# Patient Record
Sex: MALE | Race: BLACK OR AFRICAN AMERICAN | NOT HISPANIC OR LATINO | Employment: OTHER | URBAN - METROPOLITAN AREA
[De-identification: names, ages, dates, MRNs, and addresses within clinical notes are randomized per-mention and may not be internally consistent; named-entity substitution may affect disease eponyms.]

---

## 2017-01-05 ENCOUNTER — OFFICE VISIT (OUTPATIENT)
Dept: OPHTHALMOLOGY | Facility: CLINIC | Age: 72
End: 2017-01-05
Payer: MEDICARE

## 2017-01-05 DIAGNOSIS — H35.033 HYPERTENSIVE RETINOPATHY OF BOTH EYES: ICD-10-CM

## 2017-01-05 DIAGNOSIS — H34.8310 BRANCH RETINAL VEIN OCCLUSION WITH MACULAR EDEMA OF RIGHT EYE: Primary | ICD-10-CM

## 2017-01-05 DIAGNOSIS — H35.371 EPIRETINAL MEMBRANE, RIGHT EYE: ICD-10-CM

## 2017-01-05 PROCEDURE — 92226 PR SPECIAL EYE EXAM, SUBSEQUENT: CPT | Mod: 50,S$PBB,, | Performed by: OPHTHALMOLOGY

## 2017-01-05 PROCEDURE — 92226 PR SPECIAL EYE EXAM, SUBSEQUENT: CPT | Mod: 50,PBBFAC,PO | Performed by: OPHTHALMOLOGY

## 2017-01-05 PROCEDURE — 92014 COMPRE OPH EXAM EST PT 1/>: CPT | Mod: S$PBB,,, | Performed by: OPHTHALMOLOGY

## 2017-01-05 PROCEDURE — 92134 CPTRZ OPH DX IMG PST SGM RTA: CPT | Mod: PBBFAC,PO | Performed by: OPHTHALMOLOGY

## 2017-01-05 PROCEDURE — 99213 OFFICE O/P EST LOW 20 MIN: CPT | Mod: PBBFAC,PO | Performed by: OPHTHALMOLOGY

## 2017-01-05 PROCEDURE — 99999 PR PBB SHADOW E&M-EST. PATIENT-LVL III: CPT | Mod: PBBFAC,,, | Performed by: OPHTHALMOLOGY

## 2017-01-05 NOTE — PROGRESS NOTES
OCT - CME resolved  OS - No ME    Prior FA- late macular leakage with vascular distortion OD  OS - NO leakage    A/P    1. ERM OD    2. Macular BRVO OD (possible)  3. CME  Avastin x 1 (12/29/15)  OCT CME did not improve with Avastin    S/p  25g PPV/MP/AFx OD 3/23/16    Very adherent hyaloid    Doing well, good IOP off gtts    Still ME - NI with Avastin  ME resolved with Ozurdex, but no Va improvement - manage OD conservatively going forward    BCVA 2013 was 20/25    4. NS OU    5. HTN Ret OU  BP control      12 months OCT

## 2017-01-05 NOTE — MR AVS SNAPSHOT
[place] [facility]   [facility]  [place] [address]  Phone: [phone]  Fax: [phone]                  Sidney Lanza   2017 9:30 AM   Office Visit    Description:  Male : 1945   Provider:  DIMITRI Infante MD   Department:  [place] [facility]           Reason for Visit     Concerns About Ocular Health           Diagnoses this Visit        Comments    Branch retinal vein occlusion with macular edema of right eye    -  Primary     Epiretinal membrane, right eye         Hypertensive retinopathy of both eyes                To Do List           Future Appointments        Provider Department Dept Phone    2017 11:30 AM Miri Rush MD [facility] [phone]    3/13/2017 1:40 PM Sunny Soto MD [place] [facility] [phone]      Goals (5 Years of Data)     None      Follow-Up and Disposition     Return in about 1 year (around 2018).      [facility] On Call     [facility] On Call Nurse Care Line -  Assistance  Registered nurses in the [facility] On Call Center provide clinical advisement, health education, appointment booking, and other advisory services.  Call for this free service at [phone].             Medications           Message regarding Medications     Verify the changes and/or additions to your medication regime listed below are the same as discussed with your clinician today.  If any of these changes or additions are incorrect, please notify your healthcare provider.             Verify that the below list of medications is an accurate representation of the medications you are currently taking.  If none reported, the list may be blank. If incorrect, please contact your healthcare provider. Carry this list with you in case of emergency.           Current Medications     amlodipine (NORVASC) 10 MG tablet TAKE 1 TABLET DAILY    diclofenac-misoprostol  mg-mcg (ARTHROTEC 75)  mg-mcg per tablet TAKE  1 TABLET TWICE A DAY FOR ARTHRITIS PAIN.    FIBER, DEXTRIN, ORAL Take by mouth.    fluticasone (FLONASE) 50 mcg/actuation nasal spray 2 sprays by Each Nare route once daily.    multivitamin capsule Take 1 capsule by mouth once daily.           Clinical Reference Information           Allergies as of 1/5/2017     No Known Allergies      Immunizations Administered on Date of Encounter - 1/5/2017     None      Orders Placed During Today's Visit      Normal Orders This Visit    Posterior Segment OCT Retina-Both eyes     Future Labs/Procedures Expected by Expires    Posterior Segment OCT Retina-Both eyes  As directed 1/5/2018

## 2017-03-13 ENCOUNTER — OFFICE VISIT (OUTPATIENT)
Dept: INTERNAL MEDICINE | Facility: CLINIC | Age: 72
End: 2017-03-13
Payer: MEDICARE

## 2017-03-13 ENCOUNTER — TELEPHONE (OUTPATIENT)
Dept: INTERNAL MEDICINE | Facility: CLINIC | Age: 72
End: 2017-03-13

## 2017-03-13 VITALS
SYSTOLIC BLOOD PRESSURE: 138 MMHG | HEIGHT: 70 IN | DIASTOLIC BLOOD PRESSURE: 78 MMHG | RESPIRATION RATE: 15 BRPM | HEART RATE: 60 BPM | BODY MASS INDEX: 24.2 KG/M2 | WEIGHT: 169.75 LBS | DIASTOLIC BLOOD PRESSURE: 70 MMHG | TEMPERATURE: 98 F | TEMPERATURE: 98 F | WEIGHT: 169 LBS | BODY MASS INDEX: 24.3 KG/M2 | HEART RATE: 68 BPM | HEIGHT: 70 IN | SYSTOLIC BLOOD PRESSURE: 138 MMHG

## 2017-03-13 DIAGNOSIS — E78.5 HYPERLIPIDEMIA, UNSPECIFIED HYPERLIPIDEMIA TYPE: ICD-10-CM

## 2017-03-13 DIAGNOSIS — M15.9 PRIMARY OSTEOARTHRITIS INVOLVING MULTIPLE JOINTS: ICD-10-CM

## 2017-03-13 DIAGNOSIS — R29.898 RIGHT ARM WEAKNESS: ICD-10-CM

## 2017-03-13 DIAGNOSIS — H35.371 EPIRETINAL MEMBRANE, RIGHT EYE: ICD-10-CM

## 2017-03-13 DIAGNOSIS — H25.13 NUCLEAR SCLEROSIS, BILATERAL: ICD-10-CM

## 2017-03-13 DIAGNOSIS — R35.1 NOCTURIA: ICD-10-CM

## 2017-03-13 DIAGNOSIS — I10 ESSENTIAL HYPERTENSION: Chronic | ICD-10-CM

## 2017-03-13 DIAGNOSIS — H34.8310 BRANCH RETINAL VEIN OCCLUSION WITH MACULAR EDEMA OF RIGHT EYE: ICD-10-CM

## 2017-03-13 DIAGNOSIS — I10 ESSENTIAL HYPERTENSION: Primary | Chronic | ICD-10-CM

## 2017-03-13 DIAGNOSIS — M19.90 ARTHRITIS: ICD-10-CM

## 2017-03-13 DIAGNOSIS — H35.033 HYPERTENSIVE RETINOPATHY OF BOTH EYES: ICD-10-CM

## 2017-03-13 DIAGNOSIS — D64.9 ANEMIA, UNSPECIFIED TYPE: ICD-10-CM

## 2017-03-13 DIAGNOSIS — N52.9 IMPOTENCE OF ORGANIC ORIGIN: ICD-10-CM

## 2017-03-13 DIAGNOSIS — J31.0 CHRONIC RHINITIS: ICD-10-CM

## 2017-03-13 DIAGNOSIS — H34.8392 BRANCH RETINAL VEIN OCCLUSION: Primary | ICD-10-CM

## 2017-03-13 DIAGNOSIS — Z85.46 HISTORY OF PROSTATE CANCER: ICD-10-CM

## 2017-03-13 DIAGNOSIS — Z00.00 ENCOUNTER FOR PREVENTIVE HEALTH EXAMINATION: ICD-10-CM

## 2017-03-13 DIAGNOSIS — Z13.6 SCREENING FOR AAA (ABDOMINAL AORTIC ANEURYSM): Primary | ICD-10-CM

## 2017-03-13 DIAGNOSIS — H35.371 ERM OD (EPIRETINAL MEMBRANE, RIGHT EYE): ICD-10-CM

## 2017-03-13 PROCEDURE — G0438 PPPS, INITIAL VISIT: HCPCS | Mod: ,,, | Performed by: NURSE PRACTITIONER

## 2017-03-13 PROCEDURE — 99214 OFFICE O/P EST MOD 30 MIN: CPT | Mod: PBBFAC,PO | Performed by: NURSE PRACTITIONER

## 2017-03-13 PROCEDURE — 99214 OFFICE O/P EST MOD 30 MIN: CPT | Mod: S$PBB,,, | Performed by: INTERNAL MEDICINE

## 2017-03-13 PROCEDURE — 99213 OFFICE O/P EST LOW 20 MIN: CPT | Mod: PBBFAC,27,PO | Performed by: INTERNAL MEDICINE

## 2017-03-13 PROCEDURE — 99999 PR PBB SHADOW E&M-EST. PATIENT-LVL III: CPT | Mod: PBBFAC,,, | Performed by: INTERNAL MEDICINE

## 2017-03-13 PROCEDURE — 99999 PR PBB SHADOW E&M-EST. PATIENT-LVL IV: CPT | Mod: PBBFAC,,, | Performed by: NURSE PRACTITIONER

## 2017-03-13 NOTE — Clinical Note
Primary Care Providers: Sunny oSto MD, MD (General)  Your patient was seen today for a HRA visit. Gap(s) in care (HEDIS gaps) have been identified during this visit that require additional testing and possible follow up.  Colonoscopy- has anemia on labs Hep c screen- has been ordered by you AAA screen- exsmoker- orders forwarded for you to approve   I have included a copy of my visit note; please review the note and feel free to contact me with any questions.   Thank you for allowing me to participate in the care of your patients. Deloris Talley NP

## 2017-03-13 NOTE — PROGRESS NOTES
Subjective:       Patient ID: iSdney Lanza is a 71 y.o. male.    Chief Complaint: Hypertension (4 mo follow up)    HPI   The patient presents for follow-up of hypertension and other medical conditions.  He is tolerating his blood pressure medication well without side effects.  He still notes right shoulder pain.  The pain as aching in character.  Mobility is normal.  He is performing home therapy exercises as prescribed by his physical therapist.  There is no sleep disruption due to right shoulder pain.    The patient uses Flonase daily as needed for sinus congestion.  Review of Systems   Constitutional: Negative for activity change, appetite change, fatigue and unexpected weight change.   HENT: Positive for congestion and postnasal drip. Negative for sinus pressure and sore throat.    Eyes: Negative for visual disturbance.   Respiratory: Negative for cough, chest tightness, shortness of breath and wheezing.    Cardiovascular: Negative for chest pain, palpitations and leg swelling.   Gastrointestinal: Negative for abdominal pain, blood in stool, nausea and vomiting.   Genitourinary: Negative for dysuria, hematuria and urgency.   Musculoskeletal: Positive for arthralgias. Negative for back pain, gait problem, joint swelling, myalgias, neck pain and neck stiffness.   Skin: Negative for color change and rash.   Neurological: Negative for dizziness, syncope, weakness, light-headedness, numbness and headaches.   Psychiatric/Behavioral: Negative for sleep disturbance.       Objective:      Physical Exam   Constitutional: He is oriented to person, place, and time. He appears well-developed and well-nourished. No distress.   HENT:   Head: Normocephalic and atraumatic.   Eyes: Conjunctivae and EOM are normal. No scleral icterus.   Neck: Normal range of motion. Neck supple. No JVD present. No thyromegaly present.   Cardiovascular: Normal rate, regular rhythm, normal heart sounds and intact distal pulses.  Exam reveals no  gallop and no friction rub.    No murmur heard.  Pulmonary/Chest: Effort normal and breath sounds normal. No respiratory distress. He has no wheezes. He has no rales.   Abdominal: Soft. Bowel sounds are normal. He exhibits no mass. There is no tenderness.   Musculoskeletal: Normal range of motion.   Shoulder range of motion is intact bilaterally.  No localized tenderness is present at this time.   Lymphadenopathy:     He has no cervical adenopathy.   Neurological: He is alert and oriented to person, place, and time.   Gait is normal.   Skin: Skin is warm and dry. No rash noted.   Nursing note and vitals reviewed.      Assessment:       1. Essential hypertension    2. Hyperlipidemia, unspecified hyperlipidemia type    3. Arthritis    4. Chronic rhinitis    5. Nocturia        Plan:       Sidney was seen today for hypertension.  Current therapy will be continued.  Blood tests will be obtained in 3 months along with a follow-up visit for general checkup and follow-up.    Diagnoses and all orders for this visit:    Essential hypertension    Hyperlipidemia, unspecified hyperlipidemia type    Arthritis    Chronic rhinitis

## 2017-03-13 NOTE — MR AVS SNAPSHOT
[place] [facility]   [facility]  [place] [address]  Phone: [phone]  Fax: [phone]                  Sidney Lanza   3/13/2017 2:00 PM   Office Visit    Description:  Male : 1945   Provider:  HRA, MET 1   Department:  [place] [facility]           Reason for Visit     Medicare AWV           Diagnoses this Visit        Comments    Branch retinal vein occlusion    -  Primary     Branch retinal vein occlusion with macular edema of right eye         Epiretinal membrane, right eye         ERM OD (epiretinal membrane, right eye)         Essential hypertension         Hyperlipidemia, unspecified hyperlipidemia type         Hypertensive retinopathy of both eyes         Impotence of organic origin         Nuclear sclerosis, bilateral         History of prostate cancer         Right arm weakness         Primary osteoarthritis involving multiple joints         Encounter for preventive health examination                To Do List           Goals (5 Years of Data)     Eat more fruits and vegetables     Maintain a low sodium diet       Follow-Up and Disposition     Return in about 3 months (around 2017).      [facility] On Call     [facility] On Call Nurse [facility] Line - / Assistance  Registered nurses in the [facility] On Call Center provide clinical advisement, health education, appointment booking, and other advisory services.  Call for this free service at [phone].             Medications           Message regarding Medications     Verify the changes and/or additions to your medication regime listed below are the same as discussed with your clinician today.  If any of these changes or additions are incorrect, please notify your healthcare provider.             Verify that the below list of medications is an accurate representation of the medications you are currently taking.  If none reported, the list may be blank. If incorrect, please contact your healthcare  "provider. Carry this list with you in case of emergency.           Current Medications     amlodipine (NORVASC) 10 MG tablet TAKE 1 TABLET DAILY    diclofenac-misoprostol  mg-mcg (ARTHROTEC 75)  mg-mcg per tablet TAKE 1 TABLET TWICE A DAY FOR ARTHRITIS PAIN.    FIBER, DEXTRIN, ORAL Take by mouth.    multivitamin capsule Take 1 capsule by mouth once daily.    fluticasone (FLONASE) 50 mcg/actuation nasal spray 2 sprays by Each Nare route once daily.           Clinical Reference Information           Your Vitals Were     BP Pulse Temp Resp Height Weight    138/70 68 97.8 °F (36.6 °C) (Oral) 15 5' 10" (1.778 m) 76.7 kg (169 lb)    BMI                24.25 kg/m2          Blood Pressure          Most Recent Value    BP  138/70      Allergies as of 3/13/2017     No Known Allergies      Immunizations Administered on Date of Encounter - 3/13/2017     None      Instructions      Counseling and Referral of Other Preventative  (Italic type indicates deductible and co-insurance are waived)    Patient Name: Sidney Lanza  Today's Date: 3/13/2017      SERVICE LIMITATIONS RECOMMENDATION    Vaccines    · Pneumococcal (once after 65)    · Influenza (annually)    · Hepatitis B (if medium/high risk)    · Prevnar 13      Hepatitis B medium/high risk factors:       - End-stage renal disease       - Hemophiliacs who received Factor VII or         IX concentrates       - Clients of institutions for the mentally             retarded       - Persons who live in the same house as          a HepB carrier       - Homosexual men       - Illicit injectable drug abusers     Pneumococcal: current     Influenza: current     Hepatitis B: declined     Prevnar 13: current    Prostate cancer screening (annually to age 75)     Prostate specific antigen (PSA) Shared decision making with Provider. Sometimes a co-pay may be required if the patient decides to have this test. The USPSTF no longer recommends prostate cancer screening routinely in " medicine:   current    Colorectal cancer screening (to age 75)    · Fecal occult blood test (annual)  · Flexible sigmoidoscopy (5y)  · Screening colonoscopy (10y)  · Barium enema       Diabetes self-management training (no USPSTF recommendations)  Requires referral by treating physician for patient with diabetes or renal disease. 10 hours of initial DSMT sessions of no less than 30 minutes each in a continuous 12-month period. 2 hours of follow-up DSMT in subsequent years.  N/A    Glaucoma screening (no USPSTF recommendation)  Diabetes mellitus, family history   , age 50 or over    American, age 65 or over  current    Medical nutrition therapy for diabetes or renal disease (no recommended schedule)  Requires referral by treating physician for patient with diabetes or renal disease or kidney transplant within the past 3 years.  Can be provided in same year as diabetes self-management training (DSMT), and CMS recommends medical nutrition therapy take place after DSMT. Up to 3 hours for initial year and 2 hours in subsequent years.  N/A    Cardiovascular screening blood tests (every 5 years)  · Fasting lipid panel  Order as a panel if possible  current    Diabetes screening tests (at least every 3 years, Medicare covers annually or at 6-month intervals for prediabetic patients)  · Fasting blood sugar (FBS) or glucose tolerance test (GTT)  Patient must be diagnosed with one of the following:       - Hypertension       - Dyslipidemia       - Obesity (BMI 30kg/m2)       - Previous elevated impaired FBS or GTT       ... or any two of the following:       - Overweight (BMI 25 but <30)       - Family history of diabetes       - Age 65 or older       - History of gestational diabetes or birth of baby weighing more than 9 pounds  current    Abdominal aortic aneurysm screening (once)  · Sonogram   Limited to patients who meet one of the following criteria:       - Men who are 65-75 years old and have  smoked more than 100 cigarette in their lifetime       - Anyone with a family history of abdominal aortic aneurysm       - Anyone recommended for screening by the USPSTF  message sent to PCP to order- Pt to contact Dr Soto to arrange    HIV screening (annually for increased risk patients)  · HIV-1 and HIV-2 by EIA, or POLA, rapid antibody test or oral mucosa transudate  Patients must be at increased risk for HIV infection per USPSTF guidelines or pregnant. Tests covered annually for patient at increased risk or as requested by the patient. Pregnant patients may receive up to 3 tests during pregnancy.  Risks discussed, screening is declined    Smoking cessation counseling (up to 8 sessions per year)  Patients must be asymptomatic of tobacco-related conditions to receive as a preventative service.  N/A    Subsequent annual wellness visit  At least 12 months since last AWV  Return in one year     The following information is provided to all patients.  This information is to help you find resources for any of the problems found today that may be affecting your health:                Living healthy guide: www.[facility].[place].[facility]      Understanding Diabetes: www.diabetes.org      Eating healthy: www.cdc.gov/healthyweight      [facility] home safety checklist: www.cdc.gov/steadi/patient.html      Agency on Aging: www.goea.[place].[facility]      Alcoholics anonymous (AA): www.aa.org      Physical Activity: www.nathaly.nih.gov/kv5yqut      Tobacco use: www.quitwithusla.org          Language Assistance Services     ATTENTION: Language assistance services are available, free of charge. Please call [phone].      ATENCIÓN: Si habla español, tiene a martino disposición servicios gratuitos de asistencia lingüística. Llame al [phone].     CHÚ Ý: N?u b?n nói Ti?ng Vi?t, có các d?ch v? h? tr? ngôn ng? mi?n phí dành cho b?n. G?i s? [phone].         [facility] [facility] complies with applicable Federal civil rights laws and  does not discriminate on the basis of race, color, national origin, age, disability, or sex.

## 2017-03-13 NOTE — PATIENT INSTRUCTIONS
Counseling and Referral of Other Preventative  (Italic type indicates deductible and co-insurance are waived)    Patient Name: Sidney Lanza  Today's Date: 3/13/2017      SERVICE LIMITATIONS RECOMMENDATION    Vaccines    · Pneumococcal (once after 65)    · Influenza (annually)    · Hepatitis B (if medium/high risk)    · Prevnar 13      Hepatitis B medium/high risk factors:       - End-stage renal disease       - Hemophiliacs who received Factor VII or         IX concentrates       - Clients of institutions for the mentally             retarded       - Persons who live in the same house as          a HepB carrier       - Homosexual men       - Illicit injectable drug abusers     Pneumococcal: current     Influenza: current     Hepatitis B: declined     Prevnar 13: current    Prostate cancer screening (annually to age 75)     Prostate specific antigen (PSA) Shared decision making with Provider. Sometimes a co-pay may be required if the patient decides to have this test. The USPSTF no longer recommends prostate cancer screening routinely in medicine:   current    Colorectal cancer screening (to age 75)    · Fecal occult blood test (annual)  · Flexible sigmoidoscopy (5y)  · Screening colonoscopy (10y)  · Barium enema   recommended -he will discuss with DR Soto    Diabetes self-management training (no USPSTF recommendations)  Requires referral by treating physician for patient with diabetes or renal disease. 10 hours of initial DSMT sessions of no less than 30 minutes each in a continuous 12-month period. 2 hours of follow-up DSMT in subsequent years.  N/A    Glaucoma screening (no USPSTF recommendation)  Diabetes mellitus, family history   , age 50 or over    American, age 65 or over  current    Medical nutrition therapy for diabetes or renal disease (no recommended schedule)  Requires referral by treating physician for patient with diabetes or renal disease or kidney transplant within the  past 3 years.  Can be provided in same year as diabetes self-management training (DSMT), and CMS recommends medical nutrition therapy take place after DSMT. Up to 3 hours for initial year and 2 hours in subsequent years.  N/A    Cardiovascular screening blood tests (every 5 years)  · Fasting lipid panel  Order as a panel if possible  current    Diabetes screening tests (at least every 3 years, Medicare covers annually or at 6-month intervals for prediabetic patients)  · Fasting blood sugar (FBS) or glucose tolerance test (GTT)  Patient must be diagnosed with one of the following:       - Hypertension       - Dyslipidemia       - Obesity (BMI 30kg/m2)       - Previous elevated impaired FBS or GTT       ... or any two of the following:       - Overweight (BMI 25 but <30)       - Family history of diabetes       - Age 65 or older       - History of gestational diabetes or birth of baby weighing more than 9 pounds  current    Abdominal aortic aneurysm screening (once)  · Sonogram   Limited to patients who meet one of the following criteria:       - Men who are 65-75 years old and have smoked more than 100 cigarette in their lifetime       - Anyone with a family history of abdominal aortic aneurysm       - Anyone recommended for screening by the USPSTF  message sent to PCP to order- Pt to contact Dr Soto to schedule    HIV screening (annually for increased risk patients)  · HIV-1 and HIV-2 by EIA, or POLA, rapid antibody test or oral mucosa transudate  Patients must be at increased risk for HIV infection per USPSTF guidelines or pregnant. Tests covered annually for patient at increased risk or as requested by the patient. Pregnant patients may receive up to 3 tests during pregnancy.  Risks discussed, screening is declined    Smoking cessation counseling (up to 8 sessions per year)  Patients must be asymptomatic of tobacco-related conditions to receive as a preventative service.  N/A    Subsequent annual wellness visit   At least 12 months since last AWV  Return in one year     The following information is provided to all patients.  This information is to help you find resources for any of the problems found today that may be affecting your health:                Living healthy guide: www.[facility].[place].[facility]      Understanding Diabetes: www.diabetes.org      Eating healthy: www.cdc.gov/healthyweight      [facility] home safety checklist: www.cdc.gov/steadi/patient.html      Agency on Aging: www.goea.[place].[facility]      Alcoholics anonymous (AA): www.aa.org      Physical Activity: www.nathaly.nih.gov/oz0ayat      Tobacco use: www.quitwithusla.org

## 2017-03-13 NOTE — PROGRESS NOTES
"Sidney Lanza presented for  Medicare AW today. The following components were reviewed and updated:    · Medical history  · Family History  · Social history  · Allergies and Current Medications  · Health Risk Assessment  · Health Maintenance  · Care Team    **See Completed Assessments for Annual Wellness visit with in the encounter summary    The following assessments were completed:  · Depression Screening  · Cognitive function Screening  · Timed Get Up Test  · Whisper Test    Vitals:    03/13/17 1433   BP: 138/70   Pulse: 68   Resp: 15   Temp: 97.8 °F (36.6 °C)   TempSrc: Oral   Weight: 76.7 kg (169 lb)   Height: 5' 10" (1.778 m)     Body mass index is 24.25 kg/(m^2).   ]        Diagnoses and health risks identified today and associated recommendations/orders:    1. Branch retinal vein occlusion  Stable- followed by opthalmology    2. Branch retinal vein occlusion with macular edema of right eye  Stable- followed by opthalmology      3. Epiretinal membrane, right eye  Stable- followed by opthalmology      4. ERM OD (epiretinal membrane, right eye)  Stable- followed by opthalmology      5. Essential hypertension  Stable- followed by PCP     6. Hyperlipidemia, unspecified hyperlipidemia type  Stable- followed by PCP     7. Hypertensive retinopathy of both eyes  Stable- followed by opthalmology      8. Impotence of organic origin  Stable- followed by PCP     9. Nuclear sclerosis, bilateral  Stable- followed by opthalmology      10. History of prostate cancer  Stable- followed by PCP     11. Right arm weakness  Stable- followed by PCP & orthopedic    12. Primary osteoarthritis involving multiple joints  Stable- followed by PCP & orthopedic    13. Encounter for preventive health examination  Assessments completed  Preventative health recommendations reviewed with patient    14 Anemia  Stable- followed by PCP         Provided Sidney with a 5-10 year written screening schedule and personal prevention plan. " Recommendations were developed using the USPSTF age appropriate recommendations. Education, counseling, and referrals were provided as needed.  After Visit Summary printed and given to patient which includes a list of additional screenings\tests needed. He will schedule hep C screen, AAA screen (exsmoker) and discuss need for screening colonoscopy with PCP. Pt education- RENEE to diet. He exercises regularly. Denies dental issues .   Return in about 3 months (around 6/13/2017).      Deloris Talley NP

## 2017-03-14 ENCOUNTER — TELEPHONE (OUTPATIENT)
Dept: INTERNAL MEDICINE | Facility: CLINIC | Age: 72
End: 2017-03-14

## 2017-03-30 ENCOUNTER — CLINICAL SUPPORT (OUTPATIENT)
Dept: CARDIOLOGY | Facility: CLINIC | Age: 72
End: 2017-03-30
Attending: INTERNAL MEDICINE
Payer: MEDICARE

## 2017-03-30 DIAGNOSIS — Z13.6 SCREENING FOR AAA (ABDOMINAL AORTIC ANEURYSM): ICD-10-CM

## 2017-03-30 LAB — VASCULAR ABDOMINAL AORTIC ANEURYSM (AAA): 2.25

## 2017-03-30 PROCEDURE — 93978 VASCULAR STUDY: CPT | Mod: PBBFAC | Performed by: INTERNAL MEDICINE

## 2017-05-30 ENCOUNTER — TELEPHONE (OUTPATIENT)
Dept: ORTHOPEDICS | Facility: CLINIC | Age: 72
End: 2017-05-30

## 2017-05-30 NOTE — TELEPHONE ENCOUNTER
"VM left, calling patient about upcoming appointment. "shoulder trauma" per patient.  Will need new xray, need to know when this injury occurred and move up appt if needed.   "

## 2017-06-14 DIAGNOSIS — M25.511 RIGHT SHOULDER PAIN, UNSPECIFIED CHRONICITY: Primary | ICD-10-CM

## 2017-06-16 ENCOUNTER — LAB VISIT (OUTPATIENT)
Dept: LAB | Facility: HOSPITAL | Age: 72
End: 2017-06-16
Attending: INTERNAL MEDICINE
Payer: MEDICARE

## 2017-06-16 DIAGNOSIS — R35.1 NOCTURIA: ICD-10-CM

## 2017-06-16 DIAGNOSIS — E78.5 HYPERLIPIDEMIA, UNSPECIFIED HYPERLIPIDEMIA TYPE: ICD-10-CM

## 2017-06-16 DIAGNOSIS — I10 ESSENTIAL HYPERTENSION: Chronic | ICD-10-CM

## 2017-06-16 LAB
ALBUMIN SERPL BCP-MCNC: 4.2 G/DL
ALP SERPL-CCNC: 61 U/L
ALT SERPL W/O P-5'-P-CCNC: 36 U/L
ANION GAP SERPL CALC-SCNC: 7 MMOL/L
AST SERPL-CCNC: 30 U/L
BASOPHILS # BLD AUTO: 0.02 K/UL
BASOPHILS NFR BLD: 0.2 %
BILIRUB SERPL-MCNC: 0.5 MG/DL
BUN SERPL-MCNC: 12 MG/DL
CALCIUM SERPL-MCNC: 9.9 MG/DL
CHLORIDE SERPL-SCNC: 107 MMOL/L
CHOLEST/HDLC SERPL: 4.4 {RATIO}
CO2 SERPL-SCNC: 27 MMOL/L
COMPLEXED PSA SERPL-MCNC: <0.01 NG/ML
CREAT SERPL-MCNC: 1.1 MG/DL
DIFFERENTIAL METHOD: ABNORMAL
EOSINOPHIL # BLD AUTO: 0.1 K/UL
EOSINOPHIL NFR BLD: 1.6 %
ERYTHROCYTE [DISTWIDTH] IN BLOOD BY AUTOMATED COUNT: 16.3 %
EST. GFR  (AFRICAN AMERICAN): >60 ML/MIN/1.73 M^2
EST. GFR  (NON AFRICAN AMERICAN): >60 ML/MIN/1.73 M^2
GLUCOSE SERPL-MCNC: 100 MG/DL
HCT VFR BLD AUTO: 38.9 %
HDL/CHOLESTEROL RATIO: 22.6 %
HDLC SERPL-MCNC: 212 MG/DL
HDLC SERPL-MCNC: 48 MG/DL
HGB BLD-MCNC: 12.7 G/DL
LDLC SERPL CALC-MCNC: 145.2 MG/DL
LYMPHOCYTES # BLD AUTO: 2.9 K/UL
LYMPHOCYTES NFR BLD: 35.5 %
MCH RBC QN AUTO: 23.6 PG
MCHC RBC AUTO-ENTMCNC: 32.6 %
MCV RBC AUTO: 72 FL
MONOCYTES # BLD AUTO: 0.6 K/UL
MONOCYTES NFR BLD: 7.6 %
NEUTROPHILS # BLD AUTO: 4.5 K/UL
NEUTROPHILS NFR BLD: 55 %
NONHDLC SERPL-MCNC: 164 MG/DL
PLATELET # BLD AUTO: 258 K/UL
PMV BLD AUTO: 9 FL
POTASSIUM SERPL-SCNC: 4.7 MMOL/L
PROT SERPL-MCNC: 7.7 G/DL
RBC # BLD AUTO: 5.37 M/UL
SODIUM SERPL-SCNC: 141 MMOL/L
TRIGL SERPL-MCNC: 94 MG/DL
TSH SERPL DL<=0.005 MIU/L-ACNC: 1.11 UIU/ML
WBC # BLD AUTO: 8.18 K/UL

## 2017-06-16 PROCEDURE — 84153 ASSAY OF PSA TOTAL: CPT

## 2017-06-16 PROCEDURE — 80053 COMPREHEN METABOLIC PANEL: CPT

## 2017-06-16 PROCEDURE — 36415 COLL VENOUS BLD VENIPUNCTURE: CPT

## 2017-06-16 PROCEDURE — 84443 ASSAY THYROID STIM HORMONE: CPT

## 2017-06-16 PROCEDURE — 85025 COMPLETE CBC W/AUTO DIFF WBC: CPT

## 2017-06-16 PROCEDURE — 80061 LIPID PANEL: CPT

## 2017-06-22 ENCOUNTER — OFFICE VISIT (OUTPATIENT)
Dept: INTERNAL MEDICINE | Facility: CLINIC | Age: 72
End: 2017-06-22
Payer: MEDICARE

## 2017-06-22 ENCOUNTER — LAB VISIT (OUTPATIENT)
Dept: LAB | Facility: HOSPITAL | Age: 72
End: 2017-06-22
Attending: INTERNAL MEDICINE
Payer: MEDICARE

## 2017-06-22 VITALS
BODY MASS INDEX: 24.61 KG/M2 | WEIGHT: 171.94 LBS | TEMPERATURE: 98 F | RESPIRATION RATE: 14 BRPM | HEART RATE: 65 BPM | DIASTOLIC BLOOD PRESSURE: 78 MMHG | SYSTOLIC BLOOD PRESSURE: 138 MMHG | HEIGHT: 70 IN

## 2017-06-22 DIAGNOSIS — M15.9 PRIMARY OSTEOARTHRITIS INVOLVING MULTIPLE JOINTS: ICD-10-CM

## 2017-06-22 DIAGNOSIS — I10 ESSENTIAL HYPERTENSION: Primary | Chronic | ICD-10-CM

## 2017-06-22 DIAGNOSIS — D64.9 ANEMIA, UNSPECIFIED TYPE: ICD-10-CM

## 2017-06-22 DIAGNOSIS — Z91.89 ENCOUNTER FOR HEPATITIS C VIRUS SCREENING TEST FOR HIGH RISK PATIENT: ICD-10-CM

## 2017-06-22 DIAGNOSIS — J31.0 CHRONIC RHINITIS: ICD-10-CM

## 2017-06-22 DIAGNOSIS — Z11.59 ENCOUNTER FOR HEPATITIS C VIRUS SCREENING TEST FOR HIGH RISK PATIENT: ICD-10-CM

## 2017-06-22 DIAGNOSIS — E78.5 HYPERLIPIDEMIA, UNSPECIFIED HYPERLIPIDEMIA TYPE: ICD-10-CM

## 2017-06-22 PROCEDURE — 36415 COLL VENOUS BLD VENIPUNCTURE: CPT | Mod: PO

## 2017-06-22 PROCEDURE — 1125F AMNT PAIN NOTED PAIN PRSNT: CPT | Mod: ,,, | Performed by: INTERNAL MEDICINE

## 2017-06-22 PROCEDURE — 99999 PR PBB SHADOW E&M-EST. PATIENT-LVL III: CPT | Mod: PBBFAC,,, | Performed by: INTERNAL MEDICINE

## 2017-06-22 PROCEDURE — 99214 OFFICE O/P EST MOD 30 MIN: CPT | Mod: S$PBB,,, | Performed by: INTERNAL MEDICINE

## 2017-06-22 PROCEDURE — 1159F MED LIST DOCD IN RCRD: CPT | Mod: ,,, | Performed by: INTERNAL MEDICINE

## 2017-06-22 PROCEDURE — 86803 HEPATITIS C AB TEST: CPT

## 2017-06-22 PROCEDURE — 99213 OFFICE O/P EST LOW 20 MIN: CPT | Mod: PBBFAC,PO | Performed by: INTERNAL MEDICINE

## 2017-06-22 RX ORDER — DICLOFENAC SODIUM AND MISOPROSTOL 75; 200 MG/1; UG/1
1 TABLET, DELAYED RELEASE ORAL DAILY
Qty: 90 TABLET | Refills: 3 | Status: SHIPPED | OUTPATIENT
Start: 2017-06-22 | End: 2017-09-20 | Stop reason: SDUPTHER

## 2017-06-22 RX ORDER — FLUTICASONE PROPIONATE 50 MCG
2 SPRAY, SUSPENSION (ML) NASAL DAILY
Qty: 3 BOTTLE | Refills: 3 | Status: SHIPPED | OUTPATIENT
Start: 2017-06-22 | End: 2018-07-25 | Stop reason: SDUPTHER

## 2017-06-22 NOTE — PROGRESS NOTES
Subjective:       Patient ID: Sidney Lanza is a 71 y.o. male.    Chief Complaint: Annual Exam; Follow-up; Hypertension; and Hyperlipidemia    HPI   The patient presents for follow-up of medical conditions.  This includes annual examination.  The patient has hypertension, hyperlipidemia, arthritis, and chronic rhinitis.  Bilateral shoulder pain.  He is performing upper extremity exercises as prescribed.  He is unable to lie on the right shoulder ivory due to discomfort.  Posterior neck muscles are also tight at times.  He is using Arthrotec as needed for joint pain relief.    He is resting well at night although he does note rhinitis symptoms at night which include postnasal drainage and congestion.  He is tolerating his blood pressure medication well.  On self monitoring his blood pressures have ranged from 124-127 systolic over 70+ diastolic.  He does experience nocturia.  He is not experiencing any morning headaches.    Social history: the patient is a former cigarette smoker.  He currently drinks 2 beers at night.    Review of Systems   Constitutional: Negative for activity change, appetite change, chills, fatigue, fever and unexpected weight change.   HENT: Negative for congestion, ear pain, nosebleeds and postnasal drip.    Eyes: Negative for pain, redness, itching and visual disturbance.   Respiratory: Negative for cough, chest tightness, shortness of breath and wheezing.    Cardiovascular: Negative for chest pain, palpitations and leg swelling.   Gastrointestinal: Negative for abdominal pain, blood in stool, constipation, nausea and vomiting.   Genitourinary: Negative for difficulty urinating, dysuria, frequency, hematuria and urgency.   Musculoskeletal: Positive for arthralgias, joint swelling and neck pain. Negative for back pain, gait problem, myalgias and neck stiffness.   Skin: Negative for color change and rash.   Neurological: Negative for dizziness, seizures, syncope, weakness, light-headedness,  numbness and headaches.   Hematological: Does not bruise/bleed easily.   Psychiatric/Behavioral: Negative for agitation, confusion, hallucinations and sleep disturbance. The patient is not nervous/anxious.        Objective:      Physical Exam   Constitutional: He is oriented to person, place, and time. He appears well-developed and well-nourished. No distress.   HENT:   Head: Normocephalic and atraumatic.   Right Ear: External ear normal.   Left Ear: External ear normal.   Mouth/Throat: Oropharynx is clear and moist. No oropharyngeal exudate.   Eyes: Conjunctivae and EOM are normal. Pupils are equal, round, and reactive to light. No scleral icterus.   Neck: Normal range of motion. Neck supple. No JVD present. No thyromegaly present.   Cardiovascular: Normal rate, regular rhythm, normal heart sounds and intact distal pulses.  Exam reveals no gallop and no friction rub.    No murmur heard.  Pulmonary/Chest: Effort normal and breath sounds normal. No respiratory distress. He has no wheezes. He has no rales.   Abdominal: Soft. Bowel sounds are normal. He exhibits no mass. There is no tenderness.   Musculoskeletal: Normal range of motion. He exhibits deformity. He exhibits no edema or tenderness.   Heberden's and Aline's nodes noted of both hands.   Lymphadenopathy:     He has no cervical adenopathy.   Neurological: He is alert and oriented to person, place, and time. No cranial nerve deficit. He exhibits normal muscle tone.   Skin: Skin is warm and dry. No rash noted.   Psychiatric: He has a normal mood and affect. His behavior is normal.       Results for orders placed or performed in visit on 06/16/17   Urinalysis   Result Value Ref Range    Specimen UA Urine, Unspecified     Color, UA Yellow Yellow, Straw, Lanette    Appearance, UA Clear Clear    pH, UA 7.0 5.0 - 8.0    Specific Gravity, UA 1.010 1.005 - 1.030    Protein, UA Negative Negative    Glucose, UA Negative Negative    Ketones, UA Negative Negative     Bilirubin (UA) Negative Negative    Occult Blood UA Negative Negative    Nitrite, UA Negative Negative    Urobilinogen, UA Negative <2.0 EU/dL    Leukocytes, UA Negative Negative     Other labs were reviewed with the patient.    Abdominal aortic ultrasound screening for aneurysm was negative.  Assessment:       1. Essential hypertension    2. Hyperlipidemia, unspecified hyperlipidemia type    3. Primary osteoarthritis involving multiple joints    4. Anemia, unspecified type    5. Chronic rhinitis    6. Encounter for hepatitis C virus screening test for high risk patient        Plan:     Sidney was seen today for annual exam, follow-up, hypertension and hyperlipidemia.  Hepatitis C antibody test will be obtained today for screening.  Arthrotec will be renewed.  The patient is to return to clinic in 6 months.    Diagnoses and all orders for this visit:    Essential hypertension    Hyperlipidemia, unspecified hyperlipidemia type    Primary osteoarthritis involving multiple joints    Anemia, unspecified type    Chronic rhinitis    Encounter for hepatitis C virus screening test for high risk patient  -     Hepatitis C antibody; Future    Other orders  -     diclofenac-misoprostol  mg-mcg (ARTHROTEC 75)  mg-mcg per tablet; Take 1 tablet by mouth once daily. For arthritis pain.  -     fluticasone (FLONASE) 50 mcg/actuation nasal spray; 2 sprays by Each Nare route once daily.

## 2017-06-23 LAB — HCV AB SERPL QL IA: NEGATIVE

## 2017-06-26 ENCOUNTER — TELEPHONE (OUTPATIENT)
Dept: ORTHOPEDICS | Facility: CLINIC | Age: 72
End: 2017-06-26

## 2017-06-27 ENCOUNTER — HOSPITAL ENCOUNTER (OUTPATIENT)
Dept: RADIOLOGY | Facility: OTHER | Age: 72
Discharge: HOME | End: 2017-06-27
Attending: ORTHOPAEDIC SURGERY
Payer: MEDICARE

## 2017-06-27 ENCOUNTER — OFFICE VISIT (OUTPATIENT)
Dept: ORTHOPEDICS | Facility: CLINIC | Age: 72
End: 2017-06-27
Payer: MEDICARE

## 2017-06-27 VITALS
BODY MASS INDEX: 24.61 KG/M2 | WEIGHT: 171.94 LBS | HEART RATE: 65 BPM | DIASTOLIC BLOOD PRESSURE: 81 MMHG | HEIGHT: 70 IN | RESPIRATION RATE: 18 BRPM | SYSTOLIC BLOOD PRESSURE: 138 MMHG

## 2017-06-27 DIAGNOSIS — M25.511 RIGHT SHOULDER PAIN, UNSPECIFIED CHRONICITY: ICD-10-CM

## 2017-06-27 PROCEDURE — 99213 OFFICE O/P EST LOW 20 MIN: CPT | Mod: PBBFAC,25 | Performed by: ORTHOPAEDIC SURGERY

## 2017-06-27 PROCEDURE — 73030 X-RAY EXAM OF SHOULDER: CPT | Mod: 26,RT,, | Performed by: RADIOLOGY

## 2017-06-27 PROCEDURE — 99213 OFFICE O/P EST LOW 20 MIN: CPT | Mod: S$PBB,25,, | Performed by: ORTHOPAEDIC SURGERY

## 2017-06-27 PROCEDURE — 1159F MED LIST DOCD IN RCRD: CPT | Mod: ,,, | Performed by: ORTHOPAEDIC SURGERY

## 2017-06-27 PROCEDURE — 99999 PR PBB SHADOW E&M-EST. PATIENT-LVL III: CPT | Mod: PBBFAC,,, | Performed by: ORTHOPAEDIC SURGERY

## 2017-06-27 PROCEDURE — 1125F AMNT PAIN NOTED PAIN PRSNT: CPT | Mod: ,,, | Performed by: ORTHOPAEDIC SURGERY

## 2017-06-27 PROCEDURE — 20610 DRAIN/INJ JOINT/BURSA W/O US: CPT | Mod: PBBFAC | Performed by: ORTHOPAEDIC SURGERY

## 2017-06-27 RX ADMIN — TRIAMCINOLONE ACETONIDE 80 MG: 40 INJECTION, SUSPENSION INTRA-ARTICULAR; INTRAMUSCULAR at 01:06

## 2017-06-27 NOTE — PROGRESS NOTES
I have personally taken the history and examined this patient. I agree with the resident's note as stated above. Pt has TTP over biceps, Pain with impingement signs. Plan for injection today, HEP. Pt has been doing HEP for 6 months.

## 2017-06-27 NOTE — PROCEDURES
Large Joint Aspiration/Injection  Date/Time: 6/27/2017 1:38 PM  Performed by: TRAMAINE CONTRERAS  Authorized by: TRAMAINE CONTRERAS     Indications:  Pain  Timeout: Prior to procedure the correct patient, procedure, and site was verified    Needle size:  22 G  Approach:  Posterior  Medications:  80 mg triamcinolone acetonide 40 mg/mL

## 2017-06-27 NOTE — PROGRESS NOTES
HPI:  70 yo male, RHD, here for re-eval of his right shoulder. He has chronic right shoulder pain, likely from DJD as well as RTC tendinopathy. He had an injection Dec 2016 which helped for almost 3 months. He does have night time pain. He is interested in another injection. He also complains of some right scapular and trapezial pain which prompted the shoulder pain. He denies radicular pain.     ROS:  Patient denies constitutional symptoms, cardiac symptoms, respiratory symptoms, GI symptoms.  The remainder of the musculoskeletal ROS is included in the HPI.    PE:    AA&O x 4.  NAD  HEENT:  NCAT, sclera nonicteric  Lungs:  Respirations are equal and unlabored.  CV:  2+ bilateral upper and lower extremity pulses.  Skin:  Intact throughout.    MS -  Right shoulder with full passive ROM. IR to L1, ER to 40 degrees  +Jobes test, + impingement, -guerrero  5/5 strength with abduction, IR/ER.   Sensation intact, palpable distal pulses   Negative spurlings    Rads:  Imaging revealed showing supraspinatus calcific tendinitis. Moderate DJD    A/P:  71 year old male with right shoulder RTC tendinosis     --he would like to proceed with another right shoulder injection today in clinic

## 2017-06-28 RX ORDER — TRIAMCINOLONE ACETONIDE 40 MG/ML
80 INJECTION, SUSPENSION INTRA-ARTICULAR; INTRAMUSCULAR
Status: DISCONTINUED | OUTPATIENT
Start: 2017-06-27 | End: 2017-06-28 | Stop reason: HOSPADM

## 2017-09-20 RX ORDER — DICLOFENAC SODIUM AND MISOPROSTOL 75; 200 MG/1; UG/1
1 TABLET, DELAYED RELEASE ORAL DAILY
Qty: 90 TABLET | Refills: 3 | Status: SHIPPED | OUTPATIENT
Start: 2017-09-20 | End: 2017-11-28 | Stop reason: SDUPTHER

## 2017-09-20 NOTE — TELEPHONE ENCOUNTER
----- Message from Sergei Reis sent at 9/20/2017  2:22 PM CDT -----  Contact: Pt at [phone]  Pt said script diclofenac-misoprostol  mg-mcg (ARTHROTEC 75)  mg-mcg per tablet was called in to pharmacy for 60 day supply instead of 90 day supply. Pt would like this to be corrected.     [facility] - [address]   [phone] (Phone)  [phone] (Fax)

## 2017-09-22 ENCOUNTER — TELEPHONE (OUTPATIENT)
Dept: INTERNAL MEDICINE | Facility: CLINIC | Age: 72
End: 2017-09-22

## 2017-09-22 NOTE — TELEPHONE ENCOUNTER
"----- Message from Mirtha Zee sent at 9/22/2017  1:22 PM CDT -----  Contact: Pt [phone]  RX request - refill or new RX.  Is this a refill or new RX:  Refill  RX name and strength: diclofenac-misoprostol  mg-mcg (ARTHROTEC 75)  mg-mcg per tablet  Directions:   Is this a 30 day or 90 day RX:  90  Pharmacy name and phone # (DON'T enter "on file" or "in chart"): [facility] - [address] [phone] (Phone)  [phone] (Fax)  Comments:        "

## 2017-09-22 NOTE — TELEPHONE ENCOUNTER
Spoke to pt. Pt advised that the [facility] had [facility] with 3 refills on 9/20/17.

## 2017-09-26 ENCOUNTER — OFFICE VISIT (OUTPATIENT)
Dept: ORTHOPEDICS | Facility: CLINIC | Age: 72
End: 2017-09-26
Payer: MEDICARE

## 2017-09-26 VITALS
HEIGHT: 70 IN | SYSTOLIC BLOOD PRESSURE: 109 MMHG | BODY MASS INDEX: 24.61 KG/M2 | WEIGHT: 171.94 LBS | DIASTOLIC BLOOD PRESSURE: 72 MMHG | HEART RATE: 72 BPM

## 2017-09-26 DIAGNOSIS — M75.111 INCOMPLETE TEAR OF RIGHT ROTATOR CUFF: Primary | ICD-10-CM

## 2017-09-26 PROCEDURE — 99213 OFFICE O/P EST LOW 20 MIN: CPT | Mod: S$PBB,,, | Performed by: ORTHOPAEDIC SURGERY

## 2017-09-26 PROCEDURE — 99999 PR PBB SHADOW E&M-EST. PATIENT-LVL III: CPT | Mod: PBBFAC,,, | Performed by: ORTHOPAEDIC SURGERY

## 2017-09-26 PROCEDURE — 99213 OFFICE O/P EST LOW 20 MIN: CPT | Mod: PBBFAC | Performed by: ORTHOPAEDIC SURGERY

## 2017-09-30 ENCOUNTER — HOSPITAL ENCOUNTER (OUTPATIENT)
Dept: RADIOLOGY | Facility: HOSPITAL | Age: 72
Discharge: HOME | End: 2017-09-30
Attending: ORTHOPAEDIC SURGERY
Payer: MEDICARE

## 2017-09-30 DIAGNOSIS — M75.111 INCOMPLETE TEAR OF RIGHT ROTATOR CUFF: ICD-10-CM

## 2017-09-30 PROCEDURE — 73221 MRI JOINT UPR EXTREM W/O DYE: CPT | Mod: 26,RT,, | Performed by: RADIOLOGY

## 2017-09-30 PROCEDURE — 73221 MRI JOINT UPR EXTREM W/O DYE: CPT | Mod: TC,RT

## 2017-10-03 ENCOUNTER — OFFICE VISIT (OUTPATIENT)
Dept: ORTHOPEDICS | Facility: CLINIC | Age: 72
End: 2017-10-03
Payer: MEDICARE

## 2017-10-03 VITALS
RESPIRATION RATE: 71 BRPM | BODY MASS INDEX: 24.48 KG/M2 | WEIGHT: 171 LBS | HEART RATE: 71 BPM | HEIGHT: 70 IN | SYSTOLIC BLOOD PRESSURE: 145 MMHG | DIASTOLIC BLOOD PRESSURE: 79 MMHG

## 2017-10-03 DIAGNOSIS — M75.111 INCOMPLETE TEAR OF RIGHT ROTATOR CUFF: Primary | ICD-10-CM

## 2017-10-03 PROCEDURE — 99999 PR PBB SHADOW E&M-EST. PATIENT-LVL III: CPT | Mod: PBBFAC,,, | Performed by: ORTHOPAEDIC SURGERY

## 2017-10-03 PROCEDURE — 99213 OFFICE O/P EST LOW 20 MIN: CPT | Mod: PBBFAC | Performed by: ORTHOPAEDIC SURGERY

## 2017-10-03 PROCEDURE — 99212 OFFICE O/P EST SF 10 MIN: CPT | Mod: S$PBB,,, | Performed by: ORTHOPAEDIC SURGERY

## 2017-10-10 ENCOUNTER — INITIAL CONSULT (OUTPATIENT)
Dept: INTERNAL MEDICINE | Facility: CLINIC | Age: 72
End: 2017-10-10
Payer: MEDICARE

## 2017-10-10 VITALS
HEART RATE: 71 BPM | TEMPERATURE: 98 F | BODY MASS INDEX: 23.19 KG/M2 | OXYGEN SATURATION: 97 % | HEIGHT: 70 IN | DIASTOLIC BLOOD PRESSURE: 78 MMHG | WEIGHT: 162 LBS | SYSTOLIC BLOOD PRESSURE: 129 MMHG

## 2017-10-10 DIAGNOSIS — I10 ESSENTIAL HYPERTENSION: Chronic | ICD-10-CM

## 2017-10-10 DIAGNOSIS — I83.93 VARICOSE VEINS OF BOTH LOWER EXTREMITIES: ICD-10-CM

## 2017-10-10 DIAGNOSIS — D64.9 ANEMIA, UNSPECIFIED TYPE: ICD-10-CM

## 2017-10-10 DIAGNOSIS — Z01.818 PREOP EXAMINATION: Primary | ICD-10-CM

## 2017-10-10 PROCEDURE — 99214 OFFICE O/P EST MOD 30 MIN: CPT | Mod: S$PBB,,, | Performed by: HOSPITALIST

## 2017-10-10 PROCEDURE — 99213 OFFICE O/P EST LOW 20 MIN: CPT | Mod: PBBFAC | Performed by: HOSPITALIST

## 2017-10-10 PROCEDURE — 99999 PR PBB SHADOW E&M-EST. PATIENT-LVL III: CPT | Mod: PBBFAC,,, | Performed by: HOSPITALIST

## 2017-10-10 RX ORDER — GLUCOSAMINE/CHONDRO SU A 500-400 MG
2 TABLET ORAL EVERY MORNING
COMMUNITY

## 2017-10-10 NOTE — PROGRESS NOTES
CHIEF COMPLAINT:  Three-month followup, right shoulder.    HISTORY OF PRESENT ILLNESS:  Mr. Lanza is a 72-year-old male who has   recurrent right shoulder pain.  His last injection was three months ago, it   worked pretty well, but he started having the pain again, it is bothering him.    He cannot sleep on it at night.  He states it is really that did not get a lot   better.  He has problems with activities of daily living.    PHYSICAL EXAMINATION:  EXTREMITIES:  Median, radial, ulnar, and anterior interosseous, posterior   interosseous, motor and sensation to light touch intact.  Brisk capillary   refill.  On examination of his shoulder, he is tender to palpation, AC, lateral   acromion.  He has got pain on range of motion, forward flexion, external   rotation and internal rotation.    PLAN:  At this time, he has failed conservative treatment.  Therefore, we will   order an MRI for further evaluation prior to proceeding with another injection.    The patient agrees.  He will return to the clinic after the MRI.      LES/VENICE  dd: 10/09/2017 16:01:02 (LEILANI)  td: 10/10/2017 06:32:13 (LEILANI)  Doc ID   #[id number]  Job ID #[id number]    CC:

## 2017-10-10 NOTE — PROGRESS NOTES
"[facility] - Pre Op Consult  Progress Note    Patient Name: Sidney Lanza  MRN: [id number]  Date of Evaluation- 10/10/2017  PCP- Sunny Soto MD        HPI:  History of present illness- I had the pleasure of meeting this pleasant 72 y.o. gentleman in the pre op clinic prior to his elective Orthopedic surgery. The patient is new to me .  Goes by "Al"    I have obtained the history by speaking to the patient and by reviewing the electronic health records.    Events leading up to surgery / History of presenting illness -    Surgery for right shoulder RTC tear   He has been troubled with moderate Rt shoulder   pain for 1 year  . Pain increases with at night time  and activity like using the computer key board and decreases with massage.  No known injuries    Relevant health conditions of significance for the perioperative period/ History of presenting illness -    Retired   Used to play ball in the younger years    Hypertension ,On medication  Home  machine readings -  120/70- 80's      History of prostate cancer - was operated   No urinary difficulty      Microcytic anemia  Electrophoresis - Hemoglobin bands: Hb A and Hb A2;Normal  Arthrotec use for 15 years for arthritis  Had colonoscopies 1-2 times  Suggested PCP follow up    Not known to have heart disease , Diabetes Mellitus, Lung disease , Deep vein thrombosis        Subjective/ Objective:          Chief complaint-Preoperative evaluation, Perioperative Medical management, complication reduction plan     Relevant health conditions of significance for the perioperative period/ History of presenting illness -    Active cardiac conditions- none    Revised cardiac risk index predictors- none    Functional capacity -Examples of physical activity, walks every morning , does push ups, stretching , uses stairs at [facility], can take 1 flight of stairs, cutting the grass with a mower, raking lawn, painting, planting shrubs, weeding garden, swimming and " digging----- He can undertake all the above activities without  chest pain,chest tightness, Shortness of breath ,dizziness,lightheadedness making his exercise tolerance more  than 4 Mets.       Review of Systems   Constitutional: Negative for chills and fever.        No unusual weight changes   HENT:          Elevated BP  Age over 50 years  Neck size over 40 CM  Male gender   Eyes:        No unusual vision changes   Respiratory:        No Cough ,Phlegm      Cardiovascular:        As noted   Gastrointestinal:        Bowels- Regular   No overt GI/ blood losses   Endocrine:        Recent steroid use- none   Genitourinary: Negative for dysuria.        No hesitancy   Musculoskeletal:        As above      Skin: Negative for rash.   Neurological: Negative for syncope.        No unilateral weakness   Hematological:        Current use of Anticoagulants- none   Psychiatric/Behavioral:        No Depression,Anxiety       Past Medical History:   Diagnosis Date    Arthritis     Chronic rhinitis     Fever blister     Hyperlipidemia     Hypertension     Impotence of organic origin     Joint pain     Nuclear sclerosis - Both Eyes 10/23/2013    Prostate cancer     Ulcer    no vascular stenting   Family History   Problem Relation Age of Onset    Cancer Mother      breast    Cancer Father      prostate    Heart disease Father      CHF    Diabetes Sister     Heart disease Sister     Diabetes Brother     Hypertension Brother     Diabetes Brother     Amblyopia Neg Hx     Blindness Neg Hx     Cataracts Neg Hx     Glaucoma Neg Hx     Macular degeneration Neg Hx     Retinal detachment Neg Hx     Strabismus Neg Hx     Stroke Neg Hx     Thyroid disease Neg Hx     Melanoma Neg Hx     Psoriasis Neg Hx     Lupus Neg Hx      Past Surgical History:   Procedure Laterality Date    KNEE ARTHROSCOPY W/ DEBRIDEMENT      PROSTATE SURGERY  2006    for prostate cancer    ULNAR NERVE TRANSPOSITION     No anesthesia,  bleeding , cardiac problems , PONV with previous surgeries/ procedures   Medications and Allergies reviewed in epic.  FH- No anesthesia, thrombosis ,  in family    Lives with wife , help available post op    Physical Exam   HENT:   Head: Normocephalic.       Physical Exam   HENT:   Head: Normocephalic.     Constitutional- Vitals - Body mass index is 23.24 kg/m².,   Vitals:    10/10/17 1305   BP: 129/78   Pulse: 71   Temp: 97.8 °F (36.6 °C)     General appearance-Conscious,Coherent  Eyes- No conjunctival icterus,pupils  round , reactive to light  and  Bilateral cataracts  ENT-Oral cavity- moist  , Hearing grossly normal   Neck- No thyromegaly ,Trachea -central, No jugular venous distension,   No Carotid Bruit   Cardiovascular -Heart Sounds- Normal  and  no murmur   , No gallop rhythm   Respiratory - Normal Respiratory Effort, Normal breath sounds and  no wheeze    Peripheral pitting pedal edema-- none ,  varicose veins right lower extremity  and  varicose veins left lower extremity, no calf pain   Gastrointestinal -Soft abdomen, No palpable masses, Non Tender,Liver,Spleen not palpable. No-- free fluid and shifting dullness  Musculoskeletal- No finger Clubbing. Strength grossly normal   Lymphatic-No Palpable cervical, axillary,Inguinal lymphadenopathy   Psychiatric - normal effect,Orientation  Rt Dorsalis pedis pulses-palpable    Lt Dorsalis pedis pulses- palpable   Rt Posterior tibial pulses -palpable   Left posterior tibial pulses -palpable   Miscellaneous -  no asterixis,  no dupuytren's contracture,  no renal bruit and  bowel sounds positive , abdominal scar    Investigations  Lab and Imaging have been reviewed in [facility].    Review of Medicine tests    EKG- I had independently reviewed the EKG from--4/26/2011  It was reported to be showing    Normal EKG     Review of clinical lab tests-Date---6/16/2017  Creatinine-1.1  Date--6/12/2017 Hemoglobin--12.7  Platelet count--258 March 2017     There is no evidence of an  "Abdominal Aortic Aneurysm      Review of old records- Was done and information gathered regards to events leading to surgery and health conditions of significance in the perioperative period.        Assessment/Plan:     Essential hypertension  Hypertension-  Blood pressure is acceptable . I suggest continuation of -Amlodipine- during the entire perioperative period.I suggest addressing pain control as uncontrolled pain can increased blood pressure     Anemia  suggested follow up    I suggest monitoring his Hemoglobin perioperatively in view of his history of pre existing anemia.  Suggest restrictive transfusion strategy , if clinic al situation permits      Varicose veins of both lower extremities  Increased risk of thrombosis   Compression stocking use discussed      Preoperative cardiac risk assessment-  The patient does not have any active cardiac conditions . Revised cardiac risk index predictors-0 ---.Functional capacity is more than 4 Mets. He will be undergoing a Orthopedic procedure that carries a intermediate risk     The estimated risk of the rate of adverse cardiac outcomes  0.4%    No further cardiac work up is indicated prior to proceeding with the surgery       American Society of Anesthesiologists Physical status classification ( ASA ) class- - 2    Postoperative pulmonary complication risk assessment    /78 Comment: lt  Pulse 71   Temp 97.8 °F (36.6 °C) (Oral)   Ht 5' 10" (1.778 m)   Wt 73.5 kg (162 lb)   SpO2 97%   BMI 23.24 kg/m²     ARISCAT ( Canet) risk index- risk class - low,    if duration of surgery is under 3 hours , intermediate,if duration of surgery is over 3 hours     Rey Respiratory failure index- percentage risk of respiratory failure- 0.5 %      Preventive perioperative care    Thromboembolic prophylaxis:  His risk factors for thrombosis include surgical procedure and age.I suggest  thromboembolic prophylaxis ( mechanical/pharmacological, weighing the risk benefits " of pharmacological agent use considering sukhdev procedural bleeding )  during the perioperative period.I suggested being active in the post operative period.      Postoperative pulmonary complication prophylaxis-Risk factors for post operative pulmonary complications include age over 65 years and proximity of the surgical site to the lungs- I suggest incentive spirometry use, early ambulation and end tidal carbon dioxide monitoring  , oral care , head end of bed elevation     Renal complication prophylaxis-Risk factors for renal complications include hypertension . I suggest keeping him well hydrated .I suggested drinking 2 litre's of water a day      Surgical site Infection Prophylaxis-I  suggest appropriate antibiotic for Prophylaxis against Surgical site infections     This visit was focused on Preoperative evaluation, Perioperative Medical management, complication reduction plans. I suggest that the patient follows up with primary care or relevant sub specialists for ongoing health care.    I appreciate the opportunity to be involved in this patients care. Please feel free to contact me if there were any questions about this consultation.    Patient is optimized    Celia Lewis MD  Perioperative Medicine  [facility]   Pager [phone]  --------  10/17 - 12 48     Called to follow up  doing fine   No changes to medication, health  Suggested Call,if needed

## 2017-10-10 NOTE — LETTER
October 10, 2017      Miri Rush MD  [address]  [address]  [facility] [place] [address]           [facility] - Pre Op Consult  [address] [facility] [address]  Phone: [phone]          Patient: Sidney Lanza   MR Number: [id number]   YOB: 1945   Date of Visit: 10/10/2017       Dear Dr. Miri Rush:    Thank you for referring Sidney Lanza to me for evaluation. Attached you will find relevant portions of my assessment and plan of care.    If you have questions, please do not hesitate to call me. I look forward to following Sidney Lanza along with you.    Sincerely,    Celia Lewis MD    Enclosure  CC:  Sunny Soto MD    If you would like to receive this communication electronically, please contact [email or web address] or [phone] to request more information on [employer] access.    For providers and/or their staff who would like to refer a patient to [facility], please contact us through our one-stop-shop provider referral line, [facility], at [phone].    If you feel you have received this communication in error or would no longer like to receive these types of communications, please e-mail [email or web address]

## 2017-10-10 NOTE — OUTPATIENT SUBJECTIVE & OBJECTIVE
Outpatient Subjective & Objective     Chief complaint-Preoperative evaluation, Perioperative Medical management, complication reduction plan     Relevant health conditions of significance for the perioperative period/ History of presenting illness -    Active cardiac conditions- none    Revised cardiac risk index predictors- none    Functional capacity -Examples of physical activity, walks every morning , does push ups, stretching , uses stairs at [facility], can take 1 flight of stairs, cutting the grass with a mower, raking lawn, painting, planting shrubs, weeding garden, swimming and digging----- He can undertake all the above activities without  chest pain,chest tightness, Shortness of breath ,dizziness,lightheadedness making his exercise tolerance more  than 4 Mets.       Review of Systems   Constitutional: Negative for chills and fever.        No unusual weight changes   HENT:          Elevated BP  Age over 50 years  Neck size over 40 CM  Male gender   Eyes:        No unusual vision changes   Respiratory:        No Cough ,Phlegm      Cardiovascular:        As noted   Gastrointestinal:        Bowels- Regular   No overt GI/ blood losses   Endocrine:        Recent steroid use- none   Genitourinary: Negative for dysuria.        No hesitancy   Musculoskeletal:        As above      Skin: Negative for rash.   Neurological: Negative for syncope.        No unilateral weakness   Hematological:        Current use of Anticoagulants- none   Psychiatric/Behavioral:        No Depression,Anxiety       Past Medical History:   Diagnosis Date    Arthritis     Chronic rhinitis     Fever blister     Hyperlipidemia     Hypertension     Impotence of organic origin     Joint pain     Nuclear sclerosis - Both Eyes 10/23/2013    Prostate cancer     Ulcer    no vascular stenting   Family History   Problem Relation Age of Onset    Cancer Mother      breast    Cancer Father      prostate    Heart disease Father      CHF     Diabetes Sister     Heart disease Sister     Diabetes Brother     Hypertension Brother     Diabetes Brother     Amblyopia Neg Hx     Blindness Neg Hx     Cataracts Neg Hx     Glaucoma Neg Hx     Macular degeneration Neg Hx     Retinal detachment Neg Hx     Strabismus Neg Hx     Stroke Neg Hx     Thyroid disease Neg Hx     Melanoma Neg Hx     Psoriasis Neg Hx     Lupus Neg Hx      Past Surgical History:   Procedure Laterality Date    KNEE ARTHROSCOPY W/ DEBRIDEMENT      PROSTATE SURGERY  2006    for prostate cancer    ULNAR NERVE TRANSPOSITION     No anesthesia, bleeding , cardiac problems , PONV with previous surgeries/ procedures   Medications and Allergies reviewed in epic.  FH- No anesthesia, thrombosis ,  in family    Lives with wife , help available post op    Physical Exam   HENT:   Head: Normocephalic.       Physical Exam   HENT:   Head: Normocephalic.     Constitutional- Vitals - Body mass index is 23.24 kg/m².,   Vitals:    10/10/17 1305   BP: 129/78   Pulse: 71   Temp: 97.8 °F (36.6 °C)     General appearance-Conscious,Coherent  Eyes- No conjunctival icterus,pupils  round , reactive to light  and  Bilateral cataracts  ENT-Oral cavity- moist  , Hearing grossly normal   Neck- No thyromegaly ,Trachea -central, No jugular venous distension,   No Carotid Bruit   Cardiovascular -Heart Sounds- Normal  and  no murmur   , No gallop rhythm   Respiratory - Normal Respiratory Effort, Normal breath sounds and  no wheeze    Peripheral pitting pedal edema-- none ,  varicose veins right lower extremity  and  varicose veins left lower extremity, no calf pain   Gastrointestinal -Soft abdomen, No palpable masses, Non Tender,Liver,Spleen not palpable. No-- free fluid and shifting dullness  Musculoskeletal- No finger Clubbing. Strength grossly normal   Lymphatic-No Palpable cervical, axillary,Inguinal lymphadenopathy   Psychiatric - normal effect,Orientation  Rt Dorsalis pedis pulses-palpable    Lt Dorsalis  pedis pulses- palpable   Rt Posterior tibial pulses -palpable   Left posterior tibial pulses -palpable   Miscellaneous -  no asterixis,  no dupuytren's contracture,  no renal bruit and  bowel sounds positive , abdominal scar    Investigations  Lab and Imaging have been reviewed in epic.    Review of Medicine tests    EKG- I had independently reviewed the EKG from--4/26/2011  It was reported to be showing    Normal EKG     Review of clinical lab tests-Date---6/16/2017  Creatinine-1.1  Date--6/12/2017 Hemoglobin--12.7  Platelet count--258    March 2017     There is no evidence of an Abdominal Aortic Aneurysm      Review of old records- Was done and information gathered regards to events leading to surgery and health conditions of significance in the perioperative period.    Outpatient Subjective & Objective

## 2017-10-10 NOTE — ASSESSMENT & PLAN NOTE
Hypertension-  Blood pressure is acceptable . I suggest continuation of -Amlodipine- during the entire perioperative period.I suggest addressing pain control as uncontrolled pain can increased blood pressure

## 2017-10-10 NOTE — PROGRESS NOTES
CHIEF COMPLAINT:  MRI results.    HISTORY OF PRESENT ILLNESS:  Mr. Lanza is a 72-year-old that had several   month history of right shoulder pain.  He has undergone an injection, which   helped.  He is here today to discuss the MRI.    The MRI shows near full thickness partial width of the supraspinatus of the   level footprint tear, infraspinatus bursal surface partial with partial   thickness tear, subscapularis and teres normal.  Degenerative in nature along   the lateral humerus.    PLAN:  For this patient, we discussed treatment options, specifically surgery   versus injection, continuing conservative treatment.  All questions were   answered at this time.  The patient understands the risks and benefits of both   options.      LES/HN  dd: 10/09/2017 18:05:42 (CDT)  td: 10/10/2017 06:37:40 (CDT)  Doc ID   #[id number]  Job ID #[id number]    CC:

## 2017-10-10 NOTE — PATIENT INSTRUCTIONS
Your surgery has been scheduled for:____Wed 10/18____     You should report to:  ___X__[facility], located on the [place] side of the first floor of the [facility] ([phone])  ______The Second Floor [facility] Center, located on the [facility] side of the Second floor of the [facility] ([phone])  ______3rd Floor [facility]U located on the [facility] side of the [facility] [phone]    Please Note  -Tell your doctors if you take asprin, products containing aspirin, herbal medications or blood thinners, such as Coumadin, Ticlid, or Plavix. (Consult your provider regarding holding or stopping before surgery).  -Arrange for someone to drive you home following surgery. You will not be allowed to leave the surgical facility alone or drive yourself home following sedation and anesthesia.      Outpatient Encounter Prescriptions as of 10/10/2017   Medication Sig Note Dispense Refill    amlodipine (NORVASC) 10 MG tablet TAKE 1 TABLET DAILY (Patient taking differently: TAKE 1 TABLET DAILY AM) 10/10/2017: Take AM of surgery 90 tablet 3    diclofenac-misoprostol  mg-mcg (ARTHROTEC 75)  mg-mcg per tablet Take 1 tablet by mouth once daily. For arthritis pain. (Patient taking differently: Take 1 tablet by mouth every morning. For arthritis pain.) 10/10/2017: Hold until after surgery 90 tablet 3    FIBER, DEXTRIN, ORAL Take by mouth every morning.  10/10/2017: Hold AM of surgery      fluticasone (FLONASE) 50 mcg/actuation nasal spray 2 sprays by Each Nare route once daily. (Patient taking differently: 2 sprays by Each Nare route daily as needed. ) 10/10/2017: Use as needed 3 Bottle 3    glucosamine-chondroitin 500-400 mg tablet Take 1 tablet by mouth every morning. 10/10/2017: Hold until after surgery      multivitamin capsule Take 1 capsule by mouth every morning.  10/10/2017: Hold until after surgery       No facility-administered  encounter medications on file as of 10/10/2017.          Please review the instructions regarding your above listed medications.    Before Surgery  -Stop taking all herbal medications 14 days prior to surgery  -Stop taking asprin, products containing asprin 7 days before surgery  -Stop taking blood thinners   days before surgery  -Refrain from drinking alcoholic beverages for 24 hours before and after surgery  -Stop or limit smoking   days before surgery    Night before Surgery  -DO NOT EAT OR DRINK ANYTHING AFTER MIDNIGHT, INCLUDING GUM, HARD CANDY, MINTS, OR CHEWING TOBACCO  -Take a shower or bath (shower is recommended). Bathe with Hibiciens soap or an antibacterial soap from the neck down. If not supplied by your surgeon, Hibiciens soap will need to be purchased over the counter in the pharmacy. Rinse soap off thoroughly.  -Shampoo your hair with your regular shampoo    The Day of Surgery  -Take another bath or shower with Hibiciens or any antibacterial soap, to reduce the chance of infection.  -Take heart and blood pressure medications with a small sip of water, as advised by the perioperative team.  -Do not take fluid pills  -You may brush your teeth and rinse your mouth, but do not swallow any additional water.  -Do not apply perfumes, powder, body lotions, or deodorant on the day of surgery.  -Nail polish should be removed  -Do not wear makeup or moisturizer  -Wear comfortable clothes, such as a button front shirt and loose fitting pants.  -Leave all jewelry, including body piercings, and valuables at home.  -Bring any devices you will need after surgery such as crutches or canes.  -If you have sleep apnea, please bring your CPAP machine    In the event of your physical conditions including the onset of a cold or respiratory illness, or if you have to delay or cancel your surgery, please notify your surgeon.     If you have any questions or concerns, please don't hesitate to call.    Sincerely,    [facility]  [phone]

## 2017-10-10 NOTE — HPI
"History of present illness- I had the pleasure of meeting this pleasant 72 y.o. gentleman in the pre op clinic prior to his elective Orthopedic surgery. The patient is new to me .  Goes by "Al"    I have obtained the history by speaking to the patient and by reviewing the electronic health records.    Events leading up to surgery / History of presenting illness -    Surgery for right shoulder RTC tear   He has been troubled with moderate Rt shoulder   pain for 1 year  . Pain increases with at night time  and activity like using the computer key board and decreases with massage.  No known injuries    Relevant health conditions of significance for the perioperative period/ History of presenting illness -    Retired   Used to play ball in the younger years    Hypertension ,On medication  Home  machine readings -  120/70- 80's      History of prostate cancer - was operated   No urinary difficulty      Microcytic anemia  Electrophoresis - Hemoglobin bands: Hb A and Hb A2;Normal  Arth[employer] use for 15 years for arthritis  Had colonoscopies 1-2 times  Suggested PCP follow up    Not known to have heart disease , Diabetes Mellitus, Lung disease , Deep vein thrombosis    "

## 2017-10-10 NOTE — ASSESSMENT & PLAN NOTE
suggested follow up    I suggest monitoring his Hemoglobin perioperatively in view of his history of pre existing anemia.  Suggest restrictive transfusion strategy , if clinic al situation permits

## 2017-10-13 DIAGNOSIS — M75.111 INCOMPLETE TEAR OF RIGHT ROTATOR CUFF: Primary | ICD-10-CM

## 2017-10-17 ENCOUNTER — TELEPHONE (OUTPATIENT)
Dept: ORTHOPEDICS | Facility: CLINIC | Age: 72
End: 2017-10-17

## 2017-10-17 NOTE — TELEPHONE ENCOUNTER
Sidney Lanza notified of arrival time 0745 for surgery on 10/18/17 with Dr. KALINA Rush. At [facility]. Post Op appointment made, slip in mail.

## 2017-10-18 ENCOUNTER — ANESTHESIA EVENT (OUTPATIENT)
Dept: SURGERY | Facility: HOSPITAL | Age: 72
End: 2017-10-18
Payer: MEDICARE

## 2017-10-18 ENCOUNTER — SURGERY (OUTPATIENT)
Age: 72
End: 2017-10-18

## 2017-10-18 ENCOUNTER — ANESTHESIA (OUTPATIENT)
Dept: SURGERY | Facility: HOSPITAL | Age: 72
End: 2017-10-18
Payer: MEDICARE

## 2017-10-18 ENCOUNTER — HOSPITAL ENCOUNTER (OUTPATIENT)
Facility: HOSPITAL | Age: 72
Discharge: HOME | End: 2017-10-18
Attending: ORTHOPAEDIC SURGERY | Admitting: ORTHOPAEDIC SURGERY
Payer: MEDICARE

## 2017-10-18 VITALS
WEIGHT: 163 LBS | TEMPERATURE: 98 F | HEIGHT: 70 IN | HEART RATE: 64 BPM | SYSTOLIC BLOOD PRESSURE: 142 MMHG | OXYGEN SATURATION: 100 % | RESPIRATION RATE: 22 BRPM | BODY MASS INDEX: 23.34 KG/M2 | DIASTOLIC BLOOD PRESSURE: 71 MMHG

## 2017-10-18 DIAGNOSIS — M75.101 RIGHT ROTATOR CUFF TEAR: Primary | ICD-10-CM

## 2017-10-18 PROCEDURE — C1713 ANCHOR/SCREW BN/BN,TIS/BN: HCPCS | Performed by: ORTHOPAEDIC SURGERY

## 2017-10-18 PROCEDURE — 25000003 PHARM REV CODE 250: Performed by: NURSE ANESTHETIST, CERTIFIED REGISTERED

## 2017-10-18 PROCEDURE — 63600175 PHARM REV CODE 636 W HCPCS: Performed by: ORTHOPAEDIC SURGERY

## 2017-10-18 PROCEDURE — 37000008 HC ANESTHESIA 1ST 15 MINUTES: Performed by: ORTHOPAEDIC SURGERY

## 2017-10-18 PROCEDURE — 63600175 PHARM REV CODE 636 W HCPCS

## 2017-10-18 PROCEDURE — 63600175 PHARM REV CODE 636 W HCPCS: Performed by: ANESTHESIOLOGY

## 2017-10-18 PROCEDURE — 29826 SHO ARTHRS SRG DECOMPRESSION: CPT | Mod: 59,RT,, | Performed by: ORTHOPAEDIC SURGERY

## 2017-10-18 PROCEDURE — S0077 INJECTION, CLINDAMYCIN PHOSP: HCPCS | Performed by: STUDENT IN AN ORGANIZED HEALTH CARE EDUCATION/TRAINING PROGRAM

## 2017-10-18 PROCEDURE — 36000710: Performed by: ORTHOPAEDIC SURGERY

## 2017-10-18 PROCEDURE — 23412 REPAIR ROTATOR CUFF CHRONIC: CPT | Mod: 51,RT,, | Performed by: ORTHOPAEDIC SURGERY

## 2017-10-18 PROCEDURE — 71000015 HC POSTOP RECOV 1ST HR: Performed by: ORTHOPAEDIC SURGERY

## 2017-10-18 PROCEDURE — D9220A PRA ANESTHESIA: Mod: ANES,,, | Performed by: ANESTHESIOLOGY

## 2017-10-18 PROCEDURE — 63600175 PHARM REV CODE 636 W HCPCS: Performed by: NURSE ANESTHETIST, CERTIFIED REGISTERED

## 2017-10-18 PROCEDURE — 76942 ECHO GUIDE FOR BIOPSY: CPT | Performed by: ANESTHESIOLOGY

## 2017-10-18 PROCEDURE — 25000003 PHARM REV CODE 250: Performed by: ORTHOPAEDIC SURGERY

## 2017-10-18 PROCEDURE — 25000003 PHARM REV CODE 250

## 2017-10-18 PROCEDURE — 36000711: Performed by: ORTHOPAEDIC SURGERY

## 2017-10-18 PROCEDURE — 29828 SHO ARTHRS SRG BICP TENODSIS: CPT | Mod: RT,,, | Performed by: ORTHOPAEDIC SURGERY

## 2017-10-18 PROCEDURE — 27200664 HC NERVE BLOCK COMPLETE KIT: Performed by: ANESTHESIOLOGY

## 2017-10-18 PROCEDURE — 25000003 PHARM REV CODE 250: Performed by: STUDENT IN AN ORGANIZED HEALTH CARE EDUCATION/TRAINING PROGRAM

## 2017-10-18 PROCEDURE — 71000033 HC RECOVERY, INTIAL HOUR: Performed by: ORTHOPAEDIC SURGERY

## 2017-10-18 PROCEDURE — 64416 NJX AA&/STRD BRCH PL NFS IMG: CPT | Mod: 59,RT,, | Performed by: ANESTHESIOLOGY

## 2017-10-18 PROCEDURE — 37000009 HC ANESTHESIA EA ADD 15 MINS: Performed by: ORTHOPAEDIC SURGERY

## 2017-10-18 PROCEDURE — 27201423 OPTIME MED/SURG SUP & DEVICES STERILE SUPPLY: Performed by: ORTHOPAEDIC SURGERY

## 2017-10-18 PROCEDURE — D9220A PRA ANESTHESIA: Mod: CRNA,,, | Performed by: NURSE ANESTHETIST, CERTIFIED REGISTERED

## 2017-10-18 DEVICE — ANCHOR SUT BC CRKSCR 4.5X14MM: Type: IMPLANTABLE DEVICE | Site: SHOULDER | Status: FUNCTIONAL

## 2017-10-18 DEVICE — ALLOPATCH HD THCK 4 X 8: Type: IMPLANTABLE DEVICE | Site: SHOULDER | Status: FUNCTIONAL

## 2017-10-18 RX ORDER — SODIUM CHLORIDE 0.9 % (FLUSH) 0.9 %
3 SYRINGE (ML) INJECTION
Status: DISCONTINUED | OUTPATIENT
Start: 2017-10-18 | End: 2017-10-18 | Stop reason: HOSPADM

## 2017-10-18 RX ORDER — CLINDAMYCIN PHOSPHATE 900 MG/50ML
INJECTION, SOLUTION INTRAVENOUS
Status: DISCONTINUED
Start: 2017-10-18 | End: 2017-10-18 | Stop reason: HOSPADM

## 2017-10-18 RX ORDER — MIDAZOLAM HYDROCHLORIDE 1 MG/ML
INJECTION INTRAMUSCULAR; INTRAVENOUS
Status: COMPLETED
Start: 2017-10-18 | End: 2017-10-18

## 2017-10-18 RX ORDER — EPINEPHRINE 1 MG/ML
INJECTION INTRAMUSCULAR; INTRAVENOUS; SUBCUTANEOUS
Status: DISCONTINUED
Start: 2017-10-18 | End: 2017-10-18 | Stop reason: HOSPADM

## 2017-10-18 RX ORDER — CLINDAMYCIN PHOSPHATE 900 MG/50ML
900 INJECTION, SOLUTION INTRAVENOUS
Status: COMPLETED | OUTPATIENT
Start: 2017-10-18 | End: 2017-10-18

## 2017-10-18 RX ORDER — ONDANSETRON 2 MG/ML
INJECTION INTRAMUSCULAR; INTRAVENOUS
Status: DISCONTINUED | OUTPATIENT
Start: 2017-10-18 | End: 2017-10-18

## 2017-10-18 RX ORDER — MEPERIDINE HYDROCHLORIDE 50 MG/ML
12.5 INJECTION INTRAMUSCULAR; INTRAVENOUS; SUBCUTANEOUS ONCE AS NEEDED
Status: DISCONTINUED | OUTPATIENT
Start: 2017-10-18 | End: 2017-10-18 | Stop reason: HOSPADM

## 2017-10-18 RX ORDER — PROMETHAZINE HYDROCHLORIDE 12.5 MG/1
12.5 TABLET ORAL EVERY 4 HOURS PRN
Qty: 60 TABLET | Refills: 0 | Status: SHIPPED | OUTPATIENT
Start: 2017-10-18 | End: 2018-01-08

## 2017-10-18 RX ORDER — DOCUSATE SODIUM 100 MG/1
100 CAPSULE, LIQUID FILLED ORAL 2 TIMES DAILY
Qty: 60 CAPSULE | Refills: 0 | Status: SHIPPED | OUTPATIENT
Start: 2017-10-18 | End: 2017-11-15

## 2017-10-18 RX ORDER — MIDAZOLAM HYDROCHLORIDE 1 MG/ML
INJECTION, SOLUTION INTRAMUSCULAR; INTRAVENOUS
Status: DISCONTINUED | OUTPATIENT
Start: 2017-10-18 | End: 2017-10-18

## 2017-10-18 RX ORDER — BUPIVACAINE HCL/EPINEPHRINE 0.5-1:200K
VIAL (ML) INJECTION
Status: DISCONTINUED
Start: 2017-10-18 | End: 2017-10-18 | Stop reason: WASHOUT

## 2017-10-18 RX ORDER — BACITRACIN ZINC 500 UNIT/G
OINTMENT (GRAM) TOPICAL
Status: DISCONTINUED | OUTPATIENT
Start: 2017-10-18 | End: 2017-10-18 | Stop reason: HOSPADM

## 2017-10-18 RX ORDER — ROPIVACAINE HYDROCHLORIDE 5 MG/ML
INJECTION, SOLUTION EPIDURAL; INFILTRATION; PERINEURAL
Status: DISCONTINUED
Start: 2017-10-18 | End: 2017-10-18 | Stop reason: HOSPADM

## 2017-10-18 RX ORDER — HYDROMORPHONE HYDROCHLORIDE 1 MG/ML
1 INJECTION, SOLUTION INTRAMUSCULAR; INTRAVENOUS; SUBCUTANEOUS EVERY 4 HOURS PRN
Status: DISCONTINUED | OUTPATIENT
Start: 2017-10-18 | End: 2017-10-18 | Stop reason: HOSPADM

## 2017-10-18 RX ORDER — OXYCODONE AND ACETAMINOPHEN 5; 325 MG/1; MG/1
1 TABLET ORAL EVERY 4 HOURS PRN
Qty: 61 TABLET | Refills: 0 | Status: SHIPPED | OUTPATIENT
Start: 2017-10-18 | End: 2018-01-08

## 2017-10-18 RX ORDER — FENTANYL CITRATE 50 UG/ML
INJECTION, SOLUTION INTRAMUSCULAR; INTRAVENOUS
Status: COMPLETED
Start: 2017-10-18 | End: 2017-10-18

## 2017-10-18 RX ORDER — NEOSTIGMINE METHYLSULFATE 1 MG/ML
INJECTION, SOLUTION INTRAVENOUS
Status: DISCONTINUED | OUTPATIENT
Start: 2017-10-18 | End: 2017-10-18

## 2017-10-18 RX ORDER — FENTANYL CITRATE 50 UG/ML
INJECTION, SOLUTION INTRAMUSCULAR; INTRAVENOUS
Status: DISCONTINUED | OUTPATIENT
Start: 2017-10-18 | End: 2017-10-18

## 2017-10-18 RX ORDER — BACITRACIN 500 [USP'U]/G
OINTMENT TOPICAL
Status: DISCONTINUED
Start: 2017-10-18 | End: 2017-10-18 | Stop reason: HOSPADM

## 2017-10-18 RX ORDER — HYDROCODONE BITARTRATE AND ACETAMINOPHEN 5; 325 MG/1; MG/1
1 TABLET ORAL EVERY 4 HOURS PRN
Status: DISCONTINUED | OUTPATIENT
Start: 2017-10-18 | End: 2017-10-18 | Stop reason: HOSPADM

## 2017-10-18 RX ORDER — METOCLOPRAMIDE HYDROCHLORIDE 5 MG/ML
5 INJECTION INTRAMUSCULAR; INTRAVENOUS EVERY 6 HOURS PRN
Status: DISCONTINUED | OUTPATIENT
Start: 2017-10-18 | End: 2017-10-18 | Stop reason: HOSPADM

## 2017-10-18 RX ORDER — GLYCOPYRROLATE 0.2 MG/ML
INJECTION INTRAMUSCULAR; INTRAVENOUS
Status: DISCONTINUED | OUTPATIENT
Start: 2017-10-18 | End: 2017-10-18

## 2017-10-18 RX ORDER — MUPIROCIN 20 MG/G
OINTMENT TOPICAL
Status: DISCONTINUED | OUTPATIENT
Start: 2017-10-18 | End: 2017-10-18 | Stop reason: HOSPADM

## 2017-10-18 RX ORDER — EPINEPHRINE 1 MG/ML
INJECTION, SOLUTION INTRACARDIAC; INTRAMUSCULAR; INTRAVENOUS; SUBCUTANEOUS
Status: DISCONTINUED | OUTPATIENT
Start: 2017-10-18 | End: 2017-10-18 | Stop reason: HOSPADM

## 2017-10-18 RX ORDER — MIDAZOLAM HYDROCHLORIDE 1 MG/ML
4 INJECTION INTRAMUSCULAR; INTRAVENOUS ONCE
Status: COMPLETED | OUTPATIENT
Start: 2017-10-18 | End: 2017-10-18

## 2017-10-18 RX ORDER — PROPOFOL 10 MG/ML
VIAL (ML) INTRAVENOUS
Status: DISCONTINUED | OUTPATIENT
Start: 2017-10-18 | End: 2017-10-18

## 2017-10-18 RX ORDER — MUPIROCIN 20 MG/G
OINTMENT TOPICAL
Status: COMPLETED
Start: 2017-10-18 | End: 2017-10-18

## 2017-10-18 RX ORDER — ROCURONIUM BROMIDE 10 MG/ML
INJECTION, SOLUTION INTRAVENOUS
Status: DISCONTINUED | OUTPATIENT
Start: 2017-10-18 | End: 2017-10-18

## 2017-10-18 RX ORDER — PHENYLEPHRINE HYDROCHLORIDE 10 MG/ML
INJECTION INTRAVENOUS
Status: DISCONTINUED | OUTPATIENT
Start: 2017-10-18 | End: 2017-10-18

## 2017-10-18 RX ORDER — SODIUM CHLORIDE 9 MG/ML
INJECTION, SOLUTION INTRAVENOUS CONTINUOUS
Status: DISCONTINUED | OUTPATIENT
Start: 2017-10-18 | End: 2017-10-18 | Stop reason: HOSPADM

## 2017-10-18 RX ORDER — LIDOCAINE HCL/PF 100 MG/5ML
SYRINGE (ML) INTRAVENOUS
Status: DISCONTINUED | OUTPATIENT
Start: 2017-10-18 | End: 2017-10-18

## 2017-10-18 RX ORDER — FENTANYL CITRATE 50 UG/ML
25 INJECTION, SOLUTION INTRAMUSCULAR; INTRAVENOUS EVERY 5 MIN PRN
Status: DISCONTINUED | OUTPATIENT
Start: 2017-10-18 | End: 2017-10-18 | Stop reason: HOSPADM

## 2017-10-18 RX ORDER — LIDOCAINE HYDROCHLORIDE 10 MG/ML
1 INJECTION, SOLUTION EPIDURAL; INFILTRATION; INTRACAUDAL; PERINEURAL ONCE
Status: COMPLETED | OUTPATIENT
Start: 2017-10-18 | End: 2017-10-18

## 2017-10-18 RX ORDER — MEPERIDINE HYDROCHLORIDE 50 MG/ML
12.5 INJECTION INTRAMUSCULAR; INTRAVENOUS; SUBCUTANEOUS ONCE AS NEEDED
Status: DISCONTINUED | OUTPATIENT
Start: 2017-10-18 | End: 2017-10-18 | Stop reason: SDUPTHER

## 2017-10-18 RX ORDER — ONDANSETRON 2 MG/ML
4 INJECTION INTRAMUSCULAR; INTRAVENOUS EVERY 12 HOURS PRN
Status: DISCONTINUED | OUTPATIENT
Start: 2017-10-18 | End: 2017-10-18 | Stop reason: HOSPADM

## 2017-10-18 RX ADMIN — CLINDAMYCIN PHOSPHATE 900 MG: 18 INJECTION, SOLUTION INTRAVENOUS at 12:10

## 2017-10-18 RX ADMIN — ROCURONIUM BROMIDE 10 MG: 10 INJECTION, SOLUTION INTRAVENOUS at 12:10

## 2017-10-18 RX ADMIN — PHENYLEPHRINE HYDROCHLORIDE 100 MCG: 10 INJECTION INTRAVENOUS at 01:10

## 2017-10-18 RX ADMIN — Medication 8 ML/HR: at 02:10

## 2017-10-18 RX ADMIN — ROCURONIUM BROMIDE 40 MG: 10 INJECTION, SOLUTION INTRAVENOUS at 12:10

## 2017-10-18 RX ADMIN — SODIUM CHLORIDE, SODIUM GLUCONATE, SODIUM ACETATE, POTASSIUM CHLORIDE, MAGNESIUM CHLORIDE, SODIUM PHOSPHATE, DIBASIC, AND POTASSIUM PHOSPHATE: .53; .5; .37; .037; .03; .012; .00082 INJECTION, SOLUTION INTRAVENOUS at 12:10

## 2017-10-18 RX ADMIN — PROPOFOL 200 MG: 10 INJECTION, EMULSION INTRAVENOUS at 12:10

## 2017-10-18 RX ADMIN — MIDAZOLAM HYDROCHLORIDE 2 MG: 1 INJECTION, SOLUTION INTRAMUSCULAR; INTRAVENOUS at 09:10

## 2017-10-18 RX ADMIN — MIDAZOLAM HYDROCHLORIDE 2 MG: 1 INJECTION, SOLUTION INTRAMUSCULAR; INTRAVENOUS at 11:10

## 2017-10-18 RX ADMIN — BACITRACIN ZINC 12 G: 500 OINTMENT TOPICAL at 12:10

## 2017-10-18 RX ADMIN — FENTANYL CITRATE 100 MCG: 50 INJECTION, SOLUTION INTRAMUSCULAR; INTRAVENOUS at 09:10

## 2017-10-18 RX ADMIN — MUPIROCIN: 20 OINTMENT TOPICAL at 08:10

## 2017-10-18 RX ADMIN — EPINEPHRINE 3 ML: 1 INJECTION, SOLUTION INTRAMUSCULAR; SUBCUTANEOUS at 01:10

## 2017-10-18 RX ADMIN — SODIUM CHLORIDE: 0.9 INJECTION, SOLUTION INTRAVENOUS at 08:10

## 2017-10-18 RX ADMIN — PHENYLEPHRINE HYDROCHLORIDE 100 MCG: 10 INJECTION INTRAVENOUS at 12:10

## 2017-10-18 RX ADMIN — ROCURONIUM BROMIDE 10 MG: 10 INJECTION, SOLUTION INTRAVENOUS at 01:10

## 2017-10-18 RX ADMIN — EPHEDRINE SULFATE 15 MG: 50 INJECTION, SOLUTION INTRAMUSCULAR; INTRAVENOUS; SUBCUTANEOUS at 01:10

## 2017-10-18 RX ADMIN — NEOSTIGMINE METHYLSULFATE 5 MG: 1 INJECTION INTRAVENOUS at 01:10

## 2017-10-18 RX ADMIN — MIDAZOLAM HYDROCHLORIDE 2 MG: 1 INJECTION INTRAMUSCULAR; INTRAVENOUS at 09:10

## 2017-10-18 RX ADMIN — GLYCOPYRROLATE 0.1 MG: 0.2 INJECTION, SOLUTION INTRAMUSCULAR; INTRAVENOUS at 12:10

## 2017-10-18 RX ADMIN — LIDOCAINE HYDROCHLORIDE 0.2 MG: 10 INJECTION, SOLUTION EPIDURAL; INFILTRATION; INTRACAUDAL; PERINEURAL at 08:10

## 2017-10-18 RX ADMIN — GLYCOPYRROLATE 0.6 MG: 0.2 INJECTION, SOLUTION INTRAMUSCULAR; INTRAVENOUS at 01:10

## 2017-10-18 RX ADMIN — LIDOCAINE HYDROCHLORIDE 100 MG: 20 INJECTION, SOLUTION INTRAVENOUS at 12:10

## 2017-10-18 RX ADMIN — EPHEDRINE SULFATE 10 MG: 50 INJECTION, SOLUTION INTRAMUSCULAR; INTRAVENOUS; SUBCUTANEOUS at 01:10

## 2017-10-18 RX ADMIN — ONDANSETRON 4 MG: 2 INJECTION INTRAMUSCULAR; INTRAVENOUS at 01:10

## 2017-10-18 RX ADMIN — FENTANYL CITRATE 50 MCG: 50 INJECTION, SOLUTION INTRAMUSCULAR; INTRAVENOUS at 12:10

## 2017-10-18 NOTE — H&P
H&P  Orthopaedics    SUBJECTIVE:     History of Present Illness:  Patient is a 72 y.o. male with right shoulder pain for several months.  He has had injections which did provide some relief.  MRI was done which showed near full thickness supraspinatus footprint tear, infraspinatus bursal surface partial tear.  He would like to proceed with surgery.    Scheduled Meds:  Continuous Infusions:  PRN Meds:    Review of patient's allergies indicates:  No Known Allergies    Past Medical History:   Diagnosis Date    Arthritis     Chronic rhinitis     Fever blister     Hyperlipidemia     Hypertension     Impotence of organic origin     Joint pain     Nuclear sclerosis - Both Eyes 10/23/2013    Prostate cancer     Ulcer     NSAID related      Past Surgical History:   Procedure Laterality Date    KNEE ARTHROSCOPY W/ DEBRIDEMENT      PROSTATE SURGERY  2006    for prostate cancer    ULNAR NERVE TRANSPOSITION       Family History   Problem Relation Age of Onset    Cancer Mother      breast    Cancer Father      prostate    Heart disease Father 74     CHF    Diabetes Sister     Heart disease Sister      stroke    Diabetes Brother     Hypertension Brother     Diabetes Brother     Amblyopia Neg Hx     Blindness Neg Hx     Cataracts Neg Hx     Glaucoma Neg Hx     Macular degeneration Neg Hx     Retinal detachment Neg Hx     Strabismus Neg Hx     Stroke Neg Hx     Thyroid disease Neg Hx     Melanoma Neg Hx     Psoriasis Neg Hx     Lupus Neg Hx      Social History   Substance Use Topics    Smoking status: Former Smoker     Packs/day: 1.00     Years: 30.00     Types: Cigarettes     Quit date: 5/14/1988    Smokeless tobacco: Never Used    Alcohol use 16.8 oz/week     28 Cans of beer per week      Comment: beer - 2 a night         Review of Systems:  Patient denies constitutional symptoms, cardiac symptoms, respiratory symptoms, GI symptoms.  The remainder of the musculoskeletal ROS is included in the  HPI.      OBJECTIVE:     Vital Signs (Most Recent)       Physical Exam:  Gen:  No acute distress  CV:  Peripherally well-perfused.  Pulses 2+ bilaterally.  Lungs:  Normal respiratory effort.  Abdomen:  Soft, non-tender, non-distended  Head/Neck:  Normocephalic.  Atraumatic. No TTP, AROM and PROM intact without pain  Neuro:  CN intact without deficit, SILT throughout B/L Upper & Lower Extremities      MSK: RUE: Median, radial, ulnar, and anterior interosseous, posterior   interosseous, motor and sensation to light touch intact.  Brisk capillary   refill.  On examination of his shoulder, he is tender to palpation, AC, lateral   acromion.  He has got pain on range of motion, forward flexion, external   rotation and internal rotation.    Laboratory:  No results found for this or any previous visit (from the past 72 hour(s)).    Diagnostic Results:  MRI with right supra/infra spinatus tears    ASSESSMENT/PLAN:     A/P: Sidney Lanza is a 72 y.o. with right RC tear          Plan:  - to OR for right RCR      Teodoro Elkins M.D. PGY3  Orthopedic Surgery Resident

## 2017-10-18 NOTE — ANESTHESIA PREPROCEDURE EVALUATION
10/18/2017  Sidney Lanza is a 72 y.o., male   Pre-operative evaluation for Procedure(s) (LRB):  REPAIR ROTATOR CUFF ARTHROSCOPIC right (Right)    Sidney Lanza is a 72 y.o. male       Active problems:  Patient Active Problem List   Diagnosis    Hyperlipidemia    Chronic rhinitis    Impotence of organic origin    Essential hypertension    Nuclear sclerosis - Both Eyes    Branch retinal vein occlusion    Epiretinal membrane, right eye    Hypertensive retinopathy of both eyes    ERM OD (epiretinal membrane, right eye)    Branch retinal vein occlusion with macular edema of right eye    Right arm weakness    History of prostate cancer    Osteoarthritis of multiple joints    Anemia    Varicose veins of both lower extremities    Right rotator cuff tear   ;    Prev airway:       Review of patient's allergies indicates:  No Known Allergies     No current facility-administered medications on file prior to encounter.      Current Outpatient Prescriptions on File Prior to Encounter   Medication Sig Dispense Refill    amlodipine (NORVASC) 10 MG tablet TAKE 1 TABLET DAILY (Patient taking differently: TAKE 1 TABLET DAILY AM) 90 tablet 3    fluticasone (FLONASE) 50 mcg/actuation nasal spray 2 sprays by Each Nare route once daily. (Patient taking differently: 2 sprays by Each Nare route daily as needed. ) 3 Bottle 3    glucosamine-chondroitin 500-400 mg tablet Take 1 tablet by mouth every morning.      multivitamin capsule Take 1 capsule by mouth every morning.       diclofenac-misoprostol  mg-mcg (ARTHROTEC 75)  mg-mcg per tablet Take 1 tablet by mouth once daily. For arthritis pain. (Patient taking differently: Take 1 tablet by mouth every morning. For arthritis pain.) 90 tablet 3    FIBER, DEXTRIN, ORAL Take by mouth every morning.          Past Surgical History:   Procedure  Laterality Date    EYE SURGERY      KNEE ARTHROSCOPY W/ DEBRIDEMENT      PROSTATE SURGERY  2006    for prostate cancer    ULNAR NERVE TRANSPOSITION         Social History     Social History    Marital status:      Spouse name: N/A    Number of children: N/A    Years of education: N/A     Occupational History    retired      Social History Main Topics    Smoking status: Former Smoker     Packs/day: 1.00     Years: 30.00     Types: Cigarettes     Quit date: 5/14/1988    Smokeless tobacco: Never Used    Alcohol use 16.8 oz/week     28 Cans of beer per week      Comment: beer - 2 a night     Drug use: No    Sexual activity: Not on file     Other Topics Concern    Not on file     Social History Narrative    No narrative on file         Vital Signs Range (Last 24H):  Wt Readings from Last 3 Encounters:   10/18/17 73.9 kg (163 lb)   10/10/17 73.5 kg (162 lb)   10/03/17 77.6 kg (171 lb)     Temp Readings from Last 3 Encounters:   10/18/17 36.5 °C (97.7 °F) (Oral)   10/10/17 36.6 °C (97.8 °F) (Oral)   06/22/17 36.8 °C (98.2 °F) (Oral)     BP Readings from Last 3 Encounters:   10/18/17 129/84   10/10/17 129/78   10/03/17 (!) 145/79     Pulse Readings from Last 3 Encounters:   10/18/17 62   10/10/17 71   10/03/17 71         CBC:   Lab Results   Component Value Date    WBC 8.18 06/16/2017    HGB 12.7 (L) 06/16/2017    HCT 38.9 (L) 06/16/2017    MCV 72 (L) 06/16/2017     06/16/2017       CMP: CMP  Sodium   Date Value Ref Range Status   06/16/2017 141 136 - 145 mmol/L Final     Potassium   Date Value Ref Range Status   06/16/2017 4.7 3.5 - 5.1 mmol/L Final     Chloride   Date Value Ref Range Status   06/16/2017 107 95 - 110 mmol/L Final     CO2   Date Value Ref Range Status   06/16/2017 27 23 - 29 mmol/L Final     Glucose   Date Value Ref Range Status   06/16/2017 100 70 - 110 mg/dL Final     BUN, Bld   Date Value Ref Range Status   06/16/2017 12 8 - 23 mg/dL Final     Creatinine   Date Value Ref  Range Status   2017 1.1 0.5 - 1.4 mg/dL Final     Calcium   Date Value Ref Range Status   2017 9.9 8.7 - 10.5 mg/dL Final     Total Protein   Date Value Ref Range Status   2017 7.7 6.0 - 8.4 g/dL Final     Albumin   Date Value Ref Range Status   2017 4.2 3.5 - 5.2 g/dL Final     Total Bilirubin   Date Value Ref Range Status   2017 0.5 0.1 - 1.0 mg/dL Final     Comment:     For infants and newborns, interpretation of results should be based  on gestational age, weight and in agreement with clinical  observations.  Premature Infant recommended reference ranges:  Up to 24 hours.............<8.0 mg/dL  Up to 48 hours............<12.0 mg/dL  3-5 days..................<15.0 mg/dL  6-29 days.................<15.0 mg/dL       Alkaline Phosphatase   Date Value Ref Range Status   2017 61 55 - 135 U/L Final     AST   Date Value Ref Range Status   2017 30 10 - 40 U/L Final     ALT   Date Value Ref Range Status   2017 36 10 - 44 U/L Final     Anion Gap   Date Value Ref Range Status   2017 7 (L) 8 - 16 mmol/L Final     eGFR if    Date Value Ref Range Status   2017 >60.0 >60 mL/min/1.73 m^2 Final     eGFR if non    Date Value Ref Range Status   2017 >60.0 >60 mL/min/1.73 m^2 Final     Comment:     Calculation used to obtain the estimated glomerular filtration  rate (eGFR) is the CKD-EPI equation. Since race is unknown   in our information system, the eGFR values for   -American and Non--American patients are given   for each creatinine result.         INR  No results found for: INR, PROTIME        Diagnostic Studies:      EKD Echo:        .    Anesthesia Evaluation         Review of Systems  Anesthesia Hx:  History of prior surgery of interest to airway management or planning: Denies Family Hx of Anesthesia complications.  Denies Personal Hx of Anesthesia complications.   Hematology/Oncology:         -- Cancer  in past history: Oncology Comments: prostate     Cardiovascular:   Hypertension hyperlipidemia    Pulmonary:  Pulmonary Normal    Musculoskeletal:   Arthritis     Endocrine:  Endocrine Normal        Physical Exam  General:  Well nourished    Airway/Jaw/Neck:  Airway Findings: Mouth Opening: Normal Tongue: Normal  General Airway Assessment: Adult  Mallampati: I  Jaw/Neck Findings:  Neck ROM: Normal ROM      Dental:  Dental Findings: In tact   Chest/Lungs:  Chest/Lungs Findings: Clear to auscultation     Heart/Vascular:  Heart Findings: Rate: Normal  Rhythm: Regular Rhythm  Sounds: Normal        Mental Status:  Mental Status Findings:  Cooperative         Anesthesia Plan  Type of Anesthesia, risks & benefits discussed:  Anesthesia Type:  general  Patient's Preference:   Intra-op Monitoring Plan:   Intra-op Monitoring Plan Comments:   Post Op Pain Control Plan: multimodal analgesia and peripheral nerve block  Post Op Pain Control Plan Comments:   Induction:   IV  Beta Blocker:  Patient is not currently on a Beta-Blocker (No further documentation required).       Informed Consent: Patient understands risks and agrees with Anesthesia plan.  Questions answered. Anesthesia consent signed with patient.  ASA Score: 2     Day of Surgery Review of History & Physical:    H&P update referred to the surgeon.  H&P completed by Anesthesiologist.       Ready For Surgery From Anesthesia Perspective.

## 2017-10-18 NOTE — ANESTHESIA PROCEDURE NOTES
Interscalene Brachial Plexus Catheter    Patient location during procedure: pre-op   Block not for primary anesthetic.  Reason for block: at surgeon's request and post-op pain management   Post-op Pain Location: R arm pain  Start time: 10/18/2017 9:15 AM  Timeout: 10/18/2017 9:15 AM   End time: 10/18/2017 9:54 AM  Staffing  Anesthesiologist: ELVA MIRANDA  Resident/CRNA: CHRISTIANO SAMUELS  Performed: resident/CRNA   Preanesthetic Checklist  Completed: patient identified, site marked, surgical consent, pre-op evaluation, timeout performed, IV checked, risks and benefits discussed and monitors and equipment checked  Peripheral Block  Patient position: sitting  Prep: ChloraPrep and site prepped and draped  Patient monitoring: heart rate, cardiac monitor, continuous pulse ox, continuous capnometry and frequent blood pressure checks  Block type: interscalene  Laterality: right  Injection technique: continuous  Needle  Needle type: Tuohy   Needle gauge: 18 G  Needle length: 2 in  Needle localization: anatomical landmarks and ultrasound guidance  Catheter type: non-stimulating  Catheter size: 20 G  Test dose: lidocaine 1.5% with Epi 1-to-200,000 and negative   -ultrasound image captured on disc.  Assessment  Injection assessment: negative aspiration, negative parasthesia and local visualized surrounding nerve  Paresthesia pain: none  Heart rate change: no  Slow fractionated injection: yes  Medications:  Bolus administered: 30 mL of 0.5 ropivacaine  Epinephrine added: 3.75 mcg/mL (1/300,000)  Additional Notes  VSS.  [facility] RN monitoring vitals throughout procedure.  Patient tolerated procedure well.

## 2017-10-18 NOTE — DISCHARGE INSTRUCTIONS
Discharge Instructions for Shoulder Arthroscopy  You had a shoulder arthroscopy. It is a surgical procedure that helps the healthcare provider diagnose and treat shoulder problems. These include instability, arthritis, and rotator cuff problems. Below are instructions to help you care for your shoulder when you are at home.  What to expect  After surgery, your joint may be swollen, painful, and stiff. The joint will heal with time. But, recovery times vary depending on what was done. For example, with a shaved rotator cuff, you may be told to move your arm soon after surgery to prevent stiffness. But if the rotator cuff is repaired or treatment is for instability or arthritis, your healthcare provider may want you to limit movement of your arm for a period of time. Follow your healthcare providers instructions regarding arm movement.  Activity     You may be told to do daily pendulum swings to improve your joints flexibility. Use your torso to move your arm in a [place], first in one direction, then the other.   · Dont drive until your healthcare provider says its OK. And never drive while taking opioid pain medicine.  · Ask your healthcare provider when it is safe to do Pendulum exercises:    ¨ Hold on to the back of a chair, or lean on a tabletop with your healthy arm.  ¨ Do not actively move your arm with your shoulder muscles during this process. Instead, allow your arm to sway gently.    ¨ Use your torso to move your affected arm in a [place]. First do 20 circles in one direction. Then do 20 circles in the other direction.  ¨ Repeat the pendulum exercise every 2 hour(s). When you feel ready, increase the number of circles to 50 in each direction, every 2 hour(s).  · Bend your wrist and wiggle your fingers often to help blood flow.  Incision care  · Check your incision daily for redness, tenderness, or drainage.  · Wait 3 day(s) after your surgery to begin showering. Then shower as needed. Carefully wash  your incision with soap and water. Gently pat it dry. Dont rub the incision, or apply creams or lotions to it.  · Dont soak in a bathtub, hot tub, or pool until your healthcare provider says its OK.  Other home care  · Take your temperature daily for 7 days after your surgery. Report a fever above 100.4º F (38º C) to your healthcare provider. Fever may be a sign of infection.  · Wear your sling or immobilizer as directed by your healthcare provider.  · Use pain medicine, as needed and as directed. Don't wait until the pain is severe to start using the medicine.   · Use an ice pack or a bag of frozen peas--or something similar--wrapped in a thin towel on your shoulder to reduce swelling for the first 48 hours after surgery. Hold the ice pack. Leave the ice pack on for 20 minutes; then take it off for 20 minutes. Repeat as needed.  Follow-up  Make a follow-up appointment as directed by your healthcare provider.  When to seek medical attention  Call 911 right away if you have any of the following:  · Chest pain  · Shortness of breath  Otherwise, call your healthcare provider immediately if you have any of the following:  · Increasing shoulder pain or pain not relieved by medicine  · Pain or swelling in the arm on the side of your surgery  · Numbness, tingling, or blue-gray color of your arm or fingers on the side of your surgery  · Drainage or oozing, redness, or warmth at the incision  · Fever above 100.4º F (38º C) or shaking chills  · Nausea or vomiting   Date Last Reviewed: 11/16/2015  © 1142-5279 [employer]. [address] [place] [address], [place] [address]. All rights reserved. This information is not intended as a substitute for professional medical care. Always follow your healthcare professional's instructions.

## 2017-10-18 NOTE — TRANSFER OF CARE
"Anesthesia Transfer of Care Note    Patient: Sidney Lanza    Procedure(s) Performed: Procedure(s) (LRB):  REPAIR ROTATOR CUFF ARTHROSCOPIC right (Right)    Patient location: PACU    Anesthesia Type: general    Transport from OR: Transported from OR on 6-10 L/min O2 by face mask with adequate spontaneous ventilation    Post pain: adequate analgesia    Post assessment: no apparent anesthetic complications    Level of consciousness: awake    Nausea/Vomiting: no nausea/vomiting    Complications: none    Transfer of care protocol was followed      Last vitals:   Visit Vitals  /65   Pulse 65   Temp 36.5 °C (97.7 °F) (Temporal)   Resp 20   Ht 5' 10" (1.778 m)   Wt 73.9 kg (163 lb)   SpO2 100%   BMI 23.39 kg/m²     "

## 2017-10-18 NOTE — PLAN OF CARE
Pt and family given dc instructions and verbalized understanding.   1500- Pt son called for . Pt aaox3 and in no distress.

## 2017-10-18 NOTE — BRIEF OP NOTE
[facility]  Brief Operative Note     SUMMARY     Surgery Date: 10/18/2017     Surgeon(s) and Role:     * Miri Rush MD - Primary     * Teodoro Elkins MD - Resident - Assisting        Pre-op Diagnosis:  Incomplete tear of right rotator cuff [M75.111]    Post-op Diagnosis:  Post-Op Diagnosis Codes:     * Incomplete tear of right rotator cuff [M75.111]    Procedure(s) (LRB):  REPAIR ROTATOR CUFF ARTHROSCOPIC right (Right)    Anesthesia: General    Description of the findings of the procedure: see op note    Findings/Key Components: see op note    Estimated Blood Loss: * No values recorded between 10/18/2017  1:00 PM and 10/18/2017  2:08 PM *         Specimens:   Specimen (12h ago through future)    None          Discharge Note    SUMMARY     Admit Date: 10/18/2017    Discharge Date and Time: 10/18/2017     Hospital Course (synopsis of major diagnoses, care, treatment, and services provided during the course of the hospital stay): Hospital Course:  On 10/18/2017, the patient arrived to [facility] for proper pre-operative management.  Upon completion of pre-operative preparation, the patient was taken back to the operative theatre.  A right shoulder arthroscopy was performed without complication and the patient was transported to the post anesthesia care unit in stable condition. The patient suffered minimal blood loss and electrolyte imbalances during the procedure, which were correct accordingly if neccessary. After appropriate recovery from the anaesthetic agents used during the surgery the patient was discharged home.        Final Diagnosis: Post-Op Diagnosis Codes:     * Incomplete tear of right rotator cuff [M75.111]    Disposition: [facility]    Follow Up/Patient Instructions:     Medications:  Reconciled Home Medications:   Current Discharge Medication List      START taking these medications    Details   docusate sodium (COLACE) 100 MG capsule Take 1 capsule (100  mg total) by mouth 2 (two) times daily.  Qty: 60 capsule, Refills: 0      oxycodone-acetaminophen (PERCOCET) 5-325 mg per tablet Take 1 tablet by mouth every 4 (four) hours as needed for Pain.  Qty: 61 tablet, Refills: 0      promethazine (PHENERGAN) 12.5 MG Tab Take 1 tablet (12.5 mg total) by mouth every 4 (four) hours as needed.  Qty: 60 tablet, Refills: 0         CONTINUE these medications which have NOT CHANGED    Details   amlodipine (NORVASC) 10 MG tablet TAKE 1 TABLET DAILY  Qty: 90 tablet, Refills: 3      fluticasone (FLONASE) 50 mcg/actuation nasal spray 2 sprays by Each Nare route once daily.  Qty: 3 Bottle, Refills: 3      glucosamine-chondroitin 500-400 mg tablet Take 1 tablet by mouth every morning.      multivitamin capsule Take 1 capsule by mouth every morning.       diclofenac-misoprostol  mg-mcg (ARTHROTEC 75)  mg-mcg per tablet Take 1 tablet by mouth once daily. For arthritis pain.  Qty: 90 tablet, Refills: 3      FIBER, DEXTRIN, ORAL Take by mouth every morning.              Discharge Procedure Orders  Diet general     Call MD for:  temperature >100.4     Call MD for:  persistent nausea and vomiting     Call MD for:  severe uncontrolled pain     Call MD for:  difficulty breathing, headache or visual disturbances     Call MD for:  redness, tenderness, or signs of infection (pain, swelling, redness, odor or green/yellow discharge around incision site)     Call MD for:  hives     Call MD for:  persistent dizziness or light-headedness     Call MD for:  extreme fatigue     No driving, operating heavy equipment or signing legal documents while taking pain medication     Non weight bearing     Lifting restrictions   Order Comments: No lifting or ROM in clinic     Leave dressing on - Keep it clean, dry, and intact until clinic visit     No dressing needed       Follow-up Information     GERALD Landry In 2 weeks.    Specialties:  Hand Surgery, Orthopedic Surgery  Contact  information:  [address]  [address]  [facility] [address]  [phone]

## 2017-10-18 NOTE — INTERVAL H&P NOTE
The patient has been examined and the H&P has been reviewed:    I concur with the findings and no changes have occurred since H&P was written.    Anesthesia/Surgery risks, benefits and alternative options discussed and understood by patient/family.          Active Hospital Problems    Diagnosis  POA    Right rotator cuff tear [M75.101]  Yes      Resolved Hospital Problems    Diagnosis Date Resolved POA   No resolved problems to display.

## 2017-10-19 NOTE — PROGRESS NOTES
Spoke with patient's wife, no acute issues with catheter, patient pain well-controlled.  Will call again tomorrow after catheter has been removed.

## 2017-10-19 NOTE — OP NOTE
DATE OF PROCEDURE:  10/18/2017    SERVICE:  Orthopedics.    ATTENDING SURGEON:  Miri Rush M.D.    RESIDENT SURGEON:  Teodoro Elkins M.D. (RES)    PREOPERATIVE DIAGNOSES:  1.  Right shoulder rotator cuff tear.  2.  Right shoulder biceps tendinitis.    POSTOPERATIVE DIAGNOSES:  1.  Right shoulder rotator cuff tear.  2.  Right shoulder biceps tendinitis.    PROCEDURES PERFORMED:  1.  Right open rotator cuff repair with Arthrex anchor and TissueMend.  2.  Arthroscopic biceps tenodesis.  3.  Arthroscopic subacromial decompression, right shoulder.  4.  Arthroscopic right shoulder synovectomy.    ANESTHESIA:  General and regional.    FLUIDS:  Lactated Ringer's.    BLOOD LOSS:  None.  No blood was given.    PACKS AND DRAINS:  None.    IMPLANTS:  Arthrex anchor TissueMend.    COMPLICATIONS:  None.    INDICATIONS FOR PROCEDURE:  Mr. Lanza is a 72-year-old male with significant   pain in his right shoulder as well as functional loss.  After he failed   conservative treatment, MRI was positive for rotator cuff tear.  We discussed at   great detail treatment options, specifically risk of recurrent rotator cuff   tear if we would repair it.  The patient elected for the repair versus the   arthroplasty.  Risks and benefits were explained to the patient in clinic.    Consents were performed in clinic.    PROCEDURE IN DETAIL:  After the correct site was marked with the patient's   participation in the Holding Area, the patient underwent regional anesthesia.    He was brought to the Operating Room, placed in supine position and underwent   MAC anesthesia and general anesthesia.  The patient was then placed in a   well-padded beachchair position.  SCDs and TEDs were placed appropriately and   again he was in a well-padded beachchair position.  His arm was draped free.    Range of motion was assessed and showed that he had good range of motion, but it   was limited in extremes of external rotation, internal rotation and  forward   flexion.  After his arm was prepped and draped in normal sterile fashion, a   timeout was conducted for the correct site, procedure and the patient to be   indicated.  IV antibiotics were given to the patient preoperatively.  The   anatomical landmarks were marked out.  A posterior portal was created.  The   arthroscope was introduced.  Immediately could be seen that there was   significant fraying within the glenohumeral joint.  An anterior portal was   created.  A shaver was introduced and synovectomy was conducted.  The biceps was   examined and showed that it was significantly frayed.  Biceps tenodesis was   then conducted.  Once that was completed, we examined the rotator cuff and it   did appear torn, specifically in the posterior aspect of the rotator cuff where   it inserted onto the humerus.  There was some full-thickness cartilage loss and   this was documented by photos.  A probe was introduced to measure the cartilage   loss on the glenoid.  The arthroscope was then removed and it was placed in the   subacromial space.  A lateral portal was created.  There was significant amount   of bursitis and this was demonstrated through photos and a shaver was   introduced.  A subacromial decompression was conducted.  A large osteophyte was   noticed on the acromion and using a emile, an acromioplasty was also conducted.    Once this was completed, the rotator cuff was evaluated.  It showed that it was   significantly degenerated.  At that time, an open procedure was performed in   which the rotator cuff was grasped with a Scorpion, both in the anterior and   posterior aspects.  TissueMend was then sutured into place and the rotator cuff   was tacked on the lateral aspect with the TissueMend at the humeral head using   Arthrex anchor.  Once that was completed, the areas were irrigated with copious   amounts of normal saline.  Prolene closed the skin.  A sterile dressing was   applied.  No tourniquet was  used.  The patient was placed in supine position,   extubated in the OR without complications and brought to the Recovery Room in   stable condition.    POSTOPERATIVE PLAN FOR THIS PATIENT:  He is to keep the dressing clean, dry and   intact.  We will see him back at 2 weeks' time.  At 2 weeks' time, sutures are   to be removed.  He can start showering, but he will remain in a sling for   roughly 6 weeks and therapy to be initiated at 2 weeks.      LES/IN  dd: 10/19/2017 17:20:23 (CDT)  td: 10/19/2017 17:50:04 (CDT)  Doc ID   #[id number]  Job ID #[id number]    CC:

## 2017-10-20 NOTE — PROGRESS NOTES
Patient called this evening to ensure that he removed his PNC.  Patient reports that his daughter removed it for him without issue with blue tip intact.  Patient explains that his pain is under control and that he is doing well.      Danya Guido CA2   [facility]

## 2017-10-20 NOTE — PROGRESS NOTES
Spoke with patient by phone, no acute issues.  Catheter still in place, will return call later tonight after patient has removed catheter.

## 2017-10-20 NOTE — ANESTHESIA POSTPROCEDURE EVALUATION
"Anesthesia Post Evaluation    Patient: Sidney Lanza    Procedure(s) Performed: Procedure(s) (LRB):  REPAIR ROTATOR CUFF ARTHROSCOPIC right (Right)    Final Anesthesia Type: general  Patient location during evaluation: PACU  Patient participation: Yes- Able to Participate  Level of consciousness: awake and alert and oriented  Post-procedure vital signs: reviewed and stable  Pain management: adequate  Airway patency: patent  PONV status at discharge: No PONV  Anesthetic complications: no      Cardiovascular status: blood pressure returned to baseline  Respiratory status: unassisted, spontaneous ventilation and room air  Hydration status: euvolemic  Follow-up not needed.        Visit Vitals  BP (!) 142/71   Pulse 64   Temp 36.5 °C (97.7 °F) (Temporal)   Resp (!) 22   Ht 5' 10" (1.778 m)   Wt 73.9 kg (163 lb)   SpO2 100%   BMI 23.39 kg/m²       Pain/Timothy Score: No Data Recorded      "

## 2017-10-30 ENCOUNTER — IMMUNIZATION (OUTPATIENT)
Dept: INTERNAL MEDICINE | Facility: CLINIC | Age: 72
End: 2017-10-30
Payer: MEDICARE

## 2017-10-30 PROCEDURE — G0008 ADMIN INFLUENZA VIRUS VAC: HCPCS | Mod: PBBFAC

## 2017-11-01 ENCOUNTER — OFFICE VISIT (OUTPATIENT)
Dept: ORTHOPEDICS | Facility: CLINIC | Age: 72
End: 2017-11-01
Payer: MEDICARE

## 2017-11-01 VITALS
DIASTOLIC BLOOD PRESSURE: 82 MMHG | WEIGHT: 163 LBS | SYSTOLIC BLOOD PRESSURE: 133 MMHG | HEART RATE: 73 BPM | HEIGHT: 70 IN | BODY MASS INDEX: 23.34 KG/M2 | RESPIRATION RATE: 18 BRPM

## 2017-11-01 DIAGNOSIS — Z47.89 ORTHOPEDIC AFTERCARE: ICD-10-CM

## 2017-11-01 DIAGNOSIS — M75.101 TEAR OF RIGHT ROTATOR CUFF, UNSPECIFIED TEAR EXTENT: Primary | ICD-10-CM

## 2017-11-01 PROCEDURE — 99024 POSTOP FOLLOW-UP VISIT: CPT | Mod: ,,, | Performed by: PHYSICIAN ASSISTANT

## 2017-11-01 PROCEDURE — 99214 OFFICE O/P EST MOD 30 MIN: CPT | Mod: PBBFAC | Performed by: PHYSICIAN ASSISTANT

## 2017-11-01 PROCEDURE — 99999 PR PBB SHADOW E&M-EST. PATIENT-LVL IV: CPT | Mod: PBBFAC,,, | Performed by: PHYSICIAN ASSISTANT

## 2017-11-01 NOTE — PROGRESS NOTES
"Mr. Lanza is here today for a post-operative visit.  He is 14 days status post Right open rotator cuff repair with Arthrex anchor and Tissue Mend, Arthroscopic biceps tenodesis, Arthroscopic subacromial decompression right shoulder, and Arthroscopic right shoulder synovectomy by Dr. Rush on 10/18/17. He reports that he is doing well with mild pain.  Pain is 2/10, he has discontinued the prescription pain medication.  He reports that his discharge paperwork stated he could come out of the sling for elbow motion and pendulums, so he is not wearing the sling today.  He denies fever, chills, and sweats since the time of the surgery.     Physical exam:    Vitals:    11/01/17 1321   BP: 133/82   Pulse: 73   Resp: 18   Weight: 73.9 kg (163 lb)   Height: 5' 10" (1.778 m)   PainSc: 0-No pain   PainLoc: Shoulder     Vital signs are stable, patient is afebrile.  Patient is well dressed and well groomed, no acute distress.  Alert and oriented to person, place, and time.  Post op dressing taken down.  Incisions clean, dry and intact.  He is not in the sling and pillow today. There is no erythema or exudate.  There is no sign of any infection. He is NVI. Sutures removed without difficulty.      Assessment: 14 days status post Right open rotator cuff repair with Arthrex anchor and Tissue Mend, Arthroscopic biceps tenodesis, Arthroscopic subacromial decompression right shoulder, and Arthroscopic right shoulder synovectomy    Plan:  Sidney was seen today for post-op evaluation.    Diagnoses and all orders for this visit:    Tear of right rotator cuff, unspecified tear extent  -     Ambulatory Referral to Physical/Occupational Therapy    Orthopedic aftercare        - PO instruction reviewed and provided to patient  - Orders placed for OT  - Discussed home ROM exercises  - Full time use of sling and pillow for 4 additional weeks  - Sutures removed  - No lifting  - Follow up in 4 weeks  - Call with questions or concerns    "

## 2017-11-02 ENCOUNTER — CLINICAL SUPPORT (OUTPATIENT)
Dept: REHABILITATION | Facility: HOSPITAL | Age: 72
End: 2017-11-02
Payer: MEDICARE

## 2017-11-02 DIAGNOSIS — R29.898 RIGHT ARM WEAKNESS: ICD-10-CM

## 2017-11-02 PROCEDURE — 97110 THERAPEUTIC EXERCISES: CPT

## 2017-11-02 PROCEDURE — G8978 MOBILITY CURRENT STATUS: HCPCS | Mod: CK

## 2017-11-02 PROCEDURE — G8979 MOBILITY GOAL STATUS: HCPCS | Mod: CJ

## 2017-11-02 PROCEDURE — 97161 PT EVAL LOW COMPLEX 20 MIN: CPT

## 2017-11-02 NOTE — PLAN OF CARE
[facility]   PATIENT EVALUATION    Date: 11/02/2017  Start Time: 3:00  Stop Time: 4:00    Patient Name: Sidney Lanza  Clinic Number: [id number]  Age: 72 y.o.  Gender: male    Diagnosis:   Encounter Diagnosis   Name Primary?    Right arm weakness        Referring Physician: Chelsey Roger PA  Treatment Orders: PT Eval and Treat      History     Past Medical History:   Diagnosis Date    Arthritis     Chronic rhinitis     Fever blister     Hyperlipidemia     Hypertension     Impotence of organic origin     Joint pain     Nuclear sclerosis - Both Eyes 10/23/2013    Prostate cancer     Ulcer     NSAID related     Varicose vein of leg     bilateral legs       Current Outpatient Prescriptions   Medication Sig    amlodipine (NORVASC) 10 MG tablet TAKE 1 TABLET DAILY (Patient taking differently: TAKE 1 TABLET DAILY AM)    diclofenac-misoprostol  mg-mcg (ARTHROTEC 75)  mg-mcg per tablet Take 1 tablet by mouth once daily. For arthritis pain. (Patient taking differently: Take 1 tablet by mouth every morning. For arthritis pain.)    docusate sodium (COLACE) 100 MG capsule Take 1 capsule (100 mg total) by mouth 2 (two) times daily.    FIBER, DEXTRIN, ORAL Take by mouth every morning.     fluticasone (FLONASE) 50 mcg/actuation nasal spray 2 sprays by Each Nare route once daily. (Patient taking differently: 2 sprays by Each Nare route daily as needed. )    glucosamine-chondroitin 500-400 mg tablet Take 1 tablet by mouth every morning.    multivitamin capsule Take 1 capsule by mouth every morning.     oxycodone-acetaminophen (PERCOCET) 5-325 mg per tablet Take 1 tablet by mouth every 4 (four) hours as needed for Pain.    promethazine (PHENERGAN) 12.5 MG Tab Take 1 tablet (12.5 mg total) by mouth every 4 (four) hours as needed.     No current facility-administered medications for this visit.        Review of patient's allergies indicates:  No Known  Allergies      Subjective     History of Present Condition: Pt reports that for > 6 mo he has had shoulder pain      Date of Surgery: 10/19  Precautions: RTC R + biceps tenodesis    Mechanism of Injury: insidious and sudden    Pain Location: shoulder   Pain Description: Aching  Current Pain: 2/10  Least Pain: 0/10  Worst Pain: 4/10  Aggravating Factors: movement  Relieving Factors: rest    Diagnostic Tests: Near full-thickness partial width tear at the level of the supraspinatus insertion with interstitial extension and partial with partial width bursal surface tear infraspinatus with interstitial extension as noted above.  No evidence for muscular retraction or muscular atrophy.  Prior Therapy: Y    Occupation: retired    Sports/Recreational Activities: NA  Extremity Dominance: R    Prior Level of Function: Independent  Functional Deficits Leading to Referral/Nature of Injury: disuse of arm, weakness, pain  Patient Therapy Goals: return to ADls and normal activity  Cultural/Environmental/Spiritual Barriers to Treatment or Learning: none      Objective     Observation: Pt entered with abduction sling. No swelling, bruising, redness noted  Posture: FHP    Palpation: denied TTP    Shoulder Range of Motion: NT secondary to surgery      Joint Mobility: NT secondary to surgery      Strength: NT secondary to surgery    Scapular Control/Dyskinesis: NT secondary to surgery      Special Tests: NT secondary to surgery      Other: Pt had free and easy motion, no restriction felt    Treatment:     Pendulum hang 10 sec x 10  Elbow PROM x 30  Wrist Flex/ext/UD/RD x 30  scap retractions 3 x 10 x 5 sh    Cryotherapy x 10'    Functional Limitations Reports - G Codes  Category: Mobility  Tool: FOTO  Score: 46     Current: 40-60%  Goal: 20-40%       Assessment     History  Co-morbidities and personal factors that may impact the plan of care Low    Stable Clinical Presentation   Co-morbidities:       Personal Factors:     Body  regions    Body systems    Activity Limitations    Participation Restrictons   No personal factors and/ or  comorbidites          Address 1-2 elements:   Decreased strength  Decreased ROM       This is a 72 y.o. male referred to outpatient physical therapy and presents with a medical diagnosis of RTC R + biceps tenodesis and demonstrates limitations as described in the problem list. Pt demonstrates good rehab potential. Pt will benefit from physcial therapy services in order to maximize pain free and/or functional use of right shoulder. The following goals were discussed with the patient and patient is in agreement with them as to be addressed in the treatment plan. Pt was given a HEP consisting of     Pendulum hang 10 sec x 10  Elbow PROM x 30  Wrist Flex/ext/UD/RD x 30  scap retractions 3 x 10 x 5 sh      . Pt verbally understood the instructions as they were given and demonstrated proper form and technique during therapy. Pt was advised to perform these exercises free of pain, and to stop performing them if pain occurs.     Medical necessity is demonstrated by the following problem list:   - Pain limits function of effected part for all activities  - Unable to participate in daily activities   - Requires skilled supervision to complete and progress HEP  - Fall risk - impaired balance   - Continued inability to participate in vocational pursuits       Short Term Goals (4-6 Weeks):  - Pt will increase shoulder ER in neutral ROM to equal contralateral limb  - Pt will increase ER strength to 4/5  - Decrease Pain to 0 at rest  - Pt to self correct posture with scap retractions   - Pt independent with HEP with progressions.       Long Term Goals (16-20 Weeks):  - Pt will increase shoulder flex ROM to equal contralateral limb  - Pt will increase ER strength to 5/5  - Decrease Pain to 0 with raising arm overhead  - Pt to return to lifting dishes into cabinet overhead (< 5 lbs)with no increase in s/s  - Pt to improve FOTO  score to > 71    Plan     Pt will be treated by physical therapy 1-2 times a week for 16-20 weeks for manual therapy, therapeutic exercise, home exercise program, patient education, and modalities PRN to achieve established goals. Sidney may at times be seen by a PTA as part of the Rehab Team.     Ruby Benedict, PT, DPT  11/02/2017    I CERTIFY THE NEED FOR THESE SERVICES FURNISHED UNDER THIS PLAN OF TREATMENT AND WHILE UNDER MY CARE    Physician's comments: ________________________________________________________________________________________________________________________________________________    Physician's Name: ___________________________________

## 2017-11-16 ENCOUNTER — CLINICAL SUPPORT (OUTPATIENT)
Dept: REHABILITATION | Facility: HOSPITAL | Age: 72
End: 2017-11-16
Payer: MEDICARE

## 2017-11-16 DIAGNOSIS — R29.898 RIGHT ARM WEAKNESS: ICD-10-CM

## 2017-11-16 PROCEDURE — 97140 MANUAL THERAPY 1/> REGIONS: CPT

## 2017-11-16 PROCEDURE — 97110 THERAPEUTIC EXERCISES: CPT

## 2017-11-16 NOTE — PROGRESS NOTES
Physical Therapy Daily Note     Date: 11/16/2017  Name: Sidney Lanza  Clinic Number: [id number]  Diagnosis:   Encounter Diagnosis   Name Primary?    Right arm weakness      Physician: Chelsey Roger PA    Evaluation Date: 11/2  Date of Surgery: 10/18  Return to MD Date:   Visit #: 2   Start Time:  1:00  Stop Time:  1:40  Total Treatment Time: 40     Precautions:Open RTC R (unspecified size by surgeon) + biceps tenodesis    Subjective     Pt missed previous week of PT and has not attended in 2 weeks. Pt reports that he is feeling better than last week    Pain: not specified    Objective     Measurements taken:     Patient received individual therapy to increase strength, endurance, ROM, flexibility, posture and core stabilization with activities as follows:     Sidney received therapeutic exercises to develop strength, endurance, ROM, flexibility, posture and core stabilization for 15 minutes including: (1:1 15 min)       Wrist flex/ext/RD/UD x 30 per direction  Elbow PROM x 30  scap retraction 3 x 10 x 5 sh  Pendulums hang 10 times for 10 sec hold  pec stretch over towel roll x 5' (given as new HEP)    Cryotherapy x 10'      Sidney received the following manual therapy techniques: Joint mobilizations and Soft tissue Mobilization were applied to the:  for 15 minutes.(1:1 15 min)  PROM shoulder     Written Home Exercises Provided: Yes  Pt demo good understanding of the education provided. Sidney demonstrated good return demonstration of activities.     Education provided:  Sidney verbalized good understanding of education provided.   No spiritual or educational barriers to learning identified.    Assessment     Excellent tolerance to therex and excellent ROM. Cont to progress per protocol.    This is a 72 y.o. male referred to outpatient physical therapy and presents with a medical diagnosis of s/p RTC R + biceps tenodesis and demonstrates limitations as  described in the problem list Pt prognosis is Good. Pt will continue to benefit from skilled outpatient physical therapy to address the deficits listed below in the problem list, provide pt/family education and to maximize pt's level of independence in the home and community environment.    Will the patient continue to benefit from skilled PT intervention? Yes        Medical necessity is demonstrated by:   - Pain limits function of effected part for all activities  - Unable to participate in daily activities   - Requires skilled supervision to complete and progress HEP  - Fall risk - impaired balance   - Continued inability to participate in vocational pursuits    Progress towards goals:     New/Revised Goals:       Plan   Continue with established Plan of Care towards PT goals.      Ruby Benedict, PT, DPT  11/16/2017

## 2017-11-21 ENCOUNTER — CLINICAL SUPPORT (OUTPATIENT)
Dept: REHABILITATION | Facility: HOSPITAL | Age: 72
End: 2017-11-21
Payer: MEDICARE

## 2017-11-21 DIAGNOSIS — R29.898 RIGHT ARM WEAKNESS: ICD-10-CM

## 2017-11-21 PROCEDURE — 97140 MANUAL THERAPY 1/> REGIONS: CPT

## 2017-11-21 PROCEDURE — 97110 THERAPEUTIC EXERCISES: CPT

## 2017-11-21 NOTE — PROGRESS NOTES
Physical Therapy Daily Note     Date: 11/21/2017  Name: Sidney Lanza  Clinic Number: [id number]  Diagnosis:   Encounter Diagnosis   Name Primary?    Right arm weakness      Physician: Chelsey Roger PA    Evaluation Date: 11/2  Date of Surgery: 10/18  Post op day: 34  Return to MD Date:   Visit #: 3   Start Time:  1:00  Stop Time:  1:40  Total Treatment Time: 40     Precautions:Open RTC R (unspecified size by surgeon) + biceps tenodesis    Subjective     Pt missed previous week of PT and has not attended in 2 weeks. Pt reports that he is feeling better than last week    Pain: not specified    Objective     Measurements taken:     Patient received individual therapy to increase strength, endurance, ROM, flexibility, posture and core stabilization with activities as follows:     Sidney received therapeutic exercises to develop strength, endurance, ROM, flexibility, posture and core stabilization for 15 minutes including: (1:1 15 min)       Wrist flex/ext/RD/UD x 30 per direction  Elbow PROM x 30  scap retraction 3 x 10 x 5 sh  Pendulums hang 10 times for 10 sec hold  pec stretch over towel roll x 5' (given as new HEP) NP  PROM table walk out x 15    Cryotherapy x 10'      Sidney received the following manual therapy techniques: Joint mobilizations and Soft tissue Mobilization were applied to the:  for 15 minutes.(1:1 15 min)  PROM shoulder     Written Home Exercises Provided: Yes  Pt demo good understanding of the education provided. Sidney demonstrated good return demonstration of activities.     Education provided:  Sindey verbalized good understanding of education provided.   No spiritual or educational barriers to learning identified.    Assessment     Pt had excellent ROM and improved tolerance to PROM. Cont to progress per protocol.    This is a 72 y.o. male referred to outpatient physical therapy and presents with a medical diagnosis of s/p RTC R + biceps  tenodesis and demonstrates limitations as described in the problem list Pt prognosis is Good. Pt will continue to benefit from skilled outpatient physical therapy to address the deficits listed below in the problem list, provide pt/family education and to maximize pt's level of independence in the home and community environment.    Will the patient continue to benefit from skilled PT intervention? Yes        Medical necessity is demonstrated by:   - Pain limits function of effected part for all activities  - Unable to participate in daily activities   - Requires skilled supervision to complete and progress HEP  - Fall risk - impaired balance   - Continued inability to participate in vocational pursuits    Progress towards goals:     New/Revised Goals:       Plan   Continue with established Plan of Care towards PT goals.      Ruby Benedict, PT, DPT  11/21/2017

## 2017-11-27 ENCOUNTER — CLINICAL SUPPORT (OUTPATIENT)
Dept: REHABILITATION | Facility: HOSPITAL | Age: 72
End: 2017-11-27
Payer: MEDICARE

## 2017-11-27 DIAGNOSIS — R29.898 RIGHT ARM WEAKNESS: ICD-10-CM

## 2017-11-27 PROCEDURE — 97110 THERAPEUTIC EXERCISES: CPT

## 2017-11-27 NOTE — PROGRESS NOTES
Physical Therapy Daily Note     Date: 11/27/2017  Name: Sidney Lanza  Clinic Number: [id number]  Diagnosis:   Encounter Diagnosis   Name Primary?    Right arm weakness      Physician: Chelsey Roger PA    Evaluation Date: 11/2  Date of Surgery: 10/18  Post op day: 40  Return to MD Date:   Visit #: 4   Start Time:  1:00  Stop Time:  1:40  Total Treatment Time: 40     Precautions:Open RTC R (unspecified size by surgeon) + biceps tenodesis    Subjective     Pt reports that his shoulder is doing okay today    Pain: not specified    Objective     Measurements taken:     Patient received individual therapy to increase strength, endurance, ROM, flexibility, posture and core stabilization with activities as follows:     Sidney received therapeutic exercises to develop strength, endurance, ROM, flexibility, posture and core stabilization for 15 minutes including: (1:1 15 min)     Mh + abd x 10'  Wrist flex/ext/RD/UD x 30 per direction  Elbow PROM x 30  scap retraction 3 x 10 x 5 sh  Pendulums x 3'  pec stretch over towel roll x 5' (given as new HEP) NP  PROM table walk out x 15    Cryotherapy x 10'      Sidney received the following manual therapy techniques: Joint mobilizations and Soft tissue Mobilization were applied to the:  for 15 minutes.(1:1 10 min)  PROM shoulder     Written Home Exercises Provided: Yes  Pt demo good understanding of the education provided. Sidney demonstrated good return demonstration of activities.     Education provided:  Sidney verbalized good understanding of education provided.   No spiritual or educational barriers to learning identified.    Assessment     Cont with excellent ROM. Pt to see MD next week    This is a 72 y.o. male referred to outpatient physical therapy and presents with a medical diagnosis of s/p RTC R + biceps tenodesis and demonstrates limitations as described in the problem list Pt prognosis is Good. Pt will continue to  benefit from skilled outpatient physical therapy to address the deficits listed below in the problem list, provide pt/family education and to maximize pt's level of independence in the home and community environment.    Will the patient continue to benefit from skilled PT intervention? Yes        Medical necessity is demonstrated by:   - Pain limits function of effected part for all activities  - Unable to participate in daily activities   - Requires skilled supervision to complete and progress HEP  - Fall risk - impaired balance   - Continued inability to participate in vocational pursuits    Progress towards goals:     New/Revised Goals:       Plan   Continue with established Plan of Care towards PT goals.      Ruby Benedict, PT, DPT  11/27/2017

## 2017-11-28 RX ORDER — DICLOFENAC SODIUM AND MISOPROSTOL 75; 200 MG/1; UG/1
1 TABLET, DELAYED RELEASE ORAL DAILY
Qty: 90 TABLET | Refills: 3 | Status: SHIPPED | OUTPATIENT
Start: 2017-11-28 | End: 2018-01-08 | Stop reason: SDUPTHER

## 2017-12-03 RX ORDER — AMLODIPINE BESYLATE 10 MG/1
TABLET ORAL
Qty: 90 TABLET | Refills: 3 | Status: SHIPPED | OUTPATIENT
Start: 2017-12-03 | End: 2018-11-27 | Stop reason: SDUPTHER

## 2017-12-05 ENCOUNTER — OFFICE VISIT (OUTPATIENT)
Dept: ORTHOPEDICS | Facility: CLINIC | Age: 72
End: 2017-12-05
Payer: MEDICARE

## 2017-12-05 VITALS
WEIGHT: 163 LBS | RESPIRATION RATE: 18 BRPM | SYSTOLIC BLOOD PRESSURE: 131 MMHG | HEART RATE: 80 BPM | HEIGHT: 70 IN | DIASTOLIC BLOOD PRESSURE: 76 MMHG | BODY MASS INDEX: 23.34 KG/M2

## 2017-12-05 DIAGNOSIS — M75.101 TEAR OF RIGHT ROTATOR CUFF, UNSPECIFIED TEAR EXTENT: Primary | ICD-10-CM

## 2017-12-05 PROCEDURE — 99024 POSTOP FOLLOW-UP VISIT: CPT | Mod: ,,, | Performed by: PHYSICIAN ASSISTANT

## 2017-12-05 PROCEDURE — 99999 PR PBB SHADOW E&M-EST. PATIENT-LVL III: CPT | Mod: PBBFAC,,, | Performed by: PHYSICIAN ASSISTANT

## 2017-12-05 PROCEDURE — 99213 OFFICE O/P EST LOW 20 MIN: CPT | Mod: PBBFAC | Performed by: PHYSICIAN ASSISTANT

## 2017-12-05 NOTE — PROGRESS NOTES
"Mr. Lanza is here today for a post-operative visit.  He is 48 days status post Right open rotator cuff repair with Arthrex anchor and Tissue Mend, Arthroscopic biceps tenodesis, Arthroscopic subacromial decompression right shoulder, and Arthroscopic right shoulder synovectomy by Dr. Rush on 10/18/17. He reports that he is doing well with mild pain.  Pain is 1-2/10, he has discontinued the prescription pain medication.  He has been wearing the sling and pillow full time since his last visit.  He has been attending PT.   He denies fever, chills, and sweats since the time of the surgery.     Physical exam:    Vitals:    12/05/17 1458   BP: 131/76   Pulse: 80   Resp: 18   Weight: 73.9 kg (163 lb)   Height: 5' 10" (1.778 m)   PainSc:   4   PainLoc: Shoulder     Vital signs are stable, patient is afebrile.  Patient is well dressed and well groomed, no acute distress.  Alert and oriented to person, place, and time.  Incisions healing well - clean, dry and intact.  He is in the sling and pillow today. There is no erythema or exudate.  There is no sign of any infection. He is NVI.  Good finger and wrist ROM.     Assessment: 48 days status post Right open rotator cuff repair with Arthrex anchor and Tissue Mend, Arthroscopic biceps tenodesis, Arthroscopic subacromial decompression right shoulder, and Arthroscopic right shoulder synovectomy    Plan:  Sidney was seen today for post-op evaluation.    Diagnoses and all orders for this visit:    Tear of right rotator cuff, unspecified tear extent        - Continue OT  - Discussed home ROM exercises  - Sling for public and activity, discontinue pillow  - No lifting  - Follow up in 6 weeks  - Call with questions or concerns    "

## 2017-12-07 ENCOUNTER — CLINICAL SUPPORT (OUTPATIENT)
Dept: REHABILITATION | Facility: HOSPITAL | Age: 72
End: 2017-12-07
Payer: MEDICARE

## 2017-12-07 DIAGNOSIS — R29.898 RIGHT ARM WEAKNESS: ICD-10-CM

## 2017-12-07 PROCEDURE — 97110 THERAPEUTIC EXERCISES: CPT

## 2017-12-07 PROCEDURE — 97140 MANUAL THERAPY 1/> REGIONS: CPT

## 2017-12-07 NOTE — PROGRESS NOTES
Physical Therapy Daily Note     Date: 12/07/2017  Name: Sidney Lanza  Clinic Number: [id number]  Diagnosis:   Encounter Diagnosis   Name Primary?    Right arm weakness      Physician: Chesley Roger PA    Evaluation Date: 11/2  Date of Surgery: 10/18  Post op day: 49  Return to MD Date:   Visit #: 5  Start Time:  1:00  Stop Time:  20  Total Treatment Time: 460     Precautions:Open RTC R (unspecified size by surgeon) + biceps tenodesis    Subjective     Pt reports no pain with shoulder today. He saw MD yesterday and was allowed out of sling.     Pain: not specified    Objective     Measurements taken:     Patient received individual therapy to increase strength, endurance, ROM, flexibility, posture and core stabilization with activities as follows:     Sidney received therapeutic exercises to develop strength, endurance, ROM, flexibility, posture and core stabilization for 15 minutes including: (1:1 15 min)     Stick over head x 30  Stick ER x 30  SL ER 3 x 10  SL flexion 3 x 10  Prone row 3 x 10  Prone ext 3 x 10      Cryotherapy x 10'      Sidney received the following manual therapy techniques: Joint mobilizations and Soft tissue Mobilization were applied to the:  for 15 minutes.(1:1 15 min)  PROM shoulder     Written Home Exercises Provided: Yes  Pt demo good understanding of the education provided. Sidney demonstrated good return demonstration of activities.     Education provided:  Sidney verbalized good understanding of education provided.   No spiritual or educational barriers to learning identified.    Assessment     Pt maintains good ROM, no pain with new therex. Pt given new HEP: SL ER, SL flexion, stick overhead, stick ER. Progress per protocol.     This is a 72 y.o. male referred to outpatient physical therapy and presents with a medical diagnosis of s/p RTC R + biceps tenodesis and demonstrates limitations as described in the problem list Pt  prognosis is Good. Pt will continue to benefit from skilled outpatient physical therapy to address the deficits listed below in the problem list, provide pt/family education and to maximize pt's level of independence in the home and community environment.    Will the patient continue to benefit from skilled PT intervention? Yes        Medical necessity is demonstrated by:   - Pain limits function of effected part for all activities  - Unable to participate in daily activities   - Requires skilled supervision to complete and progress HEP  - Fall risk - impaired balance   - Continued inability to participate in vocational pursuits    Progress towards goals:     New/Revised Goals:       Plan   Continue with established Plan of Care towards PT goals.      Ruby Benedict, PT, DPT  12/07/2017

## 2017-12-12 ENCOUNTER — CLINICAL SUPPORT (OUTPATIENT)
Dept: REHABILITATION | Facility: HOSPITAL | Age: 72
End: 2017-12-12
Payer: MEDICARE

## 2017-12-12 DIAGNOSIS — R29.898 RIGHT ARM WEAKNESS: ICD-10-CM

## 2017-12-12 PROCEDURE — 97110 THERAPEUTIC EXERCISES: CPT

## 2017-12-12 NOTE — PROGRESS NOTES
Physical Therapy Daily Note     Date: 12/12/2017  Name: Sidney Lanza  Clinic Number: [id number]  Diagnosis:   Encounter Diagnosis   Name Primary?    Right arm weakness      Physician: Chelsey Roger PA    Evaluation Date: 11/2  Date of Surgery: 10/18  Post op day: 49  Return to MD Date:   Visit #: 6  Start Time:  2:00  Stop Time:  300   Total Treatment Time: 460     Precautions:Open RTC R (unspecified size by surgeon) + biceps tenodesis    Subjective     Pt reports that he is doing well today     Pain: not specified    Objective     Measurements taken:     Patient received individual therapy to increase strength, endurance, ROM, flexibility, posture and core stabilization with activities as follows:     Sidney received therapeutic exercises to develop strength, endurance, ROM, flexibility, posture and core stabilization for 15 minutes including: (1:1 30 min)     Stick over head x 30  Stick ER x 30  SL ER 3 x 10  SL flexion 3 x 10  Prone row 3 x 10  Prone ext 3 x 10      Cryotherapy x 10'      Sidney received the following manual therapy techniques: Joint mobilizations and Soft tissue Mobilization were applied to the:  for 15 minutes.(1:1 5 min)  PROM shoulder     Written Home Exercises Provided: Yes  Pt demo good understanding of the education provided. Sidney demonstrated good return demonstration of activities.     Education provided:  Sidney verbalized good understanding of education provided.   No spiritual or educational barriers to learning identified.    Assessment     Cont with improving ROM all directions (pain only at end range). Progressing scpaular and RTC strength without difficutly.  Progress per protocol.     This is a 72 y.o. male referred to outpatient physical therapy and presents with a medical diagnosis of s/p RTC R + biceps tenodesis and demonstrates limitations as described in the problem list Pt prognosis is Good. Pt will continue to  benefit from skilled outpatient physical therapy to address the deficits listed below in the problem list, provide pt/family education and to maximize pt's level of independence in the home and community environment.    Will the patient continue to benefit from skilled PT intervention? Yes        Medical necessity is demonstrated by:   - Pain limits function of effected part for all activities  - Unable to participate in daily activities   - Requires skilled supervision to complete and progress HEP  - Fall risk - impaired balance   - Continued inability to participate in vocational pursuits    Progress towards goals:     New/Revised Goals:       Plan   Continue with established Plan of Care towards PT goals.      Ruby Benedict, PT, DPT  12/12/2017

## 2017-12-14 ENCOUNTER — CLINICAL SUPPORT (OUTPATIENT)
Dept: REHABILITATION | Facility: HOSPITAL | Age: 72
End: 2017-12-14
Payer: MEDICARE

## 2017-12-14 DIAGNOSIS — R29.898 RIGHT ARM WEAKNESS: ICD-10-CM

## 2017-12-14 PROCEDURE — 97110 THERAPEUTIC EXERCISES: CPT

## 2017-12-14 NOTE — PROGRESS NOTES
Physical Therapy Daily Note     Date: 12/14/2017  Name: Sidney Lanza  Clinic Number: [id number]  Diagnosis:   Encounter Diagnosis   Name Primary?    Right arm weakness      Physician: Chelsey Roger PA    Evaluation Date: 11/2  Date of Surgery: 10/18  Post op day: 51  Return to MD Date:   Visit #: 7  Start Time:  2:00  Stop Time:  300   Total Treatment Time: 460     Precautions:Open RTC R (unspecified size by surgeon) + biceps tenodesis    Subjective     Pt reports no shoulder pain today  Pain: not specified    Objective     Measurements taken:     Patient received individual therapy to increase strength, endurance, ROM, flexibility, posture and core stabilization with activities as follows:     Sidney received therapeutic exercises to develop strength, endurance, ROM, flexibility, posture and core stabilization for 15 minutes including: (1:1 30 min)     Stick over head x 30  Stick ER x 30  SL ER 3 x 10 #1  SL flexion 3 x 10 #1  SL gator chop 3 x 10 #1  Prone row 3 x 10 #1  Prone ext 3 x 10 #1  Ball on wall 3 x 30 sec CW/CCW  No money x 30   Rows 3 x 10      Cryotherapy x 10'      Sidney received the following manual therapy techniques: Joint mobilizations and Soft tissue Mobilization were applied to the:  for 15 minutes.(1:1 5 min)  PROM shoulder     Written Home Exercises Provided: Yes  Pt demo good understanding of the education provided. Sidney demonstrated good return demonstration of activities.     Education provided:  Sidney verbalized good understanding of education provided.   No spiritual or educational barriers to learning identified.    Assessment     New HEP: ball on wall, no money.  Pt cont to improve strength. Able to achieve approx 120 flexion AROM. Progress per protocol.     This is a 72 y.o. male referred to outpatient physical therapy and presents with a medical diagnosis of s/p RTC R + biceps tenodesis and demonstrates limitations as  described in the problem list Pt prognosis is Good. Pt will continue to benefit from skilled outpatient physical therapy to address the deficits listed below in the problem list, provide pt/family education and to maximize pt's level of independence in the home and community environment.    Will the patient continue to benefit from skilled PT intervention? Yes        Medical necessity is demonstrated by:   - Pain limits function of effected part for all activities  - Unable to participate in daily activities   - Requires skilled supervision to complete and progress HEP  - Fall risk - impaired balance   - Continued inability to participate in vocational pursuits    Progress towards goals:     New/Revised Goals:       Plan   Continue with established Plan of Care towards PT goals.      Ruby Benedict, PT, DPT  12/14/2017

## 2017-12-19 ENCOUNTER — CLINICAL SUPPORT (OUTPATIENT)
Dept: REHABILITATION | Facility: HOSPITAL | Age: 72
End: 2017-12-19
Payer: MEDICARE

## 2017-12-19 DIAGNOSIS — R29.898 RIGHT ARM WEAKNESS: Primary | ICD-10-CM

## 2017-12-19 PROCEDURE — 97110 THERAPEUTIC EXERCISES: CPT

## 2017-12-19 NOTE — PROGRESS NOTES
Physical Therapy Daily Note     Date: 12/19/2017  Name: Sidney Lanza  Clinic Number: [id number]  Diagnosis:   Encounter Diagnosis   Name Primary?    Right arm weakness Yes     Physician: Chelsey Roger PA    Evaluation Date: 11/2  Date of Surgery: 10/18  Post op day: 56  Return to MD Date:   Visit #: 8  Start Time:  130  Stop Time:  230   Total Treatment Time: 60     Precautions:Open RTC R (unspecified size by surgeon) + biceps tenodesis    Subjective     Pt reports no shoulder pain  Pain: not specified    Objective     Measurements taken:     Patient received individual therapy to increase strength, endurance, ROM, flexibility, posture and core stabilization with activities as follows:     Sidney received therapeutic exercises to develop strength, endurance, ROM, flexibility, posture and core stabilization for 15 minutes including: (1:1 30 min)     Stick over head x 30  Stick ER x 30  SL ER 3 x 10 #2  SL flexion 3 x 10 #2  SL gator chop 3 x 10 #2  Prone row 3 x 10 #3  Prone ext 3 x 10 #2  Ball on wall 3 x 30 sec CW/CCW  No money x 30   Rows 3 x 10   Ext 3 x 10  Serratus punch 3 x 10       Cryotherapy x 10'      Sidney received the following manual therapy techniques: Joint mobilizations and Soft tissue Mobilization were applied to the:  for 15 minutes.(1:1 5 min)  PROM shoulder     Written Home Exercises Provided: Yes  Pt demo good understanding of the education provided. Sidney demonstrated good return demonstration of activities.     Education provided:  Sidney verbalized good understanding of education provided.   No spiritual or educational barriers to learning identified.    Assessment     Cont with AROM improvements. Pt demonstrating good strength gains. Cont to gradually progress strength.    This is a 72 y.o. male referred to outpatient physical therapy and presents with a medical diagnosis of s/p RTC R + biceps tenodesis and demonstrates limitations  as described in the problem list Pt prognosis is Good. Pt will continue to benefit from skilled outpatient physical therapy to address the deficits listed below in the problem list, provide pt/family education and to maximize pt's level of independence in the home and community environment.    Will the patient continue to benefit from skilled PT intervention? Yes        Medical necessity is demonstrated by:   - Pain limits function of effected part for all activities  - Unable to participate in daily activities   - Requires skilled supervision to complete and progress HEP  - Fall risk - impaired balance   - Continued inability to participate in vocational pursuits    Progress towards goals:     New/Revised Goals:       Plan   Continue with established Plan of Care towards PT goals.      Ruby Benedict, PT, DPT  12/19/2017

## 2017-12-22 ENCOUNTER — CLINICAL SUPPORT (OUTPATIENT)
Dept: REHABILITATION | Facility: HOSPITAL | Age: 72
End: 2017-12-22
Payer: MEDICARE

## 2017-12-22 DIAGNOSIS — R29.898 RIGHT ARM WEAKNESS: ICD-10-CM

## 2017-12-22 PROCEDURE — 97110 THERAPEUTIC EXERCISES: CPT

## 2017-12-22 PROCEDURE — 97140 MANUAL THERAPY 1/> REGIONS: CPT

## 2017-12-22 NOTE — PROGRESS NOTES
Physical Therapy Daily Note     Date: 12/22/2017  Name: Sidney Lanza  Clinic Number: [id number]  Diagnosis:   Encounter Diagnosis   Name Primary?    Right arm weakness      Physician: Chelsey Roger PA    Evaluation Date: 11/2  Date of Surgery: 10/18  Post op day: 58  Return to MD Date:   Visit #: 9  Start Time:  1100  Stop Time:  1200   Total Treatment Time: 60     Precautions:Open RTC R (unspecified size by surgeon) + biceps tenodesis    Subjective     Pt reports that he is feeling good and strong today  Pain: not specified    Objective     Measurements taken:     Patient received individual therapy to increase strength, endurance, ROM, flexibility, posture and core stabilization with activities as follows:     Sidney received therapeutic exercises to develop strength, endurance, ROM, flexibility, posture and core stabilization for 15 minutes including: (1:1 30 min)     Stick over head x 30  Stick ER x 30  SL ER 3 x 10 #2  SL flexion 3 x 10 #1  SL gator chop 3 x 10 #2  Prone row 3 x 10 #1  Prone ext 3 x 10 #1  Ball on wall 3 x 30 sec CW/CCW front/side  No money x 30   Rows 3 x 10   Wall slide > 90 degrees x 30  Wall clocks 3 x 10        Cryotherapy x 10'      Sidney received the following manual therapy techniques: Joint mobilizations and Soft tissue Mobilization were applied to the:  for 15 minutes.(1:1 15 min)  PROM shoulder     Written Home Exercises Provided: Yes  Pt demo good understanding of the education provided. Sidney demonstrated good return demonstration of activities.     Education provided:  Sidney verbalized good understanding of education provided.   No spiritual or educational barriers to learning identified.    Assessment     Pt demonstrated good ability to perform activity > 90 degrees. He is improving AROM strength to approx 140 degrees.   Progress per protocol.     This is a 72 y.o. male referred to outpatient physical therapy and  presents with a medical diagnosis of s/p RTC R + biceps tenodesis and demonstrates limitations as described in the problem list Pt prognosis is Good. Pt will continue to benefit from skilled outpatient physical therapy to address the deficits listed below in the problem list, provide pt/family education and to maximize pt's level of independence in the home and community environment.    Will the patient continue to benefit from skilled PT intervention? Yes        Medical necessity is demonstrated by:   - Pain limits function of effected part for all activities  - Unable to participate in daily activities   - Requires skilled supervision to complete and progress HEP  - Fall risk - impaired balance   - Continued inability to participate in vocational pursuits    Progress towards goals:     New/Revised Goals:       Plan   Continue with established Plan of Care towards PT goals.      Ruby Benedict PT, DPT  12/22/2017

## 2017-12-27 ENCOUNTER — CLINICAL SUPPORT (OUTPATIENT)
Dept: REHABILITATION | Facility: HOSPITAL | Age: 72
End: 2017-12-27
Payer: MEDICARE

## 2017-12-27 DIAGNOSIS — R29.898 RIGHT ARM WEAKNESS: ICD-10-CM

## 2017-12-27 PROCEDURE — G8979 MOBILITY GOAL STATUS: HCPCS | Mod: CI

## 2017-12-27 PROCEDURE — 97110 THERAPEUTIC EXERCISES: CPT

## 2017-12-27 PROCEDURE — G8978 MOBILITY CURRENT STATUS: HCPCS | Mod: CJ

## 2017-12-27 NOTE — PROGRESS NOTES
Physical Therapy Daily Note     Date: 12/27/2017  Name: Sidney Lanza  Clinic Number: [id number]  Diagnosis:   Encounter Diagnosis   Name Primary?    Right arm weakness      Physician: Chelsey Roger PA    Evaluation Date: 11/2  Date of Surgery: 10/18  Post op day: 63  Return to MD Date:   Visit #: 10  Start Time:  100  Stop Time:  140  Total Treatment Time: 40     Precautions:Open RTC R (unspecified size by surgeon) + biceps tenodesis    Subjective     Pt reports that he is doing great  Pain: not specified    Objective     Measurements taken:     Patient received individual therapy to increase strength, endurance, ROM, flexibility, posture and core stabilization with activities as follows:     Sidney received therapeutic exercises to develop strength, endurance, ROM, flexibility, posture and core stabilization for 15 minutes including: (1:1 30 min)     Stick over head x 30  Stick ER x 30  SL ER 3 x 10 #2  SL flexion 3 x 10 #2  SL gator chop 3 x 10 #2  Prone row 3 x 10 #5  Prone ext 3 x 10 #2  Ball on wall 3 x 30 sec CW/CCW front/side  No money x 30   Rows 3 x 10      Cryotherapy x 10'      Sidney received the following manual therapy techniques: Joint mobilizations and Soft tissue Mobilization were applied to the:  for 15 minutes.(1:1 5 min)  PROM shoulder     Written Home Exercises Provided: Yes  Pt demo good understanding of the education provided. Sidney demonstrated good return demonstration of activities.     Education provided:  Sidney verbalized good understanding of education provided.   No spiritual or educational barriers to learning identified.    Assessment   Pt cont to improve scapular control and RTC strength. AROM to 165.   Progress per protocol.     This is a 72 y.o. male referred to outpatient physical therapy and presents with a medical diagnosis of s/p RTC R + biceps tenodesis and demonstrates limitations as described in the problem list Pt  prognosis is Good. Pt will continue to benefit from skilled outpatient physical therapy to address the deficits listed below in the problem list, provide pt/family education and to maximize pt's level of independence in the home and community environment.    Will the patient continue to benefit from skilled PT intervention? Yes        Medical necessity is demonstrated by:   - Pain limits function of effected part for all activities  - Unable to participate in daily activities   - Requires skilled supervision to complete and progress HEP  - Fall risk - impaired balance   - Continued inability to participate in vocational pursuits    Progress towards goals:     New/Revised Goals:       Plan   Continue with established Plan of Care towards PT goals.      Ruby Benedict, PT, DPT  12/27/2017

## 2017-12-29 ENCOUNTER — CLINICAL SUPPORT (OUTPATIENT)
Dept: REHABILITATION | Facility: HOSPITAL | Age: 72
End: 2017-12-29
Payer: MEDICARE

## 2017-12-29 DIAGNOSIS — R29.898 RIGHT ARM WEAKNESS: ICD-10-CM

## 2017-12-29 PROCEDURE — 97110 THERAPEUTIC EXERCISES: CPT

## 2017-12-29 NOTE — PROGRESS NOTES
Physical Therapy Daily Note     Date: 12/29/2017  Name: Sidney Lanza  Clinic Number: [id number]  Diagnosis:   Encounter Diagnosis   Name Primary?    Right arm weakness      Physician: Chelsey Roger PA    Evaluation Date: 11/2  Date of Surgery: 10/18  Post op day: 65  Return to MD Date:   Visit #: 11  Start Time:  100  Stop Time:  140  Total Treatment Time: 40     Precautions:Open RTC R (unspecified size by surgeon) + biceps tenodesis    Subjective     Pt reports that his shoulder is doing well  Pain: not specified    Objective     Measurements taken:     Patient received individual therapy to increase strength, endurance, ROM, flexibility, posture and core stabilization with activities as follows:     Sidney received therapeutic exercises to develop strength, endurance, ROM, flexibility, posture and core stabilization for 15 minutes including: (1:1 30 min)     Stick over head x 30  Stick ER x 30  SL ER 3 x 10 #2  SL flexion 3 x 10 #2  SL gator chop 3 x 10 #2  Prone row 3 x 10 #4  Prone ext 3 x 10 #1  Serratus punch 3 x 10   Ball on wall 3 x 30 sec CW/CCW front/side  No money x 30   Rows 3 x 10      Cryotherapy x 10'      Sidney received the following manual therapy techniques: Joint mobilizations and Soft tissue Mobilization were applied to the:  for 15 minutes.(1:1 5 min)  PROM shoulder     Written Home Exercises Provided: Yes  Pt demo good understanding of the education provided. Sidney demonstrated good return demonstration of activities.     Education provided:  Sidney verbalized good understanding of education provided.   No spiritual or educational barriers to learning identified.    Assessment     Pt cont to improve AROM and decreased pain at end range. Pt progressing strength and was able top increase weight on shoulder therex.  Progress per protocol.     This is a 72 y.o. male referred to outpatient physical therapy and presents with a medical  diagnosis of s/p RTC R + biceps tenodesis and demonstrates limitations as described in the problem list Pt prognosis is Good. Pt will continue to benefit from skilled outpatient physical therapy to address the deficits listed below in the problem list, provide pt/family education and to maximize pt's level of independence in the home and community environment.    Will the patient continue to benefit from skilled PT intervention? Yes        Medical necessity is demonstrated by:   - Pain limits function of effected part for all activities  - Unable to participate in daily activities   - Requires skilled supervision to complete and progress HEP  - Fall risk - impaired balance   - Continued inability to participate in vocational pursuits    Progress towards goals:     New/Revised Goals:       Plan   Continue with established Plan of Care towards PT goals.      Ruby Benedict, PT, DPT  12/29/2017

## 2018-01-03 ENCOUNTER — CLINICAL SUPPORT (OUTPATIENT)
Dept: REHABILITATION | Facility: HOSPITAL | Age: 73
End: 2018-01-03
Payer: MEDICARE

## 2018-01-03 DIAGNOSIS — R29.898 RIGHT ARM WEAKNESS: ICD-10-CM

## 2018-01-03 PROCEDURE — 97110 THERAPEUTIC EXERCISES: CPT

## 2018-01-03 NOTE — PROGRESS NOTES
Physical Therapy Daily Note     Date: 01/03/2018  Name: Sidney Lanza  Clinic Number: [id number]  Diagnosis:   Encounter Diagnosis   Name Primary?    Right arm weakness      Physician: Chelsey Roger PA    Evaluation Date: 11/2  Date of Surgery: 10/18  Post op day: 69  Return to MD Date:   Visit #: 12  Start Time:  100  Stop Time:  200   Total Treatment Time: 60     Precautions:Open RTC R (unspecified size by surgeon) + biceps tenodesis    Subjective     Pt reports that the shoulder is feeling good  Pain: not specified    Objective     Measurements taken:     Patient received individual therapy to increase strength, endurance, ROM, flexibility, posture and core stabilization with activities as follows:     Sidney received therapeutic exercises to develop strength, endurance, ROM, flexibility, posture and core stabilization for 15 minutes including: (1:1 30 min)     No money 3 x 10  SL ER 3 x 10 #2  SL flexion 3 x 10 #2  SL gator chop 3 x 10 #2  Prone hor abd 3 x 10   Prone row 3 x 10 #5  Prone ext 3 x 10 #2  Ball on wall 3 x 30 sec CW/CCW front/side  Wall slide x 10  Rows 3 x 10      Cryotherapy x 10'      Sidney received the following manual therapy techniques: Joint mobilizations and Soft tissue Mobilization were applied to the:  for 15 minutes.(1:1 5 min)  PROM shoulder     Written Home Exercises Provided: Yes  Pt demo good understanding of the education provided. Sidney demonstrated good return demonstration of activities.     Education provided:  Sidney verbalized good understanding of education provided.   No spiritual or educational barriers to learning identified.    Assessment     Improved scapular control during exercises. Pt progress to wall slides with no increase in s.s  Progress per protocol.     This is a 72 y.o. male referred to outpatient physical therapy and presents with a medical diagnosis of s/p RTC R + biceps tenodesis and demonstrates  limitations as described in the problem list Pt prognosis is Good. Pt will continue to benefit from skilled outpatient physical therapy to address the deficits listed below in the problem list, provide pt/family education and to maximize pt's level of independence in the home and community environment.    Will the patient continue to benefit from skilled PT intervention? Yes        Medical necessity is demonstrated by:   - Pain limits function of effected part for all activities  - Unable to participate in daily activities   - Requires skilled supervision to complete and progress HEP  - Fall risk - impaired balance   - Continued inability to participate in vocational pursuits    Progress towards goals:     New/Revised Goals:       Plan   Continue with established Plan of Care towards PT goals.      Ruby Benedict, PT, DPT  01/03/2018

## 2018-01-08 ENCOUNTER — LAB VISIT (OUTPATIENT)
Dept: LAB | Facility: HOSPITAL | Age: 73
End: 2018-01-08
Attending: INTERNAL MEDICINE
Payer: MEDICARE

## 2018-01-08 ENCOUNTER — OFFICE VISIT (OUTPATIENT)
Dept: INTERNAL MEDICINE | Facility: CLINIC | Age: 73
End: 2018-01-08
Payer: MEDICARE

## 2018-01-08 VITALS
WEIGHT: 164 LBS | RESPIRATION RATE: 15 BRPM | TEMPERATURE: 99 F | BODY MASS INDEX: 23.53 KG/M2 | SYSTOLIC BLOOD PRESSURE: 124 MMHG | DIASTOLIC BLOOD PRESSURE: 80 MMHG | HEART RATE: 85 BPM

## 2018-01-08 DIAGNOSIS — I10 ESSENTIAL HYPERTENSION: Primary | Chronic | ICD-10-CM

## 2018-01-08 DIAGNOSIS — M15.9 PRIMARY OSTEOARTHRITIS INVOLVING MULTIPLE JOINTS: ICD-10-CM

## 2018-01-08 DIAGNOSIS — R22.31 NODULE OF FINGER OF RIGHT HAND: ICD-10-CM

## 2018-01-08 DIAGNOSIS — R29.898 RIGHT ARM WEAKNESS: ICD-10-CM

## 2018-01-08 DIAGNOSIS — M75.101 TEAR OF RIGHT ROTATOR CUFF, UNSPECIFIED TEAR EXTENT: ICD-10-CM

## 2018-01-08 DIAGNOSIS — E78.5 HYPERLIPIDEMIA, UNSPECIFIED HYPERLIPIDEMIA TYPE: ICD-10-CM

## 2018-01-08 DIAGNOSIS — I83.93 VARICOSE VEINS OF BOTH LOWER EXTREMITIES: ICD-10-CM

## 2018-01-08 DIAGNOSIS — M79.645 THUMB PAIN, LEFT: ICD-10-CM

## 2018-01-08 LAB — URATE SERPL-MCNC: 5.7 MG/DL

## 2018-01-08 PROCEDURE — 99999 PR PBB SHADOW E&M-EST. PATIENT-LVL III: CPT | Mod: PBBFAC,,, | Performed by: INTERNAL MEDICINE

## 2018-01-08 PROCEDURE — 86431 RHEUMATOID FACTOR QUANT: CPT

## 2018-01-08 PROCEDURE — 99214 OFFICE O/P EST MOD 30 MIN: CPT | Mod: S$PBB,,, | Performed by: INTERNAL MEDICINE

## 2018-01-08 PROCEDURE — 99213 OFFICE O/P EST LOW 20 MIN: CPT | Mod: PBBFAC,PO | Performed by: INTERNAL MEDICINE

## 2018-01-08 PROCEDURE — 86038 ANTINUCLEAR ANTIBODIES: CPT

## 2018-01-08 PROCEDURE — 36415 COLL VENOUS BLD VENIPUNCTURE: CPT | Mod: PO

## 2018-01-08 PROCEDURE — 84550 ASSAY OF BLOOD/URIC ACID: CPT

## 2018-01-08 RX ORDER — DICLOFENAC SODIUM AND MISOPROSTOL 75; 200 MG/1; UG/1
1 TABLET, DELAYED RELEASE ORAL DAILY
Qty: 90 TABLET | Refills: 3 | Status: SHIPPED | OUTPATIENT
Start: 2018-01-08 | End: 2019-02-10 | Stop reason: SDUPTHER

## 2018-01-08 NOTE — PROGRESS NOTES
Subjective:       Patient ID: Sidney Lanza is a 72 y.o. male.    Chief Complaint: Follow-up; Hypertension; and Hyperlipidemia    HPI   The patient presents for follow-up of medical conditions.  He has hypertension, hyperlipidemia, osteoarthritis, varicose veins of the lower extremities.  He is status post right rotator cuff repair surgery on 10/18/17.  He has been undergoing rehabilitation therapy.  Rock motion is slowly improving.  The patient has been noting left thumb joint pain with certain movements.  A soft tissue swelling of the right fourth finger is also present.  Review of Systems   Constitutional: Negative for activity change, appetite change, fatigue and unexpected weight change.   HENT: Negative for sinus pressure and sore throat.    Eyes: Negative for visual disturbance.   Respiratory: Negative for cough, chest tightness, shortness of breath and wheezing.    Cardiovascular: Negative for chest pain, palpitations and leg swelling.   Gastrointestinal: Negative for abdominal pain, blood in stool, nausea and vomiting.   Genitourinary: Negative for dysuria, hematuria and urgency.   Musculoskeletal: Positive for arthralgias and joint swelling. Negative for back pain, gait problem, myalgias and neck stiffness.   Skin: Negative for color change and rash.   Neurological: Negative for dizziness, syncope, weakness, light-headedness, numbness and headaches.   Psychiatric/Behavioral: Negative for sleep disturbance.       Objective:      Physical Exam   Constitutional: He is oriented to person, place, and time. He appears well-developed and well-nourished. No distress.   HENT:   Head: Normocephalic and atraumatic.   Eyes: Conjunctivae and EOM are normal. No scleral icterus.   Neck: Normal range of motion. Neck supple. No JVD present. No thyromegaly present.   Cardiovascular: Normal rate, regular rhythm, normal heart sounds and intact distal pulses.  Exam reveals no gallop and no friction rub.    No murmur  heard.  Pulmonary/Chest: Effort normal and breath sounds normal. No respiratory distress. He has no wheezes. He has no rales.   Abdominal: Soft. Bowel sounds are normal. He exhibits no mass. There is no tenderness.   Musculoskeletal: Normal range of motion. He exhibits no tenderness.   Asymmetrical soft tissue swelling is noted overlying the fourth PIP joint of the right hand with a nodule palpable.  PIP joint deformities are noted bilaterally.  Thumb range of motion is intact.  Slight PIP and MCP joint tenderness is present.   Lymphadenopathy:     He has no cervical adenopathy.   Neurological: He is alert and oriented to person, place, and time.   Gait is normal.   Skin: Skin is warm and dry. No rash noted.   Nursing note and vitals reviewed.      Assessment:       1. Essential hypertension    2. Hyperlipidemia, unspecified hyperlipidemia type    3. Right arm weakness    4. Primary osteoarthritis involving multiple joints    5. Varicose veins of both lower extremities    6. Tear of right rotator cuff, unspecified tear extent    7. Nodule of finger of right hand    8. Thumb pain, left        Plan:       Sidney was seen today for follow-up, hypertension and hyperlipidemia.  A uric acid level, BRISEYDA, and rheumatoid factor will be performed today.  X-rays of the hands was suggested however the patient wishes to defer these until he sees the orthopedic specialist.  Orthopedic hand surgery consultation will be obtained.    Diagnoses and all orders for this visit:    Essential hypertension    Hyperlipidemia, unspecified hyperlipidemia type    Right arm weakness    Primary osteoarthritis involving multiple joints  -     Cancel: X-Ray Hand 2 View Bilat; Future    Varicose veins of both lower extremities    Tear of right rotator cuff, unspecified tear extent    Nodule of finger of right hand  -     Ambulatory consult to Orthopedics  -     Cancel: X-Ray Hand 2 View Bilat; Future  -     Uric acid; Future  -     BRISEYDA; Future  -      Rheumatoid factor; Future    Thumb pain, left  -     Ambulatory consult to Orthopedics  -     Cancel: X-Ray Hand 2 View Bilat; Future  -     Uric acid; Future  -     BRISEYDA; Future  -     Rheumatoid factor; Future    Other orders  -     diclofenac-misoprostol  mg-mcg (ARTHROTEC 75)  mg-mcg per tablet; Take 1 tablet by mouth once daily. For arthritis pain.

## 2018-01-09 LAB
ANA SER QL IF: NORMAL
RHEUMATOID FACT SERPL-ACNC: <10 IU/ML

## 2018-01-11 ENCOUNTER — CLINICAL SUPPORT (OUTPATIENT)
Dept: REHABILITATION | Facility: HOSPITAL | Age: 73
End: 2018-01-11
Payer: MEDICARE

## 2018-01-11 DIAGNOSIS — R29.898 RIGHT ARM WEAKNESS: ICD-10-CM

## 2018-01-11 PROCEDURE — 97110 THERAPEUTIC EXERCISES: CPT

## 2018-01-11 NOTE — PROGRESS NOTES
Physical Therapy Daily Note     Date: 01/11/2018  Name: Sidney Lanza  Clinic Number: [id number]  Diagnosis:   Encounter Diagnosis   Name Primary?    Right arm weakness      Physician: Chelsey Roger PA    Evaluation Date: 11/2  Date of Surgery: 10/18  Post op day: 76  Return to MD Date:   Visit #: 13  Start Time:  200  Stop Time:  300   Total Treatment Time: 60     Precautions:Open RTC R (unspecified size by surgeon) + biceps tenodesis    Subjective     Pt reports that he is doing well. He feels his shoulder is at 85%  Pain: not specified    Objective     Measurements taken:     Patient received individual therapy to increase strength, endurance, ROM, flexibility, posture and core stabilization with activities as follows:     Sidney received therapeutic exercises to develop strength, endurance, ROM, flexibility, posture and core stabilization for 15 minutes including: (1:1 30 min)      Seated overhead stick x 30  No money 3 x 10  SL ER 3 x 10 #2  SL flexion 3 x 10 #2  SL gator chop 3 x 10 #2  Prone row 3 x 10 #5  Prone ext 3 x 10 #2  Ball on wall 3 x 30 sec CW/CCW front/side  Rows 3 x 10   LM 3 x 10       Cryotherapy x 10'      Sidney received the following manual therapy techniques: Joint mobilizations and Soft tissue Mobilization were applied to the:  for 15 minutes.(1:1 5 min)  PROM shoulder     Written Home Exercises Provided: Yes  Pt demo good understanding of the education provided. Sidney demonstrated good return demonstration of activities.     Education provided:  Sidney verbalized good understanding of education provided.   No spiritual or educational barriers to learning identified.    Assessment     AROM to 170 with flexion pain free.   Progress overhead activity.     This is a 72 y.o. male referred to outpatient physical therapy and presents with a medical diagnosis of s/p RTC R + biceps tenodesis and demonstrates limitations as described in the  problem list Pt prognosis is Good. Pt will continue to benefit from skilled outpatient physical therapy to address the deficits listed below in the problem list, provide pt/family education and to maximize pt's level of independence in the home and community environment.    Will the patient continue to benefit from skilled PT intervention? Yes        Medical necessity is demonstrated by:   - Pain limits function of effected part for all activities  - Unable to participate in daily activities   - Requires skilled supervision to complete and progress HEP  - Fall risk - impaired balance   - Continued inability to participate in vocational pursuits    Progress towards goals:     New/Revised Goals:       Plan   Continue with established Plan of Care towards PT goals.      Ruby Benedict, PT, DPT  01/11/2018

## 2018-01-22 ENCOUNTER — OFFICE VISIT (OUTPATIENT)
Dept: ORTHOPEDICS | Facility: CLINIC | Age: 73
End: 2018-01-22
Payer: MEDICARE

## 2018-01-22 VITALS
RESPIRATION RATE: 18 BRPM | BODY MASS INDEX: 23.48 KG/M2 | DIASTOLIC BLOOD PRESSURE: 71 MMHG | HEIGHT: 70 IN | HEART RATE: 72 BPM | WEIGHT: 164 LBS | SYSTOLIC BLOOD PRESSURE: 115 MMHG

## 2018-01-22 DIAGNOSIS — M75.101 TEAR OF RIGHT ROTATOR CUFF, UNSPECIFIED TEAR EXTENT: Primary | ICD-10-CM

## 2018-01-22 DIAGNOSIS — R22.31 MASS OF FINGER OF RIGHT HAND: ICD-10-CM

## 2018-01-22 DIAGNOSIS — Z47.89 ORTHOPEDIC AFTERCARE: ICD-10-CM

## 2018-01-22 PROCEDURE — 99214 OFFICE O/P EST MOD 30 MIN: CPT | Mod: PBBFAC | Performed by: PHYSICIAN ASSISTANT

## 2018-01-22 PROCEDURE — 99999 PR PBB SHADOW E&M-EST. PATIENT-LVL IV: CPT | Mod: PBBFAC,,, | Performed by: PHYSICIAN ASSISTANT

## 2018-01-22 PROCEDURE — 99024 POSTOP FOLLOW-UP VISIT: CPT | Mod: POP,,, | Performed by: PHYSICIAN ASSISTANT

## 2018-01-22 NOTE — PROGRESS NOTES
"Mr. Lanza is here today for a post-operative visit.  He is 3 months status post Right open rotator cuff repair with Arthrex anchor and Tissue Mend, Arthroscopic biceps tenodesis, Arthroscopic subacromial decompression right shoulder, and Arthroscopic right shoulder synovectomy by Dr. Rush on 10/18/17. He reports that he is doing well with mild pain.  He has discontinued use of the sling.  He has been attending PT regularly, they began weightbearing 3-4 sessions ago.   He denies fever, chills, and sweats since the time of the surgery.     Physical exam:    Vitals:    01/22/18 1412   BP: 115/71   Pulse: 72   Resp: 18   Weight: 74.4 kg (164 lb)   Height: 5' 10" (1.778 m)   PainSc:   3   PainLoc: Shoulder     Vital signs are stable, patient is afebrile.  Patient is well dressed and well groomed, no acute distress.  Alert and oriented to person, place, and time.  Incisions healing well - clean, dry and intact.  He is in the sling and pillow today. There is no erythema or exudate.  There is no sign of any infection. He is NVI.  Good finger and wrist ROM. Good elbow and shoulder ROM, slightly decreased ER, IR, and adduction.     Assessment: 3 months status post Right open rotator cuff repair with Arthrex anchor and Tissue Mend, Arthroscopic biceps tenodesis, Arthroscopic subacromial decompression right shoulder, and Arthroscopic right shoulder synovectomy    Plan:  Sidney was seen today for post-op evaluation.    Diagnoses and all orders for this visit:    Mass of finger of right hand  -     X-Ray Hand 3 View Right; Future        - Continue OT  - Continue home ROM exercises  - gradually increase lifting in PT  - Follow up in 3 months  - Call with questions or concerns    "

## 2018-01-25 ENCOUNTER — CLINICAL SUPPORT (OUTPATIENT)
Dept: REHABILITATION | Facility: HOSPITAL | Age: 73
End: 2018-01-25
Payer: MEDICARE

## 2018-01-25 DIAGNOSIS — R29.898 RIGHT ARM WEAKNESS: ICD-10-CM

## 2018-01-25 PROCEDURE — 97110 THERAPEUTIC EXERCISES: CPT

## 2018-01-25 NOTE — PROGRESS NOTES
Physical Therapy Daily Note     Date: 01/25/2018  Name: Sidney Lanza  Clinic Number: [id number]  Diagnosis:   Encounter Diagnosis   Name Primary?    Right arm weakness      Physician: Chelsey Roger PA    Evaluation Date: 11/2  Date of Surgery: 10/18  Post op day: 90  Return to MD Date:   Visit #: 13  Start Time:  200  Stop Time:  300   Total Treatment Time: 60     Precautions:Open RTC R (unspecified size by surgeon) + biceps tenodesis    Subjective     Pt reports that his shoulder is doing well. He is beginning to feel stronger  Pain: not specified    Objective     Measurements taken:     Patient received individual therapy to increase strength, endurance, ROM, flexibility, posture and core stabilization with activities as follows:     Sidney received therapeutic exercises to develop strength, endurance, ROM, flexibility, posture and core stabilization for 15 minutes including: (1:1 30 min)      Seated overhead stick x 30  No money 3 x 10  SL ER 3 x 10 #2  SL flexion 3 x 10 #2  SL gator chop 3 x 10 #2  Prone row 3 x 10 #5  Prone ext 3 x 10 #2  Ball on wall 3 x 30 sec CW/CCW front/side  Rows 3 x 10   LM 3 x 10  LM + ER 3 x 10        Cryotherapy x 10'      Sidney received the following manual therapy techniques: Joint mobilizations and Soft tissue Mobilization were applied to the:  for 15 minutes.(1:1 5 min)  PROM shoulder     Written Home Exercises Provided: Yes  Pt demo good understanding of the education provided. Sidney demonstrated good return demonstration of activities.     Education provided:  Sidney verbalized good understanding of education provided.   No spiritual or educational barriers to learning identified.    Assessment     Excellent AROM and PROM. We will progress IR gradually for full ROM. Progress overhead activity.     This is a 72 y.o. male referred to outpatient physical therapy and presents with a medical diagnosis of s/p RTC R + biceps  tenodesis and demonstrates limitations as described in the problem list Pt prognosis is Good. Pt will continue to benefit from skilled outpatient physical therapy to address the deficits listed below in the problem list, provide pt/family education and to maximize pt's level of independence in the home and community environment.    Will the patient continue to benefit from skilled PT intervention? Yes        Medical necessity is demonstrated by:   - Pain limits function of effected part for all activities  - Unable to participate in daily activities   - Requires skilled supervision to complete and progress HEP  - Fall risk - impaired balance   - Continued inability to participate in vocational pursuits    Progress towards goals:     New/Revised Goals:       Plan   Continue with established Plan of Care towards PT goals.      Ruby Benedict, PT, DPT  01/25/2018

## 2018-01-31 DIAGNOSIS — M79.642 BILATERAL HAND PAIN: Primary | ICD-10-CM

## 2018-01-31 DIAGNOSIS — M79.641 BILATERAL HAND PAIN: Primary | ICD-10-CM

## 2018-02-15 ENCOUNTER — CLINICAL SUPPORT (OUTPATIENT)
Dept: REHABILITATION | Facility: HOSPITAL | Age: 73
End: 2018-02-15
Payer: MEDICARE

## 2018-02-15 DIAGNOSIS — R29.898 RIGHT ARM WEAKNESS: ICD-10-CM

## 2018-02-15 PROCEDURE — 97110 THERAPEUTIC EXERCISES: CPT

## 2018-02-15 NOTE — PROGRESS NOTES
Physical Therapy Daily Note     Date: 02/15/2018  Name: Sidney Lanza  Clinic Number: [id number]  Diagnosis:   Encounter Diagnosis   Name Primary?    Right arm weakness      Physician: Chelsey Roger PA    Evaluation Date: 11/2  Date of Surgery: 10/18  Post op day: 111  Return to MD Date:   Visit #: 14  Start Time:  200  Stop Time:  300   Total Treatment Time: 60     Precautions:Open RTC R (unspecified size by surgeon) + biceps tenodesis    Subjective     Pt reports no new problems in the shoulder  Pain: not specified    Objective     Measurements taken:     Patient received individual therapy to increase strength, endurance, ROM, flexibility, posture and core stabilization with activities as follows:     Sidney received therapeutic exercises to develop strength, endurance, ROM, flexibility, posture and core stabilization for 30 minutes including: (1:1 30 min)      Seated overhead stick x 30  No money 3 x 10  SL ER 3 x 10 #2  SL flexion 3 x 10 #2  SL gator chop 3 x 10 #2  Prone row 3 x 10 #5  Prone ext 3 x 10 #2  Ball on wall 3 x 30 sec CW/CCW front/side  Rows 3 x 10   LM 3 x 10  y lift off on wall 3 x 5  Serratus wall walks 3 x 10  Prone on elbows push up plus 3 x 10        Cryotherapy x 10'      Sidney received the following manual therapy techniques: Joint mobilizations and Soft tissue Mobilization were applied to the:  for 10 minutes.(1:1 5 min)  PROM shoulder     Written Home Exercises Provided: Yes  Pt demo good understanding of the education provided. Sidney demonstrated good return demonstration of activities.     Education provided:   Sidney verbalized good understanding of education provided.   No spiritual or educational barriers to learning identified.    Assessment     Pt requires occasional tactile cues for proper scapular form during therex. Excellent ROM. Progress overhead activity.     This is a 72 y.o. male referred to outpatient physical  therapy and presents with a medical diagnosis of s/p RTC R + biceps tenodesis and demonstrates limitations as described in the problem list Pt prognosis is Good. Pt will continue to benefit from skilled outpatient physical therapy to address the deficits listed below in the problem list, provide pt/family education and to maximize pt's level of independence in the home and community environment.    Will the patient continue to benefit from skilled PT intervention? Yes        Medical necessity is demonstrated by:   - Pain limits function of effected part for all activities  - Unable to participate in daily activities   - Requires skilled supervision to complete and progress HEP  - Fall risk - impaired balance   - Continued inability to participate in vocational pursuits    Progress towards goals:     New/Revised Goals:       Plan   Continue with established Plan of Care towards PT goals.      Ruby Benedict, PT, DPT  02/15/2018

## 2018-02-20 ENCOUNTER — HOSPITAL ENCOUNTER (OUTPATIENT)
Dept: RADIOLOGY | Facility: OTHER | Age: 73
Discharge: HOME | End: 2018-02-20
Attending: PHYSICIAN ASSISTANT
Payer: MEDICARE

## 2018-02-20 ENCOUNTER — OFFICE VISIT (OUTPATIENT)
Dept: ORTHOPEDICS | Facility: CLINIC | Age: 73
End: 2018-02-20
Payer: MEDICARE

## 2018-02-20 VITALS
BODY MASS INDEX: 23.48 KG/M2 | SYSTOLIC BLOOD PRESSURE: 135 MMHG | WEIGHT: 164 LBS | DIASTOLIC BLOOD PRESSURE: 80 MMHG | RESPIRATION RATE: 18 BRPM | HEART RATE: 64 BPM | HEIGHT: 70 IN

## 2018-02-20 DIAGNOSIS — M67.441 GANGLION CYST OF FINGER OF RIGHT HAND: Primary | ICD-10-CM

## 2018-02-20 DIAGNOSIS — M79.642 BILATERAL HAND PAIN: ICD-10-CM

## 2018-02-20 DIAGNOSIS — M79.641 BILATERAL HAND PAIN: ICD-10-CM

## 2018-02-20 DIAGNOSIS — R22.31 FINGER MASS, RIGHT: Primary | ICD-10-CM

## 2018-02-20 PROCEDURE — 1159F MED LIST DOCD IN RCRD: CPT | Mod: ,,, | Performed by: ORTHOPAEDIC SURGERY

## 2018-02-20 PROCEDURE — 73130 X-RAY EXAM OF HAND: CPT | Mod: 26,50,, | Performed by: RADIOLOGY

## 2018-02-20 PROCEDURE — 73130 X-RAY EXAM OF HAND: CPT | Mod: 50,TC,FY

## 2018-02-20 PROCEDURE — 1126F AMNT PAIN NOTED NONE PRSNT: CPT | Mod: ,,, | Performed by: ORTHOPAEDIC SURGERY

## 2018-02-20 PROCEDURE — 99999 PR PBB SHADOW E&M-EST. PATIENT-LVL III: CPT | Mod: PBBFAC,,, | Performed by: ORTHOPAEDIC SURGERY

## 2018-02-20 PROCEDURE — 99214 OFFICE O/P EST MOD 30 MIN: CPT | Mod: S$PBB,,, | Performed by: ORTHOPAEDIC SURGERY

## 2018-02-20 PROCEDURE — 99213 OFFICE O/P EST LOW 20 MIN: CPT | Mod: PBBFAC,25 | Performed by: ORTHOPAEDIC SURGERY

## 2018-02-20 NOTE — PROGRESS NOTES
I have personally taken the history and examined the patient. I agree with the Hand Surgery PA's note. The plan will be surgical excision of mass on finger.  I have explained the risks, benefits, and alternatives of the procedure to the patient in great detail. The patient voices understanding and all questions have been answered. The patient agrees with to proceed as planned. Consents were performed in clinic.

## 2018-02-20 NOTE — PROGRESS NOTES
Subjective:      Patient ID: Sidney Lanza is a 72 y.o. male.    Chief Complaint: Pain of the Right Hand      HPI  Sidney Lanza is a 72 y.o. male presenting today for mass of the right ring finger. He is about 4 mos s/p right open rotator cuff repair as well, doing fine. Mass began about 3 mos ago. He denies pain but states it does become inflamed. Denies numbness or tingling.     Review of patient's allergies indicates:  No Known Allergies      Current Outpatient Prescriptions   Medication Sig Dispense Refill    amLODIPine (NORVASC) 10 MG tablet TAKE 1 TABLET DAILY 90 tablet 3    diclofenac-misoprostol  mg-mcg (ARTHROTEC 75)  mg-mcg per tablet Take 1 tablet by mouth once daily. For arthritis pain. 90 tablet 3    FIBER, DEXTRIN, ORAL Take by mouth every morning.       fluticasone (FLONASE) 50 mcg/actuation nasal spray 2 sprays by Each Nare route once daily. (Patient taking differently: 2 sprays by Each Nare route daily as needed. ) 3 Bottle 3    glucosamine-chondroitin 500-400 mg tablet Take 1 tablet by mouth every morning.      multivitamin capsule Take 1 capsule by mouth every morning.        No current facility-administered medications for this visit.        Past Medical History:   Diagnosis Date    Arthritis     Chronic rhinitis     Fever blister     Hyperlipidemia     Hypertension     Impotence of organic origin     Joint pain     Nuclear sclerosis - Both Eyes 10/23/2013    Prostate cancer     Ulcer     NSAID related     Varicose vein of leg     bilateral legs       Past Surgical History:   Procedure Laterality Date    EYE SURGERY      KNEE ARTHROSCOPY W/ DEBRIDEMENT      PROSTATE SURGERY  2006    for prostate cancer    SHOULDER ARTHROSCOPY Right 10/2017    RTC repair with patch    ULNAR NERVE TRANSPOSITION         Review of Systems:  Constitutional: Negative for chills and fever.   Respiratory: Negative for cough and shortness of breath.    Gastrointestinal: Negative  "for nausea and vomiting.   Skin: Negative for rash.   Neurological: Negative for dizziness and headaches.   Psychiatric/Behavioral: Negative for depression.   MSK as in HPI       OBJECTIVE:     PHYSICAL EXAM:  /80   Pulse 64   Resp 18   Ht 5' 10" (1.778 m)   Wt 74.4 kg (164 lb)   BMI 23.53 kg/m²     GEN:  NAD, well-developed, well-groomed.  NEURO: Awake, alert, and oriented. Normal attention and concentration.    PSYCH: Normal mood and affect. Behavior is normal.  HEENT: No cervical lymphadenopathy noted.  CARDIOVASCULAR: Radial pulses 2+ bilaterally. No LE edema noted.  PULMONARY: Breath sounds normal. No respiratory distress.  SKIN: Intact, no rashes.      MSK:   RUE:  Good active ROM of the wrist and fingers. AIN/PIN/Radial/Median/Ulnar Nerves assessed in isolation without deficit. Radial & Ulnar arteries palpated 2+. Capillary Refill <3s. There is a 2-3 cm mass of the right ring finger at the PIP joint, ulnar side. Soft, non-tender to palpation.      RADIOGRAPHS:  Xray bilateral hands 2/20/18  Narrative     Comparison: 08/05/2003    Results: 3 views of each hand. The bones are satisfactorily mineralized. Alignment is satisfactory. Interval progression of degenerative changes bilaterally particularly involving the first carpal metacarpal joints and several of the interphalangeal joints. On the right, the degenerative changes are most pronounced at the fourth DIP joint and fifth PIP joint. On the left, the degenerative changes are most pronounced at the third, fourth and fifth PIP joints and at the fifth DIP joint. Soft tissue swelling is noted at the second through fifth PIP joints bilaterally but most severe at the third, fourth, and fifth PIP joints on the right and at the second and third PIP joints on the left. No fracture or dislocation. Atherosclerotic vascular calcifications are noted.     Comments: I have personally reviewed the imaging and I agree with the above radiologist's " report.    ASSESSMENT/PLAN:       ICD-10-CM ICD-9-CM   1. Finger mass, right R22.31 782.2        Plan:   -treatment options discussed, pt wishes to proceed with excision of the right ring mass. Consents reviewed and signed in clinic today   -RTC post op         The patient indicates understanding of these issues and agrees to the plan.    Nhi Smith PA-C  [facility]   [facility] [place]

## 2018-02-21 ENCOUNTER — CLINICAL SUPPORT (OUTPATIENT)
Dept: REHABILITATION | Facility: HOSPITAL | Age: 73
End: 2018-02-21
Payer: MEDICARE

## 2018-02-21 DIAGNOSIS — R29.898 RIGHT ARM WEAKNESS: ICD-10-CM

## 2018-02-21 PROCEDURE — 97140 MANUAL THERAPY 1/> REGIONS: CPT

## 2018-02-21 PROCEDURE — 97110 THERAPEUTIC EXERCISES: CPT

## 2018-02-21 NOTE — PROGRESS NOTES
Physical Therapy Daily Note     Date: 02/21/2018  Name: Sidney Lanza  Clinic Number: [id number]  Diagnosis:   Encounter Diagnosis   Name Primary?    Right arm weakness      Physician: Chelsey Roger PA    Evaluation Date: 11/2  Date of Surgery: 10/18  Post op day: 117  Return to MD Date:   Visit #: 15  Start Time:  100  Stop Time:  200   Total Treatment Time: 60     Precautions:Open RTC R (unspecified size by surgeon) + biceps tenodesis    Subjective     Pt reports that his shoulder feels 85% better  Pain: not specified    Objective     Measurements taken:     Patient received individual therapy to increase strength, endurance, ROM, flexibility, posture and core stabilization with activities as follows:     Sidney received therapeutic exercises to develop strength, endurance, ROM, flexibility, posture and core stabilization for 45 minutes including: (1:1 30 min)      Seated overhead stick x 30  No money 3 x 10  SL ER 3 x 10 #2  SL flexion 3 x 10 #2  SL gator chop 3 x 10 #2  Prone row 3 x 10 #5  Prone ext 3 x 10 #2  Ball on wall 3 x 30 sec CW/CCW front/side  Rows 3 x 10   LM 3 x 10  LM + ER 3 x 10   Serratus wall walks 3 x 10         Cryotherapy x 10'      Sidney received the following manual therapy techniques: Joint mobilizations and Soft tissue Mobilization were applied to the:  for 15 minutes.(1:1 5 min)  PROM shoulder     Written Home Exercises Provided: Yes  Pt demo good understanding of the education provided. Sidney demonstrated good return demonstration of activities.     Education provided:  Sidney verbalized good understanding of education provided.   No spiritual or educational barriers to learning identified.    Assessment     Pt has improved scapular control and required less VC and tactile cues for proper form. Progress overhead activity.     This is a 72 y.o. male referred to outpatient physical therapy and presents with a medical diagnosis of  s/p RTC R + biceps tenodesis and demonstrates limitations as described in the problem list Pt prognosis is Good. Pt will continue to benefit from skilled outpatient physical therapy to address the deficits listed below in the problem list, provide pt/family education and to maximize pt's level of independence in the home and community environment.    Will the patient continue to benefit from skilled PT intervention? Yes        Medical necessity is demonstrated by:   - Pain limits function of effected part for all activities  - Unable to participate in daily activities   - Requires skilled supervision to complete and progress HEP  - Fall risk - impaired balance   - Continued inability to participate in vocational pursuits    Progress towards goals:     New/Revised Goals:       Plan   Continue with established Plan of Care towards PT goals.      Ruby Benedict, PT, DPT  02/21/2018

## 2018-02-28 ENCOUNTER — CLINICAL SUPPORT (OUTPATIENT)
Dept: REHABILITATION | Facility: HOSPITAL | Age: 73
End: 2018-02-28
Payer: MEDICARE

## 2018-02-28 DIAGNOSIS — R29.898 RIGHT ARM WEAKNESS: ICD-10-CM

## 2018-02-28 PROCEDURE — 97140 MANUAL THERAPY 1/> REGIONS: CPT

## 2018-02-28 PROCEDURE — 97110 THERAPEUTIC EXERCISES: CPT

## 2018-03-01 NOTE — PROGRESS NOTES
Physical Therapy Daily Note     Date: 03/01/2018  Name: Sidney Lanza  Clinic Number: [id number]  Diagnosis:   Encounter Diagnosis   Name Primary?    Right arm weakness      Physician: Chelsey Roger PA    Evaluation Date: 11/2  Date of Surgery: 10/18  Post op day: 111  Return to MD Date:   Visit #: 15   Precautions:Open RTC R (unspecified size by surgeon) + biceps tenodesis    Subjective     Pt reports feeling fine, denies pain at this time.     Objective     Measurements taken:     Patient received individual therapy to increase strength, endurance, ROM, flexibility, posture and core stabilization with activities as follows:     Sidney received therapeutic exercises to develop strength, endurance, ROM, flexibility, posture and core stabilization for 45 minutes including: (1:1 30 min)      UBE scapula retraction/protraction 3 minutes each direction  Shoulder flexion dynamic stretch using theraball 20 reps   No money 3 x 10  SL ER 3 x 10 #2  SL flexion 3 x 10 #2  SL gator chop 3 x 10 #2  Prone row 3 x 10 #5  Prone ext 3 x 10 #2  Ball on wall 3 x 30 sec CW/CCW front/side  Rows 3 x 10   Shoulder row and extension gtb 3 x 10 reps   Shoulder ER/IR gtb 3 x 10 reps   Wall clock rtb 3 and 5 20 reps    Cryotherapy x 10'      Sidney received the following manual therapy techniques: Joint mobilizations and Soft tissue Mobilization were applied to the:  for 15 minutes.(1:1 5 min)  PROM shoulder     Written Home Exercises Provided: Yes  Pt demo good understanding of the education provided. Sidney demonstrated good return demonstration of activities.     Education provided:  Sidney verbalized good understanding of education provided.   No spiritual or educational barriers to learning identified.    Assessment   Pt with good tolerance to PT today, good AROM and tolerance to therex. Pt will have hand surgery next week and will need not be able to come to therapy for a while.    This is a 72 y.o. male referred to outpatient physical therapy and presents with a medical diagnosis of s/p RTC R + biceps tenodesis and demonstrates limitations as described in the problem list Pt prognosis is Good. Pt will continue to benefit from skilled outpatient physical therapy to address the deficits listed below in the problem list, provide pt/family education and to maximize pt's level of independence in the home and community environment.    Will the patient continue to benefit from skilled PT intervention? Yes        Medical necessity is demonstrated by:   - Pain limits function of effected part for all activities  - Unable to participate in daily activities   - Requires skilled supervision to complete and progress HEP  - Fall risk - impaired balance   - Continued inability to participate in vocational pursuits    Progress towards goals:     New/Revised Goals:       Plan   Continue with established Plan of Care towards PT goals. PT/PTA face-to-face:discussed Pt's POC and progress towards established PT goals.  Kathy Corrales, PTA  Ruby Benedict, PT, DPT

## 2018-03-06 ENCOUNTER — TELEPHONE (OUTPATIENT)
Dept: ORTHOPEDICS | Facility: CLINIC | Age: 73
End: 2018-03-06

## 2018-03-07 ENCOUNTER — HOSPITAL ENCOUNTER (OUTPATIENT)
Facility: HOSPITAL | Age: 73
Discharge: HOME | End: 2018-03-07
Attending: ORTHOPAEDIC SURGERY | Admitting: ORTHOPAEDIC SURGERY
Payer: MEDICARE

## 2018-03-07 ENCOUNTER — ANESTHESIA (OUTPATIENT)
Dept: SURGERY | Facility: HOSPITAL | Age: 73
End: 2018-03-07
Payer: MEDICARE

## 2018-03-07 ENCOUNTER — ANESTHESIA EVENT (OUTPATIENT)
Dept: SURGERY | Facility: HOSPITAL | Age: 73
End: 2018-03-07
Payer: MEDICARE

## 2018-03-07 ENCOUNTER — SURGERY (OUTPATIENT)
Age: 73
End: 2018-03-07

## 2018-03-07 DIAGNOSIS — R22.31 FINGER MASS, RIGHT: ICD-10-CM

## 2018-03-07 PROCEDURE — D9220A PRA ANESTHESIA: Mod: ANES,,, | Performed by: ANESTHESIOLOGY

## 2018-03-07 PROCEDURE — 27201423 OPTIME MED/SURG SUP & DEVICES STERILE SUPPLY: Performed by: ORTHOPAEDIC SURGERY

## 2018-03-07 PROCEDURE — 36000706: Performed by: ORTHOPAEDIC SURGERY

## 2018-03-07 PROCEDURE — 25000003 PHARM REV CODE 250: Performed by: NURSE ANESTHETIST, CERTIFIED REGISTERED

## 2018-03-07 PROCEDURE — 71000015 HC POSTOP RECOV 1ST HR: Performed by: ORTHOPAEDIC SURGERY

## 2018-03-07 PROCEDURE — 88305 TISSUE EXAM BY PATHOLOGIST: CPT | Mod: 26,,, | Performed by: PATHOLOGY

## 2018-03-07 PROCEDURE — 71000033 HC RECOVERY, INTIAL HOUR: Performed by: ORTHOPAEDIC SURGERY

## 2018-03-07 PROCEDURE — 37000009 HC ANESTHESIA EA ADD 15 MINS: Performed by: ORTHOPAEDIC SURGERY

## 2018-03-07 PROCEDURE — 26160 REMOVE TENDON SHEATH LESION: CPT | Mod: F8,GC,, | Performed by: ORTHOPAEDIC SURGERY

## 2018-03-07 PROCEDURE — D9220A PRA ANESTHESIA: Mod: CRNA,,, | Performed by: NURSE ANESTHETIST, CERTIFIED REGISTERED

## 2018-03-07 PROCEDURE — 37000008 HC ANESTHESIA 1ST 15 MINUTES: Performed by: ORTHOPAEDIC SURGERY

## 2018-03-07 PROCEDURE — 88305 TISSUE EXAM BY PATHOLOGIST: CPT | Performed by: PATHOLOGY

## 2018-03-07 PROCEDURE — 63600175 PHARM REV CODE 636 W HCPCS: Performed by: NURSE ANESTHETIST, CERTIFIED REGISTERED

## 2018-03-07 PROCEDURE — 25000003 PHARM REV CODE 250: Performed by: ORTHOPAEDIC SURGERY

## 2018-03-07 PROCEDURE — 36000707: Performed by: ORTHOPAEDIC SURGERY

## 2018-03-07 RX ORDER — PROPOFOL 10 MG/ML
VIAL (ML) INTRAVENOUS CONTINUOUS PRN
Status: DISCONTINUED | OUTPATIENT
Start: 2018-03-07 | End: 2018-03-07

## 2018-03-07 RX ORDER — PROPOFOL 10 MG/ML
VIAL (ML) INTRAVENOUS
Status: DISCONTINUED | OUTPATIENT
Start: 2018-03-07 | End: 2018-03-07

## 2018-03-07 RX ORDER — BUPIVACAINE HYDROCHLORIDE 2.5 MG/ML
INJECTION, SOLUTION EPIDURAL; INFILTRATION; INTRACAUDAL
Status: DISCONTINUED
Start: 2018-03-07 | End: 2018-03-07 | Stop reason: HOSPADM

## 2018-03-07 RX ORDER — MIDAZOLAM HYDROCHLORIDE 1 MG/ML
INJECTION, SOLUTION INTRAMUSCULAR; INTRAVENOUS
Status: DISCONTINUED | OUTPATIENT
Start: 2018-03-07 | End: 2018-03-07

## 2018-03-07 RX ORDER — LIDOCAINE HYDROCHLORIDE 10 MG/ML
INJECTION INFILTRATION; PERINEURAL
Status: DISCONTINUED
Start: 2018-03-07 | End: 2018-03-07 | Stop reason: HOSPADM

## 2018-03-07 RX ORDER — GLYCOPYRROLATE 0.2 MG/ML
INJECTION INTRAMUSCULAR; INTRAVENOUS
Status: DISCONTINUED | OUTPATIENT
Start: 2018-03-07 | End: 2018-03-07

## 2018-03-07 RX ORDER — OXYCODONE AND ACETAMINOPHEN 5; 325 MG/1; MG/1
1 TABLET ORAL EVERY 4 HOURS PRN
Status: DISCONTINUED | OUTPATIENT
Start: 2018-03-07 | End: 2018-03-07 | Stop reason: HOSPADM

## 2018-03-07 RX ORDER — HYDROCODONE BITARTRATE AND ACETAMINOPHEN 10; 325 MG/1; MG/1
1 TABLET ORAL EVERY 4 HOURS PRN
Qty: 45 TABLET | Refills: 0 | Status: SHIPPED | OUTPATIENT
Start: 2018-03-07 | End: 2022-09-02

## 2018-03-07 RX ORDER — SODIUM CHLORIDE 9 MG/ML
INJECTION, SOLUTION INTRAVENOUS CONTINUOUS
Status: DISCONTINUED | OUTPATIENT
Start: 2018-03-07 | End: 2018-03-07 | Stop reason: HOSPADM

## 2018-03-07 RX ORDER — CEFAZOLIN SODIUM 1 G/3ML
2 INJECTION, POWDER, FOR SOLUTION INTRAMUSCULAR; INTRAVENOUS
Status: DISCONTINUED | OUTPATIENT
Start: 2018-03-07 | End: 2018-03-07 | Stop reason: HOSPADM

## 2018-03-07 RX ORDER — MIDAZOLAM HYDROCHLORIDE 5 MG/ML
INJECTION INTRAMUSCULAR; INTRAVENOUS
Status: DISCONTINUED | OUTPATIENT
Start: 2018-03-07 | End: 2018-03-07

## 2018-03-07 RX ORDER — ONDANSETRON 8 MG/1
8 TABLET, ORALLY DISINTEGRATING ORAL EVERY 8 HOURS PRN
Status: DISCONTINUED | OUTPATIENT
Start: 2018-03-07 | End: 2018-03-07 | Stop reason: HOSPADM

## 2018-03-07 RX ORDER — BUPIVACAINE HYDROCHLORIDE 2.5 MG/ML
INJECTION, SOLUTION EPIDURAL; INFILTRATION; INTRACAUDAL
Status: DISCONTINUED | OUTPATIENT
Start: 2018-03-07 | End: 2018-03-07 | Stop reason: HOSPADM

## 2018-03-07 RX ORDER — LIDOCAINE HYDROCHLORIDE 10 MG/ML
1 INJECTION, SOLUTION EPIDURAL; INFILTRATION; INTRACAUDAL; PERINEURAL ONCE
Status: COMPLETED | OUTPATIENT
Start: 2018-03-07 | End: 2018-03-07

## 2018-03-07 RX ORDER — FENTANYL CITRATE 50 UG/ML
INJECTION, SOLUTION INTRAMUSCULAR; INTRAVENOUS
Status: DISCONTINUED | OUTPATIENT
Start: 2018-03-07 | End: 2018-03-07

## 2018-03-07 RX ORDER — LIDOCAINE HYDROCHLORIDE 10 MG/ML
INJECTION, SOLUTION EPIDURAL; INFILTRATION; INTRACAUDAL; PERINEURAL
Status: DISCONTINUED | OUTPATIENT
Start: 2018-03-07 | End: 2018-03-07 | Stop reason: HOSPADM

## 2018-03-07 RX ORDER — BACITRACIN 500 [USP'U]/G
OINTMENT TOPICAL
Status: DISCONTINUED
Start: 2018-03-07 | End: 2018-03-07 | Stop reason: HOSPADM

## 2018-03-07 RX ORDER — OXYCODONE AND ACETAMINOPHEN 10; 325 MG/1; MG/1
1 TABLET ORAL EVERY 4 HOURS PRN
Status: DISCONTINUED | OUTPATIENT
Start: 2018-03-07 | End: 2018-03-07 | Stop reason: HOSPADM

## 2018-03-07 RX ORDER — BACITRACIN ZINC 500 UNIT/G
OINTMENT (GRAM) TOPICAL
Status: DISCONTINUED | OUTPATIENT
Start: 2018-03-07 | End: 2018-03-07 | Stop reason: HOSPADM

## 2018-03-07 RX ADMIN — PROPOFOL 100 MCG/KG/MIN: 10 INJECTION, EMULSION INTRAVENOUS at 12:03

## 2018-03-07 RX ADMIN — SODIUM CHLORIDE 10 ML/HR: 0.9 INJECTION, SOLUTION INTRAVENOUS at 09:03

## 2018-03-07 RX ADMIN — BUPIVACAINE HYDROCHLORIDE 10 ML: 2.5 INJECTION, SOLUTION EPIDURAL; INFILTRATION; INTRACAUDAL; PERINEURAL at 12:03

## 2018-03-07 RX ADMIN — LIDOCAINE HYDROCHLORIDE 10 ML: 10 INJECTION, SOLUTION EPIDURAL; INFILTRATION; INTRACAUDAL; PERINEURAL at 12:03

## 2018-03-07 RX ADMIN — BACITRACIN ZINC 12 G: 500 OINTMENT TOPICAL at 12:03

## 2018-03-07 RX ADMIN — MIDAZOLAM HYDROCHLORIDE 2 MG: 1 INJECTION, SOLUTION INTRAMUSCULAR; INTRAVENOUS at 12:03

## 2018-03-07 RX ADMIN — PROPOFOL 30 MG: 10 INJECTION, EMULSION INTRAVENOUS at 12:03

## 2018-03-07 RX ADMIN — PROPOFOL 10 MG: 10 INJECTION, EMULSION INTRAVENOUS at 12:03

## 2018-03-07 RX ADMIN — FENTANYL CITRATE 50 MCG: 50 INJECTION, SOLUTION INTRAMUSCULAR; INTRAVENOUS at 12:03

## 2018-03-07 RX ADMIN — GLYCOPYRROLATE 0.2 MG: 0.2 INJECTION, SOLUTION INTRAMUSCULAR; INTRAVENOUS at 12:03

## 2018-03-07 NOTE — TRANSFER OF CARE
"Anesthesia Transfer of Care Note    Patient: Sidney Lanza    Procedure(s) Performed: Procedure(s) (LRB):  EXCISION-MASS-FINGER - RIGHT RING FINGER (Right)    Patient location: PACU    Anesthesia Type: general    Transport from OR: Transported from OR on room air with adequate spontaneous ventilation    Post pain: adequate analgesia    Post assessment: no apparent anesthetic complications    Post vital signs: stable    Level of consciousness: awake    Nausea/Vomiting: no nausea/vomiting    Complications: none    Transfer of care protocol was followed      Last vitals:   Visit Vitals  BP (!) 149/77 (BP Location: Left arm, Patient Position: Sitting)   Pulse 63   Temp 36.5 °C (97.7 °F) (Oral)   Resp 16   Ht 5' 10" (1.778 m)   Wt 74.4 kg (164 lb 0.4 oz)   SpO2 100%   BMI 23.53 kg/m²     "

## 2018-03-07 NOTE — H&P
H&P    Subjective:       Patient ID: Sidney Lanza is a 72 y.o. male.     Chief Complaint: Pain of the Right Hand        HPI  Sidney Lanza is a 72 y.o. male presenting today for mass of the right ring finger. He is about 4 mos s/p right open rotator cuff repair as well, doing fine. Mass began about 3 mos ago. He denies pain but states it does become inflamed. Denies numbness or tingling.      Review of patient's allergies indicates:  No Known Allergies       Current Medications          Current Outpatient Prescriptions   Medication Sig Dispense Refill    amLODIPine (NORVASC) 10 MG tablet TAKE 1 TABLET DAILY 90 tablet 3    diclofenac-misoprostol  mg-mcg (ARTHROTEC 75)  mg-mcg per tablet Take 1 tablet by mouth once daily. For arthritis pain. 90 tablet 3    FIBER, DEXTRIN, ORAL Take by mouth every morning.         fluticasone (FLONASE) 50 mcg/actuation nasal spray 2 sprays by Each Nare route once daily. (Patient taking differently: 2 sprays by Each Nare route daily as needed. ) 3 Bottle 3    glucosamine-chondroitin 500-400 mg tablet Take 1 tablet by mouth every morning.        multivitamin capsule Take 1 capsule by mouth every morning.           No current facility-administered medications for this visit.                  Past Medical History:   Diagnosis Date    Arthritis      Chronic rhinitis      Fever blister      Hyperlipidemia      Hypertension      Impotence of organic origin      Joint pain      Nuclear sclerosis - Both Eyes 10/23/2013    Prostate cancer      Ulcer       NSAID related     Varicose vein of leg       bilateral legs               Past Surgical History:   Procedure Laterality Date    EYE SURGERY        KNEE ARTHROSCOPY W/ DEBRIDEMENT        PROSTATE SURGERY   2006     for prostate cancer    SHOULDER ARTHROSCOPY Right 10/2017     RTC repair with patch    ULNAR NERVE TRANSPOSITION             Review of Systems:  Constitutional: Negative for chills and fever.  "  Respiratory: Negative for cough and shortness of breath.    Gastrointestinal: Negative for nausea and vomiting.   Skin: Negative for rash.   Neurological: Negative for dizziness and headaches.   Psychiatric/Behavioral: Negative for depression.   MSK as in HPI         OBJECTIVE:      PHYSICAL EXAM:  /80   Pulse 64   Resp 18   Ht 5' 10" (1.778 m)   Wt 74.4 kg (164 lb)   BMI 23.53 kg/m²      Vitals: Afebrile.  Vital signs stable.  General: No acute distress.  HEENT: Normocephalic. Atraumatic. Sclera anicteric. No tracheal deviation.  Cardio: Regular rate and rhythm.  Chest: No increased work of breathing.  Abdominal: Nondistended.  Extremities: No cyanosis.  No clubbing.  No edema.  Palpable pulses.  Skin: No generalized rash.  Neuro: Awake. Alert. Oriented to person, place, time, and situation.  Psych: Normal appearance. Cooperative.  Appropriate mood.  Appropriate affect.  ]     MSK:   RUE:  Good active ROM of the wrist and fingers. AIN/PIN/Radial/Median/Ulnar Nerves assessed in isolation without deficit. Radial & Ulnar arteries palpated 2+. Capillary Refill <3s. There is a 2-3 cm mass of the right ring finger at the PIP joint, ulnar side. Soft, non-tender to palpation.        RADIOGRAPHS:  Xray bilateral hands 2/20/18  Narrative      Comparison: 08/05/2003    Results: 3 views of each hand. The bones are satisfactorily mineralized. Alignment is satisfactory. Interval progression of degenerative changes bilaterally particularly involving the first carpal metacarpal joints and several of the interphalangeal joints. On the right, the degenerative changes are most pronounced at the fourth DIP joint and fifth PIP joint. On the left, the degenerative changes are most pronounced at the third, fourth and fifth PIP joints and at the fifth DIP joint. Soft tissue swelling is noted at the second through fifth PIP joints bilaterally but most severe at the third, fourth, and fifth PIP joints on the right and at the " second and third PIP joints on the left. No fracture or dislocation. Atherosclerotic vascular calcifications are noted.      Comments: I have personally reviewed the imaging and I agree with the above radiologist's report.     ASSESSMENT/PLAN:          ICD-10-CM ICD-9-CM   1. Finger mass, right R22.31 782.2          Plan:   - Plan for right ring finger mass excision  - NPO now

## 2018-03-07 NOTE — BRIEF OP NOTE
[facility]  Brief Operative Note     SUMMARY     Surgery Date: 3/7/2018     Surgeon(s) and Role:     * Miri Rush MD - Primary     * Mikhail Reid MD - Resident - Assisting        Pre-op Diagnosis:  Ganglion cyst of finger of right hand [M67.441]    Post-op Diagnosis:  Post-Op Diagnosis Codes:     * Ganglion cyst of finger of right hand [M67.441]    Procedure(s) (LRB):  EXCISION-MASS-FINGER - RIGHT RING FINGER (Right)    Anesthesia: Local MAC    Description of the findings of the procedure: see op note    Findings/Key Components: resembled ganglion cyst    Estimated Blood Loss: * No values recorded between 3/7/2018 12:44 PM and 3/7/2018  1:02 PM *         Specimens:   Specimen (12h ago through future)    Start     Ordered    03/07/18 1252  Specimen to Pathology - Surgery  Once     Comments:  1) mass right ring finger (perm.)      03/07/18 1252          Discharge Note    SUMMARY     Admit Date: 3/7/2018    Discharge Date and Time:  03/07/2018 1:03 PM    Hospital Course (synopsis of major diagnoses, care, treatment, and services provided during the course of the hospital stay): Patient was admitted for outpatient surgery.  There were no complications.  Patient was discharged home in good condition.        Final Diagnosis: Post-Op Diagnosis Codes:     * Ganglion cyst of finger of right hand [M67.441]    Disposition: Home or Self Care    Follow Up/Patient Instructions:     Medications:  Reconciled Home Medications:   Current Discharge Medication List      START taking these medications    Details   hydrocodone-acetaminophen 10-325mg (NORCO)  mg Tab Take 1 tablet by mouth every 4 (four) hours as needed for Pain (post operative pain).  Qty: 45 tablet, Refills: 0         CONTINUE these medications which have NOT CHANGED    Details   amLODIPine (NORVASC) 10 MG tablet TAKE 1 TABLET DAILY  Qty: 90 tablet, Refills: 3      diclofenac-misoprostol  mg-mcg (ARTHROTEC 75)  mg-mcg per  tablet Take 1 tablet by mouth once daily. For arthritis pain.  Qty: 90 tablet, Refills: 3      FIBER, DEXTRIN, ORAL Take by mouth every morning.       fluticasone (FLONASE) 50 mcg/actuation nasal spray 2 sprays by Each Nare route once daily.  Qty: 3 Bottle, Refills: 3      glucosamine-chondroitin 500-400 mg tablet Take 1 tablet by mouth every morning.      multivitamin capsule Take 1 capsule by mouth every morning.              Discharge Procedure Orders  Call MD for:  temperature >100.4     Call MD for:  persistent nausea and vomiting or diarrhea     Call MD for:  severe uncontrolled pain     Call MD for:  difficulty breathing or increased cough     Call MD for:  worsening rash     Call MD for:  persistent dizziness, light-headedness, or visual disturbances     Call MD for:  increased confusion or weakness     Weight bearing restrictions (specify)   Scheduling Instructions: No lifting greater than 5 lbs     Leave dressing on - Keep it clean, dry, and intact until clinic visit       Follow-up Information     Miri Rush MD In 2 weeks.    Specialties:  Hand Surgery, Orthopedic Surgery  Contact information:  [address]  [address]  [facility]  [facility] [id number] [phone]

## 2018-03-07 NOTE — ANESTHESIA RELEASE NOTE
"Anesthesia Release from PACU Note    Patient: Sidney Lanza    Procedure(s) Performed: Procedure(s) (LRB):  EXCISION-MASS-FINGER - RIGHT RING FINGER (Right)    Anesthesia type: MAC    Post pain: Adequate analgesia    Post assessment: no apparent anesthetic complications, tolerated procedure well and no evidence of recall    Last Vitals:   Visit Vitals  /73 (BP Location: Left arm)   Pulse 71   Temp 36.6 °C (97.9 °F) (Temporal)   Resp 18   Ht 5' 10" (1.778 m)   Wt 74.4 kg (164 lb 0.4 oz)   SpO2 100%   BMI 23.53 kg/m²       Post vital signs: stable    Level of consciousness: awake, alert  and oriented    Nausea/Vomiting: no nausea/no vomiting    Complications: none    Airway Patency: patent    Respiratory: unassisted    Cardiovascular: stable and blood pressure at baseline    Hydration: euvolemic  "

## 2018-03-07 NOTE — INTERVAL H&P NOTE
The patient has been examined and the H&P has been reviewed:    I concur with the findings and no changes have occurred since H&P was written.  I have explained the risks, benefits, and alternatives of the procedure to the patient in great detail. The patient voices understanding and all questions have been answered. The patient agrees with to proceed as planned. Consents were performed in clinic.    Anesthesia/Surgery risks, benefits and alternative options discussed and understood by patient/family.          Active Hospital Problems    Diagnosis  POA    Finger mass, right [R22.31]  Yes      Resolved Hospital Problems    Diagnosis Date Resolved POA   No resolved problems to display.

## 2018-03-07 NOTE — DISCHARGE INSTRUCTIONS
Discharge Instructions: Caring for Your Incision  You are going home with stitches (sutures), surgical staples, special strips of surgical tape called Steri-Strips, or surgical skin glue. One of these items was used to close your incision, help stop bleeding, and speed healing. Follow the tips on this sheet to help your incision heal.  Home care  · Always wash your hands before touching your incision.  · Keep your incision clean and dry.  · Avoid doing things that could cause dirt or sweat to get on your incision.  · Dont pick at scabs. They help protect the wound.  · Keep your incision out of water.  · Take a sponge bath to avoid getting your incision wet, unless your healthcare provider tells you otherwise.  · Ask your provider when can you take a shower or bathe.  · Ask your provider about the best way to keep your incision dry when bathing or showering.  · Pat sutures dry if they get wet. Dont rub.  · Leave the dressing (bandage) in place until you are told to remove it or change it. Change it only as directed, using clean hands.  · After the first 12 hours, change your dressing every 24 hours, or as directed by your healthcare provider.  · Change your dressing if it gets wet or soiled.  Care for specific closures  Follow these guidelines unless your child's healthcare provider tells you otherwise:  · Sutures or staples. Once you no longer need to keep these dry, clean the incision or wound daily. First remove the bandage using clean hands. Then wash the area gently with soap and warm water. Use a wet cotton swab to loosen and remove any blood or crust that forms. After cleaning, put a thin layer of antibiotic ointment on. Then put on a new bandage.  · Skin glue. Dont put liquid, ointment, or cream on your incision or wound while the glue is in place. Avoid activities that cause heavy sweating. Protect the incision or wound from sunlight. Do not scratch, rub, or pick at the glue. Do not put tape directly  over the glue. The glue should peel off within 5 to 10 days.  · Surgical tape. Keep your incision or wound dry. If it gets wet, blot the area dry with a clean towel. Surgical tape usually falls off within 7 to 10 days. If it has not fallen off after 10 days, contact your healthcare provider before taking it off yourself. If you are told to remove the tape, put mineral oil or petroleum jelly on a cotton ball. Gently rub the tape until it is removed.  Follow-up care  Follow up with your healthcare provider to ask how long sutures or staples should be left in place. Be sure to return for suture or staple removal as directed. If dissolving stitches were used in your mouth, these will not need to be removed. They should fall out or dissolve on their own.  If tape closures were used, remove them yourself when your provider recommends if they have not fallen off on their own. If skin glue was used, the glue will wear off by itself.      When to seek medical care  Call your healthcare provider right away if you have any of the following:  · More pain, redness, swelling, bleeding, or foul-smelling discharge around the incision area  · Fever of 100.4°F (38°C) or higher, or as directed by your healthcare provider  · Shaking chills  · Vomiting or nausea that doesnt go away  · Numbness, coldness, or tingling around the incision area, or changes in skin color  · Opening of the sutures or wound  · Stitches or staples come apart or fall out or surgical tape falls off before 7 days, or as directed by your provider   Date Last Reviewed: 10/22/2014  © 1297-8437 [employer]. [address] [place] [address], [place] [address]. All rights reserved. This information is not intended as a substitute for professional medical care. Always follow your healthcare professional's instructions.

## 2018-03-07 NOTE — OP NOTE
DATE OF PROCEDURE: 3/7/2018    PREOPERATIVE DIAGNOSIS:  Right ring finger mass    POSTOPERATIVE DIAGNOSIS: Right ring finger mass    PROCEDURE PERFORMED:   Excision of right ring finger mass    SURGEON: Miri Rush M.D.    ASSISTANT: Mikhail Reid M.D.      ANESTHESIA: loca, MAC    ESTIMATED BLOOD LOSS: 10 mL.    IMPLANTS:  none    SPECIMEN:  Right ring finger mass to pathology    COMPLICATIONS: None    INDICATIONS FOR PROCEDURE: Patient is a 72 year old male with a many month history of right ring finger mass that was interfering with daily activities.  For diagnostic and therapeutic purposes, it is indicated to proceed with excisional biopsy.      PROCEDURE IN DETAIL:     The patient was identified in the preoperative holding area and the site was marked with the patient's participation.  The patient was taken to the operating room and placed upon the operating table.  The right upper extremity was prepped and draped in normal sterile manner.  A timeout was performed to verify the patient's identity, surgery, surgical site, and administration of antibiotics.  All were in agreement and we proceeded.    The right upper extremity was exsanguinated with an Esmarch and the tourniquet raised to 250 mm Hg where it remained for 15 minutes    A longitudinal incision was made at the PIP joint of the right ring finger, slightly ulnar to midline on the dorsal side.  Incision was aporximately 3cm.  Sharp dissection carried down to the mass which was subcutaneous.  It was filled with clear gelatinous fluid. The capsule of the mass was well defined at the mass was removed in one piece.  The mass seemed to originate from the extensor tran and the stalk was cauterized.  The ulnar digital nerve was located volar to the mass excision.  The tourniquet was deflated and hemostasis obatined.  Wound was irrigated.  Wound closed with nylons.  Sterile dressings applied.    The patient was awakened and taken to the Recovery Room in  stable condition.    As attending surgeon, Dr. David Velez was readily available or present for critical portions of the surgery.    PLAN FOR THE PATIENT:  Patient will return to clinic in 2 weeks for wound check with suture/staple removal.

## 2018-03-07 NOTE — PLAN OF CARE
Discharge instructions given. Paper prescriptions given to patient. Patient verbalized understanding. Consents in chart. No complaints at this time.

## 2018-03-07 NOTE — ANESTHESIA PREPROCEDURE EVALUATION
03/07/2018  Sidney Lanza is a 72 y.o., male.    Anesthesia Evaluation    I have reviewed the Patient Summary Reports.     I have reviewed the Medications.     Review of Systems  Anesthesia Hx:  History of prior surgery of interest to airway management or planning:  Denies Personal Hx of Anesthesia complications.   Social:  Non-Smoker, No Alcohol Use    Hematology/Oncology:  Hematology Normal   Oncology Normal     EENT/Dental:EENT/Dental Normal   Cardiovascular:   Exercise tolerance: good Hypertension    Pulmonary:  Pulmonary Normal    Renal/:  Renal/ Normal     Hepatic/GI:  Hepatic/GI Normal    Neurological:  Neurology Normal    Dermatological:  Skin Normal    Psych:  Psychiatric Normal           Physical Exam  General:  Well nourished    Airway/Jaw/Neck:  Airway Findings: Mouth Opening: Normal Tongue: Normal  General Airway Assessment: Adult, Good  Mallampati: III  TM Distance: Normal, at least 6 cm     Eyes/Ears/Nose:  EYES/EARS/NOSE FINDINGS: Normal   Dental:  Dental Findings: In tact   Chest/Lungs:  Chest/Lungs Findings: Clear to auscultation, Normal Respiratory Rate     Heart/Vascular:  Heart Findings: Rate: Normal  Rhythm: Regular Rhythm  Sounds: Normal  Heart murmur: negative       Mental Status:  Mental Status Findings:  Cooperative, Alert and Oriented         Anesthesia Plan  Type of Anesthesia, risks & benefits discussed:  Anesthesia Type:  MAC  Patient's Preference:   Intra-op Monitoring Plan:   Intra-op Monitoring Plan Comments:   Post Op Pain Control Plan:   Post Op Pain Control Plan Comments:   Induction:   IV  Beta Blocker:  Patient is not currently on a Beta-Blocker (No further documentation required).       Informed Consent: Patient understands risks and agrees with Anesthesia plan.  Questions answered. Anesthesia consent signed with patient.  ASA Score: 2     Day of Surgery Review  of History & Physical:    H&P update referred to the surgeon.     Anesthesia Plan Notes:   72M HTN, ganglion cyst for excision under local and heavy sedation        Ready For Surgery From Anesthesia Perspective.

## 2018-03-07 NOTE — ANESTHESIA POSTPROCEDURE EVALUATION
"Anesthesia Post Evaluation    Patient: Sidney Lanza    Procedure(s) Performed: Procedure(s) (LRB):  EXCISION-MASS-FINGER - RIGHT RING FINGER (Right)    Final Anesthesia Type: MAC  Patient location during evaluation: PACU  Patient participation: Yes- Able to Participate  Level of consciousness: awake and alert  Post-procedure vital signs: reviewed and stable  Pain management: adequate  Airway patency: patent  PONV status at discharge: No PONV  Anesthetic complications: no      Cardiovascular status: blood pressure returned to baseline  Respiratory status: unassisted  Hydration status: euvolemic  Follow-up not needed.        Visit Vitals  /73 (BP Location: Left arm)   Pulse 71   Temp 36.6 °C (97.9 °F) (Temporal)   Resp 18   Ht 5' 10" (1.778 m)   Wt 74.4 kg (164 lb 0.4 oz)   SpO2 100%   BMI 23.53 kg/m²       Pain/Timothy Score: Pain Assessment Performed: Yes (3/7/2018  1:05 PM)  Presence of Pain: denies (3/7/2018  1:05 PM)  Timothy Score: 10 (3/7/2018  1:05 PM)      "

## 2018-03-08 VITALS
DIASTOLIC BLOOD PRESSURE: 82 MMHG | WEIGHT: 164 LBS | HEART RATE: 58 BPM | HEIGHT: 70 IN | BODY MASS INDEX: 23.48 KG/M2 | RESPIRATION RATE: 16 BRPM | OXYGEN SATURATION: 100 % | SYSTOLIC BLOOD PRESSURE: 131 MMHG | TEMPERATURE: 97 F

## 2018-03-21 ENCOUNTER — OFFICE VISIT (OUTPATIENT)
Dept: ORTHOPEDICS | Facility: CLINIC | Age: 73
End: 2018-03-21
Payer: MEDICARE

## 2018-03-21 VITALS
DIASTOLIC BLOOD PRESSURE: 79 MMHG | BODY MASS INDEX: 23.48 KG/M2 | HEART RATE: 77 BPM | RESPIRATION RATE: 18 BRPM | SYSTOLIC BLOOD PRESSURE: 122 MMHG | WEIGHT: 164 LBS | HEIGHT: 70 IN

## 2018-03-21 DIAGNOSIS — Z98.890 H/O EXCISION OF GANGLION CYST: Primary | ICD-10-CM

## 2018-03-21 PROCEDURE — 99024 POSTOP FOLLOW-UP VISIT: CPT | Mod: POP,,, | Performed by: PHYSICIAN ASSISTANT

## 2018-03-21 PROCEDURE — 99999 PR PBB SHADOW E&M-EST. PATIENT-LVL III: CPT | Mod: PBBFAC,,, | Performed by: PHYSICIAN ASSISTANT

## 2018-03-21 PROCEDURE — 99213 OFFICE O/P EST LOW 20 MIN: CPT | Mod: PBBFAC | Performed by: PHYSICIAN ASSISTANT

## 2018-03-21 NOTE — PROGRESS NOTES
"Mr. Lanza is here today for a post-operative visit.  He is 14 days status post excision of right ring finger mass by Dr. Rush on 3/7/18. He reports that he is doing well. He has some stiffness of the ring and the small finger, though he notes he did has some stiffness prior to surgery this is increased. He also has a small blister to the radial side of the small finger from the dressing.  Pain is 0/10.  He is not taking pain medication.  He denies fever, chills, and sweats since the time of the surgery.     Physical exam:    Vitals:    03/21/18 0804   BP: 122/79   Pulse: 77   Resp: 18   Weight: 74.4 kg (164 lb)   Height: 5' 10" (1.778 m)   PainSc: 0-No pain   PainLoc: Hand     Vital signs are stable, patient is afebrile.  Patient is well dressed and well groomed, no acute distress.  Alert and oriented to person, place, and time.  Post op dressing taken down. Incision is clean, dry and intact. There is a small blister on the radial side of the small finger. There is no erythema or exudate.  There is no sign of any infection. He is NVI. Decreased flexion of the ring and small fingers. Sutures removed without difficulty. Incision and blister dressed with Bactroban, oil emulsion dressing, and gauze.     Assessment: 14 days status post excision of right ring finger mass    Plan:  Sidney was seen today for post-op evaluation.    Diagnoses and all orders for this visit:    H/O excision of ganglion cyst  -     Ambulatory Referral to Physical/Occupational Therapy      - PO instruction reviewed and provided to patient  -OT ordered for ROM   -RTC 4 wks if needed      Nhi Smith PA-C  [facility]   [facility] [place]        "

## 2018-06-01 ENCOUNTER — OFFICE VISIT (OUTPATIENT)
Dept: INTERNAL MEDICINE | Facility: CLINIC | Age: 73
End: 2018-06-01
Payer: MEDICARE

## 2018-06-01 VITALS
BODY MASS INDEX: 23.68 KG/M2 | HEART RATE: 72 BPM | HEIGHT: 70 IN | DIASTOLIC BLOOD PRESSURE: 63 MMHG | WEIGHT: 165.38 LBS | TEMPERATURE: 99 F | OXYGEN SATURATION: 98 % | SYSTOLIC BLOOD PRESSURE: 113 MMHG

## 2018-06-01 DIAGNOSIS — D64.9 ANEMIA, UNSPECIFIED TYPE: ICD-10-CM

## 2018-06-01 DIAGNOSIS — J31.0 CHRONIC RHINITIS: ICD-10-CM

## 2018-06-01 DIAGNOSIS — Z12.11 ENCOUNTER FOR SCREENING COLONOSCOPY: ICD-10-CM

## 2018-06-01 DIAGNOSIS — M19.90 ARTHRITIS: ICD-10-CM

## 2018-06-01 DIAGNOSIS — E78.5 HYPERLIPIDEMIA, UNSPECIFIED HYPERLIPIDEMIA TYPE: ICD-10-CM

## 2018-06-01 DIAGNOSIS — Z85.46 HISTORY OF PROSTATE CANCER: ICD-10-CM

## 2018-06-01 DIAGNOSIS — I10 ESSENTIAL HYPERTENSION: Primary | Chronic | ICD-10-CM

## 2018-06-01 PROCEDURE — 99999 PR PBB SHADOW E&M-EST. PATIENT-LVL III: CPT | Mod: PBBFAC,,, | Performed by: INTERNAL MEDICINE

## 2018-06-01 PROCEDURE — 99213 OFFICE O/P EST LOW 20 MIN: CPT | Mod: PBBFAC,PO | Performed by: INTERNAL MEDICINE

## 2018-06-01 PROCEDURE — 99214 OFFICE O/P EST MOD 30 MIN: CPT | Mod: S$PBB,,, | Performed by: INTERNAL MEDICINE

## 2018-06-04 ENCOUNTER — LAB VISIT (OUTPATIENT)
Dept: LAB | Facility: HOSPITAL | Age: 73
End: 2018-06-04
Attending: INTERNAL MEDICINE
Payer: MEDICARE

## 2018-06-04 DIAGNOSIS — Z85.46 HISTORY OF PROSTATE CANCER: ICD-10-CM

## 2018-06-04 DIAGNOSIS — E78.5 HYPERLIPIDEMIA, UNSPECIFIED HYPERLIPIDEMIA TYPE: ICD-10-CM

## 2018-06-04 DIAGNOSIS — D64.9 ANEMIA, UNSPECIFIED TYPE: ICD-10-CM

## 2018-06-04 DIAGNOSIS — I10 ESSENTIAL HYPERTENSION: Chronic | ICD-10-CM

## 2018-06-04 DIAGNOSIS — M19.90 ARTHRITIS: ICD-10-CM

## 2018-06-04 LAB
ALBUMIN SERPL BCP-MCNC: 4 G/DL
ALP SERPL-CCNC: 61 U/L
ALT SERPL W/O P-5'-P-CCNC: 26 U/L
ANION GAP SERPL CALC-SCNC: 8 MMOL/L
AST SERPL-CCNC: 27 U/L
BASOPHILS # BLD AUTO: 0.04 K/UL
BASOPHILS NFR BLD: 0.5 %
BILIRUB SERPL-MCNC: 0.6 MG/DL
BUN SERPL-MCNC: 12 MG/DL
CALCIUM SERPL-MCNC: 10 MG/DL
CHLORIDE SERPL-SCNC: 107 MMOL/L
CHOLEST SERPL-MCNC: 181 MG/DL
CHOLEST/HDLC SERPL: 4.1 {RATIO}
CO2 SERPL-SCNC: 26 MMOL/L
COMPLEXED PSA SERPL-MCNC: <0.01 NG/ML
CREAT SERPL-MCNC: 0.9 MG/DL
DIFFERENTIAL METHOD: ABNORMAL
EOSINOPHIL # BLD AUTO: 0.2 K/UL
EOSINOPHIL NFR BLD: 2.3 %
ERYTHROCYTE [DISTWIDTH] IN BLOOD BY AUTOMATED COUNT: 16.6 %
EST. GFR  (AFRICAN AMERICAN): >60 ML/MIN/1.73 M^2
EST. GFR  (NON AFRICAN AMERICAN): >60 ML/MIN/1.73 M^2
GLUCOSE SERPL-MCNC: 99 MG/DL
HCT VFR BLD AUTO: 37.7 %
HDLC SERPL-MCNC: 44 MG/DL
HDLC SERPL: 24.3 %
HGB BLD-MCNC: 12.1 G/DL
IMM GRANULOCYTES # BLD AUTO: 0.03 K/UL
IMM GRANULOCYTES NFR BLD AUTO: 0.4 %
LDLC SERPL CALC-MCNC: 122.4 MG/DL
LYMPHOCYTES # BLD AUTO: 1.8 K/UL
LYMPHOCYTES NFR BLD: 22.5 %
MCH RBC QN AUTO: 23.6 PG
MCHC RBC AUTO-ENTMCNC: 32.1 G/DL
MCV RBC AUTO: 74 FL
MONOCYTES # BLD AUTO: 0.9 K/UL
MONOCYTES NFR BLD: 11.7 %
NEUTROPHILS # BLD AUTO: 4.9 K/UL
NEUTROPHILS NFR BLD: 62.6 %
NONHDLC SERPL-MCNC: 137 MG/DL
NRBC BLD-RTO: 0 /100 WBC
PLATELET # BLD AUTO: 247 K/UL
PMV BLD AUTO: 10.1 FL
POTASSIUM SERPL-SCNC: 4.2 MMOL/L
PROT SERPL-MCNC: 7.4 G/DL
RBC # BLD AUTO: 5.12 M/UL
SODIUM SERPL-SCNC: 141 MMOL/L
TRIGL SERPL-MCNC: 73 MG/DL
TSH SERPL DL<=0.005 MIU/L-ACNC: 0.94 UIU/ML
WBC # BLD AUTO: 7.83 K/UL

## 2018-06-04 PROCEDURE — 84153 ASSAY OF PSA TOTAL: CPT

## 2018-06-04 PROCEDURE — 80061 LIPID PANEL: CPT

## 2018-06-04 PROCEDURE — 85025 COMPLETE CBC W/AUTO DIFF WBC: CPT

## 2018-06-04 PROCEDURE — 36415 COLL VENOUS BLD VENIPUNCTURE: CPT

## 2018-06-04 PROCEDURE — 80053 COMPREHEN METABOLIC PANEL: CPT

## 2018-06-04 PROCEDURE — 84443 ASSAY THYROID STIM HORMONE: CPT

## 2018-06-06 DIAGNOSIS — Z12.11 SPECIAL SCREENING FOR MALIGNANT NEOPLASMS, COLON: Primary | ICD-10-CM

## 2018-06-06 RX ORDER — POLYETHYLENE GLYCOL 3350, SODIUM SULFATE ANHYDROUS, SODIUM BICARBONATE, SODIUM CHLORIDE, POTASSIUM CHLORIDE 236; 22.74; 6.74; 5.86; 2.97 G/4L; G/4L; G/4L; G/4L; G/4L
4 POWDER, FOR SOLUTION ORAL ONCE
Qty: 4000 ML | Refills: 0 | Status: SHIPPED | OUTPATIENT
Start: 2018-06-06 | End: 2018-06-06

## 2018-06-10 NOTE — PROGRESS NOTES
Subjective:       Patient ID: Sidney Lanza is a 72 y.o. male.    Chief Complaint: Follow-up    HPI   The patient presents for follow-up of hypertension, hyperlipidemia, arthritis.  He has been feeling well.  Recent blood pressures have ranged from 128/78 to 130/80 at home.  He does have right shoulder pain following rotator cuff repair in 10/2017.  Finger joint pain is also noted at times.  He intermittently uses Arthrotec for treatment of joint pain.  No medication side effects have been noted.  Review of Systems   Constitutional: Negative for activity change, appetite change, fatigue and unexpected weight change.   HENT: Negative for sinus pressure and sore throat.    Eyes: Negative for visual disturbance.   Respiratory: Negative for cough, chest tightness, shortness of breath and wheezing.    Cardiovascular: Negative for chest pain, palpitations and leg swelling.   Gastrointestinal: Negative for abdominal pain, blood in stool, nausea and vomiting.   Genitourinary: Negative for dysuria, hematuria and urgency.   Musculoskeletal: Positive for arthralgias. Negative for back pain, gait problem, joint swelling, myalgias and neck stiffness.   Skin: Negative for color change and rash.   Neurological: Negative for dizziness, syncope, weakness, light-headedness, numbness and headaches.   Hematological: Does not bruise/bleed easily.   Psychiatric/Behavioral: Negative for sleep disturbance.       Objective:      Physical Exam   Constitutional: He is oriented to person, place, and time. He appears well-developed and well-nourished. No distress.   HENT:   Head: Normocephalic and atraumatic.   Eyes: Conjunctivae and EOM are normal. No scleral icterus.   Neck: Normal range of motion. Neck supple. No JVD present. No thyromegaly present.   Cardiovascular: Normal rate, regular rhythm, normal heart sounds and intact distal pulses.  Exam reveals no gallop and no friction rub.    No murmur heard.  Pulmonary/Chest: Effort normal  and breath sounds normal. No respiratory distress. He has no wheezes. He has no rales.   Abdominal: Soft. Bowel sounds are normal. He exhibits no mass. There is no tenderness.   Musculoskeletal: Normal range of motion. He exhibits no tenderness.   Lymphadenopathy:     He has no cervical adenopathy.   Neurological: He is alert and oriented to person, place, and time.   Gait is normal.   Skin: Skin is warm and dry. No rash noted.   Nursing note and vitals reviewed.      Assessment:       1. Essential hypertension    2. Hyperlipidemia, unspecified hyperlipidemia type    3. History of prostate cancer    4. Anemia, unspecified type    5. Arthritis    6. Chronic rhinitis    7. Encounter for screening colonoscopy        Plan:       Sidney was seen today for follow-up.  Fasting blood tests will be obtained later this week.  Screening colonoscopy will be ordered.  A prescription for the Shingrix shingles vaccine was given to the patient to take to his pharmacy.  Current medications will be continued.  A follow-up visit in 6 months will be obtained.    Diagnoses and all orders for this visit:    Essential hypertension  -     Comprehensive metabolic panel; Future  -     Lipid panel; Future  -     TSH; Future  -     CBC auto differential; Future  -     Prostate Specific Antigen, Diagnostic; Future  -     URINALYSIS; Future    Hyperlipidemia, unspecified hyperlipidemia type  -     Comprehensive metabolic panel; Future  -     Lipid panel; Future  -     TSH; Future  -     CBC auto differential; Future  -     Prostate Specific Antigen, Diagnostic; Future  -     URINALYSIS; Future    History of prostate cancer  -     Prostate Specific Antigen, Diagnostic; Future    Anemia, unspecified type  -     Comprehensive metabolic panel; Future  -     Lipid panel; Future  -     TSH; Future  -     CBC auto differential; Future  -     Prostate Specific Antigen, Diagnostic; Future  -     URINALYSIS; Future    Arthritis  -     Comprehensive  metabolic panel; Future  -     Lipid panel; Future  -     TSH; Future  -     CBC auto differential; Future  -     Prostate Specific Antigen, Diagnostic; Future  -     URINALYSIS; Future    Chronic rhinitis    Encounter for screening colonoscopy  -     Case request GI: COLONOSCOPY    Other orders  -     varicella-zoster [id number], PF, (SHINGRIX, PF,) 50 mcg/0.5 mL injection; Inject 0.5 mLs into the muscle once.

## 2018-06-12 ENCOUNTER — ANESTHESIA EVENT (OUTPATIENT)
Dept: ENDOSCOPY | Facility: HOSPITAL | Age: 73
End: 2018-06-12
Payer: MEDICARE

## 2018-06-13 ENCOUNTER — SURGERY (OUTPATIENT)
Age: 73
End: 2018-06-13

## 2018-06-13 ENCOUNTER — ANESTHESIA (OUTPATIENT)
Dept: ENDOSCOPY | Facility: HOSPITAL | Age: 73
End: 2018-06-13
Payer: MEDICARE

## 2018-06-13 ENCOUNTER — HOSPITAL ENCOUNTER (OUTPATIENT)
Facility: HOSPITAL | Age: 73
Discharge: HOME | End: 2018-06-13
Attending: INTERNAL MEDICINE | Admitting: INTERNAL MEDICINE
Payer: MEDICARE

## 2018-06-13 VITALS
RESPIRATION RATE: 18 BRPM | SYSTOLIC BLOOD PRESSURE: 141 MMHG | DIASTOLIC BLOOD PRESSURE: 80 MMHG | HEART RATE: 57 BPM | OXYGEN SATURATION: 99 % | WEIGHT: 160 LBS | BODY MASS INDEX: 22.9 KG/M2 | HEIGHT: 70 IN | TEMPERATURE: 98 F

## 2018-06-13 DIAGNOSIS — Z12.11 COLON CANCER SCREENING: ICD-10-CM

## 2018-06-13 DIAGNOSIS — D64.9 ANEMIA, UNSPECIFIED TYPE: Primary | ICD-10-CM

## 2018-06-13 PROCEDURE — 27201274 HC FORCEPS, SPYBITE: Performed by: INTERNAL MEDICINE

## 2018-06-13 PROCEDURE — 45380 COLONOSCOPY AND BIOPSY: CPT | Performed by: INTERNAL MEDICINE

## 2018-06-13 PROCEDURE — 63600175 PHARM REV CODE 636 W HCPCS: Performed by: NURSE ANESTHETIST, CERTIFIED REGISTERED

## 2018-06-13 PROCEDURE — 45380 COLONOSCOPY AND BIOPSY: CPT | Mod: 59,,, | Performed by: INTERNAL MEDICINE

## 2018-06-13 PROCEDURE — 27201012 HC FORCEPS, HOT/COLD, DISP: Performed by: INTERNAL MEDICINE

## 2018-06-13 PROCEDURE — E9220 PRA ENDO ANESTHESIA: HCPCS | Mod: PT,,, | Performed by: NURSE ANESTHETIST, CERTIFIED REGISTERED

## 2018-06-13 PROCEDURE — 88305 TISSUE EXAM BY PATHOLOGIST: CPT | Mod: 26,,, | Performed by: PATHOLOGY

## 2018-06-13 PROCEDURE — 37000009 HC ANESTHESIA EA ADD 15 MINS: Performed by: INTERNAL MEDICINE

## 2018-06-13 PROCEDURE — 37000008 HC ANESTHESIA 1ST 15 MINUTES: Performed by: INTERNAL MEDICINE

## 2018-06-13 PROCEDURE — 27201089 HC SNARE, DISP (ANY): Performed by: INTERNAL MEDICINE

## 2018-06-13 PROCEDURE — 88305 TISSUE EXAM BY PATHOLOGIST: CPT | Mod: 59 | Performed by: PATHOLOGY

## 2018-06-13 PROCEDURE — 45385 COLONOSCOPY W/LESION REMOVAL: CPT | Mod: PT,GC,, | Performed by: INTERNAL MEDICINE

## 2018-06-13 PROCEDURE — 25000003 PHARM REV CODE 250: Performed by: INTERNAL MEDICINE

## 2018-06-13 PROCEDURE — 45385 COLONOSCOPY W/LESION REMOVAL: CPT | Performed by: INTERNAL MEDICINE

## 2018-06-13 RX ORDER — PROPOFOL 10 MG/ML
VIAL (ML) INTRAVENOUS
Status: DISCONTINUED | OUTPATIENT
Start: 2018-06-13 | End: 2018-06-13

## 2018-06-13 RX ORDER — LIDOCAINE HCL/PF 100 MG/5ML
SYRINGE (ML) INTRAVENOUS
Status: DISCONTINUED | OUTPATIENT
Start: 2018-06-13 | End: 2018-06-13

## 2018-06-13 RX ORDER — SODIUM CHLORIDE 9 MG/ML
INJECTION, SOLUTION INTRAVENOUS CONTINUOUS
Status: DISCONTINUED | OUTPATIENT
Start: 2018-06-13 | End: 2018-06-13 | Stop reason: HOSPADM

## 2018-06-13 RX ORDER — PROPOFOL 10 MG/ML
VIAL (ML) INTRAVENOUS CONTINUOUS PRN
Status: DISCONTINUED | OUTPATIENT
Start: 2018-06-13 | End: 2018-06-13

## 2018-06-13 RX ADMIN — PROPOFOL 80 MG: 10 INJECTION, EMULSION INTRAVENOUS at 11:06

## 2018-06-13 RX ADMIN — SODIUM CHLORIDE: 0.9 INJECTION, SOLUTION INTRAVENOUS at 10:06

## 2018-06-13 RX ADMIN — PROPOFOL 150 MCG/KG/MIN: 10 INJECTION, EMULSION INTRAVENOUS at 11:06

## 2018-06-13 RX ADMIN — LIDOCAINE HYDROCHLORIDE 75 MG: 20 INJECTION, SOLUTION INTRAVENOUS at 11:06

## 2018-06-13 NOTE — TRANSFER OF CARE
"Anesthesia Transfer of Care Note    Patient: Sidney Lanza    Procedure(s) Performed: Procedure(s) (LRB):  COLONOSCOPY (N/A)    Patient location: GI    Anesthesia Type: general    Transport from OR: Transported from OR on room air with adequate spontaneous ventilation    Post pain: adequate analgesia    Post assessment: no apparent anesthetic complications and tolerated procedure well    Post vital signs: stable    Level of consciousness: awake and alert    Nausea/Vomiting: no nausea/vomiting    Complications: none    Transfer of care protocol was followed      Last vitals:   Visit Vitals  /67 (BP Location: Left arm, Patient Position: Lying)   Pulse 65   Temp 36.7 °C (98.1 °F) (Temporal)   Resp 18   Ht 5' 10" (1.778 m)   Wt 72.6 kg (160 lb)   SpO2 99%   BMI 22.96 kg/m²     "

## 2018-06-13 NOTE — ANESTHESIA PREPROCEDURE EVALUATION
06/13/2018  Sidney Lanza is a 72 y.o., male.  Patient Active Problem List   Diagnosis    Hyperlipidemia    Chronic rhinitis    Impotence of organic origin    Essential hypertension    Nuclear sclerosis - Both Eyes    Branch retinal vein occlusion    Epiretinal membrane, right eye    Hypertensive retinopathy of both eyes    ERM OD (epiretinal membrane, right eye)    Branch retinal vein occlusion with macular edema of right eye    Right arm weakness    History of prostate cancer    Osteoarthritis of multiple joints    Anemia    Varicose veins of both lower extremities    Right rotator cuff tear    Finger mass, right    Colon cancer screening         Anesthesia Evaluation    I have reviewed the Patient Summary Reports.     I have reviewed the Medications.     Review of Systems  Anesthesia Hx:   Denies Personal Hx of Anesthesia complications.   Cardiovascular:   Exercise tolerance: good Hypertension, well controlled    Pulmonary:  Pulmonary Normal    Hepatic/GI:   Bowel Prep.    Musculoskeletal:   Arthritis     Neurological:  Neurology Normal        Physical Exam  General:  Well nourished    Airway/Jaw/Neck:  Airway Findings: Mouth Opening: Normal Tongue: Normal  General Airway Assessment: Adult  Mallampati: II  TM Distance: Normal, at least 6 cm  Jaw/Neck Findings:  Neck ROM: Normal ROM  Neck Findings:     Eyes/Ears/Nose:  EYES/EARS/NOSE FINDINGS: Normal   Dental:  Dental Findings: Periodontal disease, Mild    Chest/Lungs:  Chest/Lungs Findings: Clear to auscultation, Normal Respiratory Rate     Heart/Vascular:  Heart Findings: Rate: Normal  Rhythm: Regular Rhythm  Sounds: Normal        Mental Status:  Mental Status Findings:  Cooperative, Alert and Oriented         Anesthesia Plan  Type of Anesthesia, risks & benefits discussed:  Anesthesia Type:  general  Patient's Preference:  GA  Intra-op Monitoring Plan: standard ASA monitors  Intra-op Monitoring Plan Comments:   Post Op Pain Control Plan: IV/PO Opioids PRN  Post Op Pain Control Plan Comments:   Induction:   IV  Beta Blocker:  Patient is not currently on a Beta-Blocker (No further documentation required).       Informed Consent: Patient understands risks and agrees with Anesthesia plan.  Questions answered. Anesthesia consent signed with patient.  ASA Score: 2     Day of Surgery Review of History & Physical: I have interviewed and examined the patient. I have reviewed the patient's H&P dated:  There are no significant changes.  H&P update referred to the provider.         Ready For Surgery From Anesthesia Perspective.

## 2018-06-13 NOTE — [employer] PATIENT INSTRUCTIONS
Discharge Summary/Instructions after an Endoscopic Procedure  Patient Name: Sidney Lanza  Patient MRN: [id number]  Patient YOB: 1945 Wednesday, June 13, 2018  Adonis Stack MD  RESTRICTIONS:  During your procedure today, you received medications for sedation.  These   medications may affect your judgment, balance and coordination.  Therefore,   for 24 hours, you have the following restrictions:   - DO NOT drive a car, operate machinery, make legal/financial decisions,   sign important papers or drink alcohol.    ACTIVITY:  Today: no heavy lifting, straining or running due to procedural   sedation/anesthesia.  The following day: return to full activity including work.  DIET:  Eat and drink normally unless instructed otherwise.     TREATMENT FOR COMMON SIDE EFFECTS:  - Mild abdominal pain, nausea, belching, bloating or excessive gas:  rest,   eat lightly and use a heating pad.  - Sore Throat: treat with throat lozenges and/or gargle with warm salt   water.  - Because air was used during the procedure, expelling large amounts of air   from your rectum or belching is normal.  - If a bowel prep was taken, you may not have a bowel movement for 1-3 days.    This is normal.  SYMPTOMS TO WATCH FOR AND REPORT TO YOUR PHYSICIAN:  1. Abdominal pain or bloating, other than gas cramps.  2. Chest pain.  3. Back pain.  4. Signs of infection such as: chills or fever occurring within 24 hours   after the procedure.  5. Rectal bleeding, which would show as bright red, maroon, or black stools.   (A tablespoon of blood from the rectum is not serious, especially if   hemorrhoids are present.)  6. Vomiting.  7. Weakness or dizziness.  GO DIRECTLY TO THE NEAREST EMERGENCY ROOM IF YOU HAVE ANY OF THE FOLLOWING:      Difficulty breathing              Chills and/or fever over 101 F   Persistent vomiting and/or vomiting blood   Severe abdominal pain   Severe chest pain   Black, tarry stools   Bleeding- more than one tablespoon   Any  other symptom or condition that you feel may need urgent attention  Your doctor recommends these additional instructions:  If any biopsies were taken, your doctors clinic will contact you in 1 to 2   weeks with any results.  - Patient has a contact number available for emergencies.  The signs and   symptoms of potential delayed complications were discussed with the   patient.  Return to normal activities tomorrow.  Written discharge   instructions were provided to the patient.   - Discharge patient to home.   - Await pathology results.   - Repeat colonoscopy in 3 years for surveillance.   - The findings and recommendations were discussed with the designated   responsible adult.  For questions, problems or results please call your physician - Adonis Stack MD at Work:  [phone].  [facility], EMERGENCY ROOM PHONE NUMBER: [phone]  IF A COMPLICATION OR EMERGENCY SITUATION ARISES AND YOU ARE UNABLE TO REACH   YOUR PHYSICIAN - GO DIRECTLY TO THE EMERGENCY ROOM.  Adonis Stack MD  6/13/2018 11:33:53 AM  This report has been verified and signed electronically.  [employer]

## 2018-06-13 NOTE — H&P
Short Stay Endoscopy History and Physical    PCP - Sunny Soto MD    Procedure - Colonoscopy  ASA - per anesthesia  Mallampati - per anesthesia  History of Anesthesia problems - no  Family history Anesthesia problems - no   Plan of anesthesia - General    HPI:  This is a 72 y.o. male here for evaluation of : colorectal cancer screening      ROS:  Constitutional: No fevers, chills, No weight loss  CV: No chest pain  Pulm: No cough, No shortness of breath  GI: see HPI  Derm: No rash    Medical History:  has a past medical history of Arthritis; Chronic rhinitis; Fever blister; Hyperlipidemia; Hypertension; Impotence of organic origin; Joint pain; Nuclear sclerosis - Both Eyes (10/23/2013); Prostate cancer; Ulcer; and Varicose vein of leg.    Surgical History:  has a past surgical history that includes Prostate surgery (2006); Knee arthroscopy w/ debridement; Ulnar nerve transposition; Eye surgery; and Shoulder arthroscopy (Right, 10/2017).    Family History: family history includes Cancer in his father and mother; Diabetes in his brother, brother, and sister; Heart disease in his sister; Heart disease (age of onset: 74) in his father; Hypertension in his brother.. Otherwise no colon cancer, inflammatory bowel disease, or GI malignancies.    Social History:  reports that he quit smoking about 30 years ago. His smoking use included Cigarettes. He has a 30.00 pack-year smoking history. He has never used smokeless tobacco. He reports that he drinks about 16.8 oz of alcohol per week . He reports that he does not use drugs.    Review of patient's allergies indicates:  No Known Allergies    Medications:   Prescriptions Prior to Admission   Medication Sig Dispense Refill Last Dose    amLODIPine (NORVASC) 10 MG tablet TAKE 1 TABLET DAILY 90 tablet 3 6/13/2018 at Unknown time    diclofenac-misoprostol  mg-mcg (ARTHROTEC 75)  mg-mcg per tablet Take 1 tablet by mouth once daily. For arthritis pain. 90 tablet  3 6/12/2018 at Unknown time    FIBER, DEXTRIN, ORAL Take by mouth every morning.    6/12/2018 at Unknown time    fluticasone (FLONASE) 50 mcg/actuation nasal spray 2 sprays by Each Nare route once daily. (Patient taking differently: 2 sprays by Each Nare route daily as needed. ) 3 Bottle 3 6/12/2018 at Unknown time    glucosamine-chondroitin 500-400 mg tablet Take 1 tablet by mouth every morning.   6/12/2018 at Unknown time    multivitamin capsule Take 1 capsule by mouth every morning.    6/12/2018 at Unknown time    hydrocodone-acetaminophen 10-325mg (NORCO)  mg Tab Take 1 tablet by mouth every 4 (four) hours as needed for Pain (post operative pain). 45 tablet 0 More than a month at Unknown time         Physical Exam:    Vital Signs:   Vitals:    06/13/18 1028   BP: (!) 141/77   Pulse: 62   Resp: 18   Temp: 97.9 °F (36.6 °C)       General Appearance: Well appearing in no acute distress  Eyes:    No scleral icterus  ENT: Neck supple, Lips, mucosa, and tongue normal; teeth and gums normal  Lungs: CTA bilaterally  Heart:  S1, S2 normal, no murmurs heard  Abdomen: Soft, non tender, non distended with positive bowel sounds.   Extremities: 2+ pulses, no clubbing, cyanosis or edema  Skin: No rash      Labs:  Lab Results   Component Value Date    WBC 7.83 06/04/2018    HGB 12.1 (L) 06/04/2018    HCT 37.7 (L) 06/04/2018     06/04/2018    CHOL 181 06/04/2018    TRIG 73 06/04/2018    HDL 44 06/04/2018    ALT 26 06/04/2018    AST 27 06/04/2018     06/04/2018    K 4.2 06/04/2018     06/04/2018    CREATININE 0.9 06/04/2018    BUN 12 06/04/2018    CO2 26 06/04/2018    TSH 0.935 06/04/2018    PSA <0.010 05/09/2013       I have explained the risks and benefits of endoscopy procedures to the patient including but not limited to bleeding, perforation, infection, and death.    David Sommers   Gastroenterology Fellow PGY VI  [phone]

## 2018-06-15 NOTE — ANESTHESIA POSTPROCEDURE EVALUATION
"Anesthesia Post Evaluation    Patient: Sidney Lanza    Procedure(s) Performed: Procedure(s) (LRB):  COLONOSCOPY (N/A)    Final Anesthesia Type: general  Patient location during evaluation: GI PACU  Patient participation: Yes- Able to Participate  Level of consciousness: awake and alert and oriented  Post-procedure vital signs: reviewed and stable  Pain management: adequate  Airway patency: patent  PONV status at discharge: No PONV  Anesthetic complications: no      Cardiovascular status: stable  Respiratory status: unassisted, spontaneous ventilation and room air  Hydration status: euvolemic  Follow-up not needed.        Visit Vitals  BP (!) 141/80 (BP Location: Left arm, Patient Position: Sitting)   Pulse (!) 57   Temp 36.7 °C (98.1 °F) (Temporal)   Resp 18   Ht 5' 10" (1.778 m)   Wt 72.6 kg (160 lb)   SpO2 99%   BMI 22.96 kg/m²       Pain/Timothy Score: No Data Recorded      "

## 2018-06-20 ENCOUNTER — TELEPHONE (OUTPATIENT)
Dept: ENDOSCOPY | Facility: HOSPITAL | Age: 73
End: 2018-06-20

## 2018-07-25 RX ORDER — FLUTICASONE PROPIONATE 50 MCG
SPRAY, SUSPENSION (ML) NASAL
Qty: 48 G | Refills: 3 | Status: SHIPPED | OUTPATIENT
Start: 2018-07-25 | End: 2019-08-05 | Stop reason: SDUPTHER

## 2018-08-01 ENCOUNTER — PES CALL (OUTPATIENT)
Dept: ADMINISTRATIVE | Facility: CLINIC | Age: 73
End: 2018-08-01

## 2018-09-28 ENCOUNTER — IMMUNIZATION (OUTPATIENT)
Dept: INTERNAL MEDICINE | Facility: CLINIC | Age: 73
End: 2018-09-28
Payer: MEDICARE

## 2018-09-28 PROCEDURE — 90662 IIV NO PRSV INCREASED AG IM: CPT | Mod: PBBFAC

## 2018-11-28 RX ORDER — AMLODIPINE BESYLATE 10 MG/1
TABLET ORAL
Qty: 90 TABLET | Refills: 3 | Status: SHIPPED | OUTPATIENT
Start: 2018-11-28 | End: 2019-11-24 | Stop reason: SDUPTHER

## 2018-12-28 ENCOUNTER — PES CALL (OUTPATIENT)
Dept: ADMINISTRATIVE | Facility: CLINIC | Age: 73
End: 2018-12-28

## 2019-02-11 RX ORDER — DICLOFENAC SODIUM AND MISOPROSTOL 75; 200 MG/1; UG/1
TABLET, DELAYED RELEASE ORAL
Qty: 60 TABLET | Refills: 5 | Status: SHIPPED | OUTPATIENT
Start: 2019-02-11 | End: 2019-08-05 | Stop reason: SDUPTHER

## 2019-04-17 ENCOUNTER — PES CALL (OUTPATIENT)
Dept: ADMINISTRATIVE | Facility: CLINIC | Age: 74
End: 2019-04-17

## 2019-05-21 ENCOUNTER — TELEPHONE (OUTPATIENT)
Dept: INTERNAL MEDICINE | Facility: CLINIC | Age: 74
End: 2019-05-21

## 2019-05-21 ENCOUNTER — PES CALL (OUTPATIENT)
Dept: ADMINISTRATIVE | Facility: CLINIC | Age: 74
End: 2019-05-21

## 2019-05-21 DIAGNOSIS — I10 HYPERTENSION, ESSENTIAL: Primary | ICD-10-CM

## 2019-05-21 DIAGNOSIS — R35.1 NOCTURIA: ICD-10-CM

## 2019-05-21 DIAGNOSIS — E78.5 HYPERLIPIDEMIA, UNSPECIFIED HYPERLIPIDEMIA TYPE: ICD-10-CM

## 2019-05-21 NOTE — TELEPHONE ENCOUNTER
----- Message from Daria Man sent at 5/21/2019 12:51 PM CDT -----  Contact: self   Doctor appointment and lab have been scheduled.  Please link lab orders to the lab appointment.  Date of doctor appointment:  8/5  Date of lab appointment:  7/30  Physical or EP: Physical  Comments:

## 2019-05-24 ENCOUNTER — PES CALL (OUTPATIENT)
Dept: ADMINISTRATIVE | Facility: CLINIC | Age: 74
End: 2019-05-24

## 2019-06-10 ENCOUNTER — PES CALL (OUTPATIENT)
Dept: ADMINISTRATIVE | Facility: CLINIC | Age: 74
End: 2019-06-10

## 2019-07-30 ENCOUNTER — LAB VISIT (OUTPATIENT)
Dept: LAB | Facility: HOSPITAL | Age: 74
End: 2019-07-30
Payer: MEDICARE

## 2019-07-30 DIAGNOSIS — I10 HYPERTENSION, ESSENTIAL: ICD-10-CM

## 2019-07-30 DIAGNOSIS — R35.1 NOCTURIA: ICD-10-CM

## 2019-07-30 DIAGNOSIS — E78.5 HYPERLIPIDEMIA, UNSPECIFIED HYPERLIPIDEMIA TYPE: ICD-10-CM

## 2019-07-30 LAB
ALBUMIN SERPL BCP-MCNC: 4.2 G/DL (ref 3.5–5.2)
ALP SERPL-CCNC: 63 U/L (ref 55–135)
ALT SERPL W/O P-5'-P-CCNC: 31 U/L (ref 10–44)
ANION GAP SERPL CALC-SCNC: 7 MMOL/L (ref 8–16)
AST SERPL-CCNC: 28 U/L (ref 10–40)
BASOPHILS # BLD AUTO: 0.04 K/UL (ref 0–0.2)
BASOPHILS NFR BLD: 0.5 % (ref 0–1.9)
BILIRUB SERPL-MCNC: 0.6 MG/DL (ref 0.1–1)
BUN SERPL-MCNC: 11 MG/DL (ref 8–23)
CALCIUM SERPL-MCNC: 10.2 MG/DL (ref 8.7–10.5)
CHLORIDE SERPL-SCNC: 106 MMOL/L (ref 95–110)
CHOLEST SERPL-MCNC: 190 MG/DL (ref 120–199)
CHOLEST/HDLC SERPL: 3.8 {RATIO} (ref 2–5)
CO2 SERPL-SCNC: 27 MMOL/L (ref 23–29)
COMPLEXED PSA SERPL-MCNC: <0.01 NG/ML (ref 0–4)
CREAT SERPL-MCNC: 0.9 MG/DL (ref 0.5–1.4)
DIFFERENTIAL METHOD: ABNORMAL
EOSINOPHIL # BLD AUTO: 0.2 K/UL (ref 0–0.5)
EOSINOPHIL NFR BLD: 1.9 % (ref 0–8)
ERYTHROCYTE [DISTWIDTH] IN BLOOD BY AUTOMATED COUNT: 16.8 % (ref 11.5–14.5)
EST. GFR  (AFRICAN AMERICAN): >60 ML/MIN/1.73 M^2
EST. GFR  (NON AFRICAN AMERICAN): >60 ML/MIN/1.73 M^2
GLUCOSE SERPL-MCNC: 102 MG/DL (ref 70–110)
HCT VFR BLD AUTO: 39.7 % (ref 40–54)
HDLC SERPL-MCNC: 50 MG/DL (ref 40–75)
HDLC SERPL: 26.3 % (ref 20–50)
HGB BLD-MCNC: 12.4 G/DL (ref 14–18)
IMM GRANULOCYTES # BLD AUTO: 0.02 K/UL (ref 0–0.04)
IMM GRANULOCYTES NFR BLD AUTO: 0.2 % (ref 0–0.5)
LDLC SERPL CALC-MCNC: 119.8 MG/DL (ref 63–159)
LYMPHOCYTES # BLD AUTO: 2.8 K/UL (ref 1–4.8)
LYMPHOCYTES NFR BLD: 32.7 % (ref 18–48)
MCH RBC QN AUTO: 23.5 PG (ref 27–31)
MCHC RBC AUTO-ENTMCNC: 31.2 G/DL (ref 32–36)
MCV RBC AUTO: 75 FL (ref 82–98)
MONOCYTES # BLD AUTO: 0.6 K/UL (ref 0.3–1)
MONOCYTES NFR BLD: 7.1 % (ref 4–15)
NEUTROPHILS # BLD AUTO: 4.9 K/UL (ref 1.8–7.7)
NEUTROPHILS NFR BLD: 57.6 % (ref 38–73)
NONHDLC SERPL-MCNC: 140 MG/DL
NRBC BLD-RTO: 0 /100 WBC
PLATELET # BLD AUTO: 266 K/UL (ref 150–350)
PMV BLD AUTO: 10 FL (ref 9.2–12.9)
POTASSIUM SERPL-SCNC: 4 MMOL/L (ref 3.5–5.1)
PROT SERPL-MCNC: 7.5 G/DL (ref 6–8.4)
RBC # BLD AUTO: 5.27 M/UL (ref 4.6–6.2)
SODIUM SERPL-SCNC: 140 MMOL/L (ref 136–145)
TRIGL SERPL-MCNC: 101 MG/DL (ref 30–150)
TSH SERPL DL<=0.005 MIU/L-ACNC: 0.76 UIU/ML (ref 0.4–4)
WBC # BLD AUTO: 8.46 K/UL (ref 3.9–12.7)

## 2019-07-30 PROCEDURE — 85025 COMPLETE CBC W/AUTO DIFF WBC: CPT

## 2019-07-30 PROCEDURE — 84443 ASSAY THYROID STIM HORMONE: CPT

## 2019-07-30 PROCEDURE — 36415 COLL VENOUS BLD VENIPUNCTURE: CPT

## 2019-07-30 PROCEDURE — 84153 ASSAY OF PSA TOTAL: CPT

## 2019-07-30 PROCEDURE — 80053 COMPREHEN METABOLIC PANEL: CPT

## 2019-07-30 PROCEDURE — 80061 LIPID PANEL: CPT

## 2019-08-05 ENCOUNTER — OFFICE VISIT (OUTPATIENT)
Dept: INTERNAL MEDICINE | Facility: CLINIC | Age: 74
End: 2019-08-05
Payer: MEDICARE

## 2019-08-05 VITALS
WEIGHT: 166.25 LBS | OXYGEN SATURATION: 97 % | TEMPERATURE: 99 F | BODY MASS INDEX: 23.8 KG/M2 | HEART RATE: 78 BPM | SYSTOLIC BLOOD PRESSURE: 130 MMHG | RESPIRATION RATE: 16 BRPM | HEIGHT: 70 IN | DIASTOLIC BLOOD PRESSURE: 74 MMHG

## 2019-08-05 DIAGNOSIS — G89.29 CHRONIC THUMB PAIN, BILATERAL: ICD-10-CM

## 2019-08-05 DIAGNOSIS — M75.101 TEAR OF RIGHT ROTATOR CUFF, UNSPECIFIED TEAR EXTENT: ICD-10-CM

## 2019-08-05 DIAGNOSIS — M79.644 CHRONIC THUMB PAIN, BILATERAL: ICD-10-CM

## 2019-08-05 DIAGNOSIS — I10 ESSENTIAL HYPERTENSION: Primary | Chronic | ICD-10-CM

## 2019-08-05 DIAGNOSIS — D64.9 ANEMIA, UNSPECIFIED TYPE: ICD-10-CM

## 2019-08-05 DIAGNOSIS — E78.49 OTHER HYPERLIPIDEMIA: ICD-10-CM

## 2019-08-05 DIAGNOSIS — M15.9 PRIMARY OSTEOARTHRITIS INVOLVING MULTIPLE JOINTS: ICD-10-CM

## 2019-08-05 DIAGNOSIS — M79.645 CHRONIC THUMB PAIN, BILATERAL: ICD-10-CM

## 2019-08-05 DIAGNOSIS — I83.93 VARICOSE VEINS OF BOTH LOWER EXTREMITIES, UNSPECIFIED WHETHER COMPLICATED: ICD-10-CM

## 2019-08-05 DIAGNOSIS — J31.0 CHRONIC RHINITIS: ICD-10-CM

## 2019-08-05 PROCEDURE — 99999 PR PBB SHADOW E&M-EST. PATIENT-LVL III: ICD-10-PCS | Mod: PBBFAC,,, | Performed by: INTERNAL MEDICINE

## 2019-08-05 PROCEDURE — 99999 PR PBB SHADOW E&M-EST. PATIENT-LVL III: CPT | Mod: PBBFAC,,, | Performed by: INTERNAL MEDICINE

## 2019-08-05 PROCEDURE — 99214 PR OFFICE/OUTPT VISIT, EST, LEVL IV, 30-39 MIN: ICD-10-PCS | Mod: S$PBB,,, | Performed by: INTERNAL MEDICINE

## 2019-08-05 PROCEDURE — 99213 OFFICE O/P EST LOW 20 MIN: CPT | Mod: PBBFAC,PO | Performed by: INTERNAL MEDICINE

## 2019-08-05 PROCEDURE — 99214 OFFICE O/P EST MOD 30 MIN: CPT | Mod: S$PBB,,, | Performed by: INTERNAL MEDICINE

## 2019-08-05 RX ORDER — FLUTICASONE PROPIONATE 50 MCG
2 SPRAY, SUSPENSION (ML) NASAL DAILY
Qty: 48 G | Refills: 3 | Status: SHIPPED | OUTPATIENT
Start: 2019-08-05 | End: 2020-11-30

## 2019-08-05 RX ORDER — DICLOFENAC SODIUM AND MISOPROSTOL 75; 200 MG/1; UG/1
TABLET, DELAYED RELEASE ORAL
Qty: 90 TABLET | Refills: 3 | Status: SHIPPED | OUTPATIENT
Start: 2019-08-05 | End: 2020-11-30

## 2019-08-05 NOTE — PROGRESS NOTES
Subjective:       Patient ID: Sidney Lanza is a 73 y.o. male.    Chief Complaint: Annual Exam (physical )    HPI   The patient presents for follow-up of medical conditions which include hypertension, hyperlipidemia, osteoarthritis, and chronic rhinitis.    The patient is using Arthrotec every other day for treatment of joint pains.  He also has intermittent lower back pain but has not experienced any exacerbations of symptoms.  He does have bilateral thumb joint pain however.  He has been using glucosamine chondroitin as adjunct therapy.    Sinus congestion has been present.  He is using Flonase and occasional oral anti allergy medications.    Immunization record was reviewed.    His last colonoscopy was on 06/13/2018.  A follow-up screening examination in 3 years was recommended in view of colon polyps being found.      Review of Systems   Constitutional: Negative for activity change, appetite change, fatigue and unexpected weight change.   HENT: Positive for congestion. Negative for sinus pressure and sore throat.    Eyes: Negative for visual disturbance.   Respiratory: Negative for cough, chest tightness, shortness of breath and wheezing.    Cardiovascular: Negative for chest pain, palpitations and leg swelling.   Gastrointestinal: Negative for abdominal pain, blood in stool, nausea and vomiting.   Genitourinary: Negative for dysuria, hematuria and urgency.   Musculoskeletal: Positive for arthralgias, back pain and joint swelling. Negative for gait problem, myalgias and neck stiffness.   Skin: Negative for color change and rash.   Neurological: Negative for dizziness, syncope, weakness, light-headedness, numbness and headaches.   Psychiatric/Behavioral: Negative for sleep disturbance.       Objective:      Physical Exam   Constitutional: He is oriented to person, place, and time. He appears well-developed and well-nourished. No distress.   HENT:   Head: Normocephalic and atraumatic.   Sinuses are nontender  to palpation.   Eyes: Conjunctivae and EOM are normal. No scleral icterus.   Neck: Normal range of motion. Neck supple. No JVD present. No thyromegaly present.   Cardiovascular: Normal rate, regular rhythm, normal heart sounds and intact distal pulses. Exam reveals no gallop and no friction rub.   No murmur heard.  Pulmonary/Chest: Effort normal and breath sounds normal. No respiratory distress. He has no wheezes. He has no rales.   Abdominal: Soft. Bowel sounds are normal. He exhibits no mass. There is no tenderness.   Musculoskeletal: He exhibits tenderness.   Carpal-metacarpal joints of both thumbs are slightly tender to palpation.   Lymphadenopathy:     He has no cervical adenopathy.   Neurological: He is alert and oriented to person, place, and time.   Gait is normal.   Skin: Skin is warm and dry. No rash noted.   Nursing note and vitals reviewed.      Lab Visit on 07/30/2019   Component Date Value Ref Range Status    Specimen UA 07/30/2019 Urine, Unspecified   Final    Color, UA 07/30/2019 Yellow  Yellow, Straw, Lanette Final    Appearance, UA 07/30/2019 Clear  Clear Final    pH, UA 07/30/2019 6.0  5.0 - 8.0 Final    Specific Gravity, UA 07/30/2019 1.015  1.005 - 1.030 Final    Protein, UA 07/30/2019 Negative  Negative Final    Comment: Recommend a 24 hour urine protein or a urine   protein/creatinine ratio if globulin induced proteinuria is  clinically suspected.      Glucose, UA 07/30/2019 Negative  Negative Final    Ketones, UA 07/30/2019 Negative  Negative Final    Bilirubin (UA) 07/30/2019 Negative  Negative Final    Occult Blood UA 07/30/2019 Negative  Negative Final    Nitrite, UA 07/30/2019 Negative  Negative Final    Leukocytes, UA 07/30/2019 Negative  Negative Final   Lab Visit on 07/30/2019   Component Date Value Ref Range Status    WBC 07/30/2019 8.46  3.90 - 12.70 K/uL Final    RBC 07/30/2019 5.27  4.60 - 6.20 M/uL Final    Hemoglobin 07/30/2019 12.4* 14.0 - 18.0 g/dL Final     Hematocrit 07/30/2019 39.7* 40.0 - 54.0 % Final    Mean Corpuscular Volume 07/30/2019 75* 82 - 98 fL Final    Mean Corpuscular Hemoglobin 07/30/2019 23.5* 27.0 - 31.0 pg Final    Mean Corpuscular Hemoglobin Conc 07/30/2019 31.2* 32.0 - 36.0 g/dL Final    RDW 07/30/2019 16.8* 11.5 - 14.5 % Final    Platelets 07/30/2019 266  150 - 350 K/uL Final    MPV 07/30/2019 10.0  9.2 - 12.9 fL Final    Immature Granulocytes 07/30/2019 0.2  0.0 - 0.5 % Final    Gran # (ANC) 07/30/2019 4.9  1.8 - 7.7 K/uL Final    Immature Grans (Abs) 07/30/2019 0.02  0.00 - 0.04 K/uL Final    Comment: Mild elevation in immature granulocytes is non specific and   can be seen in a variety of conditions including stress response,   acute inflammation, trauma and pregnancy. Correlation with other   laboratory and clinical findings is essential.      Lymph # 07/30/2019 2.8  1.0 - 4.8 K/uL Final    Mono # 07/30/2019 0.6  0.3 - 1.0 K/uL Final    Eos # 07/30/2019 0.2  0.0 - 0.5 K/uL Final    Baso # 07/30/2019 0.04  0.00 - 0.20 K/uL Final    nRBC 07/30/2019 0  0 /100 WBC Final    Gran% 07/30/2019 57.6  38.0 - 73.0 % Final    Lymph% 07/30/2019 32.7  18.0 - 48.0 % Final    Mono% 07/30/2019 7.1  4.0 - 15.0 % Final    Eosinophil% 07/30/2019 1.9  0.0 - 8.0 % Final    Basophil% 07/30/2019 0.5  0.0 - 1.9 % Final    Differential Method 07/30/2019 Automated   Final    Sodium 07/30/2019 140  136 - 145 mmol/L Final    Potassium 07/30/2019 4.0  3.5 - 5.1 mmol/L Final    Chloride 07/30/2019 106  95 - 110 mmol/L Final    CO2 07/30/2019 27  23 - 29 mmol/L Final    Glucose 07/30/2019 102  70 - 110 mg/dL Final    BUN, Bld 07/30/2019 11  8 - 23 mg/dL Final    Creatinine 07/30/2019 0.9  0.5 - 1.4 mg/dL Final    Calcium 07/30/2019 10.2  8.7 - 10.5 mg/dL Final    Total Protein 07/30/2019 7.5  6.0 - 8.4 g/dL Final    Albumin 07/30/2019 4.2  3.5 - 5.2 g/dL Final    Total Bilirubin 07/30/2019 0.6  0.1 - 1.0 mg/dL Final    Comment: For infants and  newborns, interpretation of results should be based  on gestational age, weight and in agreement with clinical  observations.  Premature Infant recommended reference ranges:  Up to 24 hours.............<8.0 mg/dL  Up to 48 hours............<12.0 mg/dL  3-5 days..................<15.0 mg/dL  6-29 days.................<15.0 mg/dL      Alkaline Phosphatase 07/30/2019 63  55 - 135 U/L Final    AST 07/30/2019 28  10 - 40 U/L Final    ALT 07/30/2019 31  10 - 44 U/L Final    Anion Gap 07/30/2019 7* 8 - 16 mmol/L Final    eGFR if African American 07/30/2019 >60.0  >60 mL/min/1.73 m^2 Final    eGFR if non African American 07/30/2019 >60.0  >60 mL/min/1.73 m^2 Final    Comment: Calculation used to obtain the estimated glomerular filtration  rate (eGFR) is the CKD-EPI equation.       Cholesterol 07/30/2019 190  120 - 199 mg/dL Final    Comment: The National Cholesterol Education Program (NCEP) has set the  following guidelines (reference ranges) for Cholesterol:  Optimal.....................<200 mg/dL  Borderline High.............200-239 mg/dL  High........................> or = 240 mg/dL      Triglycerides 07/30/2019 101  30 - 150 mg/dL Final    Comment: The National Cholesterol Education Program (NCEP) has set the  following guidelines (reference values) for triglycerides:  Normal......................<150 mg/dL  Borderline High.............150-199 mg/dL  High........................200-499 mg/dL      HDL 07/30/2019 50  40 - 75 mg/dL Final    Comment: The National Cholesterol Education Program (NCEP) has set the  following guidelines (reference values) for HDL Cholesterol:  Low...............<40 mg/dL  Optimal...........>60 mg/dL      LDL Cholesterol 07/30/2019 119.8  63.0 - 159.0 mg/dL Final    Comment: The National Cholesterol Education Program (NCEP) has set the  following guidelines (reference values) for LDL Cholesterol:  Optimal.......................<130 mg/dL  Borderline High...............130-159  mg/dL  High..........................160-189 mg/dL  Very High.....................>190 mg/dL      Hdl/Cholesterol Ratio 07/30/2019 26.3  20.0 - 50.0 % Final    Total Cholesterol/HDL Ratio 07/30/2019 3.8  2.0 - 5.0 Final    Non-HDL Cholesterol 07/30/2019 140  mg/dL Final    Comment: Risk category and Non-HDL cholesterol goals:  Coronary heart disease (CHD)or equivalent (10-year risk of CHD >20%):  Non-HDL cholesterol goal     <130 mg/dL  Two or more CHD risk factors and 10-year risk of CHD <= 20%:  Non-HDL cholesterol goal     <160 mg/dL  0 to 1 CHD risk factor:  Non-HDL cholesterol goal     <190 mg/dL      TSH 07/30/2019 0.758  0.400 - 4.000 uIU/mL Final    PSA DIAGNOSTIC 07/30/2019 <0.01  0.00 - 4.00 ng/mL Final    Comment: PSA Expected levels:  Hormonal Therapy: <0.05 ng/ml  Prostatectomy: <0.01 ng/ml  Radiation Therapy: <1.00 ng/ml         Assessment:       1. Essential hypertension    2. Varicose veins of both lower extremities, unspecified whether complicated    3. Tear of right rotator cuff, unspecified tear extent    4. Anemia, unspecified type    5. Primary osteoarthritis involving multiple joints    6. Other hyperlipidemia    7. Chronic rhinitis    8. Chronic thumb pain, bilateral        Plan:       Sidney was seen today for annual exam and follow-up of medical conditions.  Orthopedic hand surgery consultation will be obtained regarding bilateral thumb pain. Arthrotec will be renewed.  The patient should continue use of Flonase for rhinitis symptoms.  A follow-up visit in 6 months is recommended.    Diagnoses and all orders for this visit:    Essential hypertension    Varicose veins of both lower extremities, unspecified whether complicated    Tear of right rotator cuff, unspecified tear extent    Anemia, unspecified type    Primary osteoarthritis involving multiple joints    Other hyperlipidemia    Chronic rhinitis    Chronic thumb pain, bilateral  -     Ambulatory referral to Hand Surgery    Other  orders  -     diclofenac-misoprostol  mg-mcg (ARTHROTEC 75)  mg-mcg per tablet; TAKE 1 TABLET DAILY FOR ARTHRITIS PAIN  -     fluticasone propionate (FLONASE) 50 mcg/actuation nasal spray; 2 sprays (100 mcg total) by Each Nostril route once daily.

## 2019-08-21 ENCOUNTER — PES CALL (OUTPATIENT)
Dept: ADMINISTRATIVE | Facility: CLINIC | Age: 74
End: 2019-08-21

## 2019-10-11 DIAGNOSIS — G89.29 OTHER CHRONIC PAIN: Primary | ICD-10-CM

## 2019-10-14 ENCOUNTER — PATIENT OUTREACH (OUTPATIENT)
Dept: ADMINISTRATIVE | Facility: OTHER | Age: 74
End: 2019-10-14

## 2019-10-15 ENCOUNTER — CLINICAL SUPPORT (OUTPATIENT)
Dept: INTERNAL MEDICINE | Facility: CLINIC | Age: 74
End: 2019-10-15
Payer: MEDICARE

## 2019-10-15 ENCOUNTER — OFFICE VISIT (OUTPATIENT)
Dept: ORTHOPEDICS | Facility: CLINIC | Age: 74
End: 2019-10-15
Payer: MEDICARE

## 2019-10-15 ENCOUNTER — HOSPITAL ENCOUNTER (OUTPATIENT)
Dept: RADIOLOGY | Facility: OTHER | Age: 74
Discharge: HOME | End: 2019-10-15
Attending: ORTHOPAEDIC SURGERY
Payer: MEDICARE

## 2019-10-15 VITALS
SYSTOLIC BLOOD PRESSURE: 137 MMHG | DIASTOLIC BLOOD PRESSURE: 76 MMHG | HEART RATE: 69 BPM | WEIGHT: 166.25 LBS | BODY MASS INDEX: 23.8 KG/M2 | HEIGHT: 70 IN

## 2019-10-15 DIAGNOSIS — Z23 IMMUNIZATION DUE: Primary | ICD-10-CM

## 2019-10-15 DIAGNOSIS — G89.29 OTHER CHRONIC PAIN: ICD-10-CM

## 2019-10-15 DIAGNOSIS — M79.642 BILATERAL HAND PAIN: Primary | ICD-10-CM

## 2019-10-15 DIAGNOSIS — M79.641 BILATERAL HAND PAIN: Primary | ICD-10-CM

## 2019-10-15 PROCEDURE — 99999 PR PBB SHADOW E&M-EST. PATIENT-LVL III: ICD-10-PCS | Mod: PBBFAC,,, | Performed by: ORTHOPAEDIC SURGERY

## 2019-10-15 PROCEDURE — 73130 XR HAND COMPLETE 3 VIEWS BILATERAL: ICD-10-PCS | Mod: 26,50,, | Performed by: RADIOLOGY

## 2019-10-15 PROCEDURE — 99213 PR OFFICE/OUTPT VISIT, EST, LEVL III, 20-29 MIN: ICD-10-PCS | Mod: S$PBB,,, | Performed by: ORTHOPAEDIC SURGERY

## 2019-10-15 PROCEDURE — 73130 X-RAY EXAM OF HAND: CPT | Mod: 50,TC,FY

## 2019-10-15 PROCEDURE — 99213 OFFICE O/P EST LOW 20 MIN: CPT | Mod: PBBFAC,25,27 | Performed by: ORTHOPAEDIC SURGERY

## 2019-10-15 PROCEDURE — 99211 OFF/OP EST MAY X REQ PHY/QHP: CPT | Mod: PBBFAC,25

## 2019-10-15 PROCEDURE — 99999 PR PBB SHADOW E&M-EST. PATIENT-LVL III: CPT | Mod: PBBFAC,,, | Performed by: ORTHOPAEDIC SURGERY

## 2019-10-15 PROCEDURE — 99999 PR PBB SHADOW E&M-EST. PATIENT-LVL I: CPT | Mod: PBBFAC,,,

## 2019-10-15 PROCEDURE — 99213 OFFICE O/P EST LOW 20 MIN: CPT | Mod: S$PBB,,, | Performed by: ORTHOPAEDIC SURGERY

## 2019-10-15 PROCEDURE — 99999 PR PBB SHADOW E&M-EST. PATIENT-LVL I: ICD-10-PCS | Mod: PBBFAC,,,

## 2019-10-15 PROCEDURE — 73130 X-RAY EXAM OF HAND: CPT | Mod: 26,50,, | Performed by: RADIOLOGY

## 2019-10-15 PROCEDURE — 90662 IIV NO PRSV INCREASED AG IM: CPT | Mod: PBBFAC

## 2019-10-15 NOTE — LETTER
October 18, 2019      Sunny Soto MD  [address] [facility] [address]  [place] [address]           [facility] [address] [facility] [address]  [facility], [address]  [facility] [address]  Phone: [phone]          Patient: Sidney Lanza   MR Number: [id number]   YOB: 1945   Date of Visit: 10/15/2019       Dear Dr. Sunny Soto:    Thank you for referring Sidney Lanza to me for evaluation. Attached you will find relevant portions of my assessment and plan of care.    If you have questions, please do not hesitate to call me. I look forward to following Sidney Lanza along with you.    Sincerely,    Candace Mac  CC:  No Recipients    If you would like to receive this communication electronically, please contact [email or web address] or [phone] to request more information on [employer] Link access.    For providers and/or their staff who would like to refer a patient to [facility], please contact us through our one-stop-shop provider referral line, [facility], at [phone].    If you feel you have received this communication in error or would no longer like to receive these types of communications, please e-mail [email or web address]

## 2019-10-15 NOTE — PROGRESS NOTES
"Patient was given vaccine information sheet for the Flu Vaccine. The area of injection was palpated using the acromion process as a landmark. This area was cleaned with alcohol. Using a 25g 1" safety needle, 0.5mL of the vaccine was placed into the left deltoid muscle. The injection site was dressed with a bandage. Patient experienced no complications and was discharged in stable condition. Fluzone High Dose vaccine Lot: [id number] Exp: 05/16/2020.      "

## 2019-10-15 NOTE — PROGRESS NOTES
H&P  Orthopaedics    SUBJECTIVE:     History of Present Illness:  Patient is a 74 y.o. male with bilateral basilar thumb pain. Patient reports over the past year he has had progressive pain over bilateral CMC joints. Worse with activity including pinch.  Thus far has not had any treatment other than anti-inflammatories which he takes for his generalized osteoarthritis.  Denies any numbness or paresthesias.        Review of patient's allergies indicates:  No Known Allergies    Past Medical History:   Diagnosis Date    Arthritis     Chronic rhinitis     Fever blister     Hyperlipidemia     Hypertension     Impotence of organic origin     Joint pain     Nuclear sclerosis - Both Eyes 10/23/2013    Prostate cancer     Ulcer     NSAID related     Varicose vein of leg     bilateral legs     Past Surgical History:   Procedure Laterality Date    COLONOSCOPY N/A 6/13/2018    Procedure: COLONOSCOPY;  Surgeon: Adonis Stack MD;  Location: [facility] ([address] [place] [address]);  Service: Endoscopy;  Laterality: N/A;    EYE SURGERY      KNEE ARTHROSCOPY W/ DEBRIDEMENT      PROSTATE SURGERY  2006    for prostate cancer    SHOULDER ARTHROSCOPY Right 10/2017    RTC repair with patch    ULNAR NERVE TRANSPOSITION       Family History   Problem Relation Age of Onset    Cancer Mother         breast    Cancer Father         prostate    Heart disease Father 74        CHF    Diabetes Sister     Heart disease Sister         stroke    Diabetes Brother     Hypertension Brother     Diabetes Brother     Amblyopia Neg Hx     Blindness Neg Hx     Cataracts Neg Hx     Glaucoma Neg Hx     Macular degeneration Neg Hx     Retinal detachment Neg Hx     Strabismus Neg Hx     Stroke Neg Hx     Thyroid disease Neg Hx     Melanoma Neg Hx     Psoriasis Neg Hx     Lupus Neg Hx      Social History     Tobacco Use    Smoking status: Former Smoker     Packs/day: 1.00     Years: 30.00     Pack years: 30.00     Types: Cigarettes     Last attempt  to quit: 1988     Years since quittin.4    Smokeless tobacco: Never Used   Substance Use Topics    Alcohol use: Yes     Alcohol/week: 28.0 standard drinks     Types: 28 Cans of beer per week     Comment: beer - 2 a night     Drug use: No        Review of Systems:  Patient denies constitutional symptoms, cardiac symptoms, respiratory symptoms, GI symptoms.  The remainder of the musculoskeletal ROS is included in the HPI.      OBJECTIVE:     Vital Signs (Most Recent)  Pulse: 69 (10/15/19 1337)  BP: 137/76 (10/15/19 1337)    Physical Exam:  Gen:  No acute distress  CV:  Peripherally well-perfused.  Pulses 2+ bilaterally.  Lungs:  Normal respiratory effort.  Abdomen:  Soft, non-tender, non-distended  Head/Neck:  Normocephalic.  Atraumatic. No TTP, AROM and PROM intact without pain  Neuro:  CN intact without deficit, SILT throughout B/L Upper & Lower Extremities    MSK:  BUE:  - positive CMC grind bilaterally, supple MCP joints,  - AROM and PROM of the intact  - AIN/PIN/Radial/Median/Ulnar Nerves assessed in isolation without deficit  - SILT throughout  - Radial & Ulnar arteries palpated 2+  - Capillary Refill <3s        Laboratory:  No results found for this or any previous visit (from the past 72 hour(s)).    Diagnostic Results:  X-Ray: Reviewed bilateral CMC arthritis with joint space narrowing subchondral sclerosis cyst formation, osteophyte    ASSESSMENT/PLAN:     A/P: Sidney Lanza is a 74 y.o. with bilateral CMC arthritis. Will give packet of information for conservative treatment including braces.

## 2019-10-21 NOTE — PROGRESS NOTES
I have personally taken the history and examined this patient. I agree with the resident's note as stated above. Pt has CMC OA. Discussed all treatmen toptions. Pt wishes for conservative treatment- plan for bracing, cc, paraffin. Discussed injections.

## 2019-11-13 ENCOUNTER — OFFICE VISIT (OUTPATIENT)
Dept: URGENT CARE | Facility: CLINIC | Age: 74
End: 2019-11-13
Payer: MEDICARE

## 2019-11-13 VITALS
DIASTOLIC BLOOD PRESSURE: 78 MMHG | SYSTOLIC BLOOD PRESSURE: 142 MMHG | WEIGHT: 167.56 LBS | HEIGHT: 70 IN | HEART RATE: 68 BPM | TEMPERATURE: 98 F | BODY MASS INDEX: 23.99 KG/M2 | OXYGEN SATURATION: 98 % | RESPIRATION RATE: 18 BRPM

## 2019-11-13 DIAGNOSIS — R22.0 LEFT FACIAL SWELLING: Primary | ICD-10-CM

## 2019-11-13 PROCEDURE — 99214 OFFICE O/P EST MOD 30 MIN: CPT | Mod: S$GLB,,, | Performed by: PHYSICIAN ASSISTANT

## 2019-11-13 PROCEDURE — 99214 PR OFFICE/OUTPT VISIT, EST, LEVL IV, 30-39 MIN: ICD-10-PCS | Mod: S$GLB,,, | Performed by: PHYSICIAN ASSISTANT

## 2019-11-13 RX ORDER — AMOXICILLIN AND CLAVULANATE POTASSIUM 875; 125 MG/1; MG/1
1 TABLET, FILM COATED ORAL 2 TIMES DAILY
Qty: 20 TABLET | Refills: 0 | Status: SHIPPED | OUTPATIENT
Start: 2019-11-13 | End: 2019-11-23

## 2019-11-13 NOTE — PROGRESS NOTES
"Subjective:       Patient ID: Sidney Lanza is a 74 y.o. male.    Vitals:  height is 5' 10" (1.778 m) and weight is 76 kg (167 lb 8.8 oz). His oral temperature is 98.4 °F (36.9 °C). His blood pressure is 142/78 (abnormal) and his pulse is 68. His respiration is 18 and oxygen saturation is 98%.     Chief Complaint: Facial Swelling    3 days ago a lump on Left jaw line came up and since has gotten bigger. Not very painful but sensitive.  Patient denies any recent illnesses.  Patient has been using warm compresses with mild relief.  Past medical history prostate cancer, hypertension, hyperlipidemia, arthritis.  Patient denies sore throat, ear pain, nasal congestion, tooth pain, dyspnea or shortness of breath.      Constitution: Negative for chills, fatigue and fever.   HENT: Positive for facial swelling. Negative for tongue pain, congestion, sinus pain and sore throat.    Neck: Negative for painful lymph nodes.   Cardiovascular: Negative for chest pain and leg swelling.   Eyes: Negative for double vision and blurred vision.   Respiratory: Negative for cough and shortness of breath.    Gastrointestinal: Negative for nausea, vomiting and diarrhea.   Genitourinary: Negative for dysuria, frequency and urgency.   Musculoskeletal: Negative for joint pain, joint swelling, muscle cramps and muscle ache.   Skin: Negative for color change, pale and rash.   Allergic/Immunologic: Negative for seasonal allergies.   Neurological: Negative for dizziness, history of vertigo, light-headedness, passing out and headaches.   Hematologic/Lymphatic: Negative for swollen lymph nodes, easy bruising/bleeding and history of blood clots. Does not bruise/bleed easily.   Psychiatric/Behavioral: Negative for nervous/anxious, sleep disturbance and depression. The patient is not nervous/anxious.        Objective:      Physical Exam   Constitutional: He is oriented to person, place, and time. He appears well-developed and well-nourished. He is " cooperative.  Non-toxic appearance. He does not appear ill. No distress.   HENT:   Head: Normocephalic and atraumatic.   Right Ear: Hearing, tympanic membrane, external ear and ear canal normal.   Left Ear: Hearing, tympanic membrane, external ear and ear canal normal.   Nose: Nose normal. No mucosal edema, rhinorrhea or nasal deformity. No epistaxis. Right sinus exhibits no maxillary sinus tenderness and no frontal sinus tenderness. Left sinus exhibits no maxillary sinus tenderness and no frontal sinus tenderness.   Mouth/Throat: Uvula is midline, oropharynx is clear and moist and mucous membranes are normal. No oral lesions. No trismus in the jaw. Normal dentition. No uvula swelling. No oropharyngeal exudate, posterior oropharyngeal edema, posterior oropharyngeal erythema, tonsillar abscesses or cobblestoning.   Swelling of left anterior soft tissues of the mandible. No obvious abscess. [place]'s and Isai's ducts appear patent.    Eyes: Conjunctivae and lids are normal. Right eye exhibits no discharge. Left eye exhibits no discharge. No scleral icterus.   Neck: Trachea normal, normal range of motion, full passive range of motion without pain and phonation normal. Neck supple.   Cardiovascular: Normal rate, regular rhythm, normal heart sounds, intact distal pulses and normal pulses.   Pulmonary/Chest: Effort normal and breath sounds normal. No respiratory distress.   Abdominal: Soft. Normal appearance and bowel sounds are normal. He exhibits no distension, no pulsatile midline mass and no mass. There is no tenderness.   Musculoskeletal: Normal range of motion. He exhibits no edema or deformity.   Neurological: He is alert and oriented to person, place, and time. He exhibits normal muscle tone. Coordination normal.   Skin: Skin is warm, dry, intact, not diaphoretic and not pale.   Psychiatric: He has a normal mood and affect. His speech is normal and behavior is normal. Judgment and thought content normal.  Cognition and memory are normal.   Nursing note and vitals reviewed.        Assessment:       1. Left facial swelling        Plan:         Left facial swelling  -     Ambulatory referral to ENT  -     amoxicillin-clavulanate 875-125mg (AUGMENTIN) 875-125 mg per tablet; Take 1 tablet by mouth 2 (two) times daily. for 10 days  Dispense: 20 tablet; Refill: 0      Patient Instructions   Please return here or go to the Emergency Department for any concerns or worsening of condition.  If you were prescribed antibiotics, please take them to completion.  If you were prescribed a narcotic medication, do not drive or operate heavy equipment or machinery while taking these medications.  Please follow up with your primary care doctor or specialist as needed.    If you  smoke, please stop smoking.

## 2019-11-13 NOTE — PATIENT INSTRUCTIONS
Please return here or go to the Emergency Department for any concerns or worsening of condition.  If you were prescribed antibiotics, please take them to completion.  If you were prescribed a narcotic medication, do not drive or operate heavy equipment or machinery while taking these medications.  Please follow up with your primary care doctor or specialist as needed.    If you  smoke, please stop smoking.

## 2019-11-25 RX ORDER — AMLODIPINE BESYLATE 10 MG/1
TABLET ORAL
Qty: 90 TABLET | Refills: 4 | Status: SHIPPED | OUTPATIENT
Start: 2019-11-25 | End: 2021-02-02 | Stop reason: SDUPTHER

## 2020-01-07 ENCOUNTER — PES CALL (OUTPATIENT)
Dept: ADMINISTRATIVE | Facility: CLINIC | Age: 75
End: 2020-01-07

## 2020-01-30 NOTE — TELEPHONE ENCOUNTER
Sidney Lanza reminded of appointment on 6/27/17 with Dr. KALINA Rsuh w/time and location. Notified of need for xray before OV w/date, time, and location of appts.     1 pair

## 2020-02-06 ENCOUNTER — OFFICE VISIT (OUTPATIENT)
Dept: INTERNAL MEDICINE | Facility: CLINIC | Age: 75
End: 2020-02-06
Payer: MEDICARE

## 2020-02-06 VITALS
TEMPERATURE: 98 F | HEART RATE: 64 BPM | BODY MASS INDEX: 23.39 KG/M2 | WEIGHT: 163.38 LBS | DIASTOLIC BLOOD PRESSURE: 72 MMHG | HEIGHT: 70 IN | RESPIRATION RATE: 14 BRPM | SYSTOLIC BLOOD PRESSURE: 130 MMHG

## 2020-02-06 DIAGNOSIS — E78.49 OTHER HYPERLIPIDEMIA: ICD-10-CM

## 2020-02-06 DIAGNOSIS — M79.644 CHRONIC THUMB PAIN, BILATERAL: ICD-10-CM

## 2020-02-06 DIAGNOSIS — G89.29 CHRONIC THUMB PAIN, BILATERAL: ICD-10-CM

## 2020-02-06 DIAGNOSIS — I10 ESSENTIAL HYPERTENSION: Primary | ICD-10-CM

## 2020-02-06 DIAGNOSIS — M79.645 CHRONIC THUMB PAIN, BILATERAL: ICD-10-CM

## 2020-02-06 DIAGNOSIS — J31.0 CHRONIC RHINITIS: ICD-10-CM

## 2020-02-06 DIAGNOSIS — Z85.46 HISTORY OF PROSTATE CANCER: ICD-10-CM

## 2020-02-06 DIAGNOSIS — M15.9 PRIMARY OSTEOARTHRITIS INVOLVING MULTIPLE JOINTS: ICD-10-CM

## 2020-02-06 PROCEDURE — 99214 PR OFFICE/OUTPT VISIT, EST, LEVL IV, 30-39 MIN: ICD-10-PCS | Mod: S$PBB,,, | Performed by: INTERNAL MEDICINE

## 2020-02-06 PROCEDURE — 99213 OFFICE O/P EST LOW 20 MIN: CPT | Mod: PBBFAC,PO | Performed by: INTERNAL MEDICINE

## 2020-02-06 PROCEDURE — 99999 PR PBB SHADOW E&M-EST. PATIENT-LVL III: ICD-10-PCS | Mod: PBBFAC,,, | Performed by: INTERNAL MEDICINE

## 2020-02-06 PROCEDURE — 99999 PR PBB SHADOW E&M-EST. PATIENT-LVL III: CPT | Mod: PBBFAC,,, | Performed by: INTERNAL MEDICINE

## 2020-02-06 PROCEDURE — 99214 OFFICE O/P EST MOD 30 MIN: CPT | Mod: S$PBB,,, | Performed by: INTERNAL MEDICINE

## 2020-02-06 NOTE — PROGRESS NOTES
Subjective:       Patient ID: Sidney Lanza is a 74 y.o. male.    Chief Complaint: Follow-up (6 Mo)    HPI   The patient presents for follow-up of medical conditions which include hypertension, hyperlipidemia, osteoarthritis, chronic rhinitis.  Patient has a history of prostate cancer.  The patient reports he has not been experiencing lower back pain recently.  He has been noting right posterior neck pain.  Symptoms are exacerbated by prolonged sitting in an upright position such as when working with the computer.  He gives a history of bulging discs.  He does not experience any pain while lying supine in bed.  He denies having any hand paresthesias.  Right upper right upper extremity strength is better    Bilateral hand pain has been present.  He is currently seeing a Hand Surgery specialist.  The patient is interested in nonsurgical treatment measures for now.  He does not desire hand surgery.    Chronic rhinitis symptoms such as congestion and postnasal drainage are alleviated use of Flonase.  Has not experienced any symptoms of an infection however.    Review of Systems   Constitutional: Negative for activity change, appetite change, fatigue and unexpected weight change.   HENT: Positive for congestion and postnasal drip. Negative for sinus pressure and sore throat.    Eyes: Negative for visual disturbance.   Respiratory: Negative for cough, chest tightness, shortness of breath and wheezing.    Cardiovascular: Negative for chest pain, palpitations and leg swelling.   Gastrointestinal: Negative for abdominal pain, blood in stool, nausea and vomiting.   Genitourinary: Negative for dysuria, hematuria and urgency.   Musculoskeletal: Positive for arthralgias and neck pain. Negative for back pain, gait problem, myalgias and neck stiffness. Joint swelling: thumbs involved.   Skin: Negative for color change and rash.   Neurological: Negative for dizziness, syncope, weakness, light-headedness, numbness and  headaches.   Psychiatric/Behavioral: Negative for sleep disturbance.       Objective:      Physical Exam   Constitutional: He is oriented to person, place, and time. He appears well-developed and well-nourished. No distress.   HENT:   Head: Normocephalic and atraumatic.   Eyes: Conjunctivae and EOM are normal. No scleral icterus.   Neck: Normal range of motion. Neck supple. No JVD present. No thyromegaly present.   Cardiovascular: Normal rate, regular rhythm, normal heart sounds and intact distal pulses. Exam reveals no gallop and no friction rub.   No murmur heard.  Pulmonary/Chest: Effort normal and breath sounds normal. No respiratory distress. He has no wheezes. He has no rales.   Abdominal: Soft. Bowel sounds are normal. He exhibits no mass. There is no tenderness.   Musculoskeletal: Normal range of motion. He exhibits no tenderness.   Lymphadenopathy:     He has no cervical adenopathy.   Neurological: He is alert and oriented to person, place, and time.   Gait is normal.   Skin: Skin is warm and dry. No rash noted.   Nursing note and vitals reviewed.      Assessment:       1. Essential hypertension    2. Other hyperlipidemia    3. Primary osteoarthritis involving multiple joints    4. Chronic thumb pain, bilateral    5. Chronic rhinitis        Plan:       Sidney was seen today for follow-up current therapy will be continued.  The patient will follow up with Hand surgery as scheduled.  Tests and a visit for physical examination in 6 months will be obtained..    Diagnoses and all orders for this visit:    Essential hypertension    Other hyperlipidemia    Primary osteoarthritis involving multiple joints    Chronic thumb pain, bilateral    Chronic rhinitis

## 2020-04-08 ENCOUNTER — TELEPHONE (OUTPATIENT)
Dept: INTERNAL MEDICINE | Facility: CLINIC | Age: 75
End: 2020-04-08

## 2020-04-08 NOTE — TELEPHONE ENCOUNTER
----- Message from Zia Ryan sent at 4/8/2020 10:42 AM CDT -----  Contact: Pt  Pt called and would like to have someone call him back    Pt was walking with 2 lb. Weights in his hand and his hand numb and the weight fell out of his hand and his blood pressure was high.    Pt can be reached at [phone]

## 2020-04-08 NOTE — TELEPHONE ENCOUNTER
----- Message from Zia Ryan sent at 4/8/2020 11:17 AM CDT -----  Contact: Pt  Pt called and said that she missed a call from your office    Pt can be reached at [phone]

## 2020-04-08 NOTE — TELEPHONE ENCOUNTER
Spoke to pt and he had numbness in his Left  hand and arm no other symptoms   I instructed patient that if he has it again that he should go to the ed that it could be something more serious .  I told I would sent the message to the Dr and call him back

## 2020-04-09 NOTE — TELEPHONE ENCOUNTER
Numbness in the left hand and arm could be from unusual stress on the nerves involving the left upper extremity.  This could be related to carrying the hand weights.  This is more likely to be the case.  Any associated shortness of breath, dizziness, chest discomfort or excessive sweating would warrant evaluation in the emergency room.

## 2020-07-17 DIAGNOSIS — Z71.89 COMPLEX CARE COORDINATION: ICD-10-CM

## 2020-10-05 ENCOUNTER — IMMUNIZATION (OUTPATIENT)
Dept: INTERNAL MEDICINE | Facility: CLINIC | Age: 75
End: 2020-10-05
Payer: MEDICARE

## 2020-10-05 PROCEDURE — G0008 ADMIN INFLUENZA VIRUS VAC: HCPCS | Mod: PBBFAC

## 2020-10-05 PROCEDURE — 90694 VACC AIIV4 NO PRSRV 0.5ML IM: CPT | Mod: PBBFAC

## 2021-01-09 ENCOUNTER — IMMUNIZATION (OUTPATIENT)
Dept: INTERNAL MEDICINE | Facility: CLINIC | Age: 76
End: 2021-01-09
Payer: COMMERCIAL

## 2021-01-09 DIAGNOSIS — Z23 NEED FOR VACCINATION: ICD-10-CM

## 2021-01-09 PROCEDURE — 91300 COVID-19, MRNA, LNP-S, PF, 30 MCG/0.3 ML DOSE VACCINE: CPT | Mod: PBBFAC

## 2021-01-30 ENCOUNTER — IMMUNIZATION (OUTPATIENT)
Dept: INTERNAL MEDICINE | Facility: CLINIC | Age: 76
End: 2021-01-30
Payer: COMMERCIAL

## 2021-01-30 DIAGNOSIS — Z23 NEED FOR VACCINATION: Primary | ICD-10-CM

## 2021-01-30 PROCEDURE — 91300 PR SARS-COV- 2 COVID-19 VACCINE, NO PRSV, 30MCG/0.3ML, IM: CPT | Mod: PBBFAC | Performed by: INTERNAL MEDICINE

## 2021-01-30 PROCEDURE — 0002A PR IMMUNIZ ADMIN, SARS-COV-2 COVID-19 VACC, 30MCG/0.3ML, 2ND DOSE: CPT | Mod: PBBFAC | Performed by: INTERNAL MEDICINE

## 2021-01-30 RX ADMIN — RNA INGREDIENT BNT-162B2 0.3 ML: 0.23 INJECTION, SUSPENSION INTRAMUSCULAR at 11:01

## 2021-02-02 RX ORDER — AMLODIPINE BESYLATE 10 MG/1
10 TABLET ORAL DAILY
Qty: 90 TABLET | Refills: 1 | Status: SHIPPED | OUTPATIENT
Start: 2021-02-02 | End: 2021-05-09

## 2021-02-02 RX ORDER — DICLOFENAC SODIUM AND MISOPROSTOL 75; 200 MG/1; UG/1
TABLET, DELAYED RELEASE ORAL
Qty: 180 TABLET | Refills: 1 | Status: SHIPPED | OUTPATIENT
Start: 2021-02-02 | End: 2021-06-21 | Stop reason: SDUPTHER

## 2021-02-02 RX ORDER — FLUTICASONE PROPIONATE 50 MCG
2 SPRAY, SUSPENSION (ML) NASAL DAILY
Qty: 48 G | Refills: 3 | Status: SHIPPED | OUTPATIENT
Start: 2021-02-02 | End: 2021-06-21 | Stop reason: SDUPTHER

## 2021-02-24 ENCOUNTER — OFFICE VISIT (OUTPATIENT)
Dept: OTOLARYNGOLOGY | Facility: CLINIC | Age: 76
End: 2021-02-24
Payer: MEDICARE

## 2021-02-24 VITALS
HEART RATE: 79 BPM | WEIGHT: 168.88 LBS | DIASTOLIC BLOOD PRESSURE: 91 MMHG | SYSTOLIC BLOOD PRESSURE: 145 MMHG | BODY MASS INDEX: 24.23 KG/M2

## 2021-02-24 DIAGNOSIS — K11.20 SIALADENITIS: Primary | ICD-10-CM

## 2021-02-24 PROCEDURE — 1101F PT FALLS ASSESS-DOCD LE1/YR: CPT | Mod: CPTII,S$GLB,, | Performed by: OTOLARYNGOLOGY

## 2021-02-24 PROCEDURE — 3077F SYST BP >= 140 MM HG: CPT | Mod: CPTII,S$GLB,, | Performed by: OTOLARYNGOLOGY

## 2021-02-24 PROCEDURE — 99203 OFFICE O/P NEW LOW 30 MIN: CPT | Mod: S$GLB,,, | Performed by: OTOLARYNGOLOGY

## 2021-02-24 PROCEDURE — 1159F MED LIST DOCD IN RCRD: CPT | Mod: S$GLB,,, | Performed by: OTOLARYNGOLOGY

## 2021-02-24 PROCEDURE — 99203 PR OFFICE/OUTPT VISIT, NEW, LEVL III, 30-44 MIN: ICD-10-PCS | Mod: S$GLB,,, | Performed by: OTOLARYNGOLOGY

## 2021-02-24 PROCEDURE — 3008F BODY MASS INDEX DOCD: CPT | Mod: CPTII,S$GLB,, | Performed by: OTOLARYNGOLOGY

## 2021-02-24 PROCEDURE — 3288F FALL RISK ASSESSMENT DOCD: CPT | Mod: CPTII,S$GLB,, | Performed by: OTOLARYNGOLOGY

## 2021-02-24 PROCEDURE — 3008F PR BODY MASS INDEX (BMI) DOCUMENTED: ICD-10-PCS | Mod: CPTII,S$GLB,, | Performed by: OTOLARYNGOLOGY

## 2021-02-24 PROCEDURE — 99999 PR PBB SHADOW E&M-EST. PATIENT-LVL III: ICD-10-PCS | Mod: PBBFAC,,, | Performed by: OTOLARYNGOLOGY

## 2021-02-24 PROCEDURE — 3288F PR FALLS RISK ASSESSMENT DOCUMENTED: ICD-10-PCS | Mod: CPTII,S$GLB,, | Performed by: OTOLARYNGOLOGY

## 2021-02-24 PROCEDURE — 99999 PR PBB SHADOW E&M-EST. PATIENT-LVL III: CPT | Mod: PBBFAC,,, | Performed by: OTOLARYNGOLOGY

## 2021-02-24 PROCEDURE — 3080F PR MOST RECENT DIASTOLIC BLOOD PRESSURE >= 90 MM HG: ICD-10-PCS | Mod: CPTII,S$GLB,, | Performed by: OTOLARYNGOLOGY

## 2021-02-24 PROCEDURE — 1126F PR PAIN SEVERITY QUANTIFIED, NO PAIN PRESENT: ICD-10-PCS | Mod: S$GLB,,, | Performed by: OTOLARYNGOLOGY

## 2021-02-24 PROCEDURE — 1159F PR MEDICATION LIST DOCUMENTED IN MEDICAL RECORD: ICD-10-PCS | Mod: S$GLB,,, | Performed by: OTOLARYNGOLOGY

## 2021-02-24 PROCEDURE — 1101F PR PT FALLS ASSESS DOC 0-1 FALLS W/OUT INJ PAST YR: ICD-10-PCS | Mod: CPTII,S$GLB,, | Performed by: OTOLARYNGOLOGY

## 2021-02-24 PROCEDURE — 3077F PR MOST RECENT SYSTOLIC BLOOD PRESSURE >= 140 MM HG: ICD-10-PCS | Mod: CPTII,S$GLB,, | Performed by: OTOLARYNGOLOGY

## 2021-02-24 PROCEDURE — 1126F AMNT PAIN NOTED NONE PRSNT: CPT | Mod: S$GLB,,, | Performed by: OTOLARYNGOLOGY

## 2021-02-24 PROCEDURE — 3080F DIAST BP >= 90 MM HG: CPT | Mod: CPTII,S$GLB,, | Performed by: OTOLARYNGOLOGY

## 2021-03-18 ENCOUNTER — PATIENT MESSAGE (OUTPATIENT)
Dept: RESEARCH | Facility: HOSPITAL | Age: 76
End: 2021-03-18

## 2021-03-25 ENCOUNTER — LAB VISIT (OUTPATIENT)
Dept: LAB | Facility: HOSPITAL | Age: 76
End: 2021-03-25
Attending: INTERNAL MEDICINE
Payer: MEDICARE

## 2021-03-25 DIAGNOSIS — E78.49 OTHER HYPERLIPIDEMIA: ICD-10-CM

## 2021-03-25 DIAGNOSIS — Z85.46 HISTORY OF PROSTATE CANCER: ICD-10-CM

## 2021-03-25 DIAGNOSIS — M15.9 PRIMARY OSTEOARTHRITIS INVOLVING MULTIPLE JOINTS: ICD-10-CM

## 2021-03-25 DIAGNOSIS — I10 ESSENTIAL HYPERTENSION: ICD-10-CM

## 2021-03-25 LAB
ALBUMIN SERPL BCP-MCNC: 4.1 G/DL (ref 3.5–5.2)
ALP SERPL-CCNC: 63 U/L (ref 55–135)
ALT SERPL W/O P-5'-P-CCNC: 20 U/L (ref 10–44)
ANION GAP SERPL CALC-SCNC: 7 MMOL/L (ref 8–16)
AST SERPL-CCNC: 25 U/L (ref 10–40)
BASOPHILS # BLD AUTO: 0.03 K/UL (ref 0–0.2)
BASOPHILS NFR BLD: 0.4 % (ref 0–1.9)
BILIRUB SERPL-MCNC: 0.8 MG/DL (ref 0.1–1)
BUN SERPL-MCNC: 10 MG/DL (ref 8–23)
CALCIUM SERPL-MCNC: 9.3 MG/DL (ref 8.7–10.5)
CHLORIDE SERPL-SCNC: 107 MMOL/L (ref 95–110)
CHOLEST SERPL-MCNC: 199 MG/DL (ref 120–199)
CHOLEST/HDLC SERPL: 4.1 {RATIO} (ref 2–5)
CO2 SERPL-SCNC: 26 MMOL/L (ref 23–29)
COMPLEXED PSA SERPL-MCNC: <0.01 NG/ML (ref 0–4)
CREAT SERPL-MCNC: 0.9 MG/DL (ref 0.5–1.4)
DIFFERENTIAL METHOD: ABNORMAL
EOSINOPHIL # BLD AUTO: 0.1 K/UL (ref 0–0.5)
EOSINOPHIL NFR BLD: 1.7 % (ref 0–8)
ERYTHROCYTE [DISTWIDTH] IN BLOOD BY AUTOMATED COUNT: 17.2 % (ref 11.5–14.5)
EST. GFR  (AFRICAN AMERICAN): >60 ML/MIN/1.73 M^2
EST. GFR  (NON AFRICAN AMERICAN): >60 ML/MIN/1.73 M^2
GLUCOSE SERPL-MCNC: 105 MG/DL (ref 70–110)
HCT VFR BLD AUTO: 41.9 % (ref 40–54)
HDLC SERPL-MCNC: 49 MG/DL (ref 40–75)
HDLC SERPL: 24.6 % (ref 20–50)
HGB BLD-MCNC: 13.1 G/DL (ref 14–18)
IMM GRANULOCYTES # BLD AUTO: 0.01 K/UL (ref 0–0.04)
IMM GRANULOCYTES NFR BLD AUTO: 0.1 % (ref 0–0.5)
LDLC SERPL CALC-MCNC: 128.4 MG/DL (ref 63–159)
LYMPHOCYTES # BLD AUTO: 3.6 K/UL (ref 1–4.8)
LYMPHOCYTES NFR BLD: 42.5 % (ref 18–48)
MCH RBC QN AUTO: 23.1 PG (ref 27–31)
MCHC RBC AUTO-ENTMCNC: 31.3 G/DL (ref 32–36)
MCV RBC AUTO: 74 FL (ref 82–98)
MONOCYTES # BLD AUTO: 0.7 K/UL (ref 0.3–1)
MONOCYTES NFR BLD: 8.3 % (ref 4–15)
NEUTROPHILS # BLD AUTO: 4 K/UL (ref 1.8–7.7)
NEUTROPHILS NFR BLD: 47 % (ref 38–73)
NONHDLC SERPL-MCNC: 150 MG/DL
NRBC BLD-RTO: 0 /100 WBC
PLATELET # BLD AUTO: 225 K/UL (ref 150–350)
PMV BLD AUTO: 10.1 FL (ref 9.2–12.9)
POTASSIUM SERPL-SCNC: 4 MMOL/L (ref 3.5–5.1)
PROT SERPL-MCNC: 7.5 G/DL (ref 6–8.4)
RBC # BLD AUTO: 5.66 M/UL (ref 4.6–6.2)
SODIUM SERPL-SCNC: 140 MMOL/L (ref 136–145)
TRIGL SERPL-MCNC: 108 MG/DL (ref 30–150)
TSH SERPL DL<=0.005 MIU/L-ACNC: 1.58 UIU/ML (ref 0.4–4)
WBC # BLD AUTO: 8.45 K/UL (ref 3.9–12.7)

## 2021-03-25 PROCEDURE — 36415 COLL VENOUS BLD VENIPUNCTURE: CPT | Performed by: INTERNAL MEDICINE

## 2021-03-25 PROCEDURE — 84153 ASSAY OF PSA TOTAL: CPT | Performed by: INTERNAL MEDICINE

## 2021-03-25 PROCEDURE — 80053 COMPREHEN METABOLIC PANEL: CPT | Performed by: INTERNAL MEDICINE

## 2021-03-25 PROCEDURE — 80061 LIPID PANEL: CPT | Performed by: INTERNAL MEDICINE

## 2021-03-25 PROCEDURE — 85025 COMPLETE CBC W/AUTO DIFF WBC: CPT | Performed by: INTERNAL MEDICINE

## 2021-03-25 PROCEDURE — 84443 ASSAY THYROID STIM HORMONE: CPT | Performed by: INTERNAL MEDICINE

## 2021-03-26 ENCOUNTER — PATIENT MESSAGE (OUTPATIENT)
Dept: RESEARCH | Facility: HOSPITAL | Age: 76
End: 2021-03-26

## 2021-04-01 ENCOUNTER — OFFICE VISIT (OUTPATIENT)
Dept: INTERNAL MEDICINE | Facility: CLINIC | Age: 76
End: 2021-04-01
Payer: MEDICARE

## 2021-04-01 VITALS
WEIGHT: 170.19 LBS | DIASTOLIC BLOOD PRESSURE: 82 MMHG | HEART RATE: 92 BPM | OXYGEN SATURATION: 99 % | SYSTOLIC BLOOD PRESSURE: 132 MMHG | TEMPERATURE: 98 F | BODY MASS INDEX: 24.42 KG/M2

## 2021-04-01 DIAGNOSIS — I49.3 VENTRICULAR ECTOPY: ICD-10-CM

## 2021-04-01 DIAGNOSIS — J31.0 CHRONIC RHINITIS: ICD-10-CM

## 2021-04-01 DIAGNOSIS — I10 ESSENTIAL HYPERTENSION: ICD-10-CM

## 2021-04-01 DIAGNOSIS — R35.1 NOCTURIA: ICD-10-CM

## 2021-04-01 DIAGNOSIS — Z12.11 COLON CANCER SCREENING: ICD-10-CM

## 2021-04-01 DIAGNOSIS — K63.5 POLYP OF COLON, UNSPECIFIED PART OF COLON, UNSPECIFIED TYPE: ICD-10-CM

## 2021-04-01 DIAGNOSIS — M15.9 PRIMARY OSTEOARTHRITIS INVOLVING MULTIPLE JOINTS: ICD-10-CM

## 2021-04-01 DIAGNOSIS — Z00.00 WELL ADULT EXAM: Primary | ICD-10-CM

## 2021-04-01 DIAGNOSIS — E78.49 OTHER HYPERLIPIDEMIA: ICD-10-CM

## 2021-04-01 PROCEDURE — 3288F PR FALLS RISK ASSESSMENT DOCUMENTED: ICD-10-PCS | Mod: CPTII,S$GLB,, | Performed by: INTERNAL MEDICINE

## 2021-04-01 PROCEDURE — 99999 PR PBB SHADOW E&M-EST. PATIENT-LVL IV: CPT | Mod: PBBFAC,,, | Performed by: INTERNAL MEDICINE

## 2021-04-01 PROCEDURE — 1126F AMNT PAIN NOTED NONE PRSNT: CPT | Mod: S$GLB,,, | Performed by: INTERNAL MEDICINE

## 2021-04-01 PROCEDURE — 99397 PER PM REEVAL EST PAT 65+ YR: CPT | Mod: S$GLB,,, | Performed by: INTERNAL MEDICINE

## 2021-04-01 PROCEDURE — 93005 EKG 12-LEAD: ICD-10-PCS | Mod: S$GLB,,, | Performed by: INTERNAL MEDICINE

## 2021-04-01 PROCEDURE — 1101F PT FALLS ASSESS-DOCD LE1/YR: CPT | Mod: CPTII,S$GLB,, | Performed by: INTERNAL MEDICINE

## 2021-04-01 PROCEDURE — 93005 ELECTROCARDIOGRAM TRACING: CPT | Mod: S$GLB,,, | Performed by: INTERNAL MEDICINE

## 2021-04-01 PROCEDURE — 3008F PR BODY MASS INDEX (BMI) DOCUMENTED: ICD-10-PCS | Mod: CPTII,S$GLB,, | Performed by: INTERNAL MEDICINE

## 2021-04-01 PROCEDURE — 99397 PR PREVENTIVE VISIT,EST,65 & OVER: ICD-10-PCS | Mod: S$GLB,,, | Performed by: INTERNAL MEDICINE

## 2021-04-01 PROCEDURE — 99999 PR PBB SHADOW E&M-EST. PATIENT-LVL IV: ICD-10-PCS | Mod: PBBFAC,,, | Performed by: INTERNAL MEDICINE

## 2021-04-01 PROCEDURE — 1126F PR PAIN SEVERITY QUANTIFIED, NO PAIN PRESENT: ICD-10-PCS | Mod: S$GLB,,, | Performed by: INTERNAL MEDICINE

## 2021-04-01 PROCEDURE — 93010 EKG 12-LEAD: ICD-10-PCS | Mod: S$GLB,,, | Performed by: INTERNAL MEDICINE

## 2021-04-01 PROCEDURE — 3288F FALL RISK ASSESSMENT DOCD: CPT | Mod: CPTII,S$GLB,, | Performed by: INTERNAL MEDICINE

## 2021-04-01 PROCEDURE — 1101F PR PT FALLS ASSESS DOC 0-1 FALLS W/OUT INJ PAST YR: ICD-10-PCS | Mod: CPTII,S$GLB,, | Performed by: INTERNAL MEDICINE

## 2021-04-01 PROCEDURE — 93010 ELECTROCARDIOGRAM REPORT: CPT | Mod: S$GLB,,, | Performed by: INTERNAL MEDICINE

## 2021-04-01 PROCEDURE — 3008F BODY MASS INDEX DOCD: CPT | Mod: CPTII,S$GLB,, | Performed by: INTERNAL MEDICINE

## 2021-04-06 DIAGNOSIS — Z01.818 PRE-OP TESTING: ICD-10-CM

## 2021-04-06 DIAGNOSIS — Z12.11 SPECIAL SCREENING FOR MALIGNANT NEOPLASMS, COLON: Primary | ICD-10-CM

## 2021-04-06 RX ORDER — SODIUM, POTASSIUM,MAG SULFATES 17.5-3.13G
1 SOLUTION, RECONSTITUTED, ORAL ORAL DAILY
Qty: 1 KIT | Refills: 0 | Status: SHIPPED | OUTPATIENT
Start: 2021-04-06 | End: 2021-04-08

## 2021-04-09 NOTE — TELEPHONE ENCOUNTER
----- Message from Isha Sepulveda sent at 3/14/2017 11:53 AM CDT -----  Pt would like to know why he has to have Abdominal Aorta US done.    thanks  
Explained to patient reason for AAA screen- exsmoker- he prefers to speak with Dr Soto - also screening colonoscopy- he prefers to discuss with PCP. Message will be forwarded to Dr Soto -pt requesting Dr Soto or apolonia return his call.  
I did leave a message on the patient's phone.  I agree with the need for aortic aneurysm screening by ultrasound.  The patient is actually due for screening colonoscopy in 2018 (10 year follow-up).  
Spoke with HUBER Puentes, nurse. She will call the patient and explain the need for test and answer any further questions.  
Not Applicable

## 2021-04-25 ENCOUNTER — LAB VISIT (OUTPATIENT)
Dept: URGENT CARE | Facility: CLINIC | Age: 76
End: 2021-04-25
Payer: MEDICARE

## 2021-04-25 DIAGNOSIS — Z01.818 PRE-OP TESTING: ICD-10-CM

## 2021-04-25 PROCEDURE — U0005 INFEC AGEN DETEC AMPLI PROBE: HCPCS | Performed by: FAMILY MEDICINE

## 2021-04-25 PROCEDURE — U0003 INFECTIOUS AGENT DETECTION BY NUCLEIC ACID (DNA OR RNA); SEVERE ACUTE RESPIRATORY SYNDROME CORONAVIRUS 2 (SARS-COV-2) (CORONAVIRUS DISEASE [COVID-19]), AMPLIFIED PROBE TECHNIQUE, MAKING USE OF HIGH THROUGHPUT TECHNOLOGIES AS DESCRIBED BY CMS-2020-01-R: HCPCS | Performed by: FAMILY MEDICINE

## 2021-04-26 LAB — SARS-COV-2 RNA RESP QL NAA+PROBE: NOT DETECTED

## 2021-04-28 ENCOUNTER — ANESTHESIA EVENT (OUTPATIENT)
Dept: ENDOSCOPY | Facility: HOSPITAL | Age: 76
End: 2021-04-28
Payer: MEDICARE

## 2021-04-28 ENCOUNTER — ANESTHESIA (OUTPATIENT)
Dept: ENDOSCOPY | Facility: HOSPITAL | Age: 76
End: 2021-04-28
Payer: MEDICARE

## 2021-04-28 ENCOUNTER — HOSPITAL ENCOUNTER (OUTPATIENT)
Facility: HOSPITAL | Age: 76
Discharge: HOME | End: 2021-04-28
Attending: INTERNAL MEDICINE | Admitting: INTERNAL MEDICINE
Payer: MEDICARE

## 2021-04-28 VITALS
RESPIRATION RATE: 18 BRPM | HEIGHT: 70 IN | SYSTOLIC BLOOD PRESSURE: 120 MMHG | BODY MASS INDEX: 22.9 KG/M2 | TEMPERATURE: 98 F | OXYGEN SATURATION: 100 % | HEART RATE: 72 BPM | DIASTOLIC BLOOD PRESSURE: 76 MMHG | WEIGHT: 160 LBS

## 2021-04-28 DIAGNOSIS — Z86.010 HISTORY OF COLON POLYPS: ICD-10-CM

## 2021-04-28 PROBLEM — Z86.0100 HISTORY OF COLON POLYPS: Status: ACTIVE | Noted: 2021-04-28

## 2021-04-28 PROCEDURE — 88305 TISSUE EXAM BY PATHOLOGIST: CPT | Mod: 26,,, | Performed by: PATHOLOGY

## 2021-04-28 PROCEDURE — 27201089 HC SNARE, DISP (ANY): Performed by: INTERNAL MEDICINE

## 2021-04-28 PROCEDURE — E9220 PRA ENDO ANESTHESIA: ICD-10-PCS | Mod: 33,,, | Performed by: NURSE ANESTHETIST, CERTIFIED REGISTERED

## 2021-04-28 PROCEDURE — 37000008 HC ANESTHESIA 1ST 15 MINUTES: Performed by: INTERNAL MEDICINE

## 2021-04-28 PROCEDURE — 45380 COLONOSCOPY AND BIOPSY: CPT | Mod: 59,,, | Performed by: INTERNAL MEDICINE

## 2021-04-28 PROCEDURE — 88305 TISSUE EXAM BY PATHOLOGIST: CPT | Performed by: PATHOLOGY

## 2021-04-28 PROCEDURE — 45380 COLONOSCOPY AND BIOPSY: CPT | Performed by: INTERNAL MEDICINE

## 2021-04-28 PROCEDURE — E9220 PRA ENDO ANESTHESIA: HCPCS | Mod: 33,,, | Performed by: NURSE ANESTHETIST, CERTIFIED REGISTERED

## 2021-04-28 PROCEDURE — 63600175 PHARM REV CODE 636 W HCPCS: Performed by: NURSE ANESTHETIST, CERTIFIED REGISTERED

## 2021-04-28 PROCEDURE — 88305 TISSUE EXAM BY PATHOLOGIST: ICD-10-PCS | Mod: 26,,, | Performed by: PATHOLOGY

## 2021-04-28 PROCEDURE — 45385 PR COLONOSCOPY,REMV LESN,SNARE: ICD-10-PCS | Mod: 33,,, | Performed by: INTERNAL MEDICINE

## 2021-04-28 PROCEDURE — 27201012 HC FORCEPS, HOT/COLD, DISP: Performed by: INTERNAL MEDICINE

## 2021-04-28 PROCEDURE — 45380 PR COLONOSCOPY,BIOPSY: ICD-10-PCS | Mod: 59,,, | Performed by: INTERNAL MEDICINE

## 2021-04-28 PROCEDURE — 25000003 PHARM REV CODE 250: Performed by: INTERNAL MEDICINE

## 2021-04-28 PROCEDURE — 45385 COLONOSCOPY W/LESION REMOVAL: CPT | Performed by: INTERNAL MEDICINE

## 2021-04-28 PROCEDURE — 37000009 HC ANESTHESIA EA ADD 15 MINS: Performed by: INTERNAL MEDICINE

## 2021-04-28 PROCEDURE — 45385 COLONOSCOPY W/LESION REMOVAL: CPT | Mod: 33,,, | Performed by: INTERNAL MEDICINE

## 2021-04-28 RX ORDER — SODIUM CHLORIDE 9 MG/ML
INJECTION, SOLUTION INTRAVENOUS CONTINUOUS
Status: DISCONTINUED | OUTPATIENT
Start: 2021-04-28 | End: 2021-04-28 | Stop reason: HOSPADM

## 2021-04-28 RX ORDER — PROPOFOL 10 MG/ML
VIAL (ML) INTRAVENOUS CONTINUOUS PRN
Status: DISCONTINUED | OUTPATIENT
Start: 2021-04-28 | End: 2021-04-28

## 2021-04-28 RX ORDER — PROPOFOL 10 MG/ML
VIAL (ML) INTRAVENOUS
Status: DISCONTINUED | OUTPATIENT
Start: 2021-04-28 | End: 2021-04-28

## 2021-04-28 RX ORDER — LIDOCAINE HCL/PF 100 MG/5ML
SYRINGE (ML) INTRAVENOUS
Status: DISCONTINUED | OUTPATIENT
Start: 2021-04-28 | End: 2021-04-28

## 2021-04-28 RX ADMIN — PROPOFOL 150 MCG/KG/MIN: 10 INJECTION, EMULSION INTRAVENOUS at 09:04

## 2021-04-28 RX ADMIN — PROPOFOL 50 MG: 10 INJECTION, EMULSION INTRAVENOUS at 09:04

## 2021-04-28 RX ADMIN — Medication 50 MG: at 09:04

## 2021-04-28 RX ADMIN — SODIUM CHLORIDE: 0.9 INJECTION, SOLUTION INTRAVENOUS at 08:04

## 2021-05-06 PROCEDURE — 91110 GI TRC IMG INTRAL ESOPH-ILE: CPT | Mod: 26,,, | Performed by: INTERNAL MEDICINE

## 2021-05-06 PROCEDURE — 91110 PR GI TRACT CAPSULE ENDOSCOPY: ICD-10-PCS | Mod: 26,,, | Performed by: INTERNAL MEDICINE

## 2021-05-09 ENCOUNTER — TELEPHONE (OUTPATIENT)
Dept: GASTROENTEROLOGY | Facility: HOSPITAL | Age: 76
End: 2021-05-09

## 2021-05-09 DIAGNOSIS — D50.9 MICROCYTIC ANEMIA: Primary | ICD-10-CM

## 2021-05-09 LAB
FINAL PATHOLOGIC DIAGNOSIS: NORMAL
GROSS: NORMAL
Lab: NORMAL

## 2021-05-10 ENCOUNTER — PATIENT MESSAGE (OUTPATIENT)
Dept: GASTROENTEROLOGY | Facility: HOSPITAL | Age: 76
End: 2021-05-10

## 2021-06-21 RX ORDER — DICLOFENAC SODIUM AND MISOPROSTOL 75; 200 MG/1; UG/1
TABLET, DELAYED RELEASE ORAL
Qty: 180 TABLET | Refills: 1 | Status: SHIPPED | OUTPATIENT
Start: 2021-06-21 | End: 2022-08-15

## 2021-06-21 RX ORDER — FLUTICASONE PROPIONATE 50 MCG
2 SPRAY, SUSPENSION (ML) NASAL DAILY
Qty: 48 G | Refills: 3 | Status: ON HOLD | OUTPATIENT
Start: 2021-06-21 | End: 2022-09-13 | Stop reason: SDUPTHER

## 2021-09-14 ENCOUNTER — TELEPHONE (OUTPATIENT)
Dept: ORTHOPEDICS | Facility: CLINIC | Age: 76
End: 2021-09-14

## 2021-09-14 DIAGNOSIS — M79.642 LEFT HAND PAIN: Primary | ICD-10-CM

## 2021-09-28 ENCOUNTER — IMMUNIZATION (OUTPATIENT)
Dept: INTERNAL MEDICINE | Facility: CLINIC | Age: 76
End: 2021-09-28
Payer: MEDICARE

## 2021-09-28 DIAGNOSIS — Z23 NEED FOR VACCINATION: Primary | ICD-10-CM

## 2021-09-28 PROCEDURE — 91300 COVID-19, MRNA, LNP-S, PF, 30 MCG/0.3 ML DOSE VACCINE: CPT | Mod: PBBFAC | Performed by: INTERNAL MEDICINE

## 2021-09-28 PROCEDURE — 0003A COVID-19, MRNA, LNP-S, PF, 30 MCG/0.3 ML DOSE VACCINE: CPT | Mod: CV19,PBBFAC | Performed by: INTERNAL MEDICINE

## 2021-10-09 ENCOUNTER — IMMUNIZATION (OUTPATIENT)
Dept: INTERNAL MEDICINE | Facility: CLINIC | Age: 76
End: 2021-10-09
Payer: MEDICARE

## 2021-10-09 PROCEDURE — 90694 FLU VACCINE - QUADRIVALENT - ADJUVANTED: ICD-10-PCS | Mod: S$GLB,,, | Performed by: FAMILY MEDICINE

## 2021-10-09 PROCEDURE — G0008 FLU VACCINE - QUADRIVALENT - ADJUVANTED: ICD-10-PCS | Mod: S$GLB,,, | Performed by: FAMILY MEDICINE

## 2021-10-09 PROCEDURE — 90694 VACC AIIV4 NO PRSRV 0.5ML IM: CPT | Mod: S$GLB,,, | Performed by: FAMILY MEDICINE

## 2021-10-09 PROCEDURE — G0008 ADMIN INFLUENZA VIRUS VAC: HCPCS | Mod: S$GLB,,, | Performed by: FAMILY MEDICINE

## 2022-05-13 ENCOUNTER — PATIENT OUTREACH (OUTPATIENT)
Dept: ADMINISTRATIVE | Facility: HOSPITAL | Age: 77
End: 2022-05-13
Payer: MEDICARE

## 2022-06-14 ENCOUNTER — IMMUNIZATION (OUTPATIENT)
Dept: INTERNAL MEDICINE | Facility: CLINIC | Age: 77
End: 2022-06-14
Payer: MEDICARE

## 2022-06-14 DIAGNOSIS — Z23 NEED FOR VACCINATION: Primary | ICD-10-CM

## 2022-06-14 PROCEDURE — 91305 COVID-19, MRNA, LNP-S, PF, 30 MCG/0.3 ML DOSE VACCINE (PFIZER): CPT | Mod: PBBFAC | Performed by: INTERNAL MEDICINE

## 2022-08-16 ENCOUNTER — TELEPHONE (OUTPATIENT)
Dept: INTERNAL MEDICINE | Facility: CLINIC | Age: 77
End: 2022-08-16
Payer: MEDICARE

## 2022-08-16 DIAGNOSIS — Z00.00 WELL ADULT EXAM: Primary | ICD-10-CM

## 2022-08-16 DIAGNOSIS — R79.9 ABNORMAL FINDING OF BLOOD CHEMISTRY, UNSPECIFIED: ICD-10-CM

## 2022-08-16 DIAGNOSIS — Z85.46 HISTORY OF PROSTATE CANCER: ICD-10-CM

## 2022-08-16 DIAGNOSIS — I10 ESSENTIAL HYPERTENSION: ICD-10-CM

## 2022-08-16 DIAGNOSIS — E78.49 OTHER HYPERLIPIDEMIA: ICD-10-CM

## 2022-08-16 NOTE — TELEPHONE ENCOUNTER
----- Message from Cady Szymanski MA sent at 8/15/2022  4:20 PM CDT -----  Regarding: FW: annual lab appt needed    ----- Message -----  From: Cady Szymanski MA  Sent: 8/15/2022  10:50 AM CDT  To: Cady Szymanski MA  Subject: annual lab appt needed                           Handled a refill today.    9/1 annual booked w/ no labs.  Contact and schedule and enter orders.

## 2022-08-25 ENCOUNTER — LAB VISIT (OUTPATIENT)
Dept: LAB | Facility: HOSPITAL | Age: 77
End: 2022-08-25
Attending: INTERNAL MEDICINE
Payer: MEDICARE

## 2022-08-25 DIAGNOSIS — R79.9 ABNORMAL FINDING OF BLOOD CHEMISTRY, UNSPECIFIED: ICD-10-CM

## 2022-08-25 DIAGNOSIS — Z00.00 WELL ADULT EXAM: ICD-10-CM

## 2022-08-25 DIAGNOSIS — Z85.46 HISTORY OF PROSTATE CANCER: ICD-10-CM

## 2022-08-25 DIAGNOSIS — E78.49 OTHER HYPERLIPIDEMIA: ICD-10-CM

## 2022-08-25 DIAGNOSIS — I10 ESSENTIAL HYPERTENSION: ICD-10-CM

## 2022-08-25 LAB
ALBUMIN SERPL BCP-MCNC: 4.2 G/DL (ref 3.5–5.2)
ALP SERPL-CCNC: 58 U/L (ref 55–135)
ALT SERPL W/O P-5'-P-CCNC: 32 U/L (ref 10–44)
ANION GAP SERPL CALC-SCNC: 11 MMOL/L (ref 8–16)
AST SERPL-CCNC: 29 U/L (ref 10–40)
BASOPHILS # BLD AUTO: 0.03 K/UL (ref 0–0.2)
BASOPHILS NFR BLD: 0.4 % (ref 0–1.9)
BILIRUB SERPL-MCNC: 1.1 MG/DL (ref 0.1–1)
BUN SERPL-MCNC: 11 MG/DL (ref 8–23)
CALCIUM SERPL-MCNC: 9.8 MG/DL (ref 8.7–10.5)
CHLORIDE SERPL-SCNC: 106 MMOL/L (ref 95–110)
CHOLEST SERPL-MCNC: 155 MG/DL (ref 120–199)
CHOLEST/HDLC SERPL: 3 {RATIO} (ref 2–5)
CO2 SERPL-SCNC: 20 MMOL/L (ref 23–29)
COMPLEXED PSA SERPL-MCNC: <0.01 NG/ML (ref 0–4)
CREAT SERPL-MCNC: 0.9 MG/DL (ref 0.5–1.4)
DIFFERENTIAL METHOD: ABNORMAL
EOSINOPHIL # BLD AUTO: 0.1 K/UL (ref 0–0.5)
EOSINOPHIL NFR BLD: 1.2 % (ref 0–8)
ERYTHROCYTE [DISTWIDTH] IN BLOOD BY AUTOMATED COUNT: 17 % (ref 11.5–14.5)
EST. GFR  (NO RACE VARIABLE): >60 ML/MIN/1.73 M^2
ESTIMATED AVG GLUCOSE: 111 MG/DL (ref 68–131)
GLUCOSE SERPL-MCNC: 103 MG/DL (ref 70–110)
HBA1C MFR BLD: 5.5 % (ref 4–5.6)
HCT VFR BLD AUTO: 37.1 % (ref 40–54)
HDLC SERPL-MCNC: 51 MG/DL (ref 40–75)
HDLC SERPL: 32.9 % (ref 20–50)
HGB BLD-MCNC: 12.3 G/DL (ref 14–18)
IMM GRANULOCYTES # BLD AUTO: 0.01 K/UL (ref 0–0.04)
IMM GRANULOCYTES NFR BLD AUTO: 0.1 % (ref 0–0.5)
LDLC SERPL CALC-MCNC: 93.8 MG/DL (ref 63–159)
LYMPHOCYTES # BLD AUTO: 2.6 K/UL (ref 1–4.8)
LYMPHOCYTES NFR BLD: 33.4 % (ref 18–48)
MCH RBC QN AUTO: 23.4 PG (ref 27–31)
MCHC RBC AUTO-ENTMCNC: 33.2 G/DL (ref 32–36)
MCV RBC AUTO: 71 FL (ref 82–98)
MONOCYTES # BLD AUTO: 0.6 K/UL (ref 0.3–1)
MONOCYTES NFR BLD: 7.9 % (ref 4–15)
NEUTROPHILS # BLD AUTO: 4.4 K/UL (ref 1.8–7.7)
NEUTROPHILS NFR BLD: 57 % (ref 38–73)
NONHDLC SERPL-MCNC: 104 MG/DL
NRBC BLD-RTO: 0 /100 WBC
PLATELET # BLD AUTO: 221 K/UL (ref 150–450)
PMV BLD AUTO: 9.8 FL (ref 9.2–12.9)
POTASSIUM SERPL-SCNC: 3.9 MMOL/L (ref 3.5–5.1)
PROT SERPL-MCNC: 7.5 G/DL (ref 6–8.4)
RBC # BLD AUTO: 5.26 M/UL (ref 4.6–6.2)
SODIUM SERPL-SCNC: 137 MMOL/L (ref 136–145)
TRIGL SERPL-MCNC: 51 MG/DL (ref 30–150)
TSH SERPL DL<=0.005 MIU/L-ACNC: 1.05 UIU/ML (ref 0.4–4)
WBC # BLD AUTO: 7.75 K/UL (ref 3.9–12.7)

## 2022-08-25 PROCEDURE — 80061 LIPID PANEL: CPT | Performed by: INTERNAL MEDICINE

## 2022-08-25 PROCEDURE — 36415 COLL VENOUS BLD VENIPUNCTURE: CPT | Performed by: INTERNAL MEDICINE

## 2022-08-25 PROCEDURE — 85025 COMPLETE CBC W/AUTO DIFF WBC: CPT | Performed by: INTERNAL MEDICINE

## 2022-08-25 PROCEDURE — 80053 COMPREHEN METABOLIC PANEL: CPT | Performed by: INTERNAL MEDICINE

## 2022-08-25 PROCEDURE — 83036 HEMOGLOBIN GLYCOSYLATED A1C: CPT | Performed by: INTERNAL MEDICINE

## 2022-08-25 PROCEDURE — 84153 ASSAY OF PSA TOTAL: CPT | Performed by: INTERNAL MEDICINE

## 2022-08-25 PROCEDURE — 84443 ASSAY THYROID STIM HORMONE: CPT | Performed by: INTERNAL MEDICINE

## 2022-09-01 ENCOUNTER — PES CALL (OUTPATIENT)
Dept: ADMINISTRATIVE | Facility: CLINIC | Age: 77
End: 2022-09-01
Payer: MEDICARE

## 2022-09-01 ENCOUNTER — HOSPITAL ENCOUNTER (OUTPATIENT)
Dept: RADIOLOGY | Facility: HOSPITAL | Age: 77
Discharge: HOME | End: 2022-09-01
Attending: INTERNAL MEDICINE
Payer: MEDICARE

## 2022-09-01 ENCOUNTER — OFFICE VISIT (OUTPATIENT)
Dept: INTERNAL MEDICINE | Facility: CLINIC | Age: 77
End: 2022-09-01
Payer: MEDICARE

## 2022-09-01 VITALS
OXYGEN SATURATION: 98 % | DIASTOLIC BLOOD PRESSURE: 70 MMHG | HEIGHT: 70 IN | TEMPERATURE: 97 F | HEART RATE: 95 BPM | RESPIRATION RATE: 19 BRPM | WEIGHT: 150.81 LBS | BODY MASS INDEX: 21.59 KG/M2 | SYSTOLIC BLOOD PRESSURE: 114 MMHG

## 2022-09-01 DIAGNOSIS — J31.0 CHRONIC RHINITIS: ICD-10-CM

## 2022-09-01 DIAGNOSIS — E78.49 OTHER HYPERLIPIDEMIA: ICD-10-CM

## 2022-09-01 DIAGNOSIS — Z85.46 HISTORY OF PROSTATE CANCER: ICD-10-CM

## 2022-09-01 DIAGNOSIS — R63.4 ABNORMAL WEIGHT LOSS: ICD-10-CM

## 2022-09-01 DIAGNOSIS — M15.9 PRIMARY OSTEOARTHRITIS INVOLVING MULTIPLE JOINTS: ICD-10-CM

## 2022-09-01 DIAGNOSIS — R06.02 SOB (SHORTNESS OF BREATH) ON EXERTION: ICD-10-CM

## 2022-09-01 DIAGNOSIS — Z00.00 WELL ADULT EXAM: Primary | ICD-10-CM

## 2022-09-01 DIAGNOSIS — D53.9 NUTRITIONAL ANEMIA, UNSPECIFIED: ICD-10-CM

## 2022-09-01 DIAGNOSIS — I10 ESSENTIAL HYPERTENSION: Chronic | ICD-10-CM

## 2022-09-01 DIAGNOSIS — D64.9 ANEMIA, UNSPECIFIED TYPE: ICD-10-CM

## 2022-09-01 PROCEDURE — 99397 PER PM REEVAL EST PAT 65+ YR: CPT | Mod: S$GLB,,, | Performed by: INTERNAL MEDICINE

## 2022-09-01 PROCEDURE — 99999 PR PBB SHADOW E&M-EST. PATIENT-LVL V: CPT | Mod: PBBFAC,,, | Performed by: INTERNAL MEDICINE

## 2022-09-01 PROCEDURE — 3288F PR FALLS RISK ASSESSMENT DOCUMENTED: ICD-10-PCS | Mod: CPTII,S$GLB,, | Performed by: INTERNAL MEDICINE

## 2022-09-01 PROCEDURE — 1126F AMNT PAIN NOTED NONE PRSNT: CPT | Mod: CPTII,S$GLB,, | Performed by: INTERNAL MEDICINE

## 2022-09-01 PROCEDURE — 1101F PR PT FALLS ASSESS DOC 0-1 FALLS W/OUT INJ PAST YR: ICD-10-PCS | Mod: CPTII,S$GLB,, | Performed by: INTERNAL MEDICINE

## 2022-09-01 PROCEDURE — 93010 EKG 12-LEAD: ICD-10-PCS | Mod: S$GLB,,, | Performed by: INTERNAL MEDICINE

## 2022-09-01 PROCEDURE — 3288F FALL RISK ASSESSMENT DOCD: CPT | Mod: CPTII,S$GLB,, | Performed by: INTERNAL MEDICINE

## 2022-09-01 PROCEDURE — 1160F RVW MEDS BY RX/DR IN RCRD: CPT | Mod: CPTII,S$GLB,, | Performed by: INTERNAL MEDICINE

## 2022-09-01 PROCEDURE — 1159F MED LIST DOCD IN RCRD: CPT | Mod: CPTII,S$GLB,, | Performed by: INTERNAL MEDICINE

## 2022-09-01 PROCEDURE — 93005 EKG 12-LEAD: ICD-10-PCS | Mod: S$GLB,,, | Performed by: INTERNAL MEDICINE

## 2022-09-01 PROCEDURE — 71046 X-RAY EXAM CHEST 2 VIEWS: CPT | Mod: TC,PO

## 2022-09-01 PROCEDURE — 3078F PR MOST RECENT DIASTOLIC BLOOD PRESSURE < 80 MM HG: ICD-10-PCS | Mod: CPTII,S$GLB,, | Performed by: INTERNAL MEDICINE

## 2022-09-01 PROCEDURE — 99999 PR PBB SHADOW E&M-EST. PATIENT-LVL V: ICD-10-PCS | Mod: PBBFAC,,, | Performed by: INTERNAL MEDICINE

## 2022-09-01 PROCEDURE — 3078F DIAST BP <80 MM HG: CPT | Mod: CPTII,S$GLB,, | Performed by: INTERNAL MEDICINE

## 2022-09-01 PROCEDURE — 1159F PR MEDICATION LIST DOCUMENTED IN MEDICAL RECORD: ICD-10-PCS | Mod: CPTII,S$GLB,, | Performed by: INTERNAL MEDICINE

## 2022-09-01 PROCEDURE — 71046 X-RAY EXAM CHEST 2 VIEWS: CPT | Mod: 26,,, | Performed by: RADIOLOGY

## 2022-09-01 PROCEDURE — 3074F SYST BP LT 130 MM HG: CPT | Mod: CPTII,S$GLB,, | Performed by: INTERNAL MEDICINE

## 2022-09-01 PROCEDURE — 93005 ELECTROCARDIOGRAM TRACING: CPT | Mod: S$GLB,,, | Performed by: INTERNAL MEDICINE

## 2022-09-01 PROCEDURE — 71046 XR CHEST PA AND LATERAL: ICD-10-PCS | Mod: 26,,, | Performed by: RADIOLOGY

## 2022-09-01 PROCEDURE — 1101F PT FALLS ASSESS-DOCD LE1/YR: CPT | Mod: CPTII,S$GLB,, | Performed by: INTERNAL MEDICINE

## 2022-09-01 PROCEDURE — 93010 ELECTROCARDIOGRAM REPORT: CPT | Mod: S$GLB,,, | Performed by: INTERNAL MEDICINE

## 2022-09-01 PROCEDURE — 1126F PR PAIN SEVERITY QUANTIFIED, NO PAIN PRESENT: ICD-10-PCS | Mod: CPTII,S$GLB,, | Performed by: INTERNAL MEDICINE

## 2022-09-01 PROCEDURE — 99397 PR PREVENTIVE VISIT,EST,65 & OVER: ICD-10-PCS | Mod: S$GLB,,, | Performed by: INTERNAL MEDICINE

## 2022-09-01 PROCEDURE — 3074F PR MOST RECENT SYSTOLIC BLOOD PRESSURE < 130 MM HG: ICD-10-PCS | Mod: CPTII,S$GLB,, | Performed by: INTERNAL MEDICINE

## 2022-09-01 PROCEDURE — 1160F PR REVIEW ALL MEDS BY PRESCRIBER/CLIN PHARMACIST DOCUMENTED: ICD-10-PCS | Mod: CPTII,S$GLB,, | Performed by: INTERNAL MEDICINE

## 2022-09-01 NOTE — PROGRESS NOTES
Chest x-ray shows signs of possible mild fluid collection in the lungs.  Further assessment with echocardiogram and Cardiology consultation has been ordered.

## 2022-09-01 NOTE — PROGRESS NOTES
Subjective:       Patient ID: Sidney Lanza is a 77 y.o. male.    Chief Complaint: Annual Exam    HPI  The patient presents for annual physical examination and for follow-up of medical conditions.  The patient reports he has noted unexplained weight loss since his last visit in April of 2021.  He states he still he has 2-3 meals a day.  He feels that his diet is the same.  His appetite is unchanged.  He has not experienced any nausea, vomiting, diarrhea, GI bleeding, or abdominal pain.    He does exercise regularly.  He does note shortness of breath with mild exertion.  He finds himself gasping for breath after performing calisthenics or after prolonged walking.  Shortness of breath has been noted for the past 3-4 weeks.  He denies having any cough, wheezing, or chest pain.  No lower extremity edema is present.  No PND or orthopnea is described.    Active medical conditions include hypertension, hyperlipidemia, osteoarthritis, chronic rhinitis, chronic lower back pain, anemia.  There is no personal history of asthma or COPD.  There is no history of chronic cigarette smoking.  The patient states he smoked as a young adult for approximately 9 years.  There is no history of any industrial exposure to fumes or dust.      Immunization record was reviewed.      Screening tests were reviewed.    No interval change in past medical history, family history, or social history since prior evaluations.    Review of Systems   Constitutional:  Positive for unexpected weight change. Negative for activity change, appetite change, chills, fatigue and fever.   HENT:  Negative for nasal congestion, ear pain, nosebleeds and postnasal drip.    Eyes:  Negative for pain, redness, itching and visual disturbance.   Respiratory:  Positive for shortness of breath. Negative for cough, chest tightness and wheezing.    Cardiovascular:  Negative for chest pain, palpitations and leg swelling.   Gastrointestinal:  Negative for abdominal pain,  blood in stool, constipation, nausea and vomiting.   Genitourinary:  Negative for difficulty urinating, dysuria, frequency, hematuria and urgency.   Musculoskeletal:  Negative for arthralgias, back pain, gait problem, joint swelling, myalgias, neck pain and neck stiffness.   Integumentary:  Negative for color change and rash.   Neurological:  Negative for dizziness, seizures, syncope, weakness, light-headedness, numbness and headaches.   Hematological:  Does not bruise/bleed easily.   Psychiatric/Behavioral:  Negative for agitation, confusion, hallucinations and sleep disturbance. The patient is not nervous/anxious.           Physical Exam  Vitals and nursing note reviewed.   Constitutional:       General: He is not in acute distress.     Appearance: Normal appearance. He is well-developed.      Comments: The patient has lost 20 lb since 04/01/2021.   HENT:      Head: Normocephalic and atraumatic.      Right Ear: External ear normal.      Left Ear: External ear normal.      Mouth/Throat:      Mouth: Mucous membranes are moist.      Pharynx: Oropharynx is clear. No oropharyngeal exudate.   Eyes:      General: No scleral icterus.     Extraocular Movements: Extraocular movements intact.      Conjunctiva/sclera: Conjunctivae normal.   Neck:      Thyroid: No thyromegaly.      Vascular: No JVD.      Comments: No supraclavicular or axillary adenopathy.  Cardiovascular:      Rate and Rhythm: Normal rate and regular rhythm.      Pulses: Normal pulses.      Heart sounds: Normal heart sounds. No murmur heard.    No friction rub. No gallop.      Comments: Occasional ectopic beats are present.  Pulmonary:      Effort: Pulmonary effort is normal. No respiratory distress.      Breath sounds: Normal breath sounds. No wheezing or rales.   Abdominal:      General: Bowel sounds are normal. There is no distension.      Palpations: Abdomen is soft. There is no mass.      Tenderness: There is no abdominal tenderness. There is no  guarding.   Musculoskeletal:         General: No tenderness. Normal range of motion.      Cervical back: Normal range of motion and neck supple.      Right lower leg: No edema.      Left lower leg: No edema.   Lymphadenopathy:      Cervical: No cervical adenopathy.   Skin:     General: Skin is warm and dry.      Findings: No bruising or rash.   Neurological:      General: No focal deficit present.      Mental Status: He is alert and oriented to person, place, and time.      Cranial Nerves: No cranial nerve deficit.      Motor: No abnormal muscle tone.      Deep Tendon Reflexes: Reflexes are normal and symmetric.   Psychiatric:         Mood and Affect: Mood normal.         Behavior: Behavior normal.         Thought Content: Thought content normal.         Lab Visit on 08/25/2022   Component Date Value Ref Range Status    Sodium 08/25/2022 137  136 - 145 mmol/L Final    Potassium 08/25/2022 3.9  3.5 - 5.1 mmol/L Final    Chloride 08/25/2022 106  95 - 110 mmol/L Final    CO2 08/25/2022 20 (L)  23 - 29 mmol/L Final    Glucose 08/25/2022 103  70 - 110 mg/dL Final    BUN 08/25/2022 11  8 - 23 mg/dL Final    Creatinine 08/25/2022 0.9  0.5 - 1.4 mg/dL Final    Calcium 08/25/2022 9.8  8.7 - 10.5 mg/dL Final    Total Protein 08/25/2022 7.5  6.0 - 8.4 g/dL Final    Albumin 08/25/2022 4.2  3.5 - 5.2 g/dL Final    Total Bilirubin 08/25/2022 1.1 (H)  0.1 - 1.0 mg/dL Final    Comment: For infants and newborns, interpretation of results should be based  on gestational age, weight and in agreement with clinical  observations.    Premature Infant recommended reference ranges:  Up to 24 hours.............<8.0 mg/dL  Up to 48 hours............<12.0 mg/dL  3-5 days..................<15.0 mg/dL  6-29 days.................<15.0 mg/dL      Alkaline Phosphatase 08/25/2022 58  55 - 135 U/L Final    AST 08/25/2022 29  10 - 40 U/L Final    ALT 08/25/2022 32  10 - 44 U/L Final    Anion Gap 08/25/2022 11  8 - 16 mmol/L Final    eGFR 08/25/2022  >60.0  >60 mL/min/1.73 m^2 Final    Cholesterol 08/25/2022 155  120 - 199 mg/dL Final    Comment: The National Cholesterol Education Program (NCEP) has set the  following guidelines (reference ranges) for Cholesterol:  Optimal.....................<200 mg/dL  Borderline High.............200-239 mg/dL  High........................> or = 240 mg/dL      Triglycerides 08/25/2022 51  30 - 150 mg/dL Final    Comment: The National Cholesterol Education Program (NCEP) has set the  following guidelines (reference values) for triglycerides:  Normal......................<150 mg/dL  Borderline High.............150-199 mg/dL  High........................200-499 mg/dL      HDL 08/25/2022 51  40 - 75 mg/dL Final    Comment: The National Cholesterol Education Program (NCEP) has set the  following guidelines (reference values) for HDL Cholesterol:  Low...............<40 mg/dL  Optimal...........>60 mg/dL      LDL Cholesterol 08/25/2022 93.8  63.0 - 159.0 mg/dL Final    Comment: The National Cholesterol Education Program (NCEP) has set the  following guidelines (reference values) for LDL Cholesterol:  Optimal.......................<130 mg/dL  Borderline High...............130-159 mg/dL  High..........................160-189 mg/dL  Very High.....................>190 mg/dL      HDL/Cholesterol Ratio 08/25/2022 32.9  20.0 - 50.0 % Final    Total Cholesterol/HDL Ratio 08/25/2022 3.0  2.0 - 5.0 Final    Non-HDL Cholesterol 08/25/2022 104  mg/dL Final    Comment: Risk category and Non-HDL cholesterol goals:  Coronary heart disease (CHD)or equivalent (10-year risk of CHD >20%):  Non-HDL cholesterol goal     <130 mg/dL  Two or more CHD risk factors and 10-year risk of CHD <= 20%:  Non-HDL cholesterol goal     <160 mg/dL  0 to 1 CHD risk factor:  Non-HDL cholesterol goal     <190 mg/dL      WBC 08/25/2022 7.75  3.90 - 12.70 K/uL Final    RBC 08/25/2022 5.26  4.60 - 6.20 M/uL Final    Hemoglobin 08/25/2022 12.3 (L)  14.0 - 18.0 g/dL Final     Hematocrit 08/25/2022 37.1 (L)  40.0 - 54.0 % Final    MCV 08/25/2022 71 (L)  82 - 98 fL Final    MCH 08/25/2022 23.4 (L)  27.0 - 31.0 pg Final    MCHC 08/25/2022 33.2  32.0 - 36.0 g/dL Final    RDW 08/25/2022 17.0 (H)  11.5 - 14.5 % Final    Platelets 08/25/2022 221  150 - 450 K/uL Final    MPV 08/25/2022 9.8  9.2 - 12.9 fL Final    Immature Granulocytes 08/25/2022 0.1  0.0 - 0.5 % Final    Gran # (ANC) 08/25/2022 4.4  1.8 - 7.7 K/uL Final    Immature Grans (Abs) 08/25/2022 0.01  0.00 - 0.04 K/uL Final    Comment: Mild elevation in immature granulocytes is non specific and   can be seen in a variety of conditions including stress response,   acute inflammation, trauma and pregnancy. Correlation with other   laboratory and clinical findings is essential.      Lymph # 08/25/2022 2.6  1.0 - 4.8 K/uL Final    Mono # 08/25/2022 0.6  0.3 - 1.0 K/uL Final    Eos # 08/25/2022 0.1  0.0 - 0.5 K/uL Final    Baso # 08/25/2022 0.03  0.00 - 0.20 K/uL Final    nRBC 08/25/2022 0  0 /100 WBC Final    Gran % 08/25/2022 57.0  38.0 - 73.0 % Final    Lymph % 08/25/2022 33.4  18.0 - 48.0 % Final    Mono % 08/25/2022 7.9  4.0 - 15.0 % Final    Eosinophil % 08/25/2022 1.2  0.0 - 8.0 % Final    Basophil % 08/25/2022 0.4  0.0 - 1.9 % Final    Differential Method 08/25/2022 Automated   Final    TSH 08/25/2022 1.046  0.400 - 4.000 uIU/mL Final    PSA Diagnostic 08/25/2022 <0.01  0.00 - 4.00 ng/mL Final    Comment: The testing method is a chemiluminescent microparticle immunoassay   manufactured by [employer] and performed on the    or   [employer] system. Values obtained with different assay manufacturers   for   methods may be different and cannot be used interchangeably.  PSA Expected levels:  Hormonal Therapy: <0.05 ng/ml  Prostatectomy: <0.01 ng/ml  Radiation Therapy: <1.00 ng/ml      Hemoglobin A1C 08/25/2022 5.5  4.0 - 5.6 % Final    Comment: ADA Screening Guidelines:  5.7-6.4%  Consistent with prediabetes  >or=6.5%   Consistent with diabetes    High levels of fetal hemoglobin interfere with the HbA1C  assay. Heterozygous hemoglobin variants (HbS, HgC, etc)do  not significantly interfere with this assay.   However, presence of multiple variants may affect accuracy.      Estimated Avg Glucose 08/25/2022 111  68 - 131 mg/dL Final   Lab Visit on 08/25/2022   Component Date Value Ref Range Status    Specimen UA 08/25/2022 Urine, Unspecified   Final    Color, UA 08/25/2022 Yellow  Yellow, Straw, [place] Final    Appearance, UA 08/25/2022 Clear  Clear Final    pH, UA 08/25/2022 6.0  5.0 - 8.0 Final    Specific Gravity, UA 08/25/2022 1.010  1.005 - 1.030 Final    Protein, UA 08/25/2022 Negative  Negative Final    Comment: Recommend a 24 hour urine protein or a urine   protein/creatinine ratio if globulin induced proteinuria is  clinically suspected.      Glucose, UA 08/25/2022 Negative  Negative Final    Ketones, UA 08/25/2022 Negative  Negative Final    Bilirubin (UA) 08/25/2022 Negative  Negative Final    Occult Blood UA 08/25/2022 Negative  Negative Final    Nitrite, UA 08/25/2022 Negative  Negative Final    Leukocytes, UA 08/25/2022 Negative  Negative Final       Assessment & Plan:      Sidney was seen today for annual exam.  Additional lab tests will be obtained today.  Initial studies to assess cardiac and pulmonary status will be obtained.    Cardiology consultation will be obtained for further assessment of patient's exertional dyspnea.    Diagnoses and all orders for this visit:    Well adult exam    Essential hypertension    SOB (shortness of breath) on exertion  -     X-Ray Chest PA And Lateral; Future  -     IN OFFICE EKG 12-LEAD (to [place])  -     Echo Saline Bubble? No; Future  -     Complete PFT w/ bronchodilator; Future  -     Ambulatory referral/consult to Cardiology; Future  -     B-TYPE NATRIURETIC PEPTIDE; Future    Anemia, unspecified type  -     Ferritin; Future  -     Vitamin B12; Future  -     Iron and TIBC;  Future    Primary osteoarthritis involving multiple joints    Other hyperlipidemia    Chronic rhinitis    History of prostate cancer    Nutritional anemia, unspecified  -     Vitamin B12; Future    Abnormal weight loss       Follow up if symptoms worsen or fail to improve.     Sunny Soto MD

## 2022-09-02 ENCOUNTER — TELEPHONE (OUTPATIENT)
Dept: INTERNAL MEDICINE | Facility: CLINIC | Age: 77
End: 2022-09-02
Payer: MEDICARE

## 2022-09-02 ENCOUNTER — OFFICE VISIT (OUTPATIENT)
Dept: CARDIOLOGY | Facility: CLINIC | Age: 77
End: 2022-09-02
Payer: MEDICARE

## 2022-09-02 ENCOUNTER — HOSPITAL ENCOUNTER (OUTPATIENT)
Dept: CARDIOLOGY | Facility: HOSPITAL | Age: 77
Discharge: HOME | End: 2022-09-02
Attending: INTERNAL MEDICINE
Payer: MEDICARE

## 2022-09-02 ENCOUNTER — HOSPITAL ENCOUNTER (OUTPATIENT)
Dept: PULMONOLOGY | Facility: CLINIC | Age: 77
Discharge: HOME | End: 2022-09-02
Payer: MEDICARE

## 2022-09-02 VITALS
SYSTOLIC BLOOD PRESSURE: 116 MMHG | SYSTOLIC BLOOD PRESSURE: 116 MMHG | HEART RATE: 88 BPM | DIASTOLIC BLOOD PRESSURE: 70 MMHG | DIASTOLIC BLOOD PRESSURE: 70 MMHG | HEART RATE: 88 BPM | BODY MASS INDEX: 21.47 KG/M2 | WEIGHT: 150 LBS | HEIGHT: 69 IN | OXYGEN SATURATION: 99 % | WEIGHT: 149.5 LBS | BODY MASS INDEX: 22.14 KG/M2 | HEIGHT: 70 IN

## 2022-09-02 DIAGNOSIS — I50.20 HFREF (HEART FAILURE WITH REDUCED EJECTION FRACTION): Primary | ICD-10-CM

## 2022-09-02 DIAGNOSIS — R06.02 SOB (SHORTNESS OF BREATH) ON EXERTION: ICD-10-CM

## 2022-09-02 DIAGNOSIS — I42.9 CARDIOMYOPATHY, UNSPECIFIED TYPE: ICD-10-CM

## 2022-09-02 DIAGNOSIS — I10 ESSENTIAL HYPERTENSION: ICD-10-CM

## 2022-09-02 DIAGNOSIS — R93.1 ABNORMAL FINDINGS ON DIAGNOSTIC IMAGING OF HEART AND CORONARY CIRCULATION: ICD-10-CM

## 2022-09-02 LAB
ASCENDING AORTA: 3.08 CM
AV INDEX (PROSTH): 0.5
AV MEAN GRADIENT: 4 MMHG
AV PEAK GRADIENT: 8 MMHG
AV VALVE AREA: 1.85 CM2
AV VELOCITY RATIO: 0.54
BSA FOR ECHO PROCEDURE: 1.83 M2
CV ECHO LV RWT: 0.18 CM
DLCO ADJ PRE: 20.1 ML/(MIN*MMHG) (ref 18.23–32.09)
DLCO SINGLE BREATH LLN: 18.23
DLCO SINGLE BREATH PRE REF: 74.2 %
DLCO SINGLE BREATH REF: 25.16
DLCOC SBVA LLN: 2.46
DLCOC SBVA PRE REF: 104.1 %
DLCOC SBVA REF: 3.63
DLCOC SINGLE BREATH LLN: 18.23
DLCOC SINGLE BREATH PRE REF: 79.9 %
DLCOC SINGLE BREATH REF: 25.16
DLCOCSBVAULN: 4.81
DLCOCSINGLEBREATHULN: 32.09
DLCOSINGLEBREATHULN: 32.09
DLCOVA LLN: 2.46
DLCOVA PRE REF: 96.7 %
DLCOVA PRE: 3.51 ML/(MIN*MMHG*L) (ref 2.46–4.81)
DLCOVA REF: 3.63
DLCOVAULN: 4.81
DLVAADJ PRE: 3.78 ML/(MIN*MMHG*L) (ref 2.46–4.81)
DOP CALC AO PEAK VEL: 1.39 M/S
DOP CALC AO VTI: 22.5 CM
DOP CALC LVOT AREA: 3.7 CM2
DOP CALC LVOT DIAMETER: 2.18 CM
DOP CALC LVOT PEAK VEL: 0.75 M/S
DOP CALC LVOT STROKE VOLUME: 41.71 CM3
DOP CALCLVOT PEAK VEL VTI: 11.18 CM
E WAVE DECELERATION TIME: 135.98 MSEC
E/A RATIO: 1.45
E/E' RATIO: 19.33 M/S
ECHO LV POSTERIOR WALL: 0.66 CM (ref 0.6–1.1)
EJECTION FRACTION: 20 %
ERV LLN: -16449.04
ERV PRE REF: 196.8 %
ERV REF: 0.96
ERVULN: ABNORMAL
FEF 25 75 LLN: 0.6
FEF 25 75 PRE REF: 124 %
FEF 25 75 REF: 1.85
FET100 CHG: -0.9 %
FEV05 LLN: 1.31
FEV05 REF: 2.45
FEV1 CHG: -2.7 %
FEV1 FVC LLN: 62
FEV1 FVC PRE REF: 101.3 %
FEV1 FVC REF: 76
FEV1 LLN: 1.66
FEV1 PRE REF: 112.1 %
FEV1 REF: 2.48
FEV1 VOL CHG: -0.07
FRACTIONAL SHORTENING: 6 % (ref 28–44)
FRCPLETH LLN: 2.71
FRCPLETH PREREF: 94.5 %
FRCPLETH REF: 3.7
FRCPLETHULN: 4.68
FVC CHG: -3.7 %
FVC LLN: 2.33
FVC PRE REF: 110.1 %
FVC REF: 3.29
FVC VOL CHG: -0.13
INTERVENTRICULAR SEPTUM: 0.57 CM (ref 0.6–1.1)
IVC PRE: 3.47 L (ref 2.33–4.26)
IVC SINGLE BREATH LLN: 2.33
IVC SINGLE BREATH PRE REF: 105.6 %
IVC SINGLE BREATH REF: 3.29
IVCSINGLEBREATHULN: 4.26
IVRT: 111.32 MSEC
LA MAJOR: 5.71 CM
LA MINOR: 5.87 CM
LA WIDTH: 6.89 CM
LEFT ATRIUM SIZE: 4.5 CM
LEFT ATRIUM VOLUME INDEX MOD: 88.2 ML/M2
LEFT ATRIUM VOLUME INDEX: 83.4 ML/M2
LEFT ATRIUM VOLUME MOD: 161.41 CM3
LEFT ATRIUM VOLUME: 152.56 CM3
LEFT INTERNAL DIMENSION IN SYSTOLE: 7.09 CM (ref 2.1–4)
LEFT VENTRICLE DIASTOLIC VOLUME INDEX: 164.9 ML/M2
LEFT VENTRICLE DIASTOLIC VOLUME: 301.76 ML
LEFT VENTRICLE MASS INDEX: 112 G/M2
LEFT VENTRICLE SYSTOLIC VOLUME INDEX: 143.7 ML/M2
LEFT VENTRICLE SYSTOLIC VOLUME: 263.01 ML
LEFT VENTRICULAR INTERNAL DIMENSION IN DIASTOLE: 7.54 CM (ref 3.5–6)
LEFT VENTRICULAR MASS: 205.16 G
LLN IC: -9999997.04
LV LATERAL E/E' RATIO: 14.5 M/S
LV SEPTAL E/E' RATIO: 29 M/S
MV PEAK A VEL: 0.6 M/S
MV PEAK E VEL: 0.87 M/S
MV STENOSIS PRESSURE HALF TIME: 39.43 MS
MV VALVE AREA P 1/2 METHOD: 5.58 CM2
PEF LLN: 4.94
PEF PRE REF: 117 %
PEF REF: 7.52
PHYSICIAN COMMENT: ABNORMAL
PISA TR MAX VEL: 3.21 M/S
POST FEF 25 75: 2.12 L/S (ref 0.6–3.8)
POST FET 100: 6.96 SEC
POST FEV1 FVC: 77.74 % (ref 62.12–88.34)
POST FEV1: 2.71 L (ref 1.66–3.24)
POST FEV5: 2.22 L (ref 1.31–3.58)
POST FVC: 3.49 L (ref 2.33–4.26)
POST PEF: 9.29 L/S (ref 4.94–10.1)
PRE DLCO: 18.66 ML/(MIN*MMHG) (ref 18.23–32.09)
PRE ERV: 1.88 L (ref -16449.04–16450.96)
PRE FEF 25 75: 2.3 L/S (ref 0.6–3.8)
PRE FET 100: 7.02 SEC
PRE FEV05 REF: 93.5 %
PRE FEV1 FVC: 76.92 % (ref 62.12–88.34)
PRE FEV1: 2.78 L (ref 1.66–3.24)
PRE FEV5: 2.29 L (ref 1.31–3.58)
PRE FRC PL: 3.49 L (ref 2.71–4.68)
PRE FVC: 3.62 L (ref 2.33–4.26)
PRE IC: 1.74 L (ref -9999997.04–#######.####)
PRE PEF: 8.8 L/S (ref 4.94–10.1)
PRE REF IC: 58.7 %
PRE RV: 1.61 L (ref 2.06–3.41)
PRE TLC: 5.23 L (ref 5.77–8.07)
PRE VTG: 3.54 L
PULM VEIN S/D RATIO: 0.48
PV PEAK D VEL: 0.82 M/S
PV PEAK S VEL: 0.39 M/S
QEF: 18 %
RA MAJOR: 4.47 CM
RA PRESSURE: 8 MMHG
RA WIDTH: 3.84 CM
RAW PRE REF: 53.5 %
RAW PRE: 1.64 CMH2O*S/L (ref 3.06–3.06)
RAW REF: 3.06
REF IC: 2.96
RIGHT VENTRICULAR END-DIASTOLIC DIMENSION: 4.28 CM
RV LLN: 2.06
RV PRE REF: 58.8 %
RV REF: 2.74
RVTLC LLN: 35
RVTLC PRE REF: 70.6 %
RVTLC PRE: 30.78 % (ref 34.62–52.58)
RVTLC REF: 44
RVTLCULN: 53
RVULN: 3.41
SGAW PRE REF: 185.4 %
SGAW PRE: 0.15 1/(CMH2O*S) (ref 0.08–0.08)
SGAW REF: 0.08
SINUS: 3.37 CM
STJ: 3.04 CM
TDI LATERAL: 0.06 M/S
TDI SEPTAL: 0.03 M/S
TDI: 0.05 M/S
TLC LLN: 5.77
TLC PRE REF: 75.5 %
TLC REF: 6.92
TLC ULN: 8.07
TR MAX PG: 41 MMHG
TRICUSPID ANNULAR PLANE SYSTOLIC EXCURSION: 1.63 CM
TV REST PULMONARY ARTERY PRESSURE: 49 MMHG
ULN IC: ABNORMAL
VA PRE: 5.31 L (ref 6.77–6.77)
VA SINGLE BREATH LLN: 6.77
VA SINGLE BREATH PRE REF: 78.4 %
VA SINGLE BREATH REF: 6.77
VASINGLEBREATHULN: 6.77
VC LLN: 2.33
VC PRE REF: 110.1 %
VC PRE: 3.62 L (ref 2.33–4.26)
VC REF: 3.29
VC ULN: 4.26

## 2022-09-02 PROCEDURE — 94010 BREATHING CAPACITY TEST: ICD-10-PCS | Mod: S$GLB,,, | Performed by: INTERNAL MEDICINE

## 2022-09-02 PROCEDURE — 1160F PR REVIEW ALL MEDS BY PRESCRIBER/CLIN PHARMACIST DOCUMENTED: ICD-10-PCS | Mod: CPTII,S$GLB,, | Performed by: INTERNAL MEDICINE

## 2022-09-02 PROCEDURE — 3074F PR MOST RECENT SYSTOLIC BLOOD PRESSURE < 130 MM HG: ICD-10-PCS | Mod: CPTII,S$GLB,, | Performed by: INTERNAL MEDICINE

## 2022-09-02 PROCEDURE — 93005 ELECTROCARDIOGRAM TRACING: CPT | Mod: S$GLB,,, | Performed by: INTERNAL MEDICINE

## 2022-09-02 PROCEDURE — 94729 DIFFUSING CAPACITY: CPT | Mod: S$GLB,,, | Performed by: INTERNAL MEDICINE

## 2022-09-02 PROCEDURE — 99999 PR PBB SHADOW E&M-EST. PATIENT-LVL IV: CPT | Mod: PBBFAC,,, | Performed by: INTERNAL MEDICINE

## 2022-09-02 PROCEDURE — 3288F PR FALLS RISK ASSESSMENT DOCUMENTED: ICD-10-PCS | Mod: CPTII,S$GLB,, | Performed by: INTERNAL MEDICINE

## 2022-09-02 PROCEDURE — 99204 OFFICE O/P NEW MOD 45 MIN: CPT | Mod: 25,S$GLB,, | Performed by: INTERNAL MEDICINE

## 2022-09-02 PROCEDURE — 1126F PR PAIN SEVERITY QUANTIFIED, NO PAIN PRESENT: ICD-10-PCS | Mod: CPTII,S$GLB,, | Performed by: INTERNAL MEDICINE

## 2022-09-02 PROCEDURE — 93306 TTE W/DOPPLER COMPLETE: CPT

## 2022-09-02 PROCEDURE — 3074F SYST BP LT 130 MM HG: CPT | Mod: CPTII,S$GLB,, | Performed by: INTERNAL MEDICINE

## 2022-09-02 PROCEDURE — 94729 PR C02/MEMBANE DIFFUSE CAPACITY: ICD-10-PCS | Mod: S$GLB,,, | Performed by: INTERNAL MEDICINE

## 2022-09-02 PROCEDURE — 1159F PR MEDICATION LIST DOCUMENTED IN MEDICAL RECORD: ICD-10-PCS | Mod: CPTII,S$GLB,, | Performed by: INTERNAL MEDICINE

## 2022-09-02 PROCEDURE — 94010 BREATHING CAPACITY TEST: CPT | Mod: S$GLB,,, | Performed by: INTERNAL MEDICINE

## 2022-09-02 PROCEDURE — 3288F FALL RISK ASSESSMENT DOCD: CPT | Mod: CPTII,S$GLB,, | Performed by: INTERNAL MEDICINE

## 2022-09-02 PROCEDURE — 99204 PR OFFICE/OUTPT VISIT, NEW, LEVL IV, 45-59 MIN: ICD-10-PCS | Mod: 25,S$GLB,, | Performed by: INTERNAL MEDICINE

## 2022-09-02 PROCEDURE — 93010 ELECTROCARDIOGRAM REPORT: CPT | Mod: S$GLB,,, | Performed by: INTERNAL MEDICINE

## 2022-09-02 PROCEDURE — 1160F RVW MEDS BY RX/DR IN RCRD: CPT | Mod: CPTII,S$GLB,, | Performed by: INTERNAL MEDICINE

## 2022-09-02 PROCEDURE — 1159F MED LIST DOCD IN RCRD: CPT | Mod: CPTII,S$GLB,, | Performed by: INTERNAL MEDICINE

## 2022-09-02 PROCEDURE — 99999 PR PBB SHADOW E&M-EST. PATIENT-LVL IV: ICD-10-PCS | Mod: PBBFAC,,, | Performed by: INTERNAL MEDICINE

## 2022-09-02 PROCEDURE — 94726 PLETHYSMOGRAPHY LUNG VOLUMES: CPT | Mod: S$GLB,,, | Performed by: INTERNAL MEDICINE

## 2022-09-02 PROCEDURE — 93306 TTE W/DOPPLER COMPLETE: CPT | Mod: 26,,, | Performed by: INTERNAL MEDICINE

## 2022-09-02 PROCEDURE — 93306 ECHO (CUPID ONLY): ICD-10-PCS | Mod: 26,,, | Performed by: INTERNAL MEDICINE

## 2022-09-02 PROCEDURE — 94726 PULM FUNCT TST PLETHYSMOGRAP: ICD-10-PCS | Mod: S$GLB,,, | Performed by: INTERNAL MEDICINE

## 2022-09-02 PROCEDURE — 3078F DIAST BP <80 MM HG: CPT | Mod: CPTII,S$GLB,, | Performed by: INTERNAL MEDICINE

## 2022-09-02 PROCEDURE — 1126F AMNT PAIN NOTED NONE PRSNT: CPT | Mod: CPTII,S$GLB,, | Performed by: INTERNAL MEDICINE

## 2022-09-02 PROCEDURE — 93010 EKG 12-LEAD: ICD-10-PCS | Mod: S$GLB,,, | Performed by: INTERNAL MEDICINE

## 2022-09-02 PROCEDURE — 3078F PR MOST RECENT DIASTOLIC BLOOD PRESSURE < 80 MM HG: ICD-10-PCS | Mod: CPTII,S$GLB,, | Performed by: INTERNAL MEDICINE

## 2022-09-02 PROCEDURE — 1101F PT FALLS ASSESS-DOCD LE1/YR: CPT | Mod: CPTII,S$GLB,, | Performed by: INTERNAL MEDICINE

## 2022-09-02 PROCEDURE — 1101F PR PT FALLS ASSESS DOC 0-1 FALLS W/OUT INJ PAST YR: ICD-10-PCS | Mod: CPTII,S$GLB,, | Performed by: INTERNAL MEDICINE

## 2022-09-02 PROCEDURE — 93005 EKG 12-LEAD: ICD-10-PCS | Mod: S$GLB,,, | Performed by: INTERNAL MEDICINE

## 2022-09-02 RX ORDER — SACUBITRIL AND VALSARTAN 24; 26 MG/1; MG/1
1 TABLET, FILM COATED ORAL 2 TIMES DAILY
Qty: 60 TABLET | Refills: 3 | Status: SHIPPED | OUTPATIENT
Start: 2022-09-02 | End: 2022-10-04 | Stop reason: DRUGHIGH

## 2022-09-02 RX ORDER — SPIRONOLACTONE 25 MG/1
25 TABLET ORAL DAILY
Qty: 30 TABLET | Refills: 3 | Status: SHIPPED | OUTPATIENT
Start: 2022-09-02 | End: 2022-10-11 | Stop reason: SDUPTHER

## 2022-09-02 RX ORDER — FUROSEMIDE 20 MG/1
20 TABLET ORAL DAILY PRN
Qty: 30 TABLET | Refills: 3 | Status: SHIPPED | OUTPATIENT
Start: 2022-09-02 | End: 2022-10-20 | Stop reason: SDUPTHER

## 2022-09-02 RX ORDER — CARVEDILOL 3.12 MG/1
3.12 TABLET ORAL 2 TIMES DAILY WITH MEALS
Qty: 60 TABLET | Refills: 3 | Status: SHIPPED | OUTPATIENT
Start: 2022-09-02 | End: 2022-10-04 | Stop reason: SDUPTHER

## 2022-09-02 NOTE — PATIENT INSTRUCTIONS
STOP AMLODIPINE (NORVASC)    Starting tomorrow am begin Entresto 1 tab twice a day.  Also tomorrow begin SPIRONOLACTONE 1 tab once a day.    On Tuesday begin CARVEDILOL 1 tab twice a day.    Avoid salt. Very important.    Check blood pressure daily in the morning before taking meds.  Also check your weight.  Let us know towards the end of next week      ===========LOW-SALT DIET (2 grams/day)   This diet eliminates foods that are high in salt and restricts the amount of salt that you cook with. It is most often used for patients with high blood pressure, edema (fluid retention), kidney, liver, and heart disease.   Table salt contains the mineral sodium. The body needs a little bit of sodium to work normally. But too much sodium can make your health problems worse. Your total daily allowance of salt (sodium) is 2 grams. This equals 2000 milligrams (mg) (about a teaspoon of salt). This means you can have only about 500 mg of sodium at each meal. Most individuals get excessive sodium from snacks. Look carefully for sodium levels at or around 100 mg per serving of snacks and do not eat more than 2-3 servings.     When you cook, limit the salt you use. And if you can avoid using salt, even better. Do not add salt at the table. So, throw away the saltshaker!   When shopping, read the package labels. Salt is often called sodium on the label. Choose foods that are Salt-Free, Low Salt, or Very Low Salt. Note that foods with Reduced Salt may not lower your salt intake enough.     BEVERAGES OK: Tea, coffee, carbonated beverages, juices   AVOID: Flavored international coffees, electrolyte replacement drinks, sports beverages     BREAD & CEREALS OK: All regular bread, rolls, cereals, cakes; low-salt crackers, matzoh crackers   AVOID: Salted crackers, pretzels, popcorn; English toast, pancakes, muffins     FRUITS & DESSERTS OK: Ice cream, frozen yogurt, juice bars, gelatin (Jell-O), cookies and pies, sugar, honey, jelly, hard  candy   AVOID: Most pies, cakes and cookies prepared or processed with salt, instant pudding     MEATS OK: All fresh meat, fish, poultry, low-salt tuna   AVOID: Smoked, pickled, brine-cured, or salted meats or fish. This includes rosen, chipped beef, corned beef, hot dogs, luncheon meats, ham, kosher meats, salt pork, sausage, canned tuna, salted codfish, smoked salmon, herring, sardines, or anchovies.     DAIRY OK: Milk, chocolate milk, hot chocolate mix; eggs, Low Salt cheeses, yogurt, egg substitute   AVOID: Processed cheese, cheese spreads, Roquefort, Camembert, and cottage cheese, buttermilk, instant breakfast drink     BEANS, POTATOES & PASTA OK: Dry beans, split peas, lentils, potatoes, rice, macaroni, noodles, spaghetti without added salt   AVOID: Potato chips, tortilla chips, and similar products     SOUPS OK: Low-salt soups and broths made with allowed foods   AVOID: Bouillon cubes, soups with smoked or salted meats, regular soup and broth     VEGETABLES OK: Most are okay; low-salt tomato and vegetable juices   AVOID: Sauerkraut and other brine-soaked vegetables, pickles and other pickled vegetables, tomato juice, olives       SEASONING & SPICES: OK: Most seasonings are okay. Good substitutes for salt include: fresh herb blends, Tabasco, lemon, garlic, lim, vinegar, dry mustard, parsley, cilantro, horseradish, tomato paste, regular margarine, mayonnaise, butter, cream cheese, vegetable oil, cream, low-salt salad dressing and gravy   AVOID: Regular ketchup, relishes, pickles, soy sauce, teriyaki sauce, Worcestershire sauce, BBQ sauce, tartar sauce, meat tenderizer, chili sauce, regular gravy, regular salad dressing   © 9911-2959 [facility], [address] [place] [address], [place] [address]. All rights reserved. This information is not intended as a substitute for professional medical care. Always follow your healthcare professional's instructions.

## 2022-09-02 NOTE — PROGRESS NOTES
Subjective:     Problem List:  Cardiomyopathy - new 8/2022    HPI:   Sidney Lanza is a 77 y.o. male referred by Sunny Soto MD for evaluation of Shortness of Breath.  An echocardiogram was performed prior to today's visit.  It revealed severe left atrial enlargement, severe left ventricular enlargement, severe global left ventricular systolic dysfunction with an ejection fraction of 20%.  He reports shortness of breath w exertion for the past few weeks. He does not report edema except for mild swelling on the dorsum of both feet that occurs intermittently. He had one epiosde of orthopnea 2 nights ago.  He does not report chest discomfort insert.  He does not report palpitations.  He does not have history of CAD or CHF.  He takes amlodipine for hypertension.      Review of Systems   Constitutional: Positive for malaise/fatigue and weight loss. Negative for fever.   HENT:  Negative for hearing loss and nosebleeds.    Eyes:  Negative for vision loss in left eye and vision loss in right eye.   Cardiovascular:  Positive for dyspnea on exertion. Negative for chest pain, claudication, irregular heartbeat, leg swelling, palpitations and syncope.   Respiratory:  Negative for cough, hemoptysis, sputum production and wheezing.    Hematologic/Lymphatic: Negative for bleeding problem. Does not bruise/bleed easily.   Musculoskeletal:  Positive for muscle cramps. Negative for arthritis, back pain, falls, joint pain, muscle weakness and neck pain.   Gastrointestinal:  Negative for abdominal pain, constipation, diarrhea, heartburn, hematochezia and melena.   Genitourinary:  Negative for frequency, hematuria and nocturia.   Neurological:  Negative for excessive daytime sleepiness, dizziness, focal weakness, headaches, light-headedness, loss of balance, numbness, paresthesias, seizures and weakness.   Psychiatric/Behavioral:  Negative for depression. The patient is not nervous/anxious.      Review of patient's allergies  indicates:  No Known Allergies     Current Outpatient Medications   Medication Sig    amLODIPine (NORVASC) 10 MG tablet TAKE 1 TABLET BY MOUTH ONCE DAILY    diclofenac-misoprostol  mg-mcg (ARTHROTEC 75)  mg-mcg per tablet TAKE 1 TABLET BY MOUTH ONCE OR TWICE DAILY AS NEEDED  FOR ARTHRITIC PAIN    FIBER, DEXTRIN, ORAL Take 1 tablet by mouth every morning.    fluticasone propionate (FLONASE) 50 mcg/actuation nasal spray 2 sprays (100 mcg total) by Each Nostril route once daily. (Patient taking differently: 2 sprays by Each Nostril route as needed.)    glucosamine-chondroitin 500-400 mg tablet Take 2 tablets by mouth every morning.    multivitamin capsule Take 1 capsule by mouth every morning.      No current facility-administered medications for this visit.         Past Medical and Surgical history:  Sidney Lanza  has a past medical history of Arthritis, Chronic rhinitis, Fever blister, Hyperlipidemia, Hypertension, Impotence of organic origin, Joint pain, Nuclear sclerosis - Both Eyes (10/23/2013), Prostate cancer, Ulcer, and Varicose vein of leg.   Past Surgical History:   Procedure Laterality Date    COLONOSCOPY N/A 6/13/2018    Procedure: COLONOSCOPY;  Surgeon: Adonis Stack MD;  Location: [facility] ([address] [place] [address]);  Service: Endoscopy;  Laterality: N/A;    COLONOSCOPY N/A 4/28/2021    Procedure: COLONOSCOPY;  Surgeon: Steven Watson MD;  Location: [address]);  Service: Endoscopy;  Laterality: N/A;  4/25-[facility] uc-inst email-tb    EYE SURGERY      KNEE ARTHROSCOPY W/ DEBRIDEMENT      PROSTATE SURGERY  2006    for prostate cancer    SHOULDER ARTHROSCOPY Right 10/2017    RTC repair with patch    ULNAR NERVE TRANSPOSITION           Social history:  Sidney Lanza  reports that he quit smoking about 34 years ago. His smoking use included cigarettes. He has a 30.00 pack-year smoking history. He has never used smokeless tobacco. He reports current alcohol use of about 28.0 standard  "drinks per week. He reports that he does not use drugs.    Family history:  Family History   Problem Relation Age of Onset    Cancer Mother         breast    Cancer Father         prostate    Heart disease Father 74        CHF    Diabetes Sister     Heart disease Sister         stroke    Diabetes Brother     Hypertension Brother     Diabetes Brother             Objective:       Physical Exam  Constitutional:       Appearance: He is well-developed.      Comments: /70   Pulse 88   Ht 5' 9" (1.753 m)   Wt 67.8 kg (149 lb 7.6 oz)   SpO2 99%   BMI 22.07 kg/m²      HENT:      Head: Normocephalic and atraumatic.   Neck:      Vascular: No carotid bruit or JVD.   Cardiovascular:      Rate and Rhythm: Normal rate and regular rhythm. Extrasystoles are present.     Pulses:           Radial pulses are 2+ on the right side and 2+ on the left side.        Posterior tibial pulses are 1+ on the right side and 1+ on the left side.      Heart sounds: S1 normal and S2 normal. No murmur heard.    No gallop.   Pulmonary:      Effort: Pulmonary effort is normal.      Breath sounds: No wheezing or rales.   Chest:      Chest wall: There is no dullness to percussion.   Abdominal:      Palpations: Abdomen is soft. There is no hepatomegaly or splenomegaly.      Tenderness: There is no abdominal tenderness.   Musculoskeletal:      Right lower leg: No edema.      Left lower leg: No edema.   Skin:     General: Skin is warm and dry.      Findings: No bruising.      Nails: There is no clubbing.   Neurological:      Mental Status: He is alert and oriented to person, place, and time.      Gait: Gait normal.   Psychiatric:         Speech: Speech normal.         Behavior: Behavior normal.         Thought Content: Thought content normal.         Judgment: Judgment normal.         Lab Results   Component Value Date    CHOL 155 08/25/2022    CHOL 199 03/25/2021    CHOL 190 07/30/2019     Lab Results   Component Value Date    HDL 51 08/25/2022 "    HDL 49 03/25/2021    HDL 50 07/30/2019     Lab Results   Component Value Date    LDLCALC 93.8 08/25/2022    LDLCALC 128.4 03/25/2021    LDLCALC 119.8 07/30/2019     Lab Results   Component Value Date    TRIG 51 08/25/2022    TRIG 108 03/25/2021    TRIG 101 07/30/2019     Lab Results   Component Value Date    CHOLHDL 32.9 08/25/2022    CHOLHDL 24.6 03/25/2021    CHOLHDL 26.3 07/30/2019     Lab Results   Component Value Date    ALT 32 08/25/2022    AST 29 08/25/2022    ALKPHOS 58 08/25/2022    BILITOT 1.1 (H) 08/25/2022      Lab Results   Component Value Date    CREATININE 0.9 08/25/2022    BUN 11 08/25/2022     08/25/2022    K 3.9 08/25/2022     08/25/2022    CO2 20 (L) 08/25/2022         ECG reveals sinus rhythm with LVH    Assessment and Plan:       ICD-10-CM ICD-9-CM   1. HFrEF (heart failure with reduced ejection fraction)  I50.20 428.20   2. Cardiomyopathy, unspecified type  I42.9 425.4   3. SOB (shortness of breath) on exertion  R06.02 786.05   4. Essential hypertension  I10 401.9   5. Abnormal findings on diagnostic imaging of heart and coronary circulation  R93.1 794.39         He has a newly discovered cardiomyopathy.  He has significant left ventricular enlargement with the global hypokinesis.  Blood pressures been well controlled in the past.  Cardiac PET stress test to rule out obstructive CAD.   Begin Entresto, spironolactone and carvedilol.  Discontinue amlodipine.Patient given detailed instructions.   Daily weights.  Check blood pressure at home.  Low-sodium diet.  Furosemide p.r.n. only.  He has lost about 15 lb since last year - will defer to Dr. Soto for age-appropriate cancer work.       Orders entered during this encounter:    HFrEF (heart failure with reduced ejection fraction)  -     IN OFFICE EKG 12-LEAD (to Muse)  -     Cardiac PET Scan Stress; Future  -     sacubitriL-valsartan (ENTRESTO) 24-26 mg per tablet; Take 1 tablet by mouth 2 (two) times daily.  Dispense: 60 tablet;  Refill: 3  -     carvediloL (COREG) 3.125 MG tablet; Take 1 tablet (3.125 mg total) by mouth 2 (two) times daily with meals.  Dispense: 60 tablet; Refill: 3  -     spironolactone (ALDACTONE) 25 MG tablet; Take 1 tablet (25 mg total) by mouth once daily.  Dispense: 30 tablet; Refill: 3  -     furosemide (LASIX) 20 MG tablet; Take 1 tablet (20 mg total) by mouth daily as needed. For leg swelling and or shortness of breath  Dispense: 30 tablet; Refill: 3  -     Basic Metabolic Panel; Future; Expected date: 09/30/2022    Cardiomyopathy, unspecified type  -     IN OFFICE EKG 12-LEAD (to Muse)  -     Cardiac PET Scan Stress; Future    SOB (shortness of breath) on exertion  -     Ambulatory referral/consult to Cardiology    Essential hypertension    Abnormal findings on diagnostic imaging of heart and coronary circulation  -     Cardiac PET Scan Stress; Future       Follow up in about 4 weeks (around 9/30/2022).

## 2022-09-02 NOTE — TELEPHONE ENCOUNTER
I spoke to the patient's wife and she was informed of test results. The patient is scheduled to see Cardiology today along with an ECHO.

## 2022-09-02 NOTE — TELEPHONE ENCOUNTER
----- Message from Sunny Soto MD sent at 9/1/2022  6:16 PM CDT -----  Chest x-ray shows signs of possible mild fluid collection in the lungs.  Further assessment with echocardiogram and Cardiology consultation has been ordered.

## 2022-09-09 ENCOUNTER — TELEPHONE (OUTPATIENT)
Dept: INTERNAL MEDICINE | Facility: CLINIC | Age: 77
End: 2022-09-09
Payer: MEDICARE

## 2022-09-09 ENCOUNTER — TELEPHONE (OUTPATIENT)
Dept: CARDIOLOGY | Facility: CLINIC | Age: 77
End: 2022-09-09
Payer: MEDICARE

## 2022-09-09 NOTE — TELEPHONE ENCOUNTER
Pt calling to report BP, weights as advised recently. Pt reports as follows:  9/4 116/85,144lbs  9/5 117/75,145lbs  9/6 121/72,145lbs  9/7 100/74,145lbs  9/8 110/84,144lbs  Pt states he developed diarrhea on Tuesday which he is still having today. Pt states since Wednesday, he has no appetite. Pt states he is taking Carvedilol, Entresto, Spironolactone and Furosemide.Please advise.

## 2022-09-09 NOTE — TELEPHONE ENCOUNTER
Pt no longer taking Amlodipine. Dr. Ayon notified, advised pt decrease Entresto to 1/2 tablet twice daily until diarrhea resolves. Pt notified to hold Spironolactone as advised by . Instructions on when to resume, increase dose of Spironolactone also reviewed with pt. Pt verbalized understanding.

## 2022-09-09 NOTE — TELEPHONE ENCOUNTER
Pl tell pt to hold spironolactone and amlodipine for the next few days until after the diarrhea settles. He can then resume both at 1/2 the dose for another 3-4 days before going back to the full dose.

## 2022-09-09 NOTE — TELEPHONE ENCOUNTER
His message today said :  Patient is calling for Medical Advice regarding:Shortness of breath even with minimal exertion like brushing his teeth diarrhea, loss of appetite, nausea   I called him to inquire.  He saw dr fournier on 9/2.  He made some med changes you can see in his office note.  He started the carvedilol on Tuesday and says either Tuesday nite or am Wednesday started with diarrhea.  No appetite and nauseated sometimes.  Short of breath easily, like when just brushing his teeth.    See dr fournier telephone call with bp readings and he said to call us and stop amlodipine and spriolactone for now till symptoms improve.    Carvedilol cause?  If not, needs rx for symptoms.  I told him in meantime continue water and avoid dairy in case viral.    Rx [facility] needed for nausea, diarrhea    Please advise.  Thanks job

## 2022-09-11 ENCOUNTER — HOSPITAL ENCOUNTER (OUTPATIENT)
Facility: HOSPITAL | Age: 77
Discharge: HOME | End: 2022-09-13
Attending: EMERGENCY MEDICINE | Admitting: INTERNAL MEDICINE
Payer: MEDICARE

## 2022-09-11 DIAGNOSIS — I50.23 ACUTE ON CHRONIC SYSTOLIC CHF (CONGESTIVE HEART FAILURE): Primary | ICD-10-CM

## 2022-09-11 DIAGNOSIS — I50.22 CHRONIC SYSTOLIC HEART FAILURE: ICD-10-CM

## 2022-09-11 DIAGNOSIS — R06.09 DYSPNEA ON EXERTION: ICD-10-CM

## 2022-09-11 DIAGNOSIS — M54.9 UPPER BACK PAIN ON RIGHT SIDE: ICD-10-CM

## 2022-09-11 DIAGNOSIS — R91.1 LUNG NODULE: ICD-10-CM

## 2022-09-11 DIAGNOSIS — I63.9 STROKE: ICD-10-CM

## 2022-09-11 DIAGNOSIS — R07.9 CHEST PAIN: ICD-10-CM

## 2022-09-11 DIAGNOSIS — R55 SYNCOPE: ICD-10-CM

## 2022-09-11 PROBLEM — R29.90 EPISODE OF TRANSIENT NEUROLOGIC SYMPTOMS: Status: ACTIVE | Noted: 2022-09-11

## 2022-09-11 PROBLEM — I50.42 CHRONIC COMBINED SYSTOLIC AND DIASTOLIC CONGESTIVE HEART FAILURE: Status: ACTIVE | Noted: 2022-09-11

## 2022-09-11 PROBLEM — R06.00 DYSPNEA: Status: ACTIVE | Noted: 2022-09-02

## 2022-09-11 PROBLEM — I50.9 CHF (CONGESTIVE HEART FAILURE): Status: ACTIVE | Noted: 2022-09-11

## 2022-09-11 PROBLEM — R79.89 ELEVATED TROPONIN: Status: ACTIVE | Noted: 2022-09-11

## 2022-09-11 LAB
ALBUMIN SERPL BCP-MCNC: 3.9 G/DL (ref 3.5–5.2)
ALP SERPL-CCNC: 53 U/L (ref 55–135)
ALT SERPL W/O P-5'-P-CCNC: 47 U/L (ref 10–44)
ANION GAP SERPL CALC-SCNC: 11 MMOL/L (ref 8–16)
APTT BLDCRRT: 25.1 SEC (ref 21–32)
AST SERPL-CCNC: 44 U/L (ref 10–40)
BASOPHILS # BLD AUTO: 0.04 K/UL (ref 0–0.2)
BASOPHILS NFR BLD: 0.5 % (ref 0–1.9)
BILIRUB SERPL-MCNC: 0.4 MG/DL (ref 0.1–1)
BNP SERPL-MCNC: 1537 PG/ML (ref 0–99)
BUN SERPL-MCNC: 26 MG/DL (ref 8–23)
BUN SERPL-MCNC: 37 MG/DL (ref 6–30)
CALCIUM SERPL-MCNC: 9.4 MG/DL (ref 8.7–10.5)
CHLORIDE SERPL-SCNC: 109 MMOL/L (ref 95–110)
CHLORIDE SERPL-SCNC: 110 MMOL/L (ref 95–110)
CO2 SERPL-SCNC: 18 MMOL/L (ref 23–29)
CREAT SERPL-MCNC: 1.3 MG/DL (ref 0.5–1.4)
CREAT SERPL-MCNC: 1.4 MG/DL (ref 0.5–1.4)
CREAT SERPL-MCNC: 1.5 MG/DL (ref 0.5–1.4)
D DIMER PPP IA.FEU-MCNC: 3.14 MG/L FEU
DIFFERENTIAL METHOD: ABNORMAL
EOSINOPHIL # BLD AUTO: 0.1 K/UL (ref 0–0.5)
EOSINOPHIL NFR BLD: 0.9 % (ref 0–8)
ERYTHROCYTE [DISTWIDTH] IN BLOOD BY AUTOMATED COUNT: 18.8 % (ref 11.5–14.5)
EST. GFR  (NO RACE VARIABLE): 56.9 ML/MIN/1.73 M^2
GLUCOSE SERPL-MCNC: 104 MG/DL (ref 70–110)
GLUCOSE SERPL-MCNC: 119 MG/DL (ref 70–110)
HCT VFR BLD AUTO: 40.2 % (ref 40–54)
HCT VFR BLD CALC: 47 %PCV (ref 36–54)
HGB BLD-MCNC: 13.3 G/DL (ref 14–18)
IMM GRANULOCYTES # BLD AUTO: 0.01 K/UL (ref 0–0.04)
IMM GRANULOCYTES NFR BLD AUTO: 0.1 % (ref 0–0.5)
INR PPP: 1.2 (ref 0.8–1.2)
INR PPP: 1.2 (ref 0.8–1.2)
LYMPHOCYTES # BLD AUTO: 2.5 K/UL (ref 1–4.8)
LYMPHOCYTES NFR BLD: 31.3 % (ref 18–48)
MCH RBC QN AUTO: 24.5 PG (ref 27–31)
MCHC RBC AUTO-ENTMCNC: 33.1 G/DL (ref 32–36)
MCV RBC AUTO: 74 FL (ref 82–98)
MONOCYTES # BLD AUTO: 0.6 K/UL (ref 0.3–1)
MONOCYTES NFR BLD: 7.9 % (ref 4–15)
NEUTROPHILS # BLD AUTO: 4.8 K/UL (ref 1.8–7.7)
NEUTROPHILS NFR BLD: 59.3 % (ref 38–73)
NRBC BLD-RTO: 0 /100 WBC
PLATELET # BLD AUTO: 218 K/UL (ref 150–450)
PLATELET BLD QL SMEAR: ABNORMAL
PMV BLD AUTO: 11 FL (ref 9.2–12.9)
POC IONIZED CALCIUM: 1.13 MMOL/L (ref 1.06–1.42)
POC PTINR: 1 (ref 0.9–1.2)
POC PTWBT: 11.7 SEC (ref 9.7–14.3)
POC TCO2 (MEASURED): 20 MMOL/L (ref 23–29)
POTASSIUM BLD-SCNC: 5.4 MMOL/L (ref 3.5–5.1)
POTASSIUM SERPL-SCNC: 5.3 MMOL/L (ref 3.5–5.1)
PROT SERPL-MCNC: 6.7 G/DL (ref 6–8.4)
PROTHROMBIN TIME: 12.2 SEC (ref 9–12.5)
PROTHROMBIN TIME: 12.5 SEC (ref 9–12.5)
RBC # BLD AUTO: 5.43 M/UL (ref 4.6–6.2)
SAMPLE: ABNORMAL
SAMPLE: NORMAL
SAMPLE: NORMAL
SODIUM BLD-SCNC: 139 MMOL/L (ref 136–145)
SODIUM SERPL-SCNC: 138 MMOL/L (ref 136–145)
TROPONIN I SERPL DL<=0.01 NG/ML-MCNC: 0.51 NG/ML (ref 0–0.03)
TROPONIN I SERPL DL<=0.01 NG/ML-MCNC: 0.62 NG/ML (ref 0–0.03)
TSH SERPL DL<=0.005 MIU/L-ACNC: 0.67 UIU/ML (ref 0.4–4)
WBC # BLD AUTO: 8.06 K/UL (ref 3.9–12.7)

## 2022-09-11 PROCEDURE — 85730 THROMBOPLASTIN TIME PARTIAL: CPT

## 2022-09-11 PROCEDURE — G0378 HOSPITAL OBSERVATION PER HR: HCPCS

## 2022-09-11 PROCEDURE — 99220 PR INITIAL OBSERVATION CARE,LEVL III: ICD-10-PCS | Mod: ,,, | Performed by: PHYSICIAN ASSISTANT

## 2022-09-11 PROCEDURE — 85610 PROTHROMBIN TIME: CPT | Mod: 91

## 2022-09-11 PROCEDURE — 93005 ELECTROCARDIOGRAM TRACING: CPT

## 2022-09-11 PROCEDURE — 85610 PROTHROMBIN TIME: CPT

## 2022-09-11 PROCEDURE — 85025 COMPLETE CBC W/AUTO DIFF WBC: CPT

## 2022-09-11 PROCEDURE — 25000003 PHARM REV CODE 250

## 2022-09-11 PROCEDURE — 84484 ASSAY OF TROPONIN QUANT: CPT | Performed by: EMERGENCY MEDICINE

## 2022-09-11 PROCEDURE — 99285 EMERGENCY DEPT VISIT HI MDM: CPT | Mod: 25

## 2022-09-11 PROCEDURE — 83880 ASSAY OF NATRIURETIC PEPTIDE: CPT

## 2022-09-11 PROCEDURE — 99291 CRITICAL CARE FIRST HOUR: CPT | Mod: ,,, | Performed by: EMERGENCY MEDICINE

## 2022-09-11 PROCEDURE — 99900035 HC TECH TIME PER 15 MIN (STAT)

## 2022-09-11 PROCEDURE — 99220 PR INITIAL OBSERVATION CARE,LEVL III: CPT | Mod: ,,, | Performed by: PHYSICIAN ASSISTANT

## 2022-09-11 PROCEDURE — 93010 EKG 12-LEAD: ICD-10-PCS | Mod: ,,, | Performed by: INTERNAL MEDICINE

## 2022-09-11 PROCEDURE — 99291 PR CRITICAL CARE, E/M 30-74 MINUTES: ICD-10-PCS | Mod: ,,, | Performed by: EMERGENCY MEDICINE

## 2022-09-11 PROCEDURE — 84443 ASSAY THYROID STIM HORMONE: CPT

## 2022-09-11 PROCEDURE — 93010 ELECTROCARDIOGRAM REPORT: CPT | Mod: ,,, | Performed by: INTERNAL MEDICINE

## 2022-09-11 PROCEDURE — 99204 OFFICE O/P NEW MOD 45 MIN: CPT | Mod: ,,, | Performed by: PSYCHIATRY & NEUROLOGY

## 2022-09-11 PROCEDURE — 84484 ASSAY OF TROPONIN QUANT: CPT | Mod: 91

## 2022-09-11 PROCEDURE — 80053 COMPREHEN METABOLIC PANEL: CPT | Performed by: EMERGENCY MEDICINE

## 2022-09-11 PROCEDURE — 85379 FIBRIN DEGRADATION QUANT: CPT

## 2022-09-11 PROCEDURE — 99204 PR OFFICE/OUTPT VISIT, NEW, LEVL IV, 45-59 MIN: ICD-10-PCS | Mod: ,,, | Performed by: PSYCHIATRY & NEUROLOGY

## 2022-09-11 PROCEDURE — 25500020 PHARM REV CODE 255: Performed by: EMERGENCY MEDICINE

## 2022-09-11 PROCEDURE — 82565 ASSAY OF CREATININE: CPT

## 2022-09-11 RX ORDER — ACETAMINOPHEN 325 MG/1
650 TABLET ORAL EVERY 4 HOURS PRN
Status: DISCONTINUED | OUTPATIENT
Start: 2022-09-11 | End: 2022-09-13 | Stop reason: HOSPADM

## 2022-09-11 RX ORDER — ENOXAPARIN SODIUM 100 MG/ML
40 INJECTION SUBCUTANEOUS EVERY 24 HOURS
Status: DISCONTINUED | OUTPATIENT
Start: 2022-09-11 | End: 2022-09-11

## 2022-09-11 RX ORDER — NALOXONE HCL 0.4 MG/ML
0.02 VIAL (ML) INJECTION
Status: DISCONTINUED | OUTPATIENT
Start: 2022-09-11 | End: 2022-09-13 | Stop reason: HOSPADM

## 2022-09-11 RX ORDER — IBUPROFEN 200 MG
24 TABLET ORAL
Status: DISCONTINUED | OUTPATIENT
Start: 2022-09-11 | End: 2022-09-13 | Stop reason: HOSPADM

## 2022-09-11 RX ORDER — IPRATROPIUM BROMIDE AND ALBUTEROL SULFATE 2.5; .5 MG/3ML; MG/3ML
3 SOLUTION RESPIRATORY (INHALATION) EVERY 4 HOURS PRN
Status: DISCONTINUED | OUTPATIENT
Start: 2022-09-11 | End: 2022-09-13 | Stop reason: HOSPADM

## 2022-09-11 RX ORDER — PROCHLORPERAZINE EDISYLATE 5 MG/ML
5 INJECTION INTRAMUSCULAR; INTRAVENOUS EVERY 6 HOURS PRN
Status: DISCONTINUED | OUTPATIENT
Start: 2022-09-11 | End: 2022-09-13 | Stop reason: HOSPADM

## 2022-09-11 RX ORDER — POLYETHYLENE GLYCOL 3350 17 G/17G
17 POWDER, FOR SOLUTION ORAL DAILY PRN
Status: DISCONTINUED | OUTPATIENT
Start: 2022-09-11 | End: 2022-09-13 | Stop reason: HOSPADM

## 2022-09-11 RX ORDER — IBUPROFEN 200 MG
16 TABLET ORAL
Status: DISCONTINUED | OUTPATIENT
Start: 2022-09-11 | End: 2022-09-13 | Stop reason: HOSPADM

## 2022-09-11 RX ORDER — MAG HYDROX/ALUMINUM HYD/SIMETH 200-200-20
30 SUSPENSION, ORAL (FINAL DOSE FORM) ORAL 4 TIMES DAILY PRN
Status: DISCONTINUED | OUTPATIENT
Start: 2022-09-11 | End: 2022-09-13 | Stop reason: HOSPADM

## 2022-09-11 RX ORDER — HEPARIN SODIUM,PORCINE/D5W 25000/250
0-40 INTRAVENOUS SOLUTION INTRAVENOUS CONTINUOUS
Status: DISCONTINUED | OUTPATIENT
Start: 2022-09-11 | End: 2022-09-11

## 2022-09-11 RX ORDER — SODIUM CHLORIDE 0.9 % (FLUSH) 0.9 %
5 SYRINGE (ML) INJECTION
Status: DISCONTINUED | OUTPATIENT
Start: 2022-09-11 | End: 2022-09-13 | Stop reason: HOSPADM

## 2022-09-11 RX ORDER — GLUCAGON 1 MG
1 KIT INJECTION
Status: DISCONTINUED | OUTPATIENT
Start: 2022-09-11 | End: 2022-09-13 | Stop reason: HOSPADM

## 2022-09-11 RX ORDER — ASPIRIN 325 MG
325 TABLET ORAL
Status: COMPLETED | OUTPATIENT
Start: 2022-09-11 | End: 2022-09-11

## 2022-09-11 RX ORDER — ONDANSETRON 8 MG/1
8 TABLET, ORALLY DISINTEGRATING ORAL EVERY 8 HOURS PRN
Status: DISCONTINUED | OUTPATIENT
Start: 2022-09-11 | End: 2022-09-13 | Stop reason: HOSPADM

## 2022-09-11 RX ORDER — SIMETHICONE 80 MG
1 TABLET,CHEWABLE ORAL 4 TIMES DAILY PRN
Status: DISCONTINUED | OUTPATIENT
Start: 2022-09-11 | End: 2022-09-13 | Stop reason: HOSPADM

## 2022-09-11 RX ORDER — TALC
6 POWDER (GRAM) TOPICAL NIGHTLY PRN
Status: DISCONTINUED | OUTPATIENT
Start: 2022-09-11 | End: 2022-09-13 | Stop reason: HOSPADM

## 2022-09-11 RX ORDER — BISACODYL 10 MG
10 SUPPOSITORY, RECTAL RECTAL DAILY PRN
Status: DISCONTINUED | OUTPATIENT
Start: 2022-09-11 | End: 2022-09-13 | Stop reason: HOSPADM

## 2022-09-11 RX ORDER — ACETAMINOPHEN 500 MG
1000 TABLET ORAL EVERY 8 HOURS PRN
Status: DISCONTINUED | OUTPATIENT
Start: 2022-09-11 | End: 2022-09-13 | Stop reason: HOSPADM

## 2022-09-11 RX ADMIN — ASPIRIN 325 MG ORAL TABLET 325 MG: 325 PILL ORAL at 07:09

## 2022-09-11 RX ADMIN — IOHEXOL 75 ML: 350 INJECTION, SOLUTION INTRAVENOUS at 03:09

## 2022-09-11 NOTE — HPI
"Mr. Lanza is a 77 year old male with PMH of HTN, HLD, and HF. He presented to ED as stroke code for aphasia that started at 1515 when he was watching the [employer] game and playing cards. During this episode patient noted "tingling all over" but primarily in bilateral hands. When stroke team evaluated patient, he reports symptoms had resolved. He is back to his neurologic baseline. Patient says episode lasted 5 mins. Denies prior episodes of this in the past. Not a tpa candidate given patient back to baseline, NIHSS 0. CTA MP negative for LVO or high grade stenosis. Not an intervention candidate. TTE obtained on 9/2/22 (prior to today's episode) demonstrates EF 20%, severe LV global hypokinesis, and severe LAE. Patient does report SOB and some L shoulder discomfort associated with this SOB. Will get MRI brain without contrast for further evaluation.   "

## 2022-09-11 NOTE — SUBJECTIVE & OBJECTIVE
Past Medical History:   Diagnosis Date    Arthritis     Chronic rhinitis     Fever blister     Hyperlipidemia     Hypertension     Impotence of organic origin     Joint pain     Nuclear sclerosis - Both Eyes 10/23/2013    Prostate cancer     Ulcer     NSAID related     Varicose vein of leg     bilateral legs     Past Surgical History:   Procedure Laterality Date    COLONOSCOPY N/A 2018    Procedure: COLONOSCOPY;  Surgeon: Adonis Stack MD;  [facility]  Service: Endoscopy;  Laterality: N/A;    COLONOSCOPY N/A 2021    Procedure: COLONOSCOPY;  Surgeon: Steven Watson MD;  Location: [facility] ([address] [place] [address]);  Service: Endoscopy;  Laterality: N/A;  -[facility] uc-inst email-tb    EYE SURGERY      KNEE ARTHROSCOPY W/ DEBRIDEMENT      PROSTATE SURGERY      for prostate cancer    SHOULDER ARTHROSCOPY Right 10/2017    RTC repair with patch    ULNAR NERVE TRANSPOSITION       Family History   Problem Relation Age of Onset    Cancer Mother         breast    Cancer Father         prostate    Heart disease Father 74        CHF    Diabetes Sister     Heart disease Sister         stroke    Diabetes Brother     Hypertension Brother     Diabetes Brother     Amblyopia Neg Hx     Blindness Neg Hx     Cataracts Neg Hx     Glaucoma Neg Hx     Macular degeneration Neg Hx     Retinal detachment Neg Hx     Strabismus Neg Hx     Stroke Neg Hx     Thyroid disease Neg Hx     Melanoma Neg Hx     Psoriasis Neg Hx     Lupus Neg Hx      Social History     Tobacco Use    Smoking status: Former     Packs/day: 1.00     Years: 30.00     Pack years: 30.00     Types: Cigarettes     Quit date: 1988     Years since quittin.3    Smokeless tobacco: Never   Substance Use Topics    Alcohol use: Yes     Alcohol/week: 28.0 standard drinks     Types: 28 Cans of beer per week     Comment: beer - 2 a night     Drug use: No     Review of patient's allergies indicates:  No Known Allergies    Medications: I have  reviewed the current medication administration record.    (Not in a hospital admission)      Review of Systems   Constitutional:  Negative for diaphoresis and fever.   HENT:  Negative for ear pain and rhinorrhea.    Eyes:  Negative for pain and visual disturbance.   Respiratory:  Positive for shortness of breath. Negative for choking.    Cardiovascular:  Negative for chest pain.   Gastrointestinal:  Negative for abdominal pain and vomiting.   Genitourinary:  Negative for dysuria.   Musculoskeletal:  Negative for arthralgias and back pain.   Neurological:  Positive for speech difficulty (Reports resolved now) and numbness (Reports resolved now). Negative for facial asymmetry, weakness and headaches.   Psychiatric/Behavioral:  Negative for agitation and confusion. The patient is not nervous/anxious.    Objective:     Vital Signs (Most Recent):  Temp: 97.7 °F (36.5 °C) (09/11/22 1535)  Pulse: 94 (09/11/22 1535)  Resp: 16 (09/11/22 1535)  BP: (!) 127/91 (09/11/22 1535)  SpO2: 100 % (09/11/22 1535)    Vital Signs Range (Last 24H):  Temp:  [97.7 °F (36.5 °C)]   Pulse:  [94]   Resp:  [16]   BP: (127)/(91)   SpO2:  [100 %]     Physical Exam  Vitals and nursing note reviewed.   Constitutional:       General: He is not in acute distress.     Appearance: Normal appearance. He is not ill-appearing or diaphoretic.   HENT:      Head: Normocephalic and atraumatic.      Right Ear: External ear normal.      Left Ear: External ear normal.      Nose: No rhinorrhea.   Eyes:      General: No visual field deficit or scleral icterus.        Right eye: No discharge.         Left eye: No discharge.      Extraocular Movements: Extraocular movements intact.   Cardiovascular:      Rate and Rhythm: Normal rate.   Pulmonary:      Effort: Pulmonary effort is normal. No respiratory distress.   Abdominal:      General: There is no distension.   Musculoskeletal:         General: No tenderness. Normal range of motion.      Cervical back: Normal range  of motion.      Right lower leg: No edema.      Left lower leg: No edema.   Skin:     General: Skin is warm and dry.   Neurological:      General: No focal deficit present.      Mental Status: He is alert and oriented to person, place, and time.      GCS: GCS eye subscore is 4. GCS verbal subscore is 5. GCS motor subscore is 6.      Cranial Nerves: No cranial nerve deficit, dysarthria or facial asymmetry.      Sensory: No sensory deficit.      Motor: No weakness or pronator drift.   Psychiatric:         Behavior: Behavior is cooperative.       Neurological Exam:   LOC: alert  Attention Span: Good   Language: No aphasia  Articulation: No dysarthria  Orientation: Person, Place, Time   Visual Fields: Full  EOM (CN III, IV, VI): Full/intact  Facial Sensation (CN V): Normal  Facial Movement (CN VII): Symmetric facial expression    Motor: Arm left  Normal 5/5  Leg left  Normal 5/5  Arm right  Normal 5/5  Leg right Normal 5/5  Sensation: Intact to light touch       Laboratory:  CMP: No results for input(s): GLUCOSE, CALCIUM, ALBUMIN, PROT, NA, K, CO2, CL, BUN, CREATININE, ALKPHOS, ALT, AST, BILITOT in the last 24 hours.  CBC:   Recent Labs   Lab 09/11/22  1610   WBC 8.06   RBC 5.43   HGB 13.3*   HCT 40.2      MCV 74*   MCH 24.5*   MCHC 33.1     Lipid Panel: No results for input(s): CHOL, LDLCALC, HDL, TRIG in the last 168 hours.  Coagulation:   Recent Labs   Lab 09/11/22  1610   INR 1.2     Hgb A1C: No results for input(s): HGBA1C in the last 168 hours.  TSH: No results for input(s): TSH in the last 168 hours.    Diagnostic Results:      Brain imaging:  MRI Brain WO contrast pending     Vessel Imaging:  CTA MP 9/11/22   CTA head: Unremarkable CTA of the head specifically without evidence for proximal significant stenosis or occlusion.     CTA neck:   Atherosclerotic plaquing of the carotid bifurcations and proximal ICAs with less than 50% proximal ICA stenosis by NASCET criteria.     Incidental 6-7 mm subsolid  nodule right upper lobe with prominent prevascular and questionable precarinal lymph nodes.  Further evaluation with dedicated CT thorax recommended.     CT head:   Patchy ill-defined decreased attenuation supratentorial white matter which is nonspecific and may be sequela of chronic ischemic change.  There is no evidence for acute intracranial hemorrhage or sulcal effacement to suggest large territory recent infarction. Small hypodensity left basal ganglia which may be prior infarction. Study is distorted by beam hardening artifacts.       Cardiac Evaluation:   TTE 9/2/22   Severe left atrial enlargement.  The left ventricle is severely enlarged with severely decreased systolic function.  There is severe left ventricular global hypokinesis. The estimated ejection fraction is 20%.  The 3D quantitatively derived ejection fraction is 18%.  Grade II left ventricular diastolic dysfunction.  Mild right ventricular enlargement with normal right ventricular systolic function.  Mild mitral regurgitation.  Mild tricuspid regurgitation.  Intermediate central venous pressure (8 mmHg).  There is pulmonary hypertension. The estimated PA systolic pressure is 49 mmHg.  A short burst of tachycardia with a cycle length of 334 milliseconds was noted at the end of the study.

## 2022-09-11 NOTE — CONSULTS
"[facility]  Vascular Neurology  [facility]  Consult Note    Inpatient consult to Vascular (Stroke) Neurology  Consult performed by: Isaac Charles PA-C  Consult ordered by: Aster Cevallos MD        Assessment/Plan:     Patient is a 77 y.o. year old male with:    * Episode of transient neurologic symptoms  Patient is a 77 year old male with PMH of HTN, HLD, and HF. He presented to ED for aphasia that started when he was watching the [employer] game and playing cards. During this episode patient noted "tingling all over" but primarily in bilateral hands. When stroke team evaluated patient, he reports symptoms had resolved. He is back to his neurologic baseline. Patient says episode lasted 5 mins. Denies prior episodes of this in the past. Not a tpa candidate given patient back to baseline, NIHSS 0. CTA MP negative for LVO or high grade stenosis. Not an intervention candidate. TTE obtained on 9/2/22 (prior to today's episode) demonstrates EF 20%, severe LV global hypokinesis, and severe LAE. Patient does report SOB and some L shoulder discomfort associated with this SOB.     MRI brain without contrast pending. Stroke team will follow up on imaging. SBP < 220 until acute infarct is ruled out. Please contact our team regarding any additional questions or concerns in the meantime.     CHF (congestive heart failure)  Recent TTE on 9/2/22 with EF 20%, severe LAE, and LV hypokinesis   Recommend cardiology follow up   Home meds: Coreg, amlodipine, Lasix, entresto, aldactone     SOB (shortness of breath)  Patient c/o SOB during exam today   Recommend CXR and further evaluation if appropriate     Essential hypertension  Stroke RF   SBP <220 until acute infarct is ruled out   Home Meds: Coreg, amlodipine, Lasix, entresto, aldactone     Hyperlipidemia  Stroke RF   Recommend getting lipid panel           STROKE DOCUMENTATION     Acute Stroke Times   Last Known Normal Date: 09/11/22  Last Known Normal " Time: 1540 (Back to baseline, now is last known normal )  Symptom Onset Date: 09/11/22  Symptom Onset Time: 1515  Stroke Team Called Date: 09/11/22  Stroke Team Called Time: 1538  Stroke Team Arrival Date: 09/11/22  Stroke Team Arrival Time: 1540  CT Interpretation Time: 1544  Alteplase Recommended: No  CTA Interpretation Time: 1546  Thrombectomy Recommended: No    NIH Scale:  1a. Level of Consciousness: 0-->Alert, keenly responsive  1b. LOC Questions: 0-->Answers both questions correctly  1c. LOC Commands: 0-->Performs both tasks correctly  2. Best Gaze: 0-->Normal  3. Visual: 0-->No visual loss  4. Facial Palsy: 0-->Normal symmetrical movements  5a. Motor Arm, Left: 0-->No drift, limb holds 90 (or 45) degrees for full 10 secs  5b. Motor Arm, Right: 0-->No drift, limb holds 90 (or 45) degrees for full 10 secs  6a. Motor Leg, Left: 0-->No drift, leg holds 30 degree position for full 5 secs  6b. Motor Leg, Right: 0-->No drift, leg holds 30 degree position for full 5 secs  7. Limb Ataxia: 0-->Absent  8. Sensory: 0-->Normal, no sensory loss  9. Best Language: 0-->No aphasia, normal  10. Dysarthria: 0-->Normal  11. Extinction and Inattention (formerly Neglect): 0-->No abnormality  Total (NIH Stroke Scale): 0    Modified Kennerdell Score: 0  Nora Coma Scale:    ABCD2 Score: 2  [id number] Score:   HAS -BLED Score:   ICH Score:   Hunt & Owen Classification:       Thrombolysis Candidate? No, Patient back to neurological baseline     Delays to Thrombolysis?  Not Applicable    Interventional Revascularization Candidate?   Is the patient eligible for mechanical endovascular reperfusion (TIFFANY)?  No; No large vessel occlusion identified on imaging , No; no significant neurologic deficit (NIHSS <6)  and No; at this time symptoms not suggestive of large vessel occlusion    Delays to Thrombectomy? Not Applicable    Hemorrhagic change of an Ischemic Stroke: Does this patient have an ischemic stroke with hemorrhagic changes? No  "    Subjective:     History of Present Illness:  Mr. Lanza is a 77 year old male with PMH of HTN, HLD, and HF. He presented to ED as stroke code for aphasia that started at 1515 when he was watching the [employer] game and playing cards. During this episode patient noted "tingling all over" but primarily in bilateral hands. When stroke team evaluated patient, he reports symptoms had resolved. He is back to his neurologic baseline. Patient says episode lasted 5 mins. Denies prior episodes of this in the past. Not a tpa candidate given patient back to baseline, NIHSS 0. CTA MP negative for LVO or high grade stenosis. Not an intervention candidate. TTE obtained on 9/2/22 (prior to today's episode) demonstrates EF 20%, severe LV global hypokinesis, and severe LAE. Patient does report SOB and some L shoulder discomfort associated with this SOB. Will get MRI brain without contrast for further evaluation.           Past Medical History:   Diagnosis Date    Arthritis     Chronic rhinitis     Fever blister     Hyperlipidemia     Hypertension     Impotence of organic origin     Joint pain     Nuclear sclerosis - Both Eyes 10/23/2013    Prostate cancer     Ulcer     NSAID related     Varicose vein of leg     bilateral legs     Past Surgical History:   Procedure Laterality Date    COLONOSCOPY N/A 6/13/2018    Procedure: COLONOSCOPY;  Surgeon: Adonis Stack MD;  Location: [facility] ENDO ([address] [place] [address]);  Service: Endoscopy;  Laterality: N/A;    COLONOSCOPY N/A 4/28/2021    Procedure: COLONOSCOPY;  Surgeon: Steven Watson MD;  [facility]  Service: Endoscopy;  Laterality: N/A;  4/25-[facility] uc-inst email-tb    EYE SURGERY      KNEE ARTHROSCOPY W/ DEBRIDEMENT      PROSTATE SURGERY  2006    for prostate cancer    SHOULDER ARTHROSCOPY Right 10/2017    RTC repair with patch    ULNAR NERVE TRANSPOSITION       Family History   Problem Relation Age of Onset    Cancer Mother         breast    Cancer " Father         prostate    Heart disease Father 74        CHF    Diabetes Sister     Heart disease Sister         stroke    Diabetes Brother     Hypertension Brother     Diabetes Brother     Amblyopia Neg Hx     Blindness Neg Hx     Cataracts Neg Hx     Glaucoma Neg Hx     Macular degeneration Neg Hx     Retinal detachment Neg Hx     Strabismus Neg Hx     Stroke Neg Hx     Thyroid disease Neg Hx     Melanoma Neg Hx     Psoriasis Neg Hx     Lupus Neg Hx      Social History     Tobacco Use    Smoking status: Former     Packs/day: 1.00     Years: 30.00     Pack years: 30.00     Types: Cigarettes     Quit date: 1988     Years since quittin.3    Smokeless tobacco: Never   Substance Use Topics    Alcohol use: Yes     Alcohol/week: 28.0 standard drinks     Types: 28 Cans of beer per week     Comment: beer - 2 a night     Drug use: No     Review of patient's allergies indicates:  No Known Allergies    Medications: I have reviewed the current medication administration record.    (Not in a hospital admission)      Review of Systems   Constitutional:  Negative for diaphoresis and fever.   HENT:  Negative for ear pain and rhinorrhea.    Eyes:  Negative for pain and visual disturbance.   Respiratory:  Positive for shortness of breath. Negative for choking.    Cardiovascular:  Negative for chest pain.   Gastrointestinal:  Negative for abdominal pain and vomiting.   Genitourinary:  Negative for dysuria.   Musculoskeletal:  Negative for arthralgias and back pain.   Neurological:  Positive for speech difficulty (Reports resolved now) and numbness (Reports resolved now). Negative for facial asymmetry, weakness and headaches.   Psychiatric/Behavioral:  Negative for agitation and confusion. The patient is not nervous/anxious.    Objective:     Vital Signs (Most Recent):  Temp: 97.7 °F (36.5 °C) (22 1535)  Pulse: 94 (22 1535)  Resp: 16 (22 153)  BP: (!) 127/91 (22 153)  SpO2: 100  % (09/11/22 1535)    Vital Signs Range (Last 24H):  Temp:  [97.7 °F (36.5 °C)]   Pulse:  [94]   Resp:  [16]   BP: (127)/(91)   SpO2:  [100 %]     Physical Exam  Vitals and nursing note reviewed.   Constitutional:       General: He is not in acute distress.     Appearance: Normal appearance. He is not ill-appearing or diaphoretic.   HENT:      Head: Normocephalic and atraumatic.      Right Ear: External ear normal.      Left Ear: External ear normal.      Nose: No rhinorrhea.   Eyes:      General: No visual field deficit or scleral icterus.        Right eye: No discharge.         Left eye: No discharge.      Extraocular Movements: Extraocular movements intact.   Cardiovascular:      Rate and Rhythm: Normal rate.   Pulmonary:      Effort: Pulmonary effort is normal. No respiratory distress.   Abdominal:      General: There is no distension.   Musculoskeletal:         General: No tenderness. Normal range of motion.      Cervical back: Normal range of motion.      Right lower leg: No edema.      Left lower leg: No edema.   Skin:     General: Skin is warm and dry.   Neurological:      General: No focal deficit present.      Mental Status: He is alert and oriented to person, place, and time.      GCS: GCS eye subscore is 4. GCS verbal subscore is 5. GCS motor subscore is 6.      Cranial Nerves: No cranial nerve deficit, dysarthria or facial asymmetry.      Sensory: No sensory deficit.      Motor: No weakness or pronator drift.   Psychiatric:         Behavior: Behavior is cooperative.       Neurological Exam:   LOC: alert  Attention Span: Good   Language: No aphasia  Articulation: No dysarthria  Orientation: Person, Place, Time   Visual Fields: Full  EOM (CN III, IV, VI): Full/intact  Facial Sensation (CN V): Normal  Facial Movement (CN VII): Symmetric facial expression    Motor: Arm left  Normal 5/5  Leg left  Normal 5/5  Arm right  Normal 5/5  Leg right Normal 5/5  Sensation: Intact to light touch       Laboratory:  CMP:  No results for input(s): GLUCOSE, CALCIUM, ALBUMIN, PROT, NA, K, CO2, CL, BUN, CREATININE, ALKPHOS, ALT, AST, BILITOT in the last 24 hours.  CBC:   Recent Labs   Lab 09/11/22  1610   WBC 8.06   RBC 5.43   HGB 13.3*   HCT 40.2      MCV 74*   MCH 24.5*   MCHC 33.1     Lipid Panel: No results for input(s): CHOL, LDLCALC, HDL, TRIG in the last 168 hours.  Coagulation:   Recent Labs   Lab 09/11/22  1610   INR 1.2     Hgb A1C: No results for input(s): HGBA1C in the last 168 hours.  TSH: No results for input(s): TSH in the last 168 hours.    Diagnostic Results:      Brain imaging:  MRI Brain WO contrast pending     Vessel Imaging:  CTA MP 9/11/22   CTA head: Unremarkable CTA of the head specifically without evidence for proximal significant stenosis or occlusion.     CTA neck:   Atherosclerotic plaquing of the carotid bifurcations and proximal ICAs with less than 50% proximal ICA stenosis by NASCET criteria.     Incidental 6-7 mm subsolid nodule right upper lobe with prominent prevascular and questionable precarinal lymph nodes.  Further evaluation with dedicated CT thorax recommended.     CT head:   Patchy ill-defined decreased attenuation supratentorial white matter which is nonspecific and may be sequela of chronic ischemic change.  There is no evidence for acute intracranial hemorrhage or sulcal effacement to suggest large territory recent infarction. Small hypodensity left basal ganglia which may be prior infarction. Study is distorted by beam hardening artifacts.       Cardiac Evaluation:   TTE 9/2/22    Severe left atrial enlargement.   The left ventricle is severely enlarged with severely decreased systolic function.   There is severe left ventricular global hypokinesis. The estimated ejection fraction is 20%.   The 3D quantitatively derived ejection fraction is 18%.   Grade II left ventricular diastolic dysfunction.   Mild right ventricular enlargement with normal right ventricular systolic  function.   Mild mitral regurgitation.   Mild tricuspid regurgitation.   Intermediate central venous pressure (8 mmHg).   There is pulmonary hypertension. The estimated PA systolic pressure is 49 mmHg.   A short burst of tachycardia with a cycle length of 334 milliseconds was noted at the end of the study.        Isaac Charles PA-C  [facility]  Department of Vascular Neurology   [facility]

## 2022-09-11 NOTE — ED PROVIDER NOTES
Encounter Date: 9/11/2022       History     Chief Complaint   Patient presents with    Aphasia     Slurred speech,right arm tingling began at 1515 today.      76 y/o M w/ h/o HTN, HLD, recent diagnosis of CHF presents to the ED for difficulty with speech (couldn't get words out) and right arm tingling that started today while playing cards and watching the football game. Symptoms started at 1515 today. Speech and tingling feels improved. He also noticed difficulty in coordination with both hands while playing cards.  Patient reports his symptoms improved by the time of arrival to the emergency department. He has never had similar symptoms in the past. Otherwise, he has been in normal state of health. No chest pain, LE swelling, cough, fever, dysuria or other concerns.  He does report right upper back pain for the past week. No radiation. It is positional. He has had intermittent shortness of breath, which has been stable and was why he was recently diagnosed with CHF and started on medications.     The history is provided by the patient. No  was used.   Review of patient's allergies indicates:  No Known Allergies  Past Medical History:   Diagnosis Date    Arthritis     Chronic rhinitis     Fever blister     Hyperlipidemia     Hypertension     Impotence of organic origin     Joint pain     Nuclear sclerosis - Both Eyes 10/23/2013    Prostate cancer     Ulcer     NSAID related     Varicose vein of leg     bilateral legs     Past Surgical History:   Procedure Laterality Date    COLONOSCOPY N/A 6/13/2018    Procedure: COLONOSCOPY;  Surgeon: Adonis Stack MD;  Location: [address]);  Service: Endoscopy;  Laterality: N/A;    COLONOSCOPY N/A 4/28/2021    Procedure: COLONOSCOPY;  Surgeon: Steven Watson MD;  Location: [address]);  Service: Endoscopy;  Laterality: N/A;  4/25-[facility] uc-inst email-tb    EYE SURGERY      KNEE ARTHROSCOPY W/ DEBRIDEMENT      PROSTATE SURGERY  2006     for prostate cancer    SHOULDER ARTHROSCOPY Right 10/2017    RTC repair with patch    ULNAR NERVE TRANSPOSITION       Family History   Problem Relation Age of Onset    Cancer Mother         breast    Cancer Father         prostate    Heart disease Father 74        CHF    Diabetes Sister     Heart disease Sister         stroke    Diabetes Brother     Hypertension Brother     Diabetes Brother     Amblyopia Neg Hx     Blindness Neg Hx     Cataracts Neg Hx     Glaucoma Neg Hx     Macular degeneration Neg Hx     Retinal detachment Neg Hx     Strabismus Neg Hx     Stroke Neg Hx     Thyroid disease Neg Hx     Melanoma Neg Hx     Psoriasis Neg Hx     Lupus Neg Hx      Social History     Tobacco Use    Smoking status: Former     Packs/day: 1.00     Years: 30.00     Pack years: 30.00     Types: Cigarettes     Quit date: 1988     Years since quittin.3    Smokeless tobacco: Never   Substance Use Topics    Alcohol use: Yes     Alcohol/week: 28.0 standard drinks     Types: 28 Cans of beer per week     Comment: beer - 2 a night     Drug use: No     Review of Systems   Constitutional:  Negative for chills and fever.   HENT:  Negative for rhinorrhea and sore throat.    Respiratory:  Negative for cough and shortness of breath.    Cardiovascular:  Negative for chest pain and leg swelling.   Gastrointestinal:  Negative for abdominal pain, diarrhea, nausea and vomiting.   Genitourinary:  Negative for dysuria and hematuria.   Musculoskeletal:  Negative for back pain and neck pain.   Skin:  Negative for rash and wound.   Neurological:  Positive for speech difficulty and numbness. Negative for seizures, weakness and headaches.        Problems with coordination   Hematological:  Does not bruise/bleed easily.   Psychiatric/Behavioral:  Negative for agitation and behavioral problems.      Physical Exam     Initial Vitals [22 1535]   BP Pulse Resp Temp SpO2   (!) 127/91 94 16 97.7 °F (36.5 °C) 100 %      MAP       --          Physical Exam    Nursing note and vitals reviewed.  Constitutional: He appears well-developed and well-nourished. He is not diaphoretic. No distress.   HENT:   Head: Normocephalic and atraumatic.   Eyes: Conjunctivae and EOM are normal.   Neck: Neck supple.   Normal range of motion.  Cardiovascular:  Normal rate, regular rhythm, normal heart sounds and intact distal pulses.           Pulmonary/Chest: Breath sounds normal. No respiratory distress. He has no wheezes. He has no rhonchi. He has no rales.   Abdominal: Abdomen is soft. Bowel sounds are normal. He exhibits no distension. There is no abdominal tenderness. There is no rebound and no guarding.   Musculoskeletal:         General: No tenderness or edema. Normal range of motion.      Cervical back: Normal range of motion and neck supple.     Neurological: He is alert and oriented to person, place, and time. He has normal strength. No cranial nerve deficit or sensory deficit.   5/5 strength in upper and lower extremities. Normal speech. Face symmetric. No dysmetria with heel to shin or finger to nose.    Skin: Skin is warm and dry.   Psychiatric: He has a normal mood and affect. Thought content normal.       ED Course   Procedures  Labs Reviewed   CBC W/ AUTO DIFFERENTIAL - Abnormal; Notable for the following components:       Result Value    Hemoglobin 13.3 (*)     MCV 74 (*)     MCH 24.5 (*)     RDW 18.8 (*)     All other components within normal limits   COMPREHENSIVE METABOLIC PANEL - Abnormal; Notable for the following components:    Potassium 5.3 (*)     CO2 18 (*)     BUN 26 (*)     Alkaline Phosphatase 53 (*)     AST 44 (*)     ALT 47 (*)     eGFR 56.9 (*)     All other components within normal limits   TROPONIN I - Abnormal; Notable for the following components:    Troponin I 0.513 (*)     All other components within normal limits   TROPONIN I - Abnormal; Notable for the following components:    Troponin I 0.622 (*)     All other components within  normal limits   B-TYPE NATRIURETIC PEPTIDE - Abnormal; Notable for the following components:    BNP 1,537 (*)     All other components within normal limits    Narrative:     add on to [id number] per Aster Cevallos MD  09/11/2022  20:08    D DIMER, QUANTITATIVE - Abnormal; Notable for the following components:    D-Dimer 3.14 (*)     All other components within normal limits   ISTAT PROCEDURE - Abnormal; Notable for the following components:    POC Glucose 119 (*)     POC BUN 37 (*)     POC Creatinine 1.5 (*)     POC Potassium 5.4 (*)     POC TCO2 (MEASURED) 20 (*)     All other components within normal limits   PROTIME-INR   TSH   B-TYPE NATRIURETIC PEPTIDE   APTT    Narrative:     ADD ON APTT #[id number] PT #[id number] PER RAJ SILVA MD 09/11/2022    22:16     PROTIME-INR    Narrative:     ADD ON APTT #[id number] PT #[id number] PER RAJ SILVA MD 09/11/2022    22:16     URINALYSIS, REFLEX TO URINE CULTURE   POCT GLUCOSE, HAND-HELD DEVICE   ISTAT CREATININE   ISTAT PROCEDURE     EKG Readings: (Independently Interpreted)   Initial Reading: No STEMI. Previous EKG: Compared with most recent EKG Heart Rate: 86. Ectopy: PVCs. ST Segments: Normal ST Segments. T Waves: Normal.     Imaging Results              US Lower Extremity Veins Bilateral (Final result)  Result time 09/12/22 02:23:42      Final result by Chapo White MD (09/12/22 02:23:42)                   Impression:      No evidence of deep venous thrombosis in either lower extremity.      Electronically signed by: Chapo White MD  Date:    09/12/2022  Time:    02:23               Narrative:    EXAMINATION:  US LOWER EXTREMITY VEINS BILATERAL    CLINICAL HISTORY:  rule out DVT;    TECHNIQUE:  Duplex and color flow Doppler and dynamic compression was performed of the bilateral lower extremity veins was performed.    COMPARISON:  None    FINDINGS:  Right thigh veins: The common femoral, femoral, popliteal, upper greater saphenous, and deep femoral veins are  patent and free of thrombus. The veins are normally compressible and have normal phasic flow and augmentation response.    Right calf veins: The visualized calf veins are patent.    Left thigh veins: The common femoral, femoral, popliteal, upper greater saphenous, and deep femoral veins are patent and free of thrombus. The veins are normally compressible and have normal phasic flow and augmentation response.    Left calf veins: The visualized calf veins are patent.    Miscellaneous: None                                       X-Ray Chest AP Portable (Final result)  Result time 09/12/22 00:35:17      Final result by Chapo White MD (09/12/22 00:35:17)                   Impression:      Cardiomegaly with mild edema, similar to prior.    Minimal right effusion is decreased with minimal fluid still tracking in the fissure.      Electronically signed by: Chapo White MD  Date:    09/12/2022  Time:    00:35               Narrative:    EXAMINATION:  XR CHEST AP PORTABLE    CLINICAL HISTORY:  SOB;    TECHNIQUE:  Single frontal view of the chest was performed.    COMPARISON:  09/01/2022.    FINDINGS:  Cardiomegaly with mild edema, similar to prior.    Minimal right effusion is decreased with minimal fluid still tracking in the fissure.    Heart and lungs  appear unchanged when allowing for differences in technique and positioning.                                       MRI Brain Without Contrast (Final result)  Result time 09/11/22 17:15:04      Final result by Teodoro Silvestre MD (09/11/22 17:15:04)                   Impression:      No evidence of acute infarct or hemorrhage.    Chronic senescent and microvascular ischemic disease.  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    Electronically signed by resident: Den Bui  Date:    09/11/2022  Time:    17:02    Electronically signed by: Teodoro Silvestre MD  Date:    09/11/2022  Time:    17:15               Narrative:    EXAMINATION:  MRI BRAIN WITHOUT  CONTRAST    CLINICAL HISTORY:  Transient ischemic attack (TIA);new neuro;    TECHNIQUE:  Multiplanar multisequence MR imaging of the brain was performed without contrast.    COMPARISON:  CTA 09/11/2022    FINDINGS:  Brain appears normally formed.  Ventricles and sulci appear appropriate for patient's age, noting cavum septum pellucidum.  No evidence of hydrocephalus.    No evidence of acute infarct or hemorrhage.  Remote infarct within the left caudate.  There is periventricular T2 FLAIR signal hyperintensities consistent with chronic microvascular ischemic disease.    No extra-axial blood or fluid collections.  Major T2 flow voids are preserved.    Calvarial marrow appears within normal limits.  Patchy opacification of the ethmoid air cells and maxillary sinuses.  Partial mastoid effusions.  Small T2 hyperintense focus within the right parotid gland possibly small cyst.                                        CTA STROKE MULTI-PHASE (Final result)  Result time 09/11/22 16:17:51      Final result by Zach Sanchez DO (09/11/22 16:17:51)                   Impression:      CTA head: Unremarkable CTA of the head specifically without evidence for proximal significant stenosis or occlusion.    CTA neck: Atherosclerotic plaquing of the carotid bifurcations and proximal ICAs with less than 50% proximal ICA stenosis by NASCET criteria.    Incidental 6-7 mm subsolid nodule right upper lobe with prominent prevascular and questionable precarinal lymph nodes.  Further evaluation with dedicated CT thorax recommended.    CT head: Patchy ill-defined decreased attenuation supratentorial white matter which is nonspecific and may be sequela of chronic ischemic change.  There is no evidence for acute intracranial hemorrhage or sulcal effacement to suggest large territory recent infarction.    Small hypodensity left basal ganglia which may be prior infarction.    Study is distorted by beam hardening artifacts.    Further evaluation  with MRI as warranted if patient compatible.      Electronically signed by: Zach Sanchez DO  Date:    09/11/2022  Time:    16:17               Narrative:    EXAMINATION:  CTA STROKE MULTI-PHASE    CLINICAL HISTORY:  Neuro deficit, acute, stroke suspected;    TECHNIQUE:  5 mm axial images of the head pre contrast with 0.625 mm axial CTA images of the head neck post-contrast.  Coronal and sagittal MPR and MIP imaging was performed 75 ml of Omnipaque 350 contrast was injected intravenously.  Please note study was performed in multiphase technique with 3 arterial passes through the head    COMPARISON:  None    FINDINGS:  CT head  without contrast: There is no evidence for acute intracranial hemorrhage or sulcal effacement.  There is patchy ill-defined decreased attenuation supratentorial white matter which is nonspecific and may be sequela of chronic ischemic change.    CTA head:    Anterior circulation: The bilateral distal cervical, petrous, cavernous, and supraclinoid segments of the ICAs are patent without significant focal stenosis or aneurysm.    The anterior and middle cerebral arteries are patent without focal stenosis or aneurysm.    Posterior circulation: The distal vertebral arteries, basilar artery and posterior cerebral arteries are patent without focal stenosis or aneurysm.    CTA neck: Common origin the right brachiocephalic and left common carotid artery and origin of the left subclavian artery from the arch are within normal limits.    Atherosclerotic plaquing at the origin of the right vertebral artery from the subclavian artery with mild narrowing left vertebral artery origin within normal limits.  The left vertebral artery slightly hypoplastic.  Right vertebral artery is dominant.  Vertebral arteries are otherwise patent throughout their course without significant focal stenosis..    Right carotid: The right common carotid artery, carotid bifurcation and extracranial portions of the internal carotid  artery are patent without significant focal stenosis.    Left carotid: The left common carotid artery, carotid bifurcation and extracranial portions of the internal carotid arteries are patent without significant focal stenosis.    Atherosclerotic plaquing carotid bifurcations and proximal ICAs with less than 50% proximal ICA stenosis by NASCET criteria.    There is tortuosity of the cervical ICAs with the narrowing of the left proximal cervical ICA related to tortuosity.    Pharynx/larynx: Evaluation distorted by scatter artifact from dental metal and motion allowing for artifacts the nasopharynx, and oropharynx are grossly within normal limits with subcentimeter left tonsillith.  The hypopharynx larynx and trachea are unremarkable.    Glands: Bilateral parotid and submandibular glands are within normal limits. Thyroid gland is unremarkable.    No evidence for adenopathy throughout the neck by size criteria.    Degenerative change of the cervical spine with reversal normal cervical lordosis centered at the C3/C4 level.  No evidence for acute fracture cervical spine.  Grade 1 anterolisthesis of C2 on C3 with grade 1 retrolisthesis of C4 on C5.  Subpleural emphysematous change of the lungs.  Questioned right apical sub solid nodule measuring 6-7 mm image 87 series 304.  In addition there is partially visualized prominent prevascular and questioned precarinal lymph nodes with prevascular lymph node measuring approximately 1.0 cm short access.    Clinical correlation and further evaluation with CT thorax advised.    This report was flagged in Epic as abnormal.                                       Medications   sodium chloride 0.9% flush 5 mL (has no administration in time range)   albuterol-ipratropium 2.5 mg-0.5 mg/3 mL nebulizer solution 3 mL (has no administration in time range)   melatonin tablet 6 mg (has no administration in time range)   ondansetron disintegrating tablet 8 mg (has no administration in time  range)   prochlorperazine injection Soln 5 mg (has no administration in time range)   polyethylene glycol packet 17 g (has no administration in time range)   bisacodyL suppository 10 mg (has no administration in time range)   simethicone chewable tablet 80 mg (has no administration in time range)   aluminum-magnesium hydroxide-simethicone 200-200-20 mg/5 mL suspension 30 mL (has no administration in time range)   acetaminophen tablet 650 mg (has no administration in time range)   acetaminophen tablet 1,000 mg (has no administration in time range)   naloxone 0.4 mg/mL injection 0.02 mg (has no administration in time range)   glucose chewable tablet 16 g (has no administration in time range)   glucose chewable tablet 24 g (has no administration in time range)   glucagon (human recombinant) injection 1 mg (has no administration in time range)   dextrose 10% bolus 125 mL (has no administration in time range)   dextrose 10% bolus 250 mL (has no administration in time range)   aspirin EC tablet 81 mg (81 mg Oral Given 9/12/22 0822)   carvediloL tablet 3.125 mg (3.125 mg Oral Given 9/12/22 0823)   atorvastatin tablet 40 mg (40 mg Oral Given 9/12/22 0105)   clopidogreL tablet 75 mg (75 mg Oral Given 9/12/22 0105)   iohexoL (OMNIPAQUE 350) injection 75 mL (75 mLs Intravenous Given 9/11/22 1545)   aspirin tablet 325 mg (325 mg Oral Given 9/11/22 1957)   perflutren protein-A microsphr 0.22 mg/mL IV susp (0.11 mg Intravenous Given 9/12/22 1015)     Medical Decision Making:   Initial Assessment:   78 y/o M w/ h/o HTN, HLD, recent diagnosis of CHF presents to the ED for difficulty with speech (couldn't get words out) and right arm tingling that started today while playing cards and watching the football game. Symptoms started at 1515 today.  Stroke code called.  Differential Diagnosis:   CVA, TIA, ACS, PE  Clinical Tests:   Lab Tests: Ordered and Reviewed       <> Summary of Lab: Troponin elevated and uptrending.  Elevated  BNP.  Radiological Study: Reviewed and Ordered  Medical Tests: Ordered and Reviewed  ED Management:  Stroke workup with remote infarct. Vascular neurology following. Troponin added on for right upper back pain and months of shortness of breath. Initial troponin elevated. Recent outpatient diagnosis of CHF with EF of 20% and workup still in progress. Would consider further imaging of chest but patient already had contrast bolus for stroke workup and will be difficult to give IV fluids for another contrast while in the ED with that EF, creatinine 1.3. there were some lung nodules on the CTA head/neck visible. Continued workup likely needed. D-dimer pending. Cardiology is following. Plan for  admission. Remains stable but tachycardic while in the ED.           Attending Attestation:             Attending ED Notes:   Attending Note:  I have seen the patient, have repeated the key portions of the history and physical, reviewed and agree with the medical documentation, and supervised and managed the medical care of the patient. Additionally, I was present for the critical portion of any procedure(s) performed.      77 M hx above here for aphasia, right arm numbness x 30 mins.  Resolved on arrival  Code stroke activated in triage.   CN II-XII intact, strength 5/5 upper/lower extremities, no sensory changes.  CTA negative. Vascular Neuro recommended MRI brain w/o contrast.   Labs stable  EKG w/o changes  5:30 PM  MRI w/o acute infarct.  CMP pending  Pt noted intermittent sob, left shoulder pain- trop ordered to eval for atypical chest pain, which was elevated.  No previous for comparison. No active chest pain at this time. D/w cardiology- recommend trend, d dimer.  Unable to get repeat contrast bolus given CTA earlier  Admit to  pending further workup.       Critical Care time:45 minutes inclusive of direct patient care, review of previous records, interpretation of labs, imaging and ekg, as well as discussion of my  impression and plan of care with the patient, family and other clinicians/consultants. This time is exclusive of any separate billable procedures and of treating other patients.      ED Course as of 09/12/22 1051   Sun Sep 11, 2022   1545 Stroke team at bedside.  [KL]   1559 POC Creatinine(!): 1.5 [KL]   1626 Discussed with stroke team. They recommended MRI. This has been ordered.  [KL]   1627 CTA head: Unremarkable CTA of the head specifically without evidence for proximal significant stenosis or occlusion. [KL]   1628 Discussed lung nodule finding on CT with patient.  [KL]   1720 Impression:     No evidence of acute infarct or hemorrhage.     Chronic senescent and microvascular ischemic disease.  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     Electronically signed by resident: Den Bui  Date:                                            09/11/2022  Time:                                           17:02     Electronically signed by: Teodoro Silvestre MD  Date:                                            09/11/2022  Time:                                           17:15 [GM]   1806 MRI with chronic senescent and microvascular ischemic disease.  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 [KL]   1846 BUN(!): 26 [GM]   1858 Troponin I(!): 0.513  Pt reports vague shoulder pain- trop ordered and elevated,  no previous for comparison.  Repeat trop for trend ordered.    [GM]   1929 Creatinine: 1.3 [KL]   1929 BUN(!): 26 [KL]   2051 Discussed with cardiology.  They saw the patient in the emergency department.  Recommended serial troponins and D-dimer at this time. [KL]   2053 BNP(!): 1,537 [KL]   2053 Troponin I(!): 0.622  Uptrending. Cards aware.  [KL]      ED Course User Index  [GM] Anna Sanabria MD  [KL] Aster Cevallos MD             Clinical Impression:   Final diagnoses:  [I63.9] Stroke  [R91.1] Lung nodule (Primary)  [M54.9] Upper back pain on right side  [R06.00] Dyspnea on exertion      ED Disposition Condition    Observation                  Aster Cevallos MD  Resident  09/11/22 2232       Aster Cevallos MD  Resident  09/11/22 2236       Anna Sanabria MD  09/12/22 1053

## 2022-09-11 NOTE — ED NOTES
Sidney Lanza, an 77 y.o. male presents to the ED for complaints of aphasia, hand tingling, and SOB that began while he was watching the [employer] game earlier today around 1515. He explains that he first was unable to form his own sentences and then his arms and hands became numb. Patient able to ambulate from wheelchair to the stretcher. Denies all other complaints. Son at the bedside. Pt is AAOx4. Pt states his sx resolved prior to arriving to the hospital.       Chief Complaint   Patient presents with    Aphasia     Slurred speech,right arm tingling began at 1515 today.      Review of patient's allergies indicates:  No Known Allergies  Past Medical History:   Diagnosis Date    Arthritis     Chronic rhinitis     Fever blister     Hyperlipidemia     Hypertension     Impotence of organic origin     Joint pain     Nuclear sclerosis - Both Eyes 10/23/2013    Prostate cancer     Ulcer     NSAID related     Varicose vein of leg     bilateral legs

## 2022-09-11 NOTE — ASSESSMENT & PLAN NOTE
"Patient is a 77 year old male with PMH of HTN, HLD, and HF. He presented to ED for aphasia that started when he was watching the [employer] game and playing cards. During this episode patient noted "tingling all over" but primarily in bilateral hands. When stroke team evaluated patient, he reports symptoms had resolved. He is back to his neurologic baseline. Patient says episode lasted 5 mins. Denies prior episodes of this in the past. Not a tpa candidate given patient back to baseline, NIHSS 0. CTA MP negative for LVO or high grade stenosis. Not an intervention candidate. TTE obtained on 9/2/22 (prior to today's episode) demonstrates EF 20%, severe LV global hypokinesis, and severe LAE. Patient does report SOB and some L shoulder discomfort associated with this SOB.     MRI brain without contrast pending. Stroke team will follow up on imaging. SBP < 220 until acute infarct is ruled out. Please contact our team regarding any additional questions or concerns in the meantime.   "

## 2022-09-11 NOTE — ASSESSMENT & PLAN NOTE
Stroke RF   SBP <220 until acute infarct is ruled out   Home Meds: Coreg, amlodipine, Lasix, entresto, aldactone

## 2022-09-11 NOTE — ASSESSMENT & PLAN NOTE
Recent TTE on 9/2/22 with EF 20%, severe LAE, and LV hypokinesis   Recommend cardiology follow up   Home meds: Coreg, amlodipine, Lasix, entresto, aldactone

## 2022-09-11 NOTE — ED NOTES
I-STAT Chem-8+ Results:   Value Reference Range   Sodium 139 136-145 mmol/L   Potassium  5.4 3.5-5.1 mmol/L   Chloride 110  mmol/L   Ionized Calcium 1.13 1.06-1.42 mmol/L   CO2 (measured) 20 23-29 mmol/L   Glucose 119  mg/dL   BUN 37 6-30 mg/dL   Creatinine 1.5 0.5-1.4 mg/dL   Hematocrit 47 36-54%

## 2022-09-12 PROBLEM — I50.43 ACUTE ON CHRONIC COMBINED SYSTOLIC AND DIASTOLIC HEART FAILURE: Status: ACTIVE | Noted: 2022-09-11

## 2022-09-12 PROBLEM — N17.9 AKI (ACUTE KIDNEY INJURY): Status: ACTIVE | Noted: 2022-09-12

## 2022-09-12 LAB
ANION GAP SERPL CALC-SCNC: 6 MMOL/L (ref 8–16)
ASCENDING AORTA: 3.01 CM
AV INDEX (PROSTH): 0.39
AV MEAN GRADIENT: 3 MMHG
AV PEAK GRADIENT: 4 MMHG
AV VALVE AREA: 1.34 CM2
AV VELOCITY RATIO: 0.43
BASOPHILS # BLD AUTO: 0.03 K/UL (ref 0–0.2)
BASOPHILS NFR BLD: 0.4 % (ref 0–1.9)
BSA FOR ECHO PROCEDURE: 1.8 M2
BUN SERPL-MCNC: 23 MG/DL (ref 8–23)
CALCIUM SERPL-MCNC: 8.9 MG/DL (ref 8.7–10.5)
CHLORIDE SERPL-SCNC: 111 MMOL/L (ref 95–110)
CO2 SERPL-SCNC: 21 MMOL/L (ref 23–29)
CREAT SERPL-MCNC: 1 MG/DL (ref 0.5–1.4)
CV ECHO LV RWT: 0.23 CM
DIFFERENTIAL METHOD: ABNORMAL
DOP CALC AO PEAK VEL: 1.04 M/S
DOP CALC AO VTI: 16.22 CM
DOP CALC LVOT AREA: 3.4 CM2
DOP CALC LVOT DIAMETER: 2.08 CM
DOP CALC LVOT PEAK VEL: 0.45 M/S
DOP CALC LVOT STROKE VOLUME: 21.7 CM3
DOP CALCLVOT PEAK VEL VTI: 6.39 CM
E WAVE DECELERATION TIME: 132.68 MSEC
E/A RATIO: 1.1
E/E' RATIO: 18.67 M/S
ECHO LV POSTERIOR WALL: 0.85 CM (ref 0.6–1.1)
EJECTION FRACTION: 10 %
EOSINOPHIL # BLD AUTO: 0.1 K/UL (ref 0–0.5)
EOSINOPHIL NFR BLD: 0.9 % (ref 0–8)
ERYTHROCYTE [DISTWIDTH] IN BLOOD BY AUTOMATED COUNT: 16 % (ref 11.5–14.5)
EST. GFR  (NO RACE VARIABLE): >60 ML/MIN/1.73 M^2
FRACTIONAL SHORTENING: 3 % (ref 28–44)
GLUCOSE SERPL-MCNC: 79 MG/DL (ref 70–110)
HCT VFR BLD AUTO: 35.5 % (ref 40–54)
HGB BLD-MCNC: 11.9 G/DL (ref 14–18)
IMM GRANULOCYTES # BLD AUTO: 0.01 K/UL (ref 0–0.04)
IMM GRANULOCYTES NFR BLD AUTO: 0.1 % (ref 0–0.5)
INTERVENTRICULAR SEPTUM: 0.71 CM (ref 0.6–1.1)
LA MAJOR: 5.59 CM
LA MINOR: 5.48 CM
LA WIDTH: 4.8 CM
LEFT ATRIUM SIZE: 4.36 CM
LEFT ATRIUM VOLUME INDEX MOD: 44.4 ML/M2
LEFT ATRIUM VOLUME INDEX: 54.4 ML/M2
LEFT ATRIUM VOLUME MOD: 80.41 CM3
LEFT ATRIUM VOLUME: 98.45 CM3
LEFT INTERNAL DIMENSION IN SYSTOLE: 7.12 CM (ref 2.1–4)
LEFT VENTRICLE DIASTOLIC VOLUME INDEX: 158.41 ML/M2
LEFT VENTRICLE DIASTOLIC VOLUME: 286.73 ML
LEFT VENTRICLE MASS INDEX: 144 G/M2
LEFT VENTRICLE SYSTOLIC VOLUME INDEX: 146.8 ML/M2
LEFT VENTRICLE SYSTOLIC VOLUME: 265.67 ML
LEFT VENTRICULAR INTERNAL DIMENSION IN DIASTOLE: 7.37 CM (ref 3.5–6)
LEFT VENTRICULAR MASS: 260.02 G
LV LATERAL E/E' RATIO: 28 M/S
LV SEPTAL E/E' RATIO: 14 M/S
LYMPHOCYTES # BLD AUTO: 2.4 K/UL (ref 1–4.8)
LYMPHOCYTES NFR BLD: 28.2 % (ref 18–48)
MAGNESIUM SERPL-MCNC: 1.9 MG/DL (ref 1.6–2.6)
MCH RBC QN AUTO: 24 PG (ref 27–31)
MCHC RBC AUTO-ENTMCNC: 33.5 G/DL (ref 32–36)
MCV RBC AUTO: 72 FL (ref 82–98)
MONOCYTES # BLD AUTO: 0.7 K/UL (ref 0.3–1)
MONOCYTES NFR BLD: 8.2 % (ref 4–15)
MV PEAK A VEL: 0.51 M/S
MV PEAK E VEL: 0.56 M/S
MV STENOSIS PRESSURE HALF TIME: 38.48 MS
MV VALVE AREA P 1/2 METHOD: 5.72 CM2
NEUTROPHILS # BLD AUTO: 5.3 K/UL (ref 1.8–7.7)
NEUTROPHILS NFR BLD: 62.2 % (ref 38–73)
NRBC BLD-RTO: 0 /100 WBC
PHOSPHATE SERPL-MCNC: 3.7 MG/DL (ref 2.7–4.5)
PISA TR MAX VEL: 3.25 M/S
PLATELET # BLD AUTO: 205 K/UL (ref 150–450)
PMV BLD AUTO: 11 FL (ref 9.2–12.9)
POTASSIUM SERPL-SCNC: 4 MMOL/L (ref 3.5–5.1)
RA MAJOR: 3.92 CM
RA PRESSURE: 8 MMHG
RA WIDTH: 4.29 CM
RBC # BLD AUTO: 4.95 M/UL (ref 4.6–6.2)
RIGHT VENTRICULAR END-DIASTOLIC DIMENSION: 4.91 CM
RV TISSUE DOPPLER FREE WALL SYSTOLIC VELOCITY 1 (APICAL 4 CHAMBER VIEW): 7.97 CM/S
SINUS: 3.38 CM
SODIUM SERPL-SCNC: 138 MMOL/L (ref 136–145)
STJ: 2.56 CM
TDI LATERAL: 0.02 M/S
TDI SEPTAL: 0.04 M/S
TDI: 0.03 M/S
TR MAX PG: 42 MMHG
TRICUSPID ANNULAR PLANE SYSTOLIC EXCURSION: 1.14 CM
TROPONIN I SERPL DL<=0.01 NG/ML-MCNC: 0.45 NG/ML (ref 0–0.03)
TROPONIN I SERPL DL<=0.01 NG/ML-MCNC: 0.47 NG/ML (ref 0–0.03)
TV REST PULMONARY ARTERY PRESSURE: 50 MMHG
WBC # BLD AUTO: 8.44 K/UL (ref 3.9–12.7)

## 2022-09-12 PROCEDURE — 99214 OFFICE O/P EST MOD 30 MIN: CPT | Mod: ,,, | Performed by: PSYCHIATRY & NEUROLOGY

## 2022-09-12 PROCEDURE — 83735 ASSAY OF MAGNESIUM: CPT

## 2022-09-12 PROCEDURE — 99226 PR SUBSEQUENT OBSERVATION CARE,LEVEL III: CPT | Mod: ,,, | Performed by: INTERNAL MEDICINE

## 2022-09-12 PROCEDURE — 36415 COLL VENOUS BLD VENIPUNCTURE: CPT

## 2022-09-12 PROCEDURE — G0378 HOSPITAL OBSERVATION PER HR: HCPCS

## 2022-09-12 PROCEDURE — 25000003 PHARM REV CODE 250: Performed by: PHYSICIAN ASSISTANT

## 2022-09-12 PROCEDURE — 63600175 PHARM REV CODE 636 W HCPCS: Performed by: INTERNAL MEDICINE

## 2022-09-12 PROCEDURE — 80048 BASIC METABOLIC PNL TOTAL CA: CPT

## 2022-09-12 PROCEDURE — 99226 PR SUBSEQUENT OBSERVATION CARE,LEVEL III: ICD-10-PCS | Mod: ,,, | Performed by: INTERNAL MEDICINE

## 2022-09-12 PROCEDURE — 85025 COMPLETE CBC W/AUTO DIFF WBC: CPT

## 2022-09-12 PROCEDURE — 99900035 HC TECH TIME PER 15 MIN (STAT)

## 2022-09-12 PROCEDURE — 96372 THER/PROPH/DIAG INJ SC/IM: CPT | Mod: 59 | Performed by: INTERNAL MEDICINE

## 2022-09-12 PROCEDURE — 84100 ASSAY OF PHOSPHORUS: CPT

## 2022-09-12 PROCEDURE — 96374 THER/PROPH/DIAG INJ IV PUSH: CPT | Mod: 59

## 2022-09-12 PROCEDURE — 25500020 PHARM REV CODE 255: Performed by: INTERNAL MEDICINE

## 2022-09-12 PROCEDURE — 84484 ASSAY OF TROPONIN QUANT: CPT

## 2022-09-12 PROCEDURE — 99214 PR OFFICE/OUTPT VISIT, EST, LEVL IV, 30-39 MIN: ICD-10-PCS | Mod: ,,, | Performed by: PSYCHIATRY & NEUROLOGY

## 2022-09-12 PROCEDURE — 99225 PR SUBSEQUENT OBSERVATION CARE,LEVEL II: ICD-10-PCS | Mod: GC,,, | Performed by: INTERNAL MEDICINE

## 2022-09-12 PROCEDURE — 94799 UNLISTED PULMONARY SVC/PX: CPT

## 2022-09-12 PROCEDURE — 99225 PR SUBSEQUENT OBSERVATION CARE,LEVEL II: CPT | Mod: GC,,, | Performed by: INTERNAL MEDICINE

## 2022-09-12 PROCEDURE — 84484 ASSAY OF TROPONIN QUANT: CPT | Mod: 91 | Performed by: PHYSICIAN ASSISTANT

## 2022-09-12 RX ORDER — EMPAGLIFLOZIN 10 MG/1
10 TABLET, FILM COATED ORAL DAILY
Qty: 30 TABLET | Refills: 11 | Status: SHIPPED | OUTPATIENT
Start: 2022-09-12 | End: 2022-10-11 | Stop reason: SDUPTHER

## 2022-09-12 RX ORDER — CLOPIDOGREL BISULFATE 75 MG/1
75 TABLET ORAL DAILY
Status: DISCONTINUED | OUTPATIENT
Start: 2022-09-12 | End: 2022-09-12

## 2022-09-12 RX ORDER — ASPIRIN 81 MG/1
81 TABLET ORAL DAILY
Status: DISCONTINUED | OUTPATIENT
Start: 2022-09-12 | End: 2022-09-13 | Stop reason: HOSPADM

## 2022-09-12 RX ORDER — ATORVASTATIN CALCIUM 20 MG/1
40 TABLET, FILM COATED ORAL NIGHTLY
Status: DISCONTINUED | OUTPATIENT
Start: 2022-09-12 | End: 2022-09-13 | Stop reason: HOSPADM

## 2022-09-12 RX ORDER — FUROSEMIDE 10 MG/ML
40 INJECTION INTRAMUSCULAR; INTRAVENOUS DAILY
Status: DISCONTINUED | OUTPATIENT
Start: 2022-09-12 | End: 2022-09-13 | Stop reason: HOSPADM

## 2022-09-12 RX ORDER — ENOXAPARIN SODIUM 100 MG/ML
40 INJECTION SUBCUTANEOUS EVERY 24 HOURS
Status: DISCONTINUED | OUTPATIENT
Start: 2022-09-12 | End: 2022-09-13 | Stop reason: HOSPADM

## 2022-09-12 RX ORDER — CARVEDILOL 3.12 MG/1
3.12 TABLET ORAL 2 TIMES DAILY WITH MEALS
Status: DISCONTINUED | OUTPATIENT
Start: 2022-09-12 | End: 2022-09-13 | Stop reason: HOSPADM

## 2022-09-12 RX ORDER — CLOPIDOGREL BISULFATE 75 MG/1
75 TABLET ORAL DAILY
Status: DISCONTINUED | OUTPATIENT
Start: 2022-09-12 | End: 2022-09-13 | Stop reason: HOSPADM

## 2022-09-12 RX ADMIN — ATORVASTATIN CALCIUM 40 MG: 20 TABLET, FILM COATED ORAL at 01:09

## 2022-09-12 RX ADMIN — CARVEDILOL 3.12 MG: 3.12 TABLET, FILM COATED ORAL at 05:09

## 2022-09-12 RX ADMIN — HUMAN ALBUMIN MICROSPHERES AND PERFLUTREN 0.11 MG: 10; .22 INJECTION, SOLUTION INTRAVENOUS at 10:09

## 2022-09-12 RX ADMIN — CARVEDILOL 3.12 MG: 3.12 TABLET, FILM COATED ORAL at 08:09

## 2022-09-12 RX ADMIN — ATORVASTATIN CALCIUM 40 MG: 20 TABLET, FILM COATED ORAL at 08:09

## 2022-09-12 RX ADMIN — FUROSEMIDE 40 MG: 10 INJECTION, SOLUTION INTRAMUSCULAR; INTRAVENOUS at 11:09

## 2022-09-12 RX ADMIN — ENOXAPARIN SODIUM 40 MG: 100 INJECTION SUBCUTANEOUS at 11:09

## 2022-09-12 RX ADMIN — ASPIRIN 81 MG: 81 TABLET, COATED ORAL at 08:09

## 2022-09-12 RX ADMIN — CLOPIDOGREL 75 MG: 75 TABLET, FILM COATED ORAL at 01:09

## 2022-09-12 NOTE — PLAN OF CARE
Patient free of falls/trauma.injuries this shift. Patient updated on plan of care, educated on pharmacological interventions. Patient verbalized understanding. Patient in no distress.

## 2022-09-12 NOTE — PROGRESS NOTES
[facility] Step[facility]  Cardiology  Progress Note    Patient Name: Sidney Lanza  MRN: [id number]  Admission Date: 9/11/2022  Hospital Length of Stay: 0 days  Code Status: Full Code   Attending Physician: Mihaela Barbour MD   Primary Care Physician: Sunny Soto MD  Expected Discharge Date:   Principal Problem:Dyspnea    Subjective:     Interval History: No longer aphasic. SBP mostly residents in low 100s. HR mostly in 80s. Troponin has trended down from 0.622 to 0.445. Repeat echo does not indicate presence of clot. Hgb and electrolytes are within normal limits.    Review of Systems   Constitutional: Positive for decreased appetite and weight loss.   HENT: Negative.     Eyes: Negative.    Cardiovascular:  Negative for chest pain, dyspnea on exertion, leg swelling, near-syncope, orthopnea, palpitations and syncope.   Respiratory:  Positive for shortness of breath. Negative for cough.    Endocrine: Negative.    Hematologic/Lymphatic: Negative.    Skin: Negative.    Musculoskeletal: Negative.    Gastrointestinal:  Positive for diarrhea.   Genitourinary: Negative.    Neurological:  Positive for dizziness, light-headedness and paresthesias. Negative for focal weakness, loss of balance and weakness.   Psychiatric/Behavioral: Negative.     Allergic/Immunologic: Negative.      Objective:     Vital Signs (Most Recent):  Temp: 97.5 °F (36.4 °C) (09/12/22 1115)  Pulse: 81 (09/12/22 1115)  Resp: 20 (09/12/22 1115)  BP: 105/66 (09/12/22 1115)  SpO2: 96 % (09/12/22 1115) Vital Signs (24h Range):  Temp:  [97.5 °F (36.4 °C)-98.2 °F (36.8 °C)] 97.5 °F (36.4 °C)  Pulse:  [] 81  Resp:  [15-20] 20  SpO2:  [93 %-100 %] 96 %  BP: (100-158)/(66-91) 105/66     Weight: 66.7 kg (147 lb)  Body mass index is 21.71 kg/m².    SpO2: 96 %  O2 Device (Oxygen Therapy): room air      Intake/Output Summary (Last 24 hours) at 9/12/2022 1513  Last data filed at 9/12/2022 1300  Gross per 24 hour   Intake 222 ml   Output 975 ml   Net -753  ml       Lines/Drains/Airways       Peripheral Intravenous Line  Duration                  Peripheral IV - Single Lumen 09/11/22 20 G Anterior;Left;Proximal Forearm 1 day                    Physical Exam  Constitutional:       General: He is not in acute distress.     Appearance: He is not ill-appearing or diaphoretic.   HENT:      Head: Normocephalic.      Mouth/Throat:      Mouth: Mucous membranes are moist.   Eyes:      Extraocular Movements: Extraocular movements intact.   Neck:      Comments: No JVD  Cardiovascular:      Rate and Rhythm: Normal rate and regular rhythm.      Pulses:           Radial pulses are 2+ on the right side and 2+ on the left side.        Femoral pulses are 2+ on the right side and 2+ on the left side.       Dorsalis pedis pulses are 2+ on the right side and 2+ on the left side.      Heart sounds:     No S3 sounds.   Pulmonary:      Effort: Pulmonary effort is normal.      Breath sounds: Rales (RLL) present.   Abdominal:      Palpations: Abdomen is soft.      Tenderness: There is no abdominal tenderness.   Musculoskeletal:      Cervical back: Neck supple.      Right lower leg: No edema.      Left lower leg: No edema.   Skin:     General: Skin is warm.   Neurological:      General: No focal deficit present.      Mental Status: He is alert and oriented to person, place, and time.       Significant Labs: BMP:   Recent Labs   Lab 09/11/22  1807 09/12/22  0552    79    138   K 5.3* 4.0    111*   CO2 18* 21*   BUN 26* 23   CREATININE 1.3 1.0   CALCIUM 9.4 8.9   MG  --  1.9     , CMP   Recent Labs   Lab 09/11/22  1807 09/12/22  0552    138   K 5.3* 4.0    111*   CO2 18* 21*    79   BUN 26* 23   CREATININE 1.3 1.0   CALCIUM 9.4 8.9   PROT 6.7  --    ALBUMIN 3.9  --    BILITOT 0.4  --    ALKPHOS 53*  --    AST 44*  --    ALT 47*  --    ANIONGAP 11 6*     , CBC   Recent Labs   Lab 09/11/22  1610 09/12/22  0552   WBC 8.06 8.44   HGB 13.3* 11.9*   HCT 40.2 35.5*     205     , INR   Recent Labs   Lab 09/11/22  1610 09/11/22  2054   INR 1.2 1.2     ,Troponin   Recent Labs   Lab 09/11/22  1953 09/12/22  0551 09/12/22  0552   TROPONINI 0.622* 0.473* 0.445*       Significant Imaging:   TTE 9/2/22   Severe left atrial enlargement.   The left ventricle is severely enlarged with severely decreased systolic function.   There is severe left ventricular global hypokinesis. The estimated ejection fraction is 20%.   The 3D quantitatively derived ejection fraction is 18%.   Grade II left ventricular diastolic dysfunction.   Mild right ventricular enlargement with normal right ventricular systolic function.   Mild mitral regurgitation.   Mild tricuspid regurgitation.   Intermediate central venous pressure (8 mmHg).   There is pulmonary hypertension. The estimated PA systolic pressure is 49 mmHg.   A short burst of tachycardia with a cycle length of 334 milliseconds was noted at the end of the study.    Assessment and Plan:     * Dyspnea  - D-dimer elevated but low suspicion for PE.   - Continue to monitor on telemetry and continuous pulse ox    CHARLES (acute kidney injury)  -Can restart entresto, spironolactone since K+ is within normal limits and Cr is back at baseline    Elevated troponin  Initial troponin 0.5->0.6. No chest discomfort. Atypical symptoms with SOB. Troponin trended down to 0.445.    - Recommend DAPT for TIA  - Trend troponin Q6 hr until plateau or peak  - If troponin flat and <1, can likely continue ischemic work-up with PET stress. Scheduled for 10/5 but will try to move   up in schedule  - Troponin has started to trend down so coronary angiogram with interventional cardiology not necessary at this point      Chronic systolic heart failure  Relatively new Dx on TTE last week. EF 20%. Euvolemic on exam. TSH WNL. Etiology of systolic dysfunction not yet determined.     - Hold Entresto, Spironolactone given CHARLES, hyperkalemia  - Can restart low dose Coreg  tomorrow if orthostatics negative  - Repeat limited TTE tomorrow with contrast to assess for LV thrombus  - Daily weights, strict I/Os  - Ischemic work-up pending trend in troponin     Episode of transient neurologic symptoms  Presenting with TIA and stroke work-up negative.    - Recommend DAPT with ASA/Clopidogrel  - Recommend Atorvastatin 40 mg daily with bilateral carotid artery disease noted on CTA    Hyperlipidemia  - Recommend starting Atorvastatin 40 mg daily (LDL 93 in 8/22)        VTE Risk Mitigation (From admission, onward)         Ordered     enoxaparin injection 40 mg  Daily         09/12/22 1107     IP VTE HIGH RISK PATIENT  Once         09/11/22 2140              Thank you for your consult. We will continue to follow patient. Please contact cardiology consult for any further questions.    Santiago Galarza MD  Cardiology  [facility] - [facility] Step[facility]

## 2022-09-12 NOTE — ASSESSMENT & PLAN NOTE
- Low suspicion, but would check D-dimer given weight loss, shortness or breath, and tachycardia.   - Monitor on telemetry and continuous pulse ox

## 2022-09-12 NOTE — ASSESSMENT & PLAN NOTE
Recent TTE on 9/2/22 with EF 20%, severe LAE, and LV hypokinesis   Home meds: Coreg, amlodipine, Lasix, entresto, aldactone  Repeat TTE with EF10%

## 2022-09-12 NOTE — ASSESSMENT & PLAN NOTE
- Vasc Neuro consulted; no acute intervention given MRI brain negative for acute stroke  - Start ASA, plavix, and atorvastatin given c/f TIA per cards recs  - Follow up TTE  - Neuro checks q4h

## 2022-09-12 NOTE — ASSESSMENT & PLAN NOTE
Patient c/o SOB, improved from yesterday  Satting between 94-96% on RA  NM lung scan ventilation perfusion scan with no perfusion mismatch.  CXR with cardiomegaly

## 2022-09-12 NOTE — ASSESSMENT & PLAN NOTE
Relatively new Dx on TTE last week. EF 20%. Euvolemic on exam. TSH WNL. Etiology of systolic dysfunction not yet determined.     - Hold Entresto, Spironolactone given CHARLES, hyperkalemia  - Can restart low dose Coreg tomorrow if orthostatics negative  - Repeat limited TTE tomorrow with contrast to assess for LV thrombus  - Daily weights, strict I/Os  - Ischemic work-up pending trend in troponin

## 2022-09-12 NOTE — ASSESSMENT & PLAN NOTE
High clinical suspicion for PE based on presentation, elevated D-dimer, and elevated trop; however unable to get CTA tonight due to already receiving IV contrast for MRI in ED.     - Satting 100% on RA  - Well's score: 4.5  - CXR pending  - BLE US ordered  - Will trial 20mg lasix IVP x1, further IV diuresis pending response  - Continuous pulse ox  - Will hold on staring heparin gtt for now until more results available but low threshold to start if becomes hypoxic or hemodynamically unstable   - Order CTA in AM

## 2022-09-12 NOTE — HPI
"Mr. Lanza is a 77 year old male with PMH of HTN, HLD, and HF. He presented to ED as stroke code for aphasia that started at 1515 when he was watching the [employer] game and playing cards. During this episode patient noted "tingling all over" but primarily in bilateral hands. When stroke team evaluated patient, he reports symptoms had resolved. He is back to his neurologic baseline. Patient says episode lasted 5 mins. Denies prior episodes of this in the past. Not a tpa candidate given patient back to baseline, NIHSS 0. CTA MP negative for LVO or high grade stenosis. Not an intervention candidate. TTE obtained on 9/2/22 (prior to today's episode) demonstrates EF 20%, severe LV global hypokinesis, and severe LAE. Patient does report SOB and some L shoulder discomfort associated with this SOB.     Cardiology consulted for elevated troponin after stroke work-up was negative. Troponin 0.5->0.6. EKG NSR without ischemic changes. EF 20% on TTE last week. No s/s heart failure. Elevated BNP, but on Entresto.  "

## 2022-09-12 NOTE — ASSESSMENT & PLAN NOTE
- D-dimer elevated but low suspicion for PE.   - Continue to monitor on telemetry and continuous pulse ox

## 2022-09-12 NOTE — ASSESSMENT & PLAN NOTE
- Vasc Neuro consulted; no acute intervention given MRI brain negative for acute stroke  - Follow up TTE  - Neuro checks q4h

## 2022-09-12 NOTE — ASSESSMENT & PLAN NOTE
- Appears compensated on physical exam  - BNP 1537, CXR pending  - IV diuresis trial as below  - Repeat TTE to eval for possible RH strain given c/f PE  - Continue GDMT  - Strict I/Os, daily weights

## 2022-09-12 NOTE — PLAN OF CARE
Pt maintained free from falls/trauma/injuries and skin breakdown. Pt denied pain or discomfort. NPO maintained since midnight ex meds at 0105. US obtained. Chest x-ray obtained. Plan of care reviewed. Pt verbalized understanding. All questions and concerns addressed. Will continue to monitor.

## 2022-09-12 NOTE — ASSESSMENT & PLAN NOTE
"77 year old male with PMH of HTN, HLD, and HF. He presented to ED for aphasia that started when he was watching the [employer] game and playing cards. During this episode patient noted "tingling all over" but primarily in bilateral hands. When stroke team evaluated patient, he reports symptoms had resolved. Patient says episode lasted <1 minute. Denies prior episodes of this in the past. Not a tpa candidate given patient back to baseline, NIHSS 0. CTA MP negative for LVO or high grade stenosis. Not an intervention candidate. TTE obtained on 9/2/22 (prior to today's episode) demonstrates EF 20%, severe LV global hypokinesis, and severe LAE.     -NIHSS 0. Neuro exam stable. MRI negative for acute infarct or hemorrhage. Trop 0.445. Echo with EF10%, severe LAE. Recommend continuing DAPT x 1 mo followed by ASA monotherapy. Defer decision for AC to cardiology.  We will sign off at this time. Thank you for your consult. Please do not hesitate to contact us for any questions or concerns.    "

## 2022-09-12 NOTE — PLAN OF CARE
SW spoke to Pt at bedside for initial discharge planning assessment. Pt lives with spouse who will transport Pt and assist after D/C. SW is following for post-acute planning needs.    Preferred Pharmacy is [facility].     [facility]  Case Management Department  [email or web address]    [facility]  Initial Discharge Assessment       Primary Care Provider: Sunny Soto MD    Admission Diagnosis: Dyspnea on exertion [R06.00]  Lung nodule [R91.1]  Stroke [I63.9]  Upper back pain on right side [M54.9]  Chest pain [R07.9]    Admission Date: 9/11/2022  Expected Discharge Date:     Discharge Barriers Identified: None    Payor: [employer] MANAGED MCARE / Plan: [employer] MEDICARE ADVANTAGE / Product Type: Medicare Advantage /     Extended Emergency Contact Information  Primary Emergency Contact: Aleyda Lanza  Address: [address] [place] [address]  Home Phone: [phone]  Mobile Phone: [phone]  Relation: Spouse    Discharge Plan A: Home with family  Discharge Plan B: Home with family      [facility] [address] [facility]  [address] [facility] [address]  Phone: [phone] Fax: [phone]    [facility] [address] [place] [address]  Phone: [phone] Fax: [phone]    [facility]  ([facility] - [place] [id number] [address]  Phone: [phone] Fax: [phone]      Initial Assessment (most recent)       Adult Discharge Assessment - 09/12/22 1441          Discharge Assessment    Assessment Type Discharge Planning Assessment     Confirmed/corrected address, phone number and insurance Yes     Confirmed Demographics Correct on Facesheet     Source of Information patient     Communicated GEETA with patient/caregiver Date not  available/Unable to determine     Lives With spouse     Facility Arrived From: Home     Do you expect to return to your current living situation? Yes     Do you have help at home or someone to help you manage your care at home? Yes     Who are your caregiver(s) and their phone number(s)? Spouse, Aleyda Lanza [phone]     Prior to hospitilization cognitive status: Unable to Assess     Current cognitive status: Alert/Oriented     Walking or Climbing Stairs Difficulty none     Dressing/Bathing Difficulty none     Readmission within 30 days? No     Patient currently being followed by outpatient case management? Unable to determine (comments)     Do you currently have service(s) that help you manage your care at home? No     Do you take prescription medications? Yes     Do you have prescription coverage? Yes     Coverage [employer]     Do you have any problems affording any of your prescribed medications? No     Is the patient taking medications as prescribed? yes     Who is going to help you get home at discharge? Spouse, Aleyda Lanza [phone]     How do you get to doctors appointments? family or friend will provide     Are you on dialysis? No     Do you take coumadin? No     Discharge Plan A Home with family     Discharge Plan B Home with family     DME Needed Upon Discharge  other (see comments)   TBD    Discharge Plan discussed with: Patient     Discharge Barriers Identified None        Relationship/Environment    Name(s) of Who Lives With Patient Spouse, Aleyda Lanza [phone]

## 2022-09-12 NOTE — ASSESSMENT & PLAN NOTE
Initial troponin 0.5->0.6. No chest discomfort. Atypical symptoms with SOB. Troponin trended down to 0.445.    - Recommend DAPT for TIA  - Trend troponin Q6 hr until plateau or peak  - If troponin flat and <1, can likely continue ischemic work-up with PET stress. Scheduled for 10/5 but will try to move   up in schedule  - Troponin has started to trend down so coronary angiogram with interventional cardiology not necessary at this point

## 2022-09-12 NOTE — HOSPITAL COURSE
9/12/2022: NIHSS0. Neuro exam stable. MRI negative for acute infarct or hemorrhage. Trop 0.445. Echo with EF10%, severe LAE. Recommend continuing DAPT x 1 mo followed by ASA monotherapy. Defer decision for AC to cardiology.  Followup with vascular neuro in 4-6 weeks from discharge.

## 2022-09-12 NOTE — CONSULTS
"[facility]  Cardiology  Consult Note    Patient Name: Sidney Lanza  MRN: [id number]  Admission Date: 9/11/2022  Hospital Length of Stay: 0 days  Code Status: No Order   Attending Provider: Mihaela Barbour MD   Consulting Provider: Mikhail Márquez MD  Primary Care Physician: Sunny oSto MD  Principal Problem:Episode of transient neurologic symptoms    Patient information was obtained from patient and ER records.     Inpatient consult to Cardiology  Consult performed by: Mikhail Márquez MD  Consult ordered by: Aster Cevallos MD        Subjective:     Chief Complaint:  Difficulty with speech, tingling in hands     HPI:   Mr. Lanza is a 77 year old male with PMH of HTN, HLD, and HF. He presented to ED as stroke code for aphasia that started at 1515 when he was watching the [employer] game and playing cards. During this episode patient noted "tingling all over" but primarily in bilateral hands. When stroke team evaluated patient, he reports symptoms had resolved. He is back to his neurologic baseline. Patient says episode lasted 5 mins. Denies prior episodes of this in the past. Not a tpa candidate given patient back to baseline, NIHSS 0. CTA MP negative for LVO or high grade stenosis. Not an intervention candidate. TTE obtained on 9/2/22 (prior to today's episode) demonstrates EF 20%, severe LV global hypokinesis, and severe LAE. Patient does report SOB and some L shoulder discomfort associated with this SOB.     Cardiology consulted for elevated troponin after stroke work-up was negative. Troponin 0.5->0.6. EKG NSR without ischemic changes. EF 20% on TTE last week. Started on low dose GDMT during visit with Dr Ayon and plans for outpt PET stress. No s/s heart failure. Elevated BNP, but on Entresto.      Past Medical History:   Diagnosis Date    Arthritis     Chronic rhinitis     Fever blister     Hyperlipidemia     Hypertension     Impotence of organic origin     Joint pain     Nuclear " sclerosis - Both Eyes 10/23/2013    Prostate cancer     Ulcer     NSAID related     Varicose vein of leg     bilateral legs       Past Surgical History:   Procedure Laterality Date    COLONOSCOPY N/A 6/13/2018    Procedure: COLONOSCOPY;  Surgeon: Adonis Stack MD;  Location: [facility] ENDO (4TH FLR);  Service: Endoscopy;  Laterality: N/A;    COLONOSCOPY N/A 4/28/2021    Procedure: COLONOSCOPY;  Surgeon: Steven Watson MD;  Location: [facility] ENDO (4TH FLR);  Service: Endoscopy;  Laterality: N/A;  4/25-[facility] uc-inst email-tb    EYE SURGERY      KNEE ARTHROSCOPY W/ DEBRIDEMENT      PROSTATE SURGERY  2006    for prostate cancer    SHOULDER ARTHROSCOPY Right 10/2017    RTC repair with patch    ULNAR NERVE TRANSPOSITION         Review of patient's allergies indicates:  No Known Allergies    No current facility-administered medications on file prior to encounter.     Current Outpatient Medications on File Prior to Encounter   Medication Sig    amLODIPine (NORVASC) 10 MG tablet TAKE 1 TABLET BY MOUTH ONCE DAILY    carvediloL (COREG) 3.125 MG tablet Take 1 tablet (3.125 mg total) by mouth 2 (two) times daily with meals.    diclofenac-misoprostol  mg-mcg (ARTHROTEC 75)  mg-mcg per tablet TAKE 1 TABLET BY MOUTH ONCE OR TWICE DAILY AS NEEDED  FOR ARTHRITIC PAIN    FIBER, DEXTRIN, ORAL Take 1 tablet by mouth every morning.    fluticasone propionate (FLONASE) 50 mcg/actuation nasal spray 2 sprays (100 mcg total) by Each Nostril route once daily. (Patient taking differently: 2 sprays by Each Nostril route as needed.)    furosemide (LASIX) 20 MG tablet Take 1 tablet (20 mg total) by mouth daily as needed. For leg swelling and or shortness of breath    glucosamine-chondroitin 500-400 mg tablet Take 2 tablets by mouth every morning.    multivitamin capsule Take 1 capsule by mouth every morning.     sacubitriL-valsartan (ENTRESTO) 24-26 mg per tablet Take 1 tablet by mouth 2 (two) times daily.     spironolactone (ALDACTONE) 25 MG tablet Take 1 tablet (25 mg total) by mouth once daily.     Family History       Problem Relation (Age of Onset)    Cancer Mother, Father    Diabetes Sister, Brother, Brother    Heart disease Father (74), Sister    Hypertension Brother          Tobacco Use    Smoking status: Former     Packs/day: 1.00     Years: 30.00     Pack years: 30.00     Types: Cigarettes     Quit date: 1988     Years since quittin.3    Smokeless tobacco: Never   Substance and Sexual Activity    Alcohol use: Yes     Alcohol/week: 28.0 standard drinks     Types: 28 Cans of beer per week     Comment: beer - 2 a night     Drug use: No    Sexual activity: Not on file     Review of Systems   Constitutional: Positive for decreased appetite and weight loss.   HENT: Negative.     Eyes: Negative.    Cardiovascular:  Negative for chest pain, dyspnea on exertion, leg swelling, near-syncope, orthopnea, palpitations and syncope.   Respiratory:  Positive for shortness of breath. Negative for cough.    Endocrine: Negative.    Hematologic/Lymphatic: Negative.    Skin: Negative.    Musculoskeletal: Negative.    Gastrointestinal:  Positive for diarrhea.   Genitourinary: Negative.    Neurological:  Positive for dizziness, light-headedness and paresthesias. Negative for focal weakness, loss of balance and weakness.   Psychiatric/Behavioral: Negative.     Allergic/Immunologic: Negative.    Objective:     Vital Signs (Most Recent):  Temp: 97.7 °F (36.5 °C) (22 1535)  Pulse: 90 (22)  Resp: 17 (22)  BP: 135/83 (22)  SpO2: 95 % (22) Vital Signs (24h Range):  Temp:  [97.7 °F (36.5 °C)] 97.7 °F (36.5 °C)  Pulse:  [] 90  Resp:  [15-17] 17  SpO2:  [95 %-100 %] 95 %  BP: (119-158)/(82-91) 135/83     Weight: 65.3 kg (144 lb)  Body mass index is 21.27 kg/m².    SpO2: 95 %       No intake or output data in the 24 hours ending 22    Lines/Drains/Airways        Peripheral Intravenous Line  Duration                  Peripheral IV - Single Lumen 09/11/22 20 G Anterior;Left;Proximal Forearm <1 day                    Physical Exam  Constitutional:       General: He is not in acute distress.     Appearance: He is not ill-appearing.   HENT:      Head: Normocephalic.      Mouth/Throat:      Mouth: Mucous membranes are moist.   Eyes:      Extraocular Movements: Extraocular movements intact.   Neck:      Comments: No JVD  Cardiovascular:      Rate and Rhythm: Regular rhythm. Tachycardia present.      Pulses:           Radial pulses are 2+ on the right side and 2+ on the left side.        Femoral pulses are 2+ on the right side and 2+ on the left side.       Dorsalis pedis pulses are 2+ on the right side and 2+ on the left side.      Heart sounds:     No S3 sounds.   Pulmonary:      Effort: Pulmonary effort is normal.      Breath sounds: Rales (RLL) present.   Abdominal:      Palpations: Abdomen is soft.      Tenderness: There is no abdominal tenderness.   Musculoskeletal:      Cervical back: Neck supple.      Right lower leg: No edema.      Left lower leg: No edema.   Skin:     General: Skin is warm.   Neurological:      General: No focal deficit present.      Mental Status: He is alert and oriented to person, place, and time.       Significant Labs: BMP:   Recent Labs   Lab 09/11/22  1807         K 5.3*      CO2 18*   BUN 26*   CREATININE 1.3   CALCIUM 9.4   , CMP   Recent Labs   Lab 09/11/22  1807      K 5.3*      CO2 18*      BUN 26*   CREATININE 1.3   CALCIUM 9.4   PROT 6.7   ALBUMIN 3.9   BILITOT 0.4   ALKPHOS 53*   AST 44*   ALT 47*   ANIONGAP 11   , CBC   Recent Labs   Lab 09/11/22  1610   WBC 8.06   HGB 13.3*   HCT 40.2      , INR   Recent Labs   Lab 09/11/22  1610   INR 1.2   , Lipid Panel No results for input(s): CHOL, HDL, LDLCALC, TRIG, CHOLHDL in the last 48 hours., and Troponin   Recent Labs   Lab 09/11/22  1807  09/11/22 1953   TROPONINI 0.513* 0.622*       Significant Imaging:   TTE 9/2/22   Severe left atrial enlargement.   The left ventricle is severely enlarged with severely decreased systolic function.   There is severe left ventricular global hypokinesis. The estimated ejection fraction is 20%.   The 3D quantitatively derived ejection fraction is 18%.   Grade II left ventricular diastolic dysfunction.   Mild right ventricular enlargement with normal right ventricular systolic function.   Mild mitral regurgitation.   Mild tricuspid regurgitation.   Intermediate central venous pressure (8 mmHg).   There is pulmonary hypertension. The estimated PA systolic pressure is 49 mmHg.   A short burst of tachycardia with a cycle length of 334 milliseconds was noted at the end of the study.    Assessment and Plan:     * Episode of transient neurologic symptoms  Presenting with TIA and stroke work-up negative.    - Recommend DAPT with ASA/Clopidogrel  - Recommend Atorvastatin 40 mg daily with bilateral carotid artery disease noted on CTA    Elevated troponin  Troponin 0.5->0.6. No chest discomfort. Atypical symptoms with SOB. EKG without ischemic changes.    - Recommend DAPT for TIA  - Trend troponin Q6 hr until plateau or peak  - If troponin flat and <1, can likely continue ischemic work-up with PET stress as outpt (already scheduled in a few weeks)  - If troponin continues to rise overnight, then will likely need coronary angiogram with interventional cardiology consult tomorrow  - Please keep NPO at MN empirically    Chronic systolic heart failure  Relatively new Dx on TTE last week. EF 20%. Euvolemic on exam. TSH WNL. Etiology of systolic dysfunction not yet determined.     - Hold Entresto, Spironolactone given CHARLES, hyperkalemia  - Can restart low dose Coreg tomorrow if orthostatics negative  - Repeat limited TTE tomorrow with contrast to assess for LV thrombus  - Daily weights, strict I/Os  - Ischemic work-up  pending trend in troponin     SOB (shortness of breath)  - Low suspicion, but would check D-dimer given weight loss, shortness or breath, and tachycardia.   - Monitor on telemetry and continuous pulse ox    Hyperlipidemia  - Recommend starting Atorvastatin 40 mg daily (LDL 93 in 8/22)        VTE Risk Mitigation (From admission, onward)    None        Case discussed with Dr Jaziel Irwin MD.     Thank you for your consult. I will follow-up with patient. Please contact us if you have any additional questions.    Mikhail Márquez MD  Cardiology   [facility]

## 2022-09-12 NOTE — ASSESSMENT & PLAN NOTE
- Troponin 0.513>>0.622  - Trend trop until peak  - Cardiology consulted; recommend holding on ACS protocol for now and potential stress in AM  - NPO at midnight as precaution  - EKG PRN chest pain

## 2022-09-12 NOTE — PROGRESS NOTES
"[facility]  Vascular Neurology  [facility]  Progress Note    Assessment/Plan:     * Dyspnea  Patient c/o SOB, improved from yesterday  Satting between 94-96% on RA  NM lung scan ventilation perfusion scan with no perfusion mismatch.  CXR with cardiomegaly    Episode of transient neurologic symptoms  77 year old male with PMH of HTN, HLD, and HF. He presented to ED for aphasia that started when he was watching the [employer] game and playing cards. During this episode patient noted "tingling all over" but primarily in bilateral hands. When stroke team evaluated patient, he reports symptoms had resolved. Patient says episode lasted <1 minute. Denies prior episodes of this in the past. Not a tpa candidate given patient back to baseline, NIHSS 0. CTA MP negative for LVO or high grade stenosis. Not an intervention candidate. TTE obtained on 9/2/22 (prior to today's episode) demonstrates EF 20%, severe LV global hypokinesis, and severe LAE.     -NIHSS 0. Neuro exam stable. MRI negative for acute infarct or hemorrhage. Trop 0.445. Echo with EF10%, severe LAE. Recommend continuing DAPT x 1 mo followed by ASA monotherapy. Defer decision for AC to cardiology.  We will sign off at this time. Thank you for your consult. Please do not hesitate to contact us for any questions or concerns.      Elevated troponin  Elevated trop on admit 0.622--> 0.473--> 0.445    Chronic combined systolic and diastolic congestive heart failure  Recent TTE on 9/2/22 with EF 20%, severe LAE, and LV hypokinesis   Home meds: Coreg, amlodipine, Lasix, entresto, aldactone  Repeat TTE with EF10%    Essential hypertension  Stroke RF   SBP <180  Home Meds: Coreg, amlodipine, Lasix, entresto, aldactone     Hyperlipidemia  Stroke RF   LDL 8/25/2022:  93.8  On atorvastatin 40         9/12/2022: NIHSS0. Neuro exam stable. MRI negative for acute infarct or hemorrhage. Trop 0.445. Echo with EF10%, severe LAE. Recommend continuing " DAPT x 1 mo followed by ASA monotherapy. Defer decision for AC to cardiology.  Followup with vascular neuro in 4-6 weeks from discharge.       STROKE DOCUMENTATION   Acute Stroke Times   Last Known Normal Date: 09/11/22  Last Known Normal Time: 1540 (Back to baseline, now is last known normal )  Symptom Onset Date: 09/11/22  Symptom Onset Time: 1515  Stroke Team Called Date: 09/11/22  Stroke Team Called Time: 1538  Stroke Team Arrival Date: 09/11/22  Stroke Team Arrival Time: 1540  CT Interpretation Time: 1544  Alteplase Recommended: No  CTA Interpretation Time: 1546  Thrombectomy Recommended: No    NIH Scale:  1a. Level of Consciousness: 0-->Alert, keenly responsive  1b. LOC Questions: 0-->Answers both questions correctly  1c. LOC Commands: 0-->Performs both tasks correctly  2. Best Gaze: 0-->Normal  3. Visual: 0-->No visual loss  4. Facial Palsy: 0-->Normal symmetrical movements  5a. Motor Arm, Left: 0-->No drift, limb holds 90 (or 45) degrees for full 10 secs  5b. Motor Arm, Right: 0-->No drift, limb holds 90 (or 45) degrees for full 10 secs  6a. Motor Leg, Left: 0-->No drift, leg holds 30 degree position for full 5 secs  6b. Motor Leg, Right: 0-->No drift, leg holds 30 degree position for full 5 secs  7. Limb Ataxia: 0-->Absent  8. Sensory: 0-->Normal, no sensory loss  9. Best Language: 0-->No aphasia, normal  10. Dysarthria: 0-->Normal  11. Extinction and Inattention (formerly Neglect): 0-->No abnormality  Total (NIH Stroke Scale): 0       Modified [place] Score: 0  [place] Coma Scale:    ABCD2 Score: 2  [id number] Score:   HAS -BLED Score:   ICH Score:   Hunt & Owen Classification:      Hemorrhagic change of an Ischemic Stroke: Does this patient have an ischemic stroke with hemorrhagic changes? No     Neurologic Chief Complaint: aphasia and tingling    Subjective:     Interval History: Patient is seen for follow-up neurological assessment and treatment recommendations:     NIHSS0. Neuro exam stable. MRI  negative for acute infarct or hemorrhage. Trop 0.445. Echo with EF10%, severe LAE. Recommend continuing DAPT x 1 mo followed by ASA monotherapy. Defer decision for AC to cardiology.  Followup with vascular neuro in 4-6 weeks from discharge.     HPI, Past Medical, Family, and Social History remains the same as documented in the initial encounter.     Review of Systems   Constitutional:  Negative for chills and fever.   HENT:  Negative for congestion and trouble swallowing.    Eyes:  Negative for visual disturbance.   Respiratory:  Negative for cough and shortness of breath.    Cardiovascular:  Negative for chest pain and leg swelling.   Gastrointestinal:  Negative for nausea and vomiting.   Genitourinary:  Negative for difficulty urinating and dysuria.   Musculoskeletal:  Positive for back pain. Negative for neck pain.   Skin:  Negative for pallor and rash.   Neurological:  Negative for dizziness, facial asymmetry, speech difficulty, weakness, light-headedness and headaches.   Psychiatric/Behavioral:  Negative for agitation, behavioral problems and confusion.    All other systems reviewed and are negative.  Scheduled Meds:   aspirin  81 mg Oral Daily    atorvastatin  40 mg Oral QHS    carvediloL  3.125 mg Oral BID WM    clopidogreL  75 mg Oral Daily    enoxaparin  40 mg Subcutaneous Daily    furosemide (LASIX) injection  40 mg Intravenous Daily     Continuous Infusions:  PRN Meds:acetaminophen, acetaminophen, albuterol-ipratropium, aluminum-magnesium hydroxide-simethicone, bisacodyL, dextrose 10%, dextrose 10%, glucagon (human recombinant), glucose, glucose, melatonin, naloxone, ondansetron, polyethylene glycol, prochlorperazine, simethicone, sodium chloride 0.9%    Objective:     Vital Signs (Most Recent):  Temp: 97.5 °F (36.4 °C) (09/12/22 1115)  Pulse: 81 (09/12/22 1115)  Resp: 20 (09/12/22 1115)  BP: 105/66 (09/12/22 1115)  SpO2: 96 % (09/12/22 1115)  BP Location: Right arm    Vital Signs Range (Last  24H):  Temp:  [97.5 °F (36.4 °C)-98.2 °F (36.8 °C)]   Pulse:  []   Resp:  [15-20]   BP: (100-158)/(66-91)   SpO2:  [93 %-100 %]   BP Location: Right arm    Physical Exam  Vitals and nursing note reviewed.   Constitutional:       General: He is not in acute distress.  HENT:      Head: Normocephalic and atraumatic.      Right Ear: External ear normal.      Left Ear: External ear normal.      Nose: Nose normal.      Mouth/Throat:      Mouth: Mucous membranes are moist.   Eyes:      Extraocular Movements: Extraocular movements intact.      Conjunctiva/sclera: Conjunctivae normal.   Cardiovascular:      Rate and Rhythm: Normal rate.   Pulmonary:      Effort: Pulmonary effort is normal. No respiratory distress.   Abdominal:      General: Abdomen is flat.   Musculoskeletal:      Right lower leg: No edema.      Left lower leg: No edema.   Skin:     General: Skin is warm and dry.   Neurological:      Mental Status: He is alert and oriented to person, place, and time.      Sensory: No sensory deficit.      Motor: No weakness.   Psychiatric:         Mood and Affect: Mood normal.         Behavior: Behavior normal.       Neurological Exam:   LOC: alert  Attention Span: Good   Language: No aphasia  Articulation: No dysarthria  Orientation: Person, Place, Time   Visual Fields: Full  EOM (CN III, IV, VI): Full/intact  Facial Sensation (CN V): Normal  Facial Movement (CN VII): Symmetric facial expression    Motor: Arm left  Normal 5/5  Leg left  Normal 5/5  Arm right  Normal 5/5  Leg right Normal 5/5  Sensation: Intact to light touch     Laboratory:  CMP:   Recent Labs   Lab 09/11/22  1807 09/12/22  0552   CALCIUM 9.4 8.9   ALBUMIN 3.9  --    PROT 6.7  --     138   K 5.3* 4.0   CO2 18* 21*    111*   BUN 26* 23   CREATININE 1.3 1.0   ALKPHOS 53*  --    ALT 47*  --    AST 44*  --    BILITOT 0.4  --      CBC:   Recent Labs   Lab 09/12/22  0552   WBC 8.44   RBC 4.95   HGB 11.9*   HCT 35.5*      MCV 72*   MCH  24.0*   MCHC 33.5     Lipid Panel: No results for input(s): CHOL, LDLCALC, HDL, TRIG in the last 168 hours.  Coagulation:   Recent Labs   Lab 09/11/22 2054   INR 1.2   APTT 25.1     Platelet Aggregation Study: No results for input(s): PLTAGG, PLTAGINTERP, PLTAGREGLACO, ADPPLTAGGREG in the last 168 hours.  Hgb A1C: No results for input(s): HGBA1C in the last 168 hours.  TSH:   Recent Labs   Lab 09/11/22  1610   TSH 0.671       Diagnostic Results     Brain imaging:  MRI Brain WO contrast 9/11/2022  No evidence of acute infarct or hemorrhage.     Chronic senescent and microvascular ischemic disease.  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     Vessel Imaging:  CTA MP 9/11/22   CTA head: Unremarkable CTA of the head specifically without evidence for proximal significant stenosis or occlusion.     CTA neck:   Atherosclerotic plaquing of the carotid bifurcations and proximal ICAs with less than 50% proximal ICA stenosis by NASCET criteria.     Incidental 6-7 mm subsolid nodule right upper lobe with prominent prevascular and questionable precarinal lymph nodes.  Further evaluation with dedicated CT thorax recommended.     CT head:   Patchy ill-defined decreased attenuation supratentorial white matter which is nonspecific and may be sequela of chronic ischemic change.  There is no evidence for acute intracranial hemorrhage or sulcal effacement to suggest large territory recent infarction. Small hypodensity left basal ganglia which may be prior infarction. Study is distorted by beam hardening artifacts.        Cardiac Evaluation:   TTE 9/12/2022   The left ventricle is severely enlarged with eccentric hypertrophy and severely decreased systolic function. The estimated ejection fraction is 10%.   Mild right ventricular enlargement with mildly to moderately reduced right ventricular systolic function.   Grade II left ventricular diastolic dysfunction.   Severe left atrial enlargement.   Mild mitral regurgitation.   There is  pulmonary hypertension. The estimated PA systolic pressure is 50 mmHg.   Intermediate central venous pressure (8 mmHg).    TTE 9/2/22    Severe left atrial enlargement.   The left ventricle is severely enlarged with severely decreased systolic function.   There is severe left ventricular global hypokinesis. The estimated ejection fraction is 20%.   The 3D quantitatively derived ejection fraction is 18%.   Grade II left ventricular diastolic dysfunction.   Mild right ventricular enlargement with normal right ventricular systolic function.   Mild mitral regurgitation.   Mild tricuspid regurgitation.   Intermediate central venous pressure (8 mmHg).   There is pulmonary hypertension. The estimated PA systolic pressure is 49 mmHg.   A short burst of tachycardia with a cycle length of 334 milliseconds was noted at the end of the study.      Adriane Trevino PA-C  [facility]  Department of Vascular Neurology   [facility]

## 2022-09-12 NOTE — SUBJECTIVE & OBJECTIVE
Past Medical History:   Diagnosis Date    Arthritis     Chronic rhinitis     Fever blister     Hyperlipidemia     Hypertension     Impotence of organic origin     Joint pain     Nuclear sclerosis - Both Eyes 10/23/2013    Prostate cancer     Ulcer     NSAID related     Varicose vein of leg     bilateral legs       Past Surgical History:   Procedure Laterality Date    COLONOSCOPY N/A 6/13/2018    Procedure: COLONOSCOPY;  Surgeon: Adonis Stack MD;  Location: [facility] ENDO (4TH FLR);  Service: Endoscopy;  Laterality: N/A;    COLONOSCOPY N/A 4/28/2021    Procedure: COLONOSCOPY;  Surgeon: Steven Watson MD;  [facility]  Service: Endoscopy;  Laterality: N/A;  4/25-[facility] uc-inst email-tb    EYE SURGERY      KNEE ARTHROSCOPY W/ DEBRIDEMENT      PROSTATE SURGERY  2006    for prostate cancer    SHOULDER ARTHROSCOPY Right 10/2017    RTC repair with patch    ULNAR NERVE TRANSPOSITION         Review of patient's allergies indicates:  No Known Allergies    No current facility-administered medications on file prior to encounter.     Current Outpatient Medications on File Prior to Encounter   Medication Sig    amLODIPine (NORVASC) 10 MG tablet TAKE 1 TABLET BY MOUTH ONCE DAILY    carvediloL (COREG) 3.125 MG tablet Take 1 tablet (3.125 mg total) by mouth 2 (two) times daily with meals.    diclofenac-misoprostol  mg-mcg (ARTHROTEC 75)  mg-mcg per tablet TAKE 1 TABLET BY MOUTH ONCE OR TWICE DAILY AS NEEDED  FOR ARTHRITIC PAIN    FIBER, DEXTRIN, ORAL Take 1 tablet by mouth every morning.    fluticasone propionate (FLONASE) 50 mcg/actuation nasal spray 2 sprays (100 mcg total) by Each Nostril route once daily. (Patient taking differently: 2 sprays by Each Nostril route as needed.)    furosemide (LASIX) 20 MG tablet Take 1 tablet (20 mg total) by mouth daily as needed. For leg swelling and or shortness of breath    glucosamine-chondroitin 500-400 mg tablet Take 2 tablets by mouth every morning.     multivitamin capsule Take 1 capsule by mouth every morning.     sacubitriL-valsartan (ENTRESTO) 24-26 mg per tablet Take 1 tablet by mouth 2 (two) times daily.    spironolactone (ALDACTONE) 25 MG tablet Take 1 tablet (25 mg total) by mouth once daily.     Family History       Problem Relation (Age of Onset)    Cancer Mother, Father    Diabetes Sister, Brother, Brother    Heart disease Father (74), Sister    Hypertension Brother          Tobacco Use    Smoking status: Former     Packs/day: 1.00     Years: 30.00     Pack years: 30.00     Types: Cigarettes     Quit date: 1988     Years since quittin.3    Smokeless tobacco: Never   Substance and Sexual Activity    Alcohol use: Yes     Alcohol/week: 28.0 standard drinks     Types: 28 Cans of beer per week     Comment: beer - 2 a night     Drug use: No    Sexual activity: Not on file     Review of Systems   Constitutional: Positive for decreased appetite and weight loss.   HENT: Negative.     Eyes: Negative.    Cardiovascular:  Negative for chest pain, dyspnea on exertion, leg swelling, near-syncope, orthopnea, palpitations and syncope.   Respiratory:  Positive for shortness of breath. Negative for cough.    Endocrine: Negative.    Hematologic/Lymphatic: Negative.    Skin: Negative.    Musculoskeletal: Negative.    Gastrointestinal:  Positive for diarrhea.   Genitourinary: Negative.    Neurological:  Positive for dizziness, light-headedness and paresthesias. Negative for focal weakness, loss of balance and weakness.   Psychiatric/Behavioral: Negative.     Allergic/Immunologic: Negative.    Objective:     Vital Signs (Most Recent):  Temp: 97.7 °F (36.5 °C) (22 1535)  Pulse: 90 (22)  Resp: 17 (22)  BP: 135/83 (22)  SpO2: 95 % (22) Vital Signs (24h Range):  Temp:  [97.7 °F (36.5 °C)] 97.7 °F (36.5 °C)  Pulse:  [] 90  Resp:  [15-17] 17  SpO2:  [95 %-100 %] 95 %  BP: (119-158)/(82-91) 135/83     Weight: 65.3 kg  (144 lb)  Body mass index is 21.27 kg/m².    SpO2: 95 %       No intake or output data in the 24 hours ending 09/11/22 2126    Lines/Drains/Airways       Peripheral Intravenous Line  Duration                  Peripheral IV - Single Lumen 09/11/22 20 G Anterior;Left;Proximal Forearm <1 day                    Physical Exam  Constitutional:       General: He is not in acute distress.     Appearance: He is not ill-appearing.   HENT:      Head: Normocephalic.      Mouth/Throat:      Mouth: Mucous membranes are moist.   Eyes:      Extraocular Movements: Extraocular movements intact.   Neck:      Comments: No JVD  Cardiovascular:      Rate and Rhythm: Regular rhythm. Tachycardia present.      Pulses:           Radial pulses are 2+ on the right side and 2+ on the left side.        Femoral pulses are 2+ on the right side and 2+ on the left side.       Dorsalis pedis pulses are 2+ on the right side and 2+ on the left side.      Heart sounds:     No S3 sounds.   Pulmonary:      Effort: Pulmonary effort is normal.      Breath sounds: Rales (RLL) present.   Abdominal:      Palpations: Abdomen is soft.      Tenderness: There is no abdominal tenderness.   Musculoskeletal:      Cervical back: Neck supple.      Right lower leg: No edema.      Left lower leg: No edema.   Skin:     General: Skin is warm.   Neurological:      General: No focal deficit present.      Mental Status: He is alert and oriented to person, place, and time.       Significant Labs: BMP:   Recent Labs   Lab 09/11/22  1807         K 5.3*      CO2 18*   BUN 26*   CREATININE 1.3   CALCIUM 9.4   , CMP   Recent Labs   Lab 09/11/22  1807      K 5.3*      CO2 18*      BUN 26*   CREATININE 1.3   CALCIUM 9.4   PROT 6.7   ALBUMIN 3.9   BILITOT 0.4   ALKPHOS 53*   AST 44*   ALT 47*   ANIONGAP 11   , CBC   Recent Labs   Lab 09/11/22  1610   WBC 8.06   HGB 13.3*   HCT 40.2      , INR   Recent Labs   Lab 09/11/22  1610   INR 1.2   ,  Lipid Panel No results for input(s): CHOL, HDL, LDLCALC, TRIG, CHOLHDL in the last 48 hours., and Troponin   Recent Labs   Lab 09/11/22  1807 09/11/22 1953   TROPONINI 0.513* 0.622*       Significant Imaging:   TTE 9/2/22  Severe left atrial enlargement.  The left ventricle is severely enlarged with severely decreased systolic function.  There is severe left ventricular global hypokinesis. The estimated ejection fraction is 20%.  The 3D quantitatively derived ejection fraction is 18%.  Grade II left ventricular diastolic dysfunction.  Mild right ventricular enlargement with normal right ventricular systolic function.  Mild mitral regurgitation.  Mild tricuspid regurgitation.  Intermediate central venous pressure (8 mmHg).  There is pulmonary hypertension. The estimated PA systolic pressure is 49 mmHg.  A short burst of tachycardia with a cycle length of 334 milliseconds was noted at the end of the study.

## 2022-09-12 NOTE — ASSESSMENT & PLAN NOTE
Presenting with TIA and stroke work-up negative.    - Recommend DAPT with ASA/Clopidogrel  - Recommend Atorvastatin 40 mg daily with bilateral carotid artery disease noted on CTA

## 2022-09-12 NOTE — ASSESSMENT & PLAN NOTE
- Cr 1.3, baseline 0.9  - Hold IVFs for now given EF 20% and encourage PO hydration  - Check UA  - avoid nephrotoxins, renally dose meds  - Serial BMPs

## 2022-09-12 NOTE — ASSESSMENT & PLAN NOTE
Troponin 0.5->0.6. No chest discomfort. Atypical symptoms with SOB. EKG without ischemic changes.    - Recommend DAPT for TIA  - Trend troponin Q6 hr until plateau or peak  - If troponin flat and <1, can likely continue ischemic work-up with PET stress as outpt (already scheduled)  - If troponin continues to rise overnight, then will likely need coronary angiogram with interventional cardiology  - Please keep NPO at MN empirically

## 2022-09-12 NOTE — ASSESSMENT & PLAN NOTE
Concern for PE based on presentation, elevated D-dimer, and elevated trop; however unable to get CTA tonight due to already receiving IV contrast for MRI in ED.   - Satting 100% on RA  - Well's score: 4.5  - start enoxaparin 40 mg subq as patient is improving  - VQ scan showed low probability of PE

## 2022-09-12 NOTE — H&P
"[facility] - Emergency Dept  [facility] Medicine  History & Physical    Patient Name: Sidney Lanza  MRN: [id number]  Patient Class: OP- Observation  Admission Date: 9/11/2022  Attending Physician: Mihaela Barbour MD   Primary Care Provider: Sunny Stoo MD         Patient information was obtained from patient, past medical records and ER records.     Subjective:     Principal Problem:Dyspnea    Chief Complaint:   Chief Complaint   Patient presents with    Aphasia     Slurred speech,right arm tingling began at 1515 today.         HPI: Sidney Lanza is a 77 y.o. male with a PMHx of HTN, HLD, CHF presents to the ED for concern of stroke. He experienced aphasia and bilateral arm tingling that started while playing cards and watching football earlier tonight. Patient was brought to the ED by his friends and stated his symptoms resolved by the time of arrival. Stroke code activated in the ED, MRI negative, shows remote infarct. Additionally, patient states he has had dyspnea for 3 weeks that initially occurred only upon exertion but now occurs at rest as well. Endorses associated chest pain/pressure that only occurs with deep inhalation. He has been adjusting his breathing and leaning forward to alleviate the pleuritic pain. Denies orthopnea but he does experience more "pressure" in his right lung when he lays down on his R side which resolves when lying on his left side. Patient denies any long car/plane rides or prolonged immobility. Denies fever, chills, N/V, abdominal pain, LE swelling/pain, or syncope.     Of note, patient was recently diagnosed with CHF with EF of 20% and was started on entresto, aldactone, coreg and lasix. Coreg dose was halved due to adverse side effects, aldactone was discontinued due to multiple episodes of diarrhea.     ED: /84, , afebrile, SpO2 96% on RA. CMP: K 5.3, Cr 1.3. BNP 1537, Troponin 0.622. MRI Brain w/o contrast: No evidence of acute infarct or hemorrhage. CT Stroke: " negative for acute infarct or hemorrhage. Vascular neuro consulted; no acute intervention warranted. Cardiology consulted and do not recommend starting ACS protocol at this time.      Past Medical History:   Diagnosis Date    Arthritis     Chronic rhinitis     Fever blister     Hyperlipidemia     Hypertension     Impotence of organic origin     Joint pain     Nuclear sclerosis - Both Eyes 10/23/2013    Prostate cancer     Ulcer     NSAID related     Varicose vein of leg     bilateral legs       Past Surgical History:   Procedure Laterality Date    COLONOSCOPY N/A 6/13/2018    Procedure: COLONOSCOPY;  Surgeon: Adonis Stack MD;  Location: [facility] ENDO ([facility]  Service: Endoscopy;  Laterality: N/A;    COLONOSCOPY N/A 4/28/2021    Procedure: COLONOSCOPY;  Surgeon: Steven Watson MD;  Location: [facility] ENDO ([facility]);  Service: Endoscopy;  Laterality: N/A;  4/25[facility] uc-inst email-tb    EYE SURGERY      KNEE ARTHROSCOPY W/ DEBRIDEMENT      PROSTATE SURGERY  2006    for prostate cancer    SHOULDER ARTHROSCOPY Right 10/2017    RTC repair with patch    ULNAR NERVE TRANSPOSITION         Review of patient's allergies indicates:  No Known Allergies    No current facility-administered medications on file prior to encounter.     Current Outpatient Medications on File Prior to Encounter   Medication Sig    amLODIPine (NORVASC) 10 MG tablet TAKE 1 TABLET BY MOUTH ONCE DAILY    carvediloL (COREG) 3.125 MG tablet Take 1 tablet (3.125 mg total) by mouth 2 (two) times daily with meals.    diclofenac-misoprostol  mg-mcg (ARTHROTEC 75)  mg-mcg per tablet TAKE 1 TABLET BY MOUTH ONCE OR TWICE DAILY AS NEEDED  FOR ARTHRITIC PAIN    FIBER, DEXTRIN, ORAL Take 1 tablet by mouth every morning.    fluticasone propionate (FLONASE) 50 mcg/actuation nasal spray 2 sprays (100 mcg total) by Each Nostril route once daily. (Patient taking differently: 2 sprays by Each Nostril route as needed.)    furosemide (LASIX) 20 MG tablet  Take 1 tablet (20 mg total) by mouth daily as needed. For leg swelling and or shortness of breath    glucosamine-chondroitin 500-400 mg tablet Take 2 tablets by mouth every morning.    multivitamin capsule Take 1 capsule by mouth every morning.     sacubitriL-valsartan (ENTRESTO) 24-26 mg per tablet Take 1 tablet by mouth 2 (two) times daily.    spironolactone (ALDACTONE) 25 MG tablet Take 1 tablet (25 mg total) by mouth once daily.     Family History       Problem Relation (Age of Onset)    Cancer Mother, Father    Diabetes Sister, Brother, Brother    Heart disease Father (74), Sister    Hypertension Brother          Tobacco Use    Smoking status: Former     Packs/day: 1.00     Years: 30.00     Pack years: 30.00     Types: Cigarettes     Quit date: 1988     Years since quittin.3    Smokeless tobacco: Never   Substance and Sexual Activity    Alcohol use: Yes     Alcohol/week: 28.0 standard drinks     Types: 28 Cans of beer per week     Comment: beer - 2 a night     Drug use: No    Sexual activity: Not on file     Review of Systems   Constitutional:  Positive for unexpected weight change (15 lb weight loss in 1 yr). Negative for activity change, chills and fever.   HENT:  Negative for congestion, trouble swallowing and voice change.    Eyes:  Negative for photophobia and visual disturbance.   Respiratory:  Positive for shortness of breath. Negative for cough, chest tightness and wheezing.    Cardiovascular:  Positive for chest pain. Negative for palpitations and leg swelling.   Gastrointestinal:  Negative for abdominal pain, constipation, diarrhea, nausea and vomiting.   Genitourinary:  Negative for dysuria, frequency, hematuria and urgency.   Musculoskeletal:  Negative for arthralgias, back pain and gait problem.   Skin:  Negative for color change and rash.   Neurological:  Positive for speech difficulty and numbness (BUEs). Negative for dizziness, syncope, weakness, light-headedness and headaches.    Psychiatric/Behavioral:  Negative for agitation and confusion. The patient is not nervous/anxious.    Objective:     Vital Signs (Most Recent):  Temp: 97.7 °F (36.5 °C) (09/11/22 1535)  Pulse: 83 (09/11/22 2202)  Resp: 15 (09/11/22 2202)  BP: 100/70 (09/11/22 2202)  SpO2: 95 % (09/11/22 2202) Vital Signs (24h Range):  Temp:  [97.7 °F (36.5 °C)] 97.7 °F (36.5 °C)  Pulse:  [] 83  Resp:  [15-17] 15  SpO2:  [95 %-100 %] 95 %  BP: (100-158)/(70-91) 100/70     Weight: 65.3 kg (144 lb)  Body mass index is 21.27 kg/m².    Physical Exam  Vitals and nursing note reviewed.   Constitutional:       General: He is not in acute distress.     Appearance: He is well-developed.   HENT:      Head: Normocephalic and atraumatic.      Mouth/Throat:      Pharynx: No oropharyngeal exudate.   Eyes:      General: No scleral icterus.     Extraocular Movements: Extraocular movements intact.      Conjunctiva/sclera: Conjunctivae normal.   Neck:      Vascular: No JVD.   Cardiovascular:      Rate and Rhythm: Normal rate and regular rhythm.      Heart sounds: Normal heart sounds.   Pulmonary:      Effort: Pulmonary effort is normal. No respiratory distress.      Breath sounds: Normal breath sounds. No wheezing.   Abdominal:      General: Bowel sounds are normal. There is no distension.      Palpations: Abdomen is soft.      Tenderness: There is no abdominal tenderness.   Musculoskeletal:         General: No tenderness. Normal range of motion.      Cervical back: Normal range of motion and neck supple.      Right lower leg: No edema.      Left lower leg: No edema.   Lymphadenopathy:      Cervical: No cervical adenopathy.   Skin:     General: Skin is warm and dry.      Capillary Refill: Capillary refill takes less than 2 seconds.      Findings: No rash.   Neurological:      Mental Status: He is alert and oriented to person, place, and time.      Cranial Nerves: No cranial nerve deficit.      Sensory: No sensory deficit.      Coordination:  Coordination normal.   Psychiatric:         Behavior: Behavior normal.         Thought Content: Thought content normal.         Judgment: Judgment normal.           Significant Labs: All pertinent labs within the past 24 hours have been reviewed.  CBC:   Recent Labs   Lab 09/11/22  1552 09/11/22  1610   WBC  --  8.06   HGB  --  13.3*   HCT 47 40.2   PLT  --  218     CMP:   Recent Labs   Lab 09/11/22  1807      K 5.3*      CO2 18*      BUN 26*   CREATININE 1.3   CALCIUM 9.4   PROT 6.7   ALBUMIN 3.9   BILITOT 0.4   ALKPHOS 53*   AST 44*   ALT 47*   ANIONGAP 11       Significant Imaging: I have reviewed all pertinent imaging results/findings within the past 24 hours.  MRI Brain Without Contrast  Narrative: EXAMINATION:  MRI BRAIN WITHOUT CONTRAST    CLINICAL HISTORY:  Transient ischemic attack (TIA);new neuro;    TECHNIQUE:  Multiplanar multisequence MR imaging of the brain was performed without contrast.    COMPARISON:  CTA 09/11/2022    FINDINGS:  Brain appears normally formed.  Ventricles and sulci appear appropriate for patient's age, noting cavum septum pellucidum.  No evidence of hydrocephalus.    No evidence of acute infarct or hemorrhage.  Remote infarct within the left caudate.  There is periventricular T2 FLAIR signal hyperintensities consistent with chronic microvascular ischemic disease.    No extra-axial blood or fluid collections.  Major T2 flow voids are preserved.    Calvarial marrow appears within normal limits.  Patchy opacification of the ethmoid air cells and maxillary sinuses.  Partial mastoid effusions.  Small T2 hyperintense focus within the right parotid gland possibly small cyst.  Impression: No evidence of acute infarct or hemorrhage.    Chronic senescent and microvascular ischemic disease.  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    Electronically signed by resident: Den Bui  Date:    09/11/2022  Time:    17:02    Electronically signed by: Teodoro Silvestre  MD  Date:    09/11/2022  Time:    17:15  CTA STROKE MULTI-PHASE  Narrative: EXAMINATION:  CTA STROKE MULTI-PHASE    CLINICAL HISTORY:  Neuro deficit, acute, stroke suspected;    TECHNIQUE:  5 mm axial images of the head pre contrast with 0.625 mm axial CTA images of the head neck post-contrast.  Coronal and sagittal MPR and MIP imaging was performed 75 ml of Omnipaque 350 contrast was injected intravenously.  Please note study was performed in multiphase technique with 3 arterial passes through the head    COMPARISON:  None    FINDINGS:  CT head  without contrast: There is no evidence for acute intracranial hemorrhage or sulcal effacement.  There is patchy ill-defined decreased attenuation supratentorial white matter which is nonspecific and may be sequela of chronic ischemic change.    CTA head:    Anterior circulation: The bilateral distal cervical, petrous, cavernous, and supraclinoid segments of the ICAs are patent without significant focal stenosis or aneurysm.    The anterior and middle cerebral arteries are patent without focal stenosis or aneurysm.    Posterior circulation: The distal vertebral arteries, basilar artery and posterior cerebral arteries are patent without focal stenosis or aneurysm.    CTA neck: Common origin the right brachiocephalic and left common carotid artery and origin of the left subclavian artery from the arch are within normal limits.    Atherosclerotic plaquing at the origin of the right vertebral artery from the subclavian artery with mild narrowing left vertebral artery origin within normal limits.  The left vertebral artery slightly hypoplastic.  Right vertebral artery is dominant.  Vertebral arteries are otherwise patent throughout their course without significant focal stenosis..    Right carotid: The right common carotid artery, carotid bifurcation and extracranial portions of the internal carotid artery are patent without significant focal stenosis.    Left carotid: The left  common carotid artery, carotid bifurcation and extracranial portions of the internal carotid arteries are patent without significant focal stenosis.    Atherosclerotic plaquing carotid bifurcations and proximal ICAs with less than 50% proximal ICA stenosis by NASCET criteria.    There is tortuosity of the cervical ICAs with the narrowing of the left proximal cervical ICA related to tortuosity.    Pharynx/larynx: Evaluation distorted by scatter artifact from dental metal and motion allowing for artifacts the nasopharynx, and oropharynx are grossly within normal limits with subcentimeter left tonsillith.  The hypopharynx larynx and trachea are unremarkable.    Glands: Bilateral parotid and submandibular glands are within normal limits. Thyroid gland is unremarkable.    No evidence for adenopathy throughout the neck by size criteria.    Degenerative change of the cervical spine with reversal normal cervical lordosis centered at the C3/C4 level.  No evidence for acute fracture cervical spine.  Grade 1 anterolisthesis of C2 on C3 with grade 1 retrolisthesis of C4 on C5.  Subpleural emphysematous change of the lungs.  Questioned right apical sub solid nodule measuring 6-7 mm image 87 series 304.  In addition there is partially visualized prominent prevascular and questioned precarinal lymph nodes with prevascular lymph node measuring approximately 1.0 cm short access.    Clinical correlation and further evaluation with CT thorax advised.    This report was flagged in Epic as abnormal.  Impression: CTA head: Unremarkable CTA of the head specifically without evidence for proximal significant stenosis or occlusion.    CTA neck: Atherosclerotic plaquing of the carotid bifurcations and proximal ICAs with less than 50% proximal ICA stenosis by NASCET criteria.    Incidental 6-7 mm subsolid nodule right upper lobe with prominent prevascular and questionable precarinal lymph nodes.  Further evaluation with dedicated CT thorax  recommended.    CT head: Patchy ill-defined decreased attenuation supratentorial white matter which is nonspecific and may be sequela of chronic ischemic change.  There is no evidence for acute intracranial hemorrhage or sulcal effacement to suggest large territory recent infarction.    Small hypodensity left basal ganglia which may be prior infarction.    Study is distorted by beam hardening artifacts.    Further evaluation with MRI as warranted if patient compatible.    Electronically signed by: Zach Sanchez DO  Date:    09/11/2022  Time:    16:17      Assessment/Plan:     * Dyspnea  Concern for PE based on presentation, elevated D-dimer, and elevated trop; however unable to get CTA tonight due to already receiving IV contrast for MRI in ED.     - Satting 100% on RA  - Well's score: 4.5  - CXR pending  - BLE US ordered  - Will hold on staring heparin gtt for now until more results available but low threshold to start if becomes hypoxic or hemodynamically unstable   - Order CTA in AM    Chronic combined systolic and diastolic congestive heart failure  - Appears compensated on physical exam  - BNP 1537, CXR pending  - Repeat TTE to eval for possible RH strain given c/f PE  - Continue BB, hold entresto given CHARLES  - Strict I/Os, daily weights    Elevated troponin  - Troponin 0.513>>0.622  - Trend trop until peak  - Cardiology consulted; recommend starting DAPT given TIA but hold on heparin gtt   - Will need interventional cards consult in AM if trop continues to rise  - NPO at midnight as precaution  - EKG PRN chest pain    Episode of transient neurologic symptoms  - Vasc Neuro consulted; no acute intervention given MRI brain negative for acute stroke  - Start ASA, plavix, and atorvastatin given c/f TIA per cards recs  - Follow up TTE  - Neuro checks q4h    CHARLES (acute kidney injury)  - Cr 1.3, baseline 0.9  - Hold IVFs for now given EF 20% and encourage PO hydration  - Check UA  - avoid nephrotoxins, renally dose  meds  - Serial BMPs    Essential hypertension  - Continue coreg 3.125 mg PO BID   - Hold entresto given CHARLES    Hyperlipidemia  - LDL 93 in 8/22  - Start atorvastatin 40mg daily     VTE Risk Mitigation (From admission, onward)           Ordered     IP VTE HIGH RISK PATIENT  Once         09/11/22 0989                       Isaura Lundberg PA-C  Department of Hospital Medicine   [facility] - Emergency Dept

## 2022-09-12 NOTE — ASSESSMENT & PLAN NOTE
EF 10%  Mild right ventricular enlargement with moderately reduced right ventricular systolic function  Severe left atrial enlargement  Pulmonary HTN (PASP 50 mmHg)  - BNP 1537  - Continue BB, hold entresto given CHARLES  - Strict I/Os, daily weights  - intermediate central venous pressure 8 mmHg  - start furosemide 80 mg IV daily

## 2022-09-12 NOTE — HPI
"Sidney Lanza is a 77 y.o. male with a PMHx of HTN, HLD, CHF presents to the ED for concern of stroke. He experienced aphasia and bilateral arm tingling that started while playing cards and watching football earlier tonight. Patient was brought to the ED by his friends and stated his symptoms resolved by the time of arrival. Stroke code activated in the ED, MRI negative, shows remote infarct. Additionally, patient states he has had dyspnea for 3 weeks that initially occurred only upon exertion but now occurs at rest as well. Endorses associated chest pain/pressure that only occurs with deep inhalation. He has been adjusting his breathing and leaning forward to alleviate the pleuritic pain. Denies orthopnea but he does experience more "pressure" in his right lung when he lays down on his R side which resolves when lying on his left side. Patient denies any long car/plane rides or prolonged immobility. Denies fever, chills, N/V, abdominal pain, LE swelling/pain, or syncope.     Of note, patient was recently diagnosed with CHF with EF of 20% and was started on entresto, aldactone, coreg and lasix. Coreg dose was halved due to adverse side effects, aldactone was discontinued due to multiple episodes of diarrhea.     ED: /84, , afebrile, SpO2 96% on RA. CMP: K 5.3, Cr 1.3. BNP 1537, Troponin 0.622. MRI Brain w/o contrast: No evidence of acute infarct or hemorrhage. CT Stroke: negative for acute infarct or hemorrhage. Vascular neuro consulted; no acute intervention warranted. Cardiology consulted and do not recommend starting ACS protocol at this time.  "

## 2022-09-12 NOTE — SUBJECTIVE & OBJECTIVE
Interval History: No longer aphasic. SBP mostly residents in low 100s. HR mostly in 80s. Troponin has trended down from 0.622 to 0.445. Repeat echo does not indicate presence of clot. Hgb and electrolytes are within normal limits.    Review of Systems   Constitutional: Positive for decreased appetite and weight loss.   HENT: Negative.     Eyes: Negative.    Cardiovascular:  Negative for chest pain, dyspnea on exertion, leg swelling, near-syncope, orthopnea, palpitations and syncope.   Respiratory:  Positive for shortness of breath. Negative for cough.    Endocrine: Negative.    Hematologic/Lymphatic: Negative.    Skin: Negative.    Musculoskeletal: Negative.    Gastrointestinal:  Positive for diarrhea.   Genitourinary: Negative.    Neurological:  Positive for dizziness, light-headedness and paresthesias. Negative for focal weakness, loss of balance and weakness.   Psychiatric/Behavioral: Negative.     Allergic/Immunologic: Negative.      Objective:     Vital Signs (Most Recent):  Temp: 97.5 °F (36.4 °C) (09/12/22 1115)  Pulse: 81 (09/12/22 1115)  Resp: 20 (09/12/22 1115)  BP: 105/66 (09/12/22 1115)  SpO2: 96 % (09/12/22 1115) Vital Signs (24h Range):  Temp:  [97.5 °F (36.4 °C)-98.2 °F (36.8 °C)] 97.5 °F (36.4 °C)  Pulse:  [] 81  Resp:  [15-20] 20  SpO2:  [93 %-100 %] 96 %  BP: (100-158)/(66-91) 105/66     Weight: 66.7 kg (147 lb)  Body mass index is 21.71 kg/m².    SpO2: 96 %  O2 Device (Oxygen Therapy): room air      Intake/Output Summary (Last 24 hours) at 9/12/2022 1513  Last data filed at 9/12/2022 1300  Gross per 24 hour   Intake 222 ml   Output 975 ml   Net -753 ml       Lines/Drains/Airways       Peripheral Intravenous Line  Duration                  Peripheral IV - Single Lumen 09/11/22 20 G Anterior;Left;Proximal Forearm 1 day                    Physical Exam  Constitutional:       General: He is not in acute distress.     Appearance: He is not ill-appearing or diaphoretic.   HENT:      Head:  Normocephalic.      Mouth/Throat:      Mouth: Mucous membranes are moist.   Eyes:      Extraocular Movements: Extraocular movements intact.   Neck:      Comments: No JVD  Cardiovascular:      Rate and Rhythm: Normal rate and regular rhythm.      Pulses:           Radial pulses are 2+ on the right side and 2+ on the left side.        Femoral pulses are 2+ on the right side and 2+ on the left side.       Dorsalis pedis pulses are 2+ on the right side and 2+ on the left side.      Heart sounds:     No S3 sounds.   Pulmonary:      Effort: Pulmonary effort is normal.      Breath sounds: Rales (RLL) present.   Abdominal:      Palpations: Abdomen is soft.      Tenderness: There is no abdominal tenderness.   Musculoskeletal:      Cervical back: Neck supple.      Right lower leg: No edema.      Left lower leg: No edema.   Skin:     General: Skin is warm.   Neurological:      General: No focal deficit present.      Mental Status: He is alert and oriented to person, place, and time.       Significant Labs: BMP:   Recent Labs   Lab 09/11/22 1807 09/12/22  0552    79    138   K 5.3* 4.0    111*   CO2 18* 21*   BUN 26* 23   CREATININE 1.3 1.0   CALCIUM 9.4 8.9   MG  --  1.9     , CMP   Recent Labs   Lab 09/11/22  1807 09/12/22  0552    138   K 5.3* 4.0    111*   CO2 18* 21*    79   BUN 26* 23   CREATININE 1.3 1.0   CALCIUM 9.4 8.9   PROT 6.7  --    ALBUMIN 3.9  --    BILITOT 0.4  --    ALKPHOS 53*  --    AST 44*  --    ALT 47*  --    ANIONGAP 11 6*     , CBC   Recent Labs   Lab 09/11/22  1610 09/12/22  0552   WBC 8.06 8.44   HGB 13.3* 11.9*   HCT 40.2 35.5*    205     , INR   Recent Labs   Lab 09/11/22 1610 09/11/22  2054   INR 1.2 1.2     ,Troponin   Recent Labs   Lab 09/11/22 1953 09/12/22  0551 09/12/22  0552   TROPONINI 0.622* 0.473* 0.445*       Significant Imaging:   TTE 9/2/22  Severe left atrial enlargement.  The left ventricle is severely enlarged with severely decreased  systolic function.  There is severe left ventricular global hypokinesis. The estimated ejection fraction is 20%.  The 3D quantitatively derived ejection fraction is 18%.  Grade II left ventricular diastolic dysfunction.  Mild right ventricular enlargement with normal right ventricular systolic function.  Mild mitral regurgitation.  Mild tricuspid regurgitation.  Intermediate central venous pressure (8 mmHg).  There is pulmonary hypertension. The estimated PA systolic pressure is 49 mmHg.  A short burst of tachycardia with a cycle length of 334 milliseconds was noted at the end of the study.

## 2022-09-12 NOTE — SUBJECTIVE & OBJECTIVE
Past Medical History:   Diagnosis Date    Arthritis     Chronic rhinitis     Fever blister     Hyperlipidemia     Hypertension     Impotence of organic origin     Joint pain     Nuclear sclerosis - Both Eyes 10/23/2013    Prostate cancer     Ulcer     NSAID related     Varicose vein of leg     bilateral legs       Past Surgical History:   Procedure Laterality Date    COLONOSCOPY N/A 6/13/2018    Procedure: COLONOSCOPY;  Surgeon: Adonis Stack MD;  Location: [facility] ENDO (4TH FLR);  Service: Endoscopy;  Laterality: N/A;    COLONOSCOPY N/A 4/28/2021    Procedure: COLONOSCOPY;  Surgeon: Steven Watson MD;  [facility]  Service: Endoscopy;  Laterality: N/A;  4/25-[facility] uc-inst [id number]    EYE SURGERY      KNEE ARTHROSCOPY W/ DEBRIDEMENT      PROSTATE SURGERY  2006    for prostate cancer    SHOULDER ARTHROSCOPY Right 10/2017    RTC repair with patch    ULNAR NERVE TRANSPOSITION         Review of patient's allergies indicates:  No Known Allergies    No current facility-administered medications on file prior to encounter.     Current Outpatient Medications on File Prior to Encounter   Medication Sig    amLODIPine (NORVASC) 10 MG tablet TAKE 1 TABLET BY MOUTH ONCE DAILY    carvediloL (COREG) 3.125 MG tablet Take 1 tablet (3.125 mg total) by mouth 2 (two) times daily with meals.    diclofenac-misoprostol  mg-mcg (ARTHROTEC 75)  mg-mcg per tablet TAKE 1 TABLET BY MOUTH ONCE OR TWICE DAILY AS NEEDED  FOR ARTHRITIC PAIN    FIBER, DEXTRIN, ORAL Take 1 tablet by mouth every morning.    fluticasone propionate (FLONASE) 50 mcg/actuation nasal spray 2 sprays (100 mcg total) by Each Nostril route once daily. (Patient taking differently: 2 sprays by Each Nostril route as needed.)    furosemide (LASIX) 20 MG tablet Take 1 tablet (20 mg total) by mouth daily as needed. For leg swelling and or shortness of breath    glucosamine-chondroitin 500-400 mg tablet Take 2 tablets by mouth every morning.     multivitamin capsule Take 1 capsule by mouth every morning.     sacubitriL-valsartan (ENTRESTO) 24-26 mg per tablet Take 1 tablet by mouth 2 (two) times daily.    spironolactone (ALDACTONE) 25 MG tablet Take 1 tablet (25 mg total) by mouth once daily.     Family History       Problem Relation (Age of Onset)    Cancer Mother, Father    Diabetes Sister, Brother, Brother    Heart disease Father (74), Sister    Hypertension Brother          Tobacco Use    Smoking status: Former     Packs/day: 1.00     Years: 30.00     Pack years: 30.00     Types: Cigarettes     Quit date: 1988     Years since quittin.3    Smokeless tobacco: Never   Substance and Sexual Activity    Alcohol use: Yes     Alcohol/week: 28.0 standard drinks     Types: 28 Cans of beer per week     Comment: beer - 2 a night     Drug use: No    Sexual activity: Not on file     Review of Systems   Constitutional:  Positive for unexpected weight change (15 lb weight loss in 1 yr). Negative for activity change, chills and fever.   HENT:  Negative for congestion, trouble swallowing and voice change.    Eyes:  Negative for photophobia and visual disturbance.   Respiratory:  Positive for shortness of breath. Negative for cough, chest tightness and wheezing.    Cardiovascular:  Positive for chest pain. Negative for palpitations and leg swelling.   Gastrointestinal:  Negative for abdominal pain, constipation, diarrhea, nausea and vomiting.   Genitourinary:  Negative for dysuria, frequency, hematuria and urgency.   Musculoskeletal:  Negative for arthralgias, back pain and gait problem.   Skin:  Negative for color change and rash.   Neurological:  Positive for speech difficulty and numbness (BUEs). Negative for dizziness, syncope, weakness, light-headedness and headaches.   Psychiatric/Behavioral:  Negative for agitation and confusion. The patient is not nervous/anxious.    Objective:     Vital Signs (Most Recent):  Temp: 97.7 °F (36.5 °C) (22  1535)  Pulse: 83 (09/11/22 2202)  Resp: 15 (09/11/22 2202)  BP: 100/70 (09/11/22 2202)  SpO2: 95 % (09/11/22 2202) Vital Signs (24h Range):  Temp:  [97.7 °F (36.5 °C)] 97.7 °F (36.5 °C)  Pulse:  [] 83  Resp:  [15-17] 15  SpO2:  [95 %-100 %] 95 %  BP: (100-158)/(70-91) 100/70     Weight: 65.3 kg (144 lb)  Body mass index is 21.27 kg/m².    Physical Exam  Vitals and nursing note reviewed.   Constitutional:       General: He is not in acute distress.     Appearance: He is well-developed.   HENT:      Head: Normocephalic and atraumatic.      Mouth/Throat:      Pharynx: No oropharyngeal exudate.   Eyes:      General: No scleral icterus.     Extraocular Movements: Extraocular movements intact.      Conjunctiva/sclera: Conjunctivae normal.   Neck:      Vascular: No JVD.   Cardiovascular:      Rate and Rhythm: Normal rate and regular rhythm.      Heart sounds: Normal heart sounds.   Pulmonary:      Effort: Pulmonary effort is normal. No respiratory distress.      Breath sounds: Normal breath sounds. No wheezing.   Abdominal:      General: Bowel sounds are normal. There is no distension.      Palpations: Abdomen is soft.      Tenderness: There is no abdominal tenderness.   Musculoskeletal:         General: No tenderness. Normal range of motion.      Cervical back: Normal range of motion and neck supple.      Right lower leg: No edema.      Left lower leg: No edema.   Lymphadenopathy:      Cervical: No cervical adenopathy.   Skin:     General: Skin is warm and dry.      Capillary Refill: Capillary refill takes less than 2 seconds.      Findings: No rash.   Neurological:      Mental Status: He is alert and oriented to person, place, and time.      Cranial Nerves: No cranial nerve deficit.      Sensory: No sensory deficit.      Coordination: Coordination normal.   Psychiatric:         Behavior: Behavior normal.         Thought Content: Thought content normal.         Judgment: Judgment normal.           Significant Labs:  All pertinent labs within the past 24 hours have been reviewed.  CBC:   Recent Labs   Lab 09/11/22  1552 09/11/22  1610   WBC  --  8.06   HGB  --  13.3*   HCT 47 40.2   PLT  --  218     CMP:   Recent Labs   Lab 09/11/22  1807      K 5.3*      CO2 18*      BUN 26*   CREATININE 1.3   CALCIUM 9.4   PROT 6.7   ALBUMIN 3.9   BILITOT 0.4   ALKPHOS 53*   AST 44*   ALT 47*   ANIONGAP 11       Significant Imaging: I have reviewed all pertinent imaging results/findings within the past 24 hours.  MRI Brain Without Contrast  Narrative: EXAMINATION:  MRI BRAIN WITHOUT CONTRAST    CLINICAL HISTORY:  Transient ischemic attack (TIA);new neuro;    TECHNIQUE:  Multiplanar multisequence MR imaging of the brain was performed without contrast.    COMPARISON:  CTA 09/11/2022    FINDINGS:  Brain appears normally formed.  Ventricles and sulci appear appropriate for patient's age, noting cavum septum pellucidum.  No evidence of hydrocephalus.    No evidence of acute infarct or hemorrhage.  Remote infarct within the left caudate.  There is periventricular T2 FLAIR signal hyperintensities consistent with chronic microvascular ischemic disease.    No extra-axial blood or fluid collections.  Major T2 flow voids are preserved.    Calvarial marrow appears within normal limits.  Patchy opacification of the ethmoid air cells and maxillary sinuses.  Partial mastoid effusions.  Small T2 hyperintense focus within the right parotid gland possibly small cyst.  Impression: No evidence of acute infarct or hemorrhage.    Chronic senescent and microvascular ischemic disease.  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    Electronically signed by resident: Den Bui  Date:    09/11/2022  Time:    17:02    Electronically signed by: Teodoro Silvestre MD  Date:    09/11/2022  Time:    17:15  CTA STROKE MULTI-PHASE  Narrative: EXAMINATION:  CTA STROKE MULTI-PHASE    CLINICAL HISTORY:  Neuro deficit, acute, stroke suspected;    TECHNIQUE:  5 mm  axial images of the head pre contrast with 0.625 mm axial CTA images of the head neck post-contrast.  Coronal and sagittal MPR and MIP imaging was performed 75 ml of Omnipaque 350 contrast was injected intravenously.  Please note study was performed in multiphase technique with 3 arterial passes through the head    COMPARISON:  None    FINDINGS:  CT head  without contrast: There is no evidence for acute intracranial hemorrhage or sulcal effacement.  There is patchy ill-defined decreased attenuation supratentorial white matter which is nonspecific and may be sequela of chronic ischemic change.    CTA head:    Anterior circulation: The bilateral distal cervical, petrous, cavernous, and supraclinoid segments of the ICAs are patent without significant focal stenosis or aneurysm.    The anterior and middle cerebral arteries are patent without focal stenosis or aneurysm.    Posterior circulation: The distal vertebral arteries, basilar artery and posterior cerebral arteries are patent without focal stenosis or aneurysm.    CTA neck: Common origin the right brachiocephalic and left common carotid artery and origin of the left subclavian artery from the arch are within normal limits.    Atherosclerotic plaquing at the origin of the right vertebral artery from the subclavian artery with mild narrowing left vertebral artery origin within normal limits.  The left vertebral artery slightly hypoplastic.  Right vertebral artery is dominant.  Vertebral arteries are otherwise patent throughout their course without significant focal stenosis..    Right carotid: The right common carotid artery, carotid bifurcation and extracranial portions of the internal carotid artery are patent without significant focal stenosis.    Left carotid: The left common carotid artery, carotid bifurcation and extracranial portions of the internal carotid arteries are patent without significant focal stenosis.    Atherosclerotic plaquing carotid  bifurcations and proximal ICAs with less than 50% proximal ICA stenosis by NASCET criteria.    There is tortuosity of the cervical ICAs with the narrowing of the left proximal cervical ICA related to tortuosity.    Pharynx/larynx: Evaluation distorted by scatter artifact from dental metal and motion allowing for artifacts the nasopharynx, and oropharynx are grossly within normal limits with subcentimeter left tonsillith.  The hypopharynx larynx and trachea are unremarkable.    Glands: Bilateral parotid and submandibular glands are within normal limits. Thyroid gland is unremarkable.    No evidence for adenopathy throughout the neck by size criteria.    Degenerative change of the cervical spine with reversal normal cervical lordosis centered at the C3/C4 level.  No evidence for acute fracture cervical spine.  Grade 1 anterolisthesis of C2 on C3 with grade 1 retrolisthesis of C4 on C5.  Subpleural emphysematous change of the lungs.  Questioned right apical sub solid nodule measuring 6-7 mm image 87 series 304.  In addition there is partially visualized prominent prevascular and questioned precarinal lymph nodes with prevascular lymph node measuring approximately 1.0 cm short access.    Clinical correlation and further evaluation with CT thorax advised.    This report was flagged in Epic as abnormal.  Impression: CTA head: Unremarkable CTA of the head specifically without evidence for proximal significant stenosis or occlusion.    CTA neck: Atherosclerotic plaquing of the carotid bifurcations and proximal ICAs with less than 50% proximal ICA stenosis by NASCET criteria.    Incidental 6-7 mm subsolid nodule right upper lobe with prominent prevascular and questionable precarinal lymph nodes.  Further evaluation with dedicated CT thorax recommended.    CT head: Patchy ill-defined decreased attenuation supratentorial white matter which is nonspecific and may be sequela of chronic ischemic change.  There is no evidence for  acute intracranial hemorrhage or sulcal effacement to suggest large territory recent infarction.    Small hypodensity left basal ganglia which may be prior infarction.    Study is distorted by beam hardening artifacts.    Further evaluation with MRI as warranted if patient compatible.    Electronically signed by: Zach Sanchez DO  Date:    09/11/2022  Time:    16:17

## 2022-09-12 NOTE — SUBJECTIVE & OBJECTIVE
Neurologic Chief Complaint: aphasia and tingling    Subjective:     Interval History: Patient is seen for follow-up neurological assessment and treatment recommendations:     NIHSS0. Neuro exam stable. MRI negative for acute infarct or hemorrhage. Trop 0.445. Echo with EF10%, severe LAE. Recommend continuing DAPT x 1 mo followed by ASA monotherapy. Defer decision for AC to cardiology.  Followup with vascular neuro in 4-6 weeks from discharge.     HPI, Past Medical, Family, and Social History remains the same as documented in the initial encounter.     Review of Systems   Constitutional:  Negative for chills and fever.   HENT:  Negative for congestion and trouble swallowing.    Eyes:  Negative for visual disturbance.   Respiratory:  Negative for cough and shortness of breath.    Cardiovascular:  Negative for chest pain and leg swelling.   Gastrointestinal:  Negative for nausea and vomiting.   Genitourinary:  Negative for difficulty urinating and dysuria.   Musculoskeletal:  Positive for back pain. Negative for neck pain.   Skin:  Negative for pallor and rash.   Neurological:  Negative for dizziness, facial asymmetry, speech difficulty, weakness, light-headedness and headaches.   Psychiatric/Behavioral:  Negative for agitation, behavioral problems and confusion.    All other systems reviewed and are negative.  Scheduled Meds:   aspirin  81 mg Oral Daily    atorvastatin  40 mg Oral QHS    carvediloL  3.125 mg Oral BID WM    clopidogreL  75 mg Oral Daily    enoxaparin  40 mg Subcutaneous Daily    furosemide (LASIX) injection  40 mg Intravenous Daily     Continuous Infusions:  PRN Meds:acetaminophen, acetaminophen, albuterol-ipratropium, aluminum-magnesium hydroxide-simethicone, bisacodyL, dextrose 10%, dextrose 10%, glucagon (human recombinant), glucose, glucose, melatonin, naloxone, ondansetron, polyethylene glycol, prochlorperazine, simethicone, sodium chloride 0.9%    Objective:     Vital Signs (Most Recent):  Temp:  97.5 °F (36.4 °C) (09/12/22 1115)  Pulse: 81 (09/12/22 1115)  Resp: 20 (09/12/22 1115)  BP: 105/66 (09/12/22 1115)  SpO2: 96 % (09/12/22 1115)  BP Location: Right arm    Vital Signs Range (Last 24H):  Temp:  [97.5 °F (36.4 °C)-98.2 °F (36.8 °C)]   Pulse:  []   Resp:  [15-20]   BP: (100-158)/(66-91)   SpO2:  [93 %-100 %]   BP Location: Right arm    Physical Exam  Vitals and nursing note reviewed.   Constitutional:       General: He is not in acute distress.  HENT:      Head: Normocephalic and atraumatic.      Right Ear: External ear normal.      Left Ear: External ear normal.      Nose: Nose normal.      Mouth/Throat:      Mouth: Mucous membranes are moist.   Eyes:      Extraocular Movements: Extraocular movements intact.      Conjunctiva/sclera: Conjunctivae normal.   Cardiovascular:      Rate and Rhythm: Normal rate.   Pulmonary:      Effort: Pulmonary effort is normal. No respiratory distress.   Abdominal:      General: Abdomen is flat.   Musculoskeletal:      Right lower leg: No edema.      Left lower leg: No edema.   Skin:     General: Skin is warm and dry.   Neurological:      Mental Status: He is alert and oriented to person, place, and time.      Sensory: No sensory deficit.      Motor: No weakness.   Psychiatric:         Mood and Affect: Mood normal.         Behavior: Behavior normal.       Neurological Exam:   LOC: alert  Attention Span: Good   Language: No aphasia  Articulation: No dysarthria  Orientation: Person, Place, Time   Visual Fields: Full  EOM (CN III, IV, VI): Full/intact  Facial Sensation (CN V): Normal  Facial Movement (CN VII): Symmetric facial expression    Motor: Arm left  Normal 5/5  Leg left  Normal 5/5  Arm right  Normal 5/5  Leg right Normal 5/5  Sensation: Intact to light touch     Laboratory:  CMP:   Recent Labs   Lab 09/11/22  1807 09/12/22  0552   CALCIUM 9.4 8.9   ALBUMIN 3.9  --    PROT 6.7  --     138   K 5.3* 4.0   CO2 18* 21*    111*   BUN 26* 23   CREATININE  1.3 1.0   ALKPHOS 53*  --    ALT 47*  --    AST 44*  --    BILITOT 0.4  --      CBC:   Recent Labs   Lab 09/12/22  0552   WBC 8.44   RBC 4.95   HGB 11.9*   HCT 35.5*      MCV 72*   MCH 24.0*   MCHC 33.5     Lipid Panel: No results for input(s): CHOL, LDLCALC, HDL, TRIG in the last 168 hours.  Coagulation:   Recent Labs   Lab 09/11/22 2054   INR 1.2   APTT 25.1     Platelet Aggregation Study: No results for input(s): PLTAGG, PLTAGINTERP, PLTAGREGLACO, ADPPLTAGGREG in the last 168 hours.  Hgb A1C: No results for input(s): HGBA1C in the last 168 hours.  TSH:   Recent Labs   Lab 09/11/22  1610   TSH 0.671       Diagnostic Results     Brain imaging:  MRI Brain WO contrast 9/11/2022  No evidence of acute infarct or hemorrhage.     Chronic senescent and microvascular ischemic disease.  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     Vessel Imaging:  CTA MP 9/11/22   CTA head: Unremarkable CTA of the head specifically without evidence for proximal significant stenosis or occlusion.     CTA neck:   Atherosclerotic plaquing of the carotid bifurcations and proximal ICAs with less than 50% proximal ICA stenosis by NASCET criteria.     Incidental 6-7 mm subsolid nodule right upper lobe with prominent prevascular and questionable precarinal lymph nodes.  Further evaluation with dedicated CT thorax recommended.     CT head:   Patchy ill-defined decreased attenuation supratentorial white matter which is nonspecific and may be sequela of chronic ischemic change.  There is no evidence for acute intracranial hemorrhage or sulcal effacement to suggest large territory recent infarction. Small hypodensity left basal ganglia which may be prior infarction. Study is distorted by beam hardening artifacts.        Cardiac Evaluation:   TTE 9/12/2022  The left ventricle is severely enlarged with eccentric hypertrophy and severely decreased systolic function. The estimated ejection fraction is 10%.  Mild right ventricular enlargement with mildly  to moderately reduced right ventricular systolic function.  Grade II left ventricular diastolic dysfunction.  Severe left atrial enlargement.  Mild mitral regurgitation.  There is pulmonary hypertension. The estimated PA systolic pressure is 50 mmHg.  Intermediate central venous pressure (8 mmHg).    TTE 9/2/22   Severe left atrial enlargement.  The left ventricle is severely enlarged with severely decreased systolic function.  There is severe left ventricular global hypokinesis. The estimated ejection fraction is 20%.  The 3D quantitatively derived ejection fraction is 18%.  Grade II left ventricular diastolic dysfunction.  Mild right ventricular enlargement with normal right ventricular systolic function.  Mild mitral regurgitation.  Mild tricuspid regurgitation.  Intermediate central venous pressure (8 mmHg).  There is pulmonary hypertension. The estimated PA systolic pressure is 49 mmHg.  A short burst of tachycardia with a cycle length of 334 milliseconds was noted at the end of the study.

## 2022-09-13 ENCOUNTER — CLINICAL SUPPORT (OUTPATIENT)
Dept: CARDIOLOGY | Facility: HOSPITAL | Age: 77
End: 2022-09-13
Attending: INTERNAL MEDICINE
Payer: MEDICARE

## 2022-09-13 VITALS
OXYGEN SATURATION: 93 % | DIASTOLIC BLOOD PRESSURE: 74 MMHG | HEIGHT: 69 IN | WEIGHT: 147 LBS | RESPIRATION RATE: 17 BRPM | TEMPERATURE: 97 F | BODY MASS INDEX: 21.77 KG/M2 | HEART RATE: 80 BPM | SYSTOLIC BLOOD PRESSURE: 109 MMHG

## 2022-09-13 LAB
ANION GAP SERPL CALC-SCNC: 9 MMOL/L (ref 8–16)
BASOPHILS # BLD AUTO: 0.04 K/UL (ref 0–0.2)
BASOPHILS NFR BLD: 0.5 % (ref 0–1.9)
BUN SERPL-MCNC: 25 MG/DL (ref 8–23)
CALCIUM SERPL-MCNC: 8.9 MG/DL (ref 8.7–10.5)
CHLORIDE SERPL-SCNC: 110 MMOL/L (ref 95–110)
CO2 SERPL-SCNC: 21 MMOL/L (ref 23–29)
CREAT SERPL-MCNC: 1.1 MG/DL (ref 0.5–1.4)
DIFFERENTIAL METHOD: ABNORMAL
EOSINOPHIL # BLD AUTO: 0.1 K/UL (ref 0–0.5)
EOSINOPHIL NFR BLD: 1 % (ref 0–8)
ERYTHROCYTE [DISTWIDTH] IN BLOOD BY AUTOMATED COUNT: 16.2 % (ref 11.5–14.5)
EST. GFR  (NO RACE VARIABLE): >60 ML/MIN/1.73 M^2
GLUCOSE SERPL-MCNC: 84 MG/DL (ref 70–110)
HCT VFR BLD AUTO: 35.7 % (ref 40–54)
HGB BLD-MCNC: 12.1 G/DL (ref 14–18)
IMM GRANULOCYTES # BLD AUTO: 0.02 K/UL (ref 0–0.04)
IMM GRANULOCYTES NFR BLD AUTO: 0.2 % (ref 0–0.5)
LYMPHOCYTES # BLD AUTO: 2.8 K/UL (ref 1–4.8)
LYMPHOCYTES NFR BLD: 34.8 % (ref 18–48)
MAGNESIUM SERPL-MCNC: 1.9 MG/DL (ref 1.6–2.6)
MCH RBC QN AUTO: 23.8 PG (ref 27–31)
MCHC RBC AUTO-ENTMCNC: 33.9 G/DL (ref 32–36)
MCV RBC AUTO: 70 FL (ref 82–98)
MONOCYTES # BLD AUTO: 0.6 K/UL (ref 0.3–1)
MONOCYTES NFR BLD: 7.1 % (ref 4–15)
NEUTROPHILS # BLD AUTO: 4.5 K/UL (ref 1.8–7.7)
NEUTROPHILS NFR BLD: 56.4 % (ref 38–73)
NRBC BLD-RTO: 0 /100 WBC
PHOSPHATE SERPL-MCNC: 3.8 MG/DL (ref 2.7–4.5)
PLATELET # BLD AUTO: 174 K/UL (ref 150–450)
PMV BLD AUTO: 10.3 FL (ref 9.2–12.9)
POTASSIUM SERPL-SCNC: 4 MMOL/L (ref 3.5–5.1)
RBC # BLD AUTO: 5.08 M/UL (ref 4.6–6.2)
SODIUM SERPL-SCNC: 140 MMOL/L (ref 136–145)
WBC # BLD AUTO: 8.04 K/UL (ref 3.9–12.7)

## 2022-09-13 PROCEDURE — 80048 BASIC METABOLIC PNL TOTAL CA: CPT

## 2022-09-13 PROCEDURE — 36415 COLL VENOUS BLD VENIPUNCTURE: CPT

## 2022-09-13 PROCEDURE — 96376 TX/PRO/DX INJ SAME DRUG ADON: CPT

## 2022-09-13 PROCEDURE — 63600175 PHARM REV CODE 636 W HCPCS: Performed by: INTERNAL MEDICINE

## 2022-09-13 PROCEDURE — 85025 COMPLETE CBC W/AUTO DIFF WBC: CPT

## 2022-09-13 PROCEDURE — 84100 ASSAY OF PHOSPHORUS: CPT

## 2022-09-13 PROCEDURE — 99217 PR OBSERVATION CARE DISCHARGE: CPT | Mod: ,,, | Performed by: INTERNAL MEDICINE

## 2022-09-13 PROCEDURE — 25000003 PHARM REV CODE 250: Performed by: PHYSICIAN ASSISTANT

## 2022-09-13 PROCEDURE — G0378 HOSPITAL OBSERVATION PER HR: HCPCS

## 2022-09-13 PROCEDURE — 99225 PR SUBSEQUENT OBSERVATION CARE,LEVEL II: ICD-10-PCS | Mod: GC,,, | Performed by: INTERNAL MEDICINE

## 2022-09-13 PROCEDURE — 99225 PR SUBSEQUENT OBSERVATION CARE,LEVEL II: CPT | Mod: GC,,, | Performed by: INTERNAL MEDICINE

## 2022-09-13 PROCEDURE — 93270 REMOTE 30 DAY ECG REV/REPORT: CPT

## 2022-09-13 PROCEDURE — 93272 CARDIAC EVENT MONITOR (CUPID ONLY): ICD-10-PCS | Mod: ,,, | Performed by: INTERNAL MEDICINE

## 2022-09-13 PROCEDURE — 93272 ECG/REVIEW INTERPRET ONLY: CPT | Mod: ,,, | Performed by: INTERNAL MEDICINE

## 2022-09-13 PROCEDURE — 83735 ASSAY OF MAGNESIUM: CPT

## 2022-09-13 PROCEDURE — 99217 PR OBSERVATION CARE DISCHARGE: ICD-10-PCS | Mod: ,,, | Performed by: INTERNAL MEDICINE

## 2022-09-13 RX ORDER — ATORVASTATIN CALCIUM 40 MG/1
40 TABLET, FILM COATED ORAL NIGHTLY
Qty: 90 TABLET | Refills: 3 | Status: SHIPPED | OUTPATIENT
Start: 2022-09-13 | End: 2022-10-11 | Stop reason: SDUPTHER

## 2022-09-13 RX ORDER — ASPIRIN 81 MG/1
81 TABLET ORAL DAILY
Qty: 30 TABLET | Refills: 0 | Status: SHIPPED | OUTPATIENT
Start: 2022-09-14 | End: 2022-10-11 | Stop reason: SDUPTHER

## 2022-09-13 RX ORDER — CLOPIDOGREL BISULFATE 75 MG/1
75 TABLET ORAL DAILY
Qty: 30 TABLET | Refills: 11 | Status: SHIPPED | OUTPATIENT
Start: 2022-09-14 | End: 2022-10-11 | Stop reason: ALTCHOICE

## 2022-09-13 RX ORDER — FLUTICASONE PROPIONATE 50 MCG
2 SPRAY, SUSPENSION (ML) NASAL
Start: 2022-09-13 | End: 2022-12-28

## 2022-09-13 RX ADMIN — CLOPIDOGREL 75 MG: 75 TABLET, FILM COATED ORAL at 09:09

## 2022-09-13 RX ADMIN — FUROSEMIDE 40 MG: 10 INJECTION, SOLUTION INTRAMUSCULAR; INTRAVENOUS at 09:09

## 2022-09-13 RX ADMIN — ASPIRIN 81 MG: 81 TABLET, COATED ORAL at 09:09

## 2022-09-13 RX ADMIN — CARVEDILOL 3.12 MG: 3.12 TABLET, FILM COATED ORAL at 09:09

## 2022-09-13 NOTE — ASSESSMENT & PLAN NOTE
Relatively new Dx on TTE last week. EF 20%. Euvolemic on exam. TSH WNL. Etiology of systolic dysfunction not yet determined.     - Restart Entresto, Spironolactone   - Requires outpatient follow up with advanced heart failure  - Start SGLT2, GDMT  - Stop amlodipine

## 2022-09-13 NOTE — ASSESSMENT & PLAN NOTE
Initial troponin 0.5->0.6. No chest discomfort. Atypical symptoms with SOB. Troponin trended down to 0.445.    - Recommend DAPT for TIA  - If troponin flat and <1, can likely continue ischemic work-up with PET stress. Scheduled for 10/5.  - Troponin has started to trend down so coronary angiogram with interventional cardiology not necessary at this point

## 2022-09-13 NOTE — ASSESSMENT & PLAN NOTE
- Requires outpatient follow up with advanced heart failure  - Start SGLT2, GDMT  - Stop amlodipine

## 2022-09-13 NOTE — PLAN OF CARE
Department of Anesthesiology  Preprocedure Note       Name:  Thelma Ryan   Age:  40 y.o.  :  1983                                          MRN:  [id number]         Date:  2020      Surgeon: Patrick Sanchez):  Ramin Verdugo MD    Procedure: Procedure(s):  ROBOTIC LEFT INGUINAL HERNIA REPAIR, POSS MESH,  POSS OPEN, POSS RIGHT    Medications prior to admission:   Prior to Admission medications    Medication Sig Start Date End Date Taking? Authorizing Provider   Folic Acid 5 MG CAPS Take 5 mg by mouth daily   Yes Historical Provider, MD   lisinopril-hydroCHLOROthiazide (PRINZIDE) 20-12.5 MG per tablet Take 1 tablet by mouth daily 20  Yes Gabriel Garland DO   omeprazole (PRILOSEC) 20 MG delayed release capsule Take 1 capsule by mouth daily 20  Yes Gabriel Garland DO   Methotrexate, PF, 12.5 MG/0.4ML SOAJ Inject 15 mg into the skin once a week     Historical Provider, MD   meloxicam (MOBIC) 15 MG tablet Take 1 tablet by mouth daily as needed for Pain 9/10/19   Gabriel Garland DO       Current medications:    Current Facility-Administered Medications   Medication Dose Route Frequency Provider Last Rate Last Dose    0.9 % sodium chloride infusion   Intravenous Continuous Ramin Verdugo  mL/hr at 20 6698      sodium chloride flush 0.9 % injection 10 mL  10 mL Intravenous 2 times per day Ramin Verdugo MD        sodium chloride flush 0.9 % injection 10 mL  10 mL Intravenous PRN Ramin Verdugo MD        ceFAZolin (ANCEF) 2 g in dextrose 5 % 50 mL IVPB  2 g Intravenous On Call to MD Chely        ibuprofen (ADVIL;MOTRIN) tablet 800 mg  800 mg Oral Once Ramin Verdugo MD        ceFAZolin (ANCEF) IVPB                Allergies:     Allergies   Allergen Reactions    Plaquenil [Hydroxychloroquine Sulfate] Nausea And Vomiting       Problem List:    Patient Active Problem List   Diagnosis Code    History of asthma Z87.09    Migraine headache Pt has no post-acute needs.    [facility]  Discharge Final Note    Primary Care Provider: Sunny Soto MD    Expected Discharge Date: 9/13/2022    Final Discharge Note (most recent)       Final Note - 09/13/22 1633          Final Note    Assessment Type Final Discharge Note     Anticipated Discharge Disposition Home or Self Care     Hospital Resources/Appts/Education Provided Appointments scheduled by Navigator/Coordinator        Post-Acute Status    Discharge Delays None known at this time                     Important Message from Medicare      Sirena Ray [facility]  Case Management Department  [email or web address]              Status: Time of last liquid consumption: 2330                        Time of last solid consumption: 2100                        Date of last liquid consumption: 11/12/20                        Date of last solid food consumption: 11/12/20    BMI:   Wt Readings from Last 3 Encounters:   11/04/20 213 lb (96.6 kg)   11/02/20 212 lb (96.2 kg)   10/20/20 210 lb (95.3 kg)     There is no height or weight on file to calculate BMI.    CBC:   Lab Results   Component Value Date    WBC 6.6 08/05/2020    RBC 5.08 08/05/2020    HGB 16.5 08/05/2020    HCT 50.5 08/05/2020    MCV 99.4 08/05/2020    RDW 13.1 02/04/2018     08/05/2020       CMP:   Lab Results   Component Value Date     11/09/2020    K 4.1 11/09/2020     11/09/2020    CO2 27 11/09/2020    BUN 11 11/09/2020    CREATININE 0.8 11/09/2020    LABGLOM >90 11/09/2020    GLUCOSE 106 11/09/2020    PROT 6.9 03/03/2020    CALCIUM 9.4 11/09/2020    BILITOT 0.5 03/03/2020    ALKPHOS 66 03/03/2020    AST 23 03/03/2020    ALT 29 08/05/2020       POC Tests: No results for input(s): POCGLU, POCNA, POCK, POCCL, POCBUN, POCHEMO, POCHCT in the last 72 hours.     Coags: No results found for: PROTIME, INR, APTT    HCG (If Applicable): No results found for: PREGTESTUR, PREGSERUM, HCG, HCGQUANT     ABGs: No results found for: PHART, PO2ART, WZQ8PLZ, KYK8FIA, BEART, U7FCKRPY     Type & Screen (If Applicable):  No results found for: LABABO, LABRH    Drug/Infectious Status (If Applicable):  Lab Results   Component Value Date    HEPCAB Negative 03/03/2020       COVID-19 Screening (If Applicable):   Lab Results   Component Value Date    COVID19 Not Detected 11/05/2020         Anesthesia Evaluation  Patient summary reviewed and Nursing notes reviewed  Airway: Mallampati: II        Dental:          Pulmonary: breath sounds clear to auscultation                             Cardiovascular:  Exercise tolerance: good (>4 METS),   (+) hypertension:,       ECG reviewed  Rhythm: regular  Rate: normal                    Neuro/Psych:               GI/Hepatic/Renal:   (+) GERD: well controlled,           Endo/Other:    (+) : arthritis: rheumatoid. , .                 Abdominal:       Abdomen: soft. Vascular:                                        Anesthesia Plan      general     ASA 2       Induction: intravenous. MIPS: Postoperative opioids intended and Prophylactic antiemetics administered. Anesthetic plan and risks discussed with patient and spouse. Plan discussed with CRNA.                   [address] [place] [address],    11/13/2020

## 2022-09-13 NOTE — PLAN OF CARE
Patient has been scheduled for a PCP Hospital Follow Up Visit with Debora Alonzo NP on Tuesday Sep 20, 2022 at 10:00 AM.    Please arrive approximately 15 minutes before your scheduled appointment time and ensure that you have a valid government issued ID and your insurance card.     For your safety, masks are required.     [facility]   Please park in surface lot and check in at central registration desk.    [facility]  [address]   [facility] [address]  [phone]            Hilda Jay   ([facility])  Case Management  Ext. [phone]

## 2022-09-13 NOTE — PROGRESS NOTES
[facility]  Cardiology  Progress Note    Patient Name: Sidney Lanza  MRN: [id number]  Admission Date: 9/11/2022  Hospital Length of Stay: 0 days  Code Status: Full Code   Attending Physician: Mihaela Barbour MD   Primary Care Physician: Sunny Soto MD  Expected Discharge Date: 9/13/2022  Principal Problem:Dyspnea    Subjective:     Interval History: No acute events overnight. No complaints of shortness of breath. SBP mostly in 100s. HR mostly in 90s.     Review of Systems   Constitutional: Positive for decreased appetite and weight loss.   HENT: Negative.     Eyes: Negative.    Cardiovascular:  Negative for chest pain, dyspnea on exertion, leg swelling, near-syncope, orthopnea, palpitations and syncope.   Respiratory:  Positive for shortness of breath. Negative for cough.    Endocrine: Negative.    Hematologic/Lymphatic: Negative.    Skin: Negative.    Musculoskeletal: Negative.    Gastrointestinal:  Positive for diarrhea.   Genitourinary: Negative.    Neurological:  Positive for dizziness, light-headedness and paresthesias. Negative for focal weakness, loss of balance and weakness.   Psychiatric/Behavioral: Negative.     Allergic/Immunologic: Negative.      Objective:     Vital Signs (Most Recent):  Temp: 97.1 °F (36.2 °C) (09/13/22 1126)  Pulse: 80 (09/13/22 1126)  Resp: 17 (09/13/22 1126)  BP: 109/74 (09/13/22 1126)  SpO2: (!) 93 % (09/13/22 1126) Vital Signs (24h Range):  Temp:  [96.8 °F (36 °C)-98.7 °F (37.1 °C)] 97.1 °F (36.2 °C)  Pulse:  [80-98] 80  Resp:  [14-20] 17  SpO2:  [93 %-100 %] 93 %  BP: (100-109)/(62-75) 109/74     Weight: 66.7 kg (147 lb)  Body mass index is 21.71 kg/m².    SpO2: (!) 93 %  O2 Device (Oxygen Therapy): room air      Intake/Output Summary (Last 24 hours) at 9/13/2022 1540  Last data filed at 9/13/2022 1400  Gross per 24 hour   Intake 462 ml   Output 3200 ml   Net -2738 ml         Lines/Drains/Airways       None                   Physical Exam  Constitutional:        General: He is not in acute distress.     Appearance: He is not ill-appearing or diaphoretic.   HENT:      Head: Normocephalic.      Mouth/Throat:      Mouth: Mucous membranes are moist.   Eyes:      Extraocular Movements: Extraocular movements intact.   Neck:      Comments: No JVD  Cardiovascular:      Rate and Rhythm: Normal rate and regular rhythm.      Pulses:           Radial pulses are 2+ on the right side and 2+ on the left side.        Femoral pulses are 2+ on the right side and 2+ on the left side.       Dorsalis pedis pulses are 2+ on the right side and 2+ on the left side.      Heart sounds:     No S3 sounds.   Pulmonary:      Effort: Pulmonary effort is normal.      Breath sounds: Rales (RLL) present.   Abdominal:      Palpations: Abdomen is soft.      Tenderness: There is no abdominal tenderness.   Musculoskeletal:      Cervical back: Neck supple.      Right lower leg: No edema.      Left lower leg: No edema.   Skin:     General: Skin is warm.   Neurological:      General: No focal deficit present.      Mental Status: He is alert and oriented to person, place, and time.       Significant Labs: BMP:   Recent Labs   Lab 09/11/22  1807 09/12/22  0552 09/13/22  0438    79 84    138 140   K 5.3* 4.0 4.0    111* 110   CO2 18* 21* 21*   BUN 26* 23 25*   CREATININE 1.3 1.0 1.1   CALCIUM 9.4 8.9 8.9   MG  --  1.9 1.9     , CMP   Recent Labs   Lab 09/11/22  1807 09/12/22  0552 09/13/22  0438    138 140   K 5.3* 4.0 4.0    111* 110   CO2 18* 21* 21*    79 84   BUN 26* 23 25*   CREATININE 1.3 1.0 1.1   CALCIUM 9.4 8.9 8.9   PROT 6.7  --   --    ALBUMIN 3.9  --   --    BILITOT 0.4  --   --    ALKPHOS 53*  --   --    AST 44*  --   --    ALT 47*  --   --    ANIONGAP 11 6* 9     , CBC   Recent Labs   Lab 09/11/22  1610 09/12/22  0552 09/13/22  0438   WBC 8.06 8.44 8.04   HGB 13.3* 11.9* 12.1*   HCT 40.2 35.5* 35.7*    205 174     , INR   Recent Labs   Lab 09/11/22  1610  09/11/22 2054   INR 1.2 1.2     ,Troponin   Recent Labs   Lab 09/11/22  1953 09/12/22  0551 09/12/22  0552   TROPONINI 0.622* 0.473* 0.445*       Significant Imaging:   TTE 9/2/22   Severe left atrial enlargement.   The left ventricle is severely enlarged with severely decreased systolic function.   There is severe left ventricular global hypokinesis. The estimated ejection fraction is 20%.   The 3D quantitatively derived ejection fraction is 18%.   Grade II left ventricular diastolic dysfunction.   Mild right ventricular enlargement with normal right ventricular systolic function.   Mild mitral regurgitation.   Mild tricuspid regurgitation.   Intermediate central venous pressure (8 mmHg).   There is pulmonary hypertension. The estimated PA systolic pressure is 49 mmHg.   A short burst of tachycardia with a cycle length of 334 milliseconds was noted at the end of the study.    Assessment and Plan:     * Dyspnea  - D-dimer elevated but low suspicion for PE.   - Continue to monitor on telemetry and continuous pulse ox    CHARLES (acute kidney injury)  -Can restart entresto, spironolactone since K+ is within normal limits and Cr is back at baseline    Elevated troponin  Initial troponin 0.5->0.6. No chest discomfort. Atypical symptoms with SOB. Troponin trended down to 0.445.    - Recommend DAPT for TIA  - If troponin flat and <1, can likely continue ischemic work-up with PET stress. Scheduled for 10/5.  - Troponin has started to trend down so coronary angiogram with interventional cardiology not necessary at this point      Chronic systolic heart failure  Relatively new Dx on TTE last week. EF 20%. Euvolemic on exam. TSH WNL. Etiology of systolic dysfunction not yet determined.     - Restart Entresto, Spironolactone   - Requires outpatient follow up with advanced heart failure  - Start SGLT2, GDMT  - Stop amlodipine      Episode of transient neurologic symptoms  Presenting with TIA and stroke work-up  negative.    - Recommend DAPT with ASA/Clopidogrel  - Recommend Atorvastatin 40 mg daily with bilateral carotid artery disease noted on CTA    Hyperlipidemia  - Recommend starting Atorvastatin 40 mg daily (LDL 93 in 8/22)      VTE Risk Mitigation (From admission, onward)         Ordered     enoxaparin injection 40 mg  Daily         09/12/22 1107     IP VTE HIGH RISK PATIENT  Once         09/11/22 2140              Thank you for your consult. I will sign off now. Please contact cardiology consult for any further questions.    Santiago Galarza MD  Cardiology  [facility]

## 2022-09-13 NOTE — SUBJECTIVE & OBJECTIVE
Interval History: No acute events overnight. No complaints of shortness of breath. SBP mostly in 100s. HR mostly in 90s.     Review of Systems   Constitutional: Positive for decreased appetite and weight loss.   HENT: Negative.     Eyes: Negative.    Cardiovascular:  Negative for chest pain, dyspnea on exertion, leg swelling, near-syncope, orthopnea, palpitations and syncope.   Respiratory:  Positive for shortness of breath. Negative for cough.    Endocrine: Negative.    Hematologic/Lymphatic: Negative.    Skin: Negative.    Musculoskeletal: Negative.    Gastrointestinal:  Positive for diarrhea.   Genitourinary: Negative.    Neurological:  Positive for dizziness, light-headedness and paresthesias. Negative for focal weakness, loss of balance and weakness.   Psychiatric/Behavioral: Negative.     Allergic/Immunologic: Negative.      Objective:     Vital Signs (Most Recent):  Temp: 97.1 °F (36.2 °C) (09/13/22 1126)  Pulse: 80 (09/13/22 1126)  Resp: 17 (09/13/22 1126)  BP: 109/74 (09/13/22 1126)  SpO2: (!) 93 % (09/13/22 1126) Vital Signs (24h Range):  Temp:  [96.8 °F (36 °C)-98.7 °F (37.1 °C)] 97.1 °F (36.2 °C)  Pulse:  [80-98] 80  Resp:  [14-20] 17  SpO2:  [93 %-100 %] 93 %  BP: (100-109)/(62-75) 109/74     Weight: 66.7 kg (147 lb)  Body mass index is 21.71 kg/m².    SpO2: (!) 93 %  O2 Device (Oxygen Therapy): room air      Intake/Output Summary (Last 24 hours) at 9/13/2022 1540  Last data filed at 9/13/2022 1400  Gross per 24 hour   Intake 462 ml   Output 3200 ml   Net -2738 ml         Lines/Drains/Airways       None                   Physical Exam  Constitutional:       General: He is not in acute distress.     Appearance: He is not ill-appearing or diaphoretic.   HENT:      Head: Normocephalic.      Mouth/Throat:      Mouth: Mucous membranes are moist.   Eyes:      Extraocular Movements: Extraocular movements intact.   Neck:      Comments: No JVD  Cardiovascular:      Rate and Rhythm: Normal rate and regular  rhythm.      Pulses:           Radial pulses are 2+ on the right side and 2+ on the left side.        Femoral pulses are 2+ on the right side and 2+ on the left side.       Dorsalis pedis pulses are 2+ on the right side and 2+ on the left side.      Heart sounds:     No S3 sounds.   Pulmonary:      Effort: Pulmonary effort is normal.      Breath sounds: Rales (RLL) present.   Abdominal:      Palpations: Abdomen is soft.      Tenderness: There is no abdominal tenderness.   Musculoskeletal:      Cervical back: Neck supple.      Right lower leg: No edema.      Left lower leg: No edema.   Skin:     General: Skin is warm.   Neurological:      General: No focal deficit present.      Mental Status: He is alert and oriented to person, place, and time.       Significant Labs: BMP:   Recent Labs   Lab 09/11/22 1807 09/12/22 0552 09/13/22 0438    79 84    138 140   K 5.3* 4.0 4.0    111* 110   CO2 18* 21* 21*   BUN 26* 23 25*   CREATININE 1.3 1.0 1.1   CALCIUM 9.4 8.9 8.9   MG  --  1.9 1.9     , CMP   Recent Labs   Lab 09/11/22 1807 09/12/22 0552 09/13/22  0438    138 140   K 5.3* 4.0 4.0    111* 110   CO2 18* 21* 21*    79 84   BUN 26* 23 25*   CREATININE 1.3 1.0 1.1   CALCIUM 9.4 8.9 8.9   PROT 6.7  --   --    ALBUMIN 3.9  --   --    BILITOT 0.4  --   --    ALKPHOS 53*  --   --    AST 44*  --   --    ALT 47*  --   --    ANIONGAP 11 6* 9     , CBC   Recent Labs   Lab 09/11/22 1610 09/12/22  0552 09/13/22  0438   WBC 8.06 8.44 8.04   HGB 13.3* 11.9* 12.1*   HCT 40.2 35.5* 35.7*    205 174     , INR   Recent Labs   Lab 09/11/22  1610 09/11/22  2054   INR 1.2 1.2     ,Troponin   Recent Labs   Lab 09/11/22 1953 09/12/22  0551 09/12/22  0552   TROPONINI 0.622* 0.473* 0.445*       Significant Imaging:   TTE 9/2/22  Severe left atrial enlargement.  The left ventricle is severely enlarged with severely decreased systolic function.  There is severe left ventricular global  hypokinesis. The estimated ejection fraction is 20%.  The 3D quantitatively derived ejection fraction is 18%.  Grade II left ventricular diastolic dysfunction.  Mild right ventricular enlargement with normal right ventricular systolic function.  Mild mitral regurgitation.  Mild tricuspid regurgitation.  Intermediate central venous pressure (8 mmHg).  There is pulmonary hypertension. The estimated PA systolic pressure is 49 mmHg.  A short burst of tachycardia with a cycle length of 334 milliseconds was noted at the end of the study.

## 2022-09-14 LAB — POCT GLUCOSE: 91 MG/DL (ref 70–110)

## 2022-09-15 ENCOUNTER — TELEPHONE (OUTPATIENT)
Dept: OPTOMETRY | Facility: CLINIC | Age: 77
End: 2022-09-15
Payer: MEDICARE

## 2022-09-15 ENCOUNTER — OFFICE VISIT (OUTPATIENT)
Dept: OPTOMETRY | Facility: CLINIC | Age: 77
End: 2022-09-15
Payer: MEDICARE

## 2022-09-15 DIAGNOSIS — H35.371 EPIRETINAL MEMBRANE (ERM) OF RIGHT EYE: ICD-10-CM

## 2022-09-15 DIAGNOSIS — H35.033 HYPERTENSIVE RETINOPATHY OF BOTH EYES: ICD-10-CM

## 2022-09-15 DIAGNOSIS — H25.813 COMBINED FORMS OF AGE-RELATED CATARACT OF BOTH EYES: ICD-10-CM

## 2022-09-15 DIAGNOSIS — H34.8312 STABLE BRANCH RETINAL VEIN OCCLUSION OF RIGHT EYE: ICD-10-CM

## 2022-09-15 DIAGNOSIS — H35.371 EPIRETINAL MEMBRANE, RIGHT EYE: ICD-10-CM

## 2022-09-15 DIAGNOSIS — H34.8310 BRANCH RETINAL VEIN OCCLUSION WITH MACULAR EDEMA OF RIGHT EYE: Primary | ICD-10-CM

## 2022-09-15 PROCEDURE — 1101F PT FALLS ASSESS-DOCD LE1/YR: CPT | Mod: CPTII,S$GLB,, | Performed by: OPTOMETRIST

## 2022-09-15 PROCEDURE — 1159F PR MEDICATION LIST DOCUMENTED IN MEDICAL RECORD: ICD-10-PCS | Mod: CPTII,S$GLB,, | Performed by: OPTOMETRIST

## 2022-09-15 PROCEDURE — 3288F PR FALLS RISK ASSESSMENT DOCUMENTED: ICD-10-PCS | Mod: CPTII,S$GLB,, | Performed by: OPTOMETRIST

## 2022-09-15 PROCEDURE — 3288F FALL RISK ASSESSMENT DOCD: CPT | Mod: CPTII,S$GLB,, | Performed by: OPTOMETRIST

## 2022-09-15 PROCEDURE — 1126F AMNT PAIN NOTED NONE PRSNT: CPT | Mod: CPTII,S$GLB,, | Performed by: OPTOMETRIST

## 2022-09-15 PROCEDURE — 1126F PR PAIN SEVERITY QUANTIFIED, NO PAIN PRESENT: ICD-10-PCS | Mod: CPTII,S$GLB,, | Performed by: OPTOMETRIST

## 2022-09-15 PROCEDURE — 99999 PR PBB SHADOW E&M-EST. PATIENT-LVL III: ICD-10-PCS | Mod: PBBFAC,,, | Performed by: OPTOMETRIST

## 2022-09-15 PROCEDURE — 92004 COMPRE OPH EXAM NEW PT 1/>: CPT | Mod: S$GLB,,, | Performed by: OPTOMETRIST

## 2022-09-15 PROCEDURE — 92004 PR EYE EXAM, NEW PATIENT,COMPREHESV: ICD-10-PCS | Mod: S$GLB,,, | Performed by: OPTOMETRIST

## 2022-09-15 PROCEDURE — 1159F MED LIST DOCD IN RCRD: CPT | Mod: CPTII,S$GLB,, | Performed by: OPTOMETRIST

## 2022-09-15 PROCEDURE — 99999 PR PBB SHADOW E&M-EST. PATIENT-LVL III: CPT | Mod: PBBFAC,,, | Performed by: OPTOMETRIST

## 2022-09-15 PROCEDURE — 1101F PR PT FALLS ASSESS DOC 0-1 FALLS W/OUT INJ PAST YR: ICD-10-PCS | Mod: CPTII,S$GLB,, | Performed by: OPTOMETRIST

## 2022-09-19 NOTE — PROGRESS NOTES
UMBERTO KEARNS about3 yrs. Ago elsewhere.  Patient is here for annual eye exam today.   Glasses about 3 yrs. Old.  Patient has noticed that distance is not as   clear and OD seems more foggy, near seems fine. No other eye complaints.   Not using any drops. No family history of glaucoma or AMD.  Last edited by Yoko Desir on 9/15/2022 12:38 PM.        ROS    Negative for: Constitutional, Gastrointestinal, Neurological, Skin,   Genitourinary, Musculoskeletal, HENT, Endocrine, Cardiovascular, Eyes,   Respiratory, Psychiatric, Allergic/Imm, Heme/Lymph  Last edited by Tory Toney, OD on 9/15/2022  1:03 PM.        Assessment /Plan     For exam results, see Encounter Report.    Branch retinal vein occlusion with macular edema of right eye    ERM OD (epiretinal membrane, right eye)    Hypertensive retinopathy of both eyes    Epiretinal membrane, right eye    Stable branch retinal vein occlusion of right eye    Combined forms of age-related cataract of both eyes    MONITOR. ED PT ON ALL EXAM FINDINGS  HOLD SPEC RX   H/O BRVO OD, INACTIVE   CONTINUE WITH PCP FOR HTN CARE; MONITOR. ED PT   COMBO CATS; REFER TO [facility] FOR CAT CONSULT; MONITOR. CONSIDER RETINA CONSULT

## 2022-09-20 ENCOUNTER — OFFICE VISIT (OUTPATIENT)
Dept: INTERNAL MEDICINE | Facility: CLINIC | Age: 77
End: 2022-09-20
Payer: MEDICARE

## 2022-09-20 ENCOUNTER — PROCEDURE VISIT (OUTPATIENT)
Dept: DERMATOLOGY | Facility: CLINIC | Age: 77
End: 2022-09-20

## 2022-09-20 VITALS
OXYGEN SATURATION: 99 % | HEART RATE: 90 BPM | SYSTOLIC BLOOD PRESSURE: 110 MMHG | DIASTOLIC BLOOD PRESSURE: 80 MMHG | HEIGHT: 69 IN | WEIGHT: 147.69 LBS | BODY MASS INDEX: 21.87 KG/M2

## 2022-09-20 DIAGNOSIS — L82.1 SK (SEBORRHEIC KERATOSIS): Primary | ICD-10-CM

## 2022-09-20 DIAGNOSIS — Z23 ENCOUNTER FOR IMMUNIZATION: ICD-10-CM

## 2022-09-20 DIAGNOSIS — I10 ESSENTIAL HYPERTENSION: ICD-10-CM

## 2022-09-20 DIAGNOSIS — R29.90 EPISODE OF TRANSIENT NEUROLOGIC SYMPTOMS: ICD-10-CM

## 2022-09-20 DIAGNOSIS — Z09 HOSPITAL DISCHARGE FOLLOW-UP: Primary | ICD-10-CM

## 2022-09-20 PROCEDURE — 1126F PR PAIN SEVERITY QUANTIFIED, NO PAIN PRESENT: ICD-10-PCS | Mod: CPTII,S$GLB,, | Performed by: NURSE PRACTITIONER

## 2022-09-20 PROCEDURE — 3079F DIAST BP 80-89 MM HG: CPT | Mod: CPTII,S$GLB,, | Performed by: NURSE PRACTITIONER

## 2022-09-20 PROCEDURE — 99999 PR PBB SHADOW E&M-EST. PATIENT-LVL IV: ICD-10-PCS | Mod: PBBFAC,,, | Performed by: NURSE PRACTITIONER

## 2022-09-20 PROCEDURE — 90694 FLU VACCINE - QUADRIVALENT - ADJUVANTED: ICD-10-PCS | Mod: S$GLB,,, | Performed by: NURSE PRACTITIONER

## 2022-09-20 PROCEDURE — 3288F FALL RISK ASSESSMENT DOCD: CPT | Mod: CPTII,S$GLB,, | Performed by: NURSE PRACTITIONER

## 2022-09-20 PROCEDURE — 17110 DESTRUCTION B9 LES UP TO 14: CPT | Mod: CSM,S$GLB,, | Performed by: DERMATOLOGY

## 2022-09-20 PROCEDURE — 1126F AMNT PAIN NOTED NONE PRSNT: CPT | Mod: CPTII,S$GLB,, | Performed by: NURSE PRACTITIONER

## 2022-09-20 PROCEDURE — 99214 OFFICE O/P EST MOD 30 MIN: CPT | Mod: S$GLB,,, | Performed by: NURSE PRACTITIONER

## 2022-09-20 PROCEDURE — 17110 PR DESTRUCTION BENIGN LESIONS UP TO 14: ICD-10-PCS | Mod: CSM,S$GLB,, | Performed by: DERMATOLOGY

## 2022-09-20 PROCEDURE — 90694 VACC AIIV4 NO PRSRV 0.5ML IM: CPT | Mod: S$GLB,,, | Performed by: NURSE PRACTITIONER

## 2022-09-20 PROCEDURE — 3079F PR MOST RECENT DIASTOLIC BLOOD PRESSURE 80-89 MM HG: ICD-10-PCS | Mod: CPTII,S$GLB,, | Performed by: NURSE PRACTITIONER

## 2022-09-20 PROCEDURE — 3074F PR MOST RECENT SYSTOLIC BLOOD PRESSURE < 130 MM HG: ICD-10-PCS | Mod: CPTII,S$GLB,, | Performed by: NURSE PRACTITIONER

## 2022-09-20 PROCEDURE — 99499 UNLISTED E&M SERVICE: CPT | Mod: S$GLB,,, | Performed by: DERMATOLOGY

## 2022-09-20 PROCEDURE — 3074F SYST BP LT 130 MM HG: CPT | Mod: CPTII,S$GLB,, | Performed by: NURSE PRACTITIONER

## 2022-09-20 PROCEDURE — 3288F PR FALLS RISK ASSESSMENT DOCUMENTED: ICD-10-PCS | Mod: CPTII,S$GLB,, | Performed by: NURSE PRACTITIONER

## 2022-09-20 PROCEDURE — 1101F PT FALLS ASSESS-DOCD LE1/YR: CPT | Mod: CPTII,S$GLB,, | Performed by: NURSE PRACTITIONER

## 2022-09-20 PROCEDURE — 99499 NO LOS: ICD-10-PCS | Mod: S$GLB,,, | Performed by: DERMATOLOGY

## 2022-09-20 PROCEDURE — 1101F PR PT FALLS ASSESS DOC 0-1 FALLS W/OUT INJ PAST YR: ICD-10-PCS | Mod: CPTII,S$GLB,, | Performed by: NURSE PRACTITIONER

## 2022-09-20 PROCEDURE — G0008 FLU VACCINE - QUADRIVALENT - ADJUVANTED: ICD-10-PCS | Mod: S$GLB,,, | Performed by: NURSE PRACTITIONER

## 2022-09-20 PROCEDURE — G0008 ADMIN INFLUENZA VIRUS VAC: HCPCS | Mod: S$GLB,,, | Performed by: NURSE PRACTITIONER

## 2022-09-20 PROCEDURE — 99214 PR OFFICE/OUTPT VISIT, EST, LEVL IV, 30-39 MIN: ICD-10-PCS | Mod: S$GLB,,, | Performed by: NURSE PRACTITIONER

## 2022-09-20 PROCEDURE — 99999 PR PBB SHADOW E&M-EST. PATIENT-LVL IV: CPT | Mod: PBBFAC,,, | Performed by: NURSE PRACTITIONER

## 2022-09-20 NOTE — PROGRESS NOTES
Here for cosmetic destruction of seborrheic keratoses on face.    Procedure note for destruction via hyfrecation and curettage:    Verbal consent obtained. Risks of recurrence and scarring discussed.   3  lesions cleaned with alcohol and anesthetized with 1% lidocaine with epinephrine. Areas then lightly hyfrecated and curetted to remove gross lesion. Hemostasis achieved with aluminum chloride application. No complications.   Areas dressed with Vaseline jelly and bandage. Wound care discussed.    Pt tolerated well    F/u prn

## 2022-09-20 NOTE — PROGRESS NOTES
INTERNAL MEDICINE PROGRESS/URGENT CARE NOTE    CHIEF COMPLAINT     Chief Complaint   Patient presents with    Follow-up     HOSPITAL        HPI     Sidney Lanza is a 77 y.o. male who presents for an urgent/follow up visit today. Sidney Lanza is an established patient.    Patient went to the ED on 09/11/2022 for stroke like symptoms- loss of speech, control of hands. The week before his ED visit, he was diagnosed with heart failure.     Discharged from the hospital on 09/13/2022. He reports he feels well since discharge. Reports fatigue last night after taking a shower.     Past Medical History:  Past Medical History:   Diagnosis Date    Arthritis     Chronic rhinitis     Fever blister     Hyperlipidemia     Hypertension     Impotence of organic origin     Joint pain     Nuclear sclerosis - Both Eyes 10/23/2013    Prostate cancer     Ulcer     NSAID related     Varicose vein of leg     bilateral legs       Home Medications:  Prior to Admission medications    Medication Sig Start Date End Date Taking? Authorizing Provider   amLODIPine (NORVASC) 10 MG tablet TAKE 1 TABLET BY MOUTH ONCE DAILY 7/27/22  Yes Sunny Soto MD   aspirin (ECOTRIN) 81 MG EC tablet Take 1 tablet (81 mg total) by mouth once daily. 9/14/22 9/14/23 Yes Mihaela Barbour MD   atorvastatin (LIPITOR) 40 MG tablet Take 1 tablet (40 mg total) by mouth every evening. 9/13/22 9/13/23 Yes Mihaela Barbour MD   carvediloL (COREG) 3.125 MG tablet Take 1 tablet (3.125 mg total) by mouth 2 (two) times daily with meals. 9/2/22 9/2/23 Yes Tanja Ayon MD   clopidogreL (PLAVIX) 75 mg tablet Take 1 tablet (75 mg total) by mouth once daily. 9/14/22 9/14/23 Yes Mihaela Barbour MD   empagliflozin (JARDIANCE) 10 mg tablet Take 1 tablet (10 mg total) by mouth once daily. 9/12/22  Yes Mihaela Barbour MD   FIBER, DEXTRIN, ORAL Take 1 tablet by mouth every morning.   Yes Historical Provider   fluticasone propionate (FLONASE) 50 mcg/actuation nasal spray 2  "sprays (100 mcg total) by Each Nostril route as needed for Rhinitis. 9/13/22  Yes Mihaela Barbour MD   furosemide (LASIX) 20 MG tablet Take 1 tablet (20 mg total) by mouth daily as needed. For leg swelling and or shortness of breath 9/2/22 10/2/22 Yes Tanja Ayon MD   glucosamine-chondroitin 500-400 mg tablet Take 2 tablets by mouth every morning.   Yes Historical Provider   multivitamin capsule Take 1 capsule by mouth every morning.    Yes Historical Provider   sacubitriL-valsartan (ENTRESTO) 24-26 mg per tablet Take 1 tablet by mouth 2 (two) times daily. 9/2/22  Yes Tanja Ayon MD   spironolactone (ALDACTONE) 25 MG tablet Take 1 tablet (25 mg total) by mouth once daily. 9/2/22 9/2/23 Yes Tanja Ayon MD       Review of Systems:  Review of Systems    Health Maintainence:   Immunizations:  Health Maintenance         Date Due Completion Date    Influenza Vaccine (1) 09/01/2022 10/9/2021    COVID-19 Vaccine (5 - Booster for Pfizer series) 10/14/2022 6/14/2022    TETANUS VACCINE 05/10/2026 5/10/2016    Lipid Panel 08/25/2027 8/25/2022             PHYSICAL EXAM     /80   Pulse 90   Ht 5' 9" (1.753 m)   Wt 67 kg (147 lb 11.3 oz)   SpO2 99%   BMI 21.81 kg/m²     Physical Exam    Nursing note and vitals reviewed.    Constitutional: AAOx3. No distress.  Eyes: EOMI. No discharge. Anicteric.  HENT:   Neck: Normal range of motion. Neck supple.  Cardiovascular: Normal rate.  Regular rhythm.    Pulmonary/Chest: No respiratory distress. Effort normal.  No tachypnea.  No adventitious lung sounds.  Abdominal: No distension and no mass.   Musculoskeletal: Normal range of motion.   Neurological: GCS 15. Alert and oriented to person, place, and time. No gross cranial nerve, light touch or strength deficit. Coordination normal.   Skin: Skin is warm and dry.   EXT: 2+ radial pulses.   Psychiatric: Behavior is normal. Judgment normal.     LABS     Lab Results   Component Value Date    HGBA1C 5.5 " 08/25/2022     CMP  Sodium   Date Value Ref Range Status   10/05/2022 138 136 - 145 mmol/L Final     Potassium   Date Value Ref Range Status   10/05/2022 4.0 3.5 - 5.1 mmol/L Final     Chloride   Date Value Ref Range Status   10/05/2022 110 95 - 110 mmol/L Final     CO2   Date Value Ref Range Status   10/05/2022 19 (L) 23 - 29 mmol/L Final     Glucose   Date Value Ref Range Status   10/05/2022 103 70 - 110 mg/dL Final     BUN   Date Value Ref Range Status   10/05/2022 15 8 - 23 mg/dL Final     Creatinine   Date Value Ref Range Status   10/05/2022 1.1 0.5 - 1.4 mg/dL Final     Calcium   Date Value Ref Range Status   10/05/2022 9.2 8.7 - 10.5 mg/dL Final     Total Protein   Date Value Ref Range Status   09/11/2022 6.7 6.0 - 8.4 g/dL Final     Albumin   Date Value Ref Range Status   09/11/2022 3.9 3.5 - 5.2 g/dL Final     Total Bilirubin   Date Value Ref Range Status   09/11/2022 0.4 0.1 - 1.0 mg/dL Final     Comment:     For infants and newborns, interpretation of results should be based  on gestational age, weight and in agreement with clinical  observations.    Premature Infant recommended reference ranges:  Up to 24 hours.............<8.0 mg/dL  Up to 48 hours............<12.0 mg/dL  3-5 days..................<15.0 mg/dL  6-29 days.................<15.0 mg/dL       Alkaline Phosphatase   Date Value Ref Range Status   09/11/2022 53 (L) 55 - 135 U/L Final     AST   Date Value Ref Range Status   09/11/2022 44 (H) 10 - 40 U/L Final     Comment:     *Result may be interfered by visible hemolysis     ALT   Date Value Ref Range Status   09/11/2022 47 (H) 10 - 44 U/L Final     Anion Gap   Date Value Ref Range Status   10/05/2022 9 8 - 16 mmol/L Final     eGFR if    Date Value Ref Range Status   03/25/2021 >60.0 >60 mL/min/1.73 m^2 Final     eGFR if non    Date Value Ref Range Status   03/25/2021 >60.0 >60 mL/min/1.73 m^2 Final     Comment:     Calculation used to obtain the estimated  glomerular filtration  rate (eGFR) is the CKD-EPI equation.        Lab Results   Component Value Date    WBC 8.04 09/13/2022    HGB 12.1 (L) 09/13/2022    HCT 35.7 (L) 09/13/2022    MCV 70 (L) 09/13/2022     09/13/2022     Lab Results   Component Value Date    CHOL 155 08/25/2022    CHOL 199 03/25/2021    CHOL 190 07/30/2019     Lab Results   Component Value Date    HDL 51 08/25/2022    HDL 49 03/25/2021    HDL 50 07/30/2019     Lab Results   Component Value Date    LDLCALC 93.8 08/25/2022    LDLCALC 128.4 03/25/2021    LDLCALC 119.8 07/30/2019     Lab Results   Component Value Date    TRIG 51 08/25/2022    TRIG 108 03/25/2021    TRIG 101 07/30/2019     Lab Results   Component Value Date    CHOLHDL 32.9 08/25/2022    CHOLHDL 24.6 03/25/2021    CHOLHDL 26.3 07/30/2019     Lab Results   Component Value Date    TSH 0.671 09/11/2022       ASSESSMENT/PLAN     Sidney Lanza is a 77 y.o. male     Sidney was seen today for follow-up.    Diagnoses and all orders for this visit:    Hospital discharge follow-up- has not seen Neurology post hospital follow-up, referral placed.  Patient hesitant about cardiac rehab, discussed importance and encouraged him to consider this program.  -     Ambulatory referral/consult to Neurology; Future    Essential hypertension- at goal, continue current treatment plan.    Encounter for immunization-  I had a detailed discussion of the risks and benefits of seasonal influenza vaccination, notified the patient that it was immediately available in the [facility].  Patient elected to receive vaccination.  Patient counseled on expectations, strict return precautions.   Comments:  high dose seasonal flu    Episode of transient neurologic symptoms  -     Ambulatory referral/consult to Neurology; Future    Other orders  -     Influenza - Quadrivalent (Adjuvanted)         Follow up with PCP in 3-6 months, sooner for new or worsening issues    Patient education  provided from [facility]. Patient was counseled on when and how to seek emergent care.     Note was created using voice recognition software. It may have occasional typographical errors not identified and edited despite initial review prior to signing.     Debora Aolnzo, MSN, APRN FNP-BC  Department of Internal Medicine - [facility]  10:41 AM

## 2022-09-20 NOTE — PATIENT INSTRUCTIONS

## 2022-09-27 ENCOUNTER — TELEPHONE (OUTPATIENT)
Dept: CARDIOLOGY | Facility: HOSPITAL | Age: 77
End: 2022-09-27
Payer: MEDICARE

## 2022-09-27 DIAGNOSIS — I47.20 VT (VENTRICULAR TACHYCARDIA): Primary | ICD-10-CM

## 2022-09-27 NOTE — TELEPHONE ENCOUNTER
[employer] wireless alert received for wide complex tachycardia x 23 beats (w/ intermittent accelerated idioventricular rhythm)  Event date/time:  9/26/22 @ 5:23pm    S/w patient by phone: he endorses BOYD, but denies any symptoms of near syncope, LH or CP  Last EF 10%, PET stress pending    Will notify ordering MD and patient's primary cardiologist

## 2022-09-28 NOTE — DISCHARGE SUMMARY
"Discharge Summary  Hospital Medicine    Attending Provider on Discharge: Mihaela Barbour MD  [facility] Medicine Team: [facility]  Date of Admission:  9/11/2022     Date of Discharge:  9/13/2022  Code status: Full Code    Active Hospital Problems    Diagnosis  POA    *Dyspnea [R06.00]  Yes    Acute on chronic combined systolic and diastolic heart failure [I50.43]  Yes     Priority: 1 - High    CHARLES (acute kidney injury) [N17.9]  Yes    Episode of transient neurologic symptoms [R29.90]  Yes    Elevated troponin [R77.8]  Yes    Chronic systolic heart failure [I50.22]  Yes    Essential hypertension [I10]  Yes     Chronic    Hyperlipidemia [E78.5]  Yes      Resolved Hospital Problems   No resolved problems to display.     HPI  Sidney Lanza is a 77 y.o. male with a PMHx of HTN, HLD, CHF presents to the ED for concern of stroke. He experienced aphasia and bilateral arm tingling that started while playing cards and watching football earlier tonight. Patient was brought to the ED by his friends and stated his symptoms resolved by the time of arrival. Stroke code activated in the ED, MRI negative, shows remote infarct. Additionally, patient states he has had dyspnea for 3 weeks that initially occurred only upon exertion but now occurs at rest as well. Endorses associated chest pain/pressure that only occurs with deep inhalation. He has been adjusting his breathing and leaning forward to alleviate the pleuritic pain. Denies orthopnea but he does experience more "pressure" in his right lung when he lays down on his R side which resolves when lying on his left side. Patient denies any long car/plane rides or prolonged immobility. Denies fever, chills, N/V, abdominal pain, LE swelling/pain, or syncope.     Of note, patient was recently diagnosed with CHF with EF of 20% and was started on entresto, aldactone, coreg and lasix. Coreg dose was halved due to adverse side effects, aldactone was discontinued due to multiple " episodes of diarrhea.     ED: /84, , afebrile, SpO2 96% on RA. CMP: K 5.3, Cr 1.3. BNP 1537, Troponin 0.622. MRI Brain w/o contrast: No evidence of acute infarct or hemorrhage. CT Stroke: negative for acute infarct or hemorrhage. Vascular neuro consulted; no acute intervention warranted. Cardiology consulted and do not recommend starting ACS protocol at this time.    Hospital Course  * Dyspnea  Concern for PE based on presentation, elevated D-dimer, and elevated trop; however unable to get CTA tonight due to already receiving IV contrast for MRI in ED.   - Satting 100% on RA  - Well's score: 4.5  - start enoxaparin 40 mg subq as patient is improving  - VQ scan showed low probability of PE    Acute on chronic combined systolic and diastolic heart failure  EF 10%  Mild right ventricular enlargement with moderately reduced right ventricular systolic function  Severe left atrial enlargement  Pulmonary HTN (PASP 50 mmHg)  - BNP 1537  - Continue BB, hold entresto given CHARLES  - Strict I/Os, daily weights  - intermediate central venous pressure 8 mmHg  - start furosemide 80 mg IV daily  - he was discharged back home dose furosemide 80 mg daily    CHARLES (acute kidney injury)  - Cr 1.3, baseline 0.9  - Hold IVFs for now given EF 20% and encourage PO hydration  - Check UA  - avoid nephrotoxins, renally dose meds  - SCr 1.1 at discharge    Elevated troponin  - Troponin peaked 0.622  Probable NSTEMI type II    Episode of transient neurologic symptoms  - Vasc Neuro consulted; no acute intervention given MRI brain negative for acute stroke  - Start ASA, plavix, and atorvastatin given c/f TIA per cards recs  - Follow up TTE  - Neuro checks q4h    Essential hypertension  - Continue coreg 3.125 mg PO BID   - Hold entresto given CHARLES    Hyperlipidemia  - LDL 93 in 8/22  - Start atorvastatin 40mg daily     Procedures: none    Consultants: vascular neurology, cardiology    Discharge Medication List as of 9/13/2022  2:44 PM         START taking these medications    Details   aspirin (ECOTRIN) 81 MG EC tablet Take 1 tablet (81 mg total) by mouth once daily., Starting Wed 9/14/2022, Until Thu 9/14/2023, Normal      atorvastatin (LIPITOR) 40 MG tablet Take 1 tablet (40 mg total) by mouth every evening., Starting Tue 9/13/2022, Until Wed 9/13/2023, Normal      clopidogreL (PLAVIX) 75 mg tablet Take 1 tablet (75 mg total) by mouth once daily., Starting Wed 9/14/2022, Until Thu 9/14/2023, Normal      empagliflozin (JARDIANCE) 10 mg tablet Take 1 tablet (10 mg total) by mouth once daily., Starting Mon 9/12/2022, Normal           CONTINUE these medications which have CHANGED    Details   fluticasone propionate (FLONASE) 50 mcg/actuation nasal spray 2 sprays (100 mcg total) by Each Nostril route as needed for Rhinitis., Starting Tue 9/13/2022, No Print           CONTINUE these medications which have NOT CHANGED    Details   carvediloL (COREG) 3.125 MG tablet Take 1 tablet (3.125 mg total) by mouth 2 (two) times daily with meals., Starting Fri 9/2/2022, Until Sat 9/2/2023, Normal      FIBER, DEXTRIN, ORAL Take 1 tablet by mouth every morning., Historical Med      furosemide (LASIX) 20 MG tablet Take 1 tablet (20 mg total) by mouth daily as needed. For leg swelling and or shortness of breath, Starting Fri 9/2/2022, Until Sun 10/2/2022 at 2359, Normal      glucosamine-chondroitin 500-400 mg tablet Take 2 tablets by mouth every morning., Historical Med      multivitamin capsule Take 1 capsule by mouth every morning. , Historical Med      sacubitriL-valsartan (ENTRESTO) 24-26 mg per tablet Take 1 tablet by mouth 2 (two) times daily., Starting Fri 9/2/2022, Normal      spironolactone (ALDACTONE) 25 MG tablet Take 1 tablet (25 mg total) by mouth once daily., Starting Fri 9/2/2022, Until Sat 9/2/2023, Normal      amLODIPine (NORVASC) 10 MG tablet TAKE 1 TABLET BY MOUTH ONCE DAILY, Normal           STOP taking these medications       diclofenac-misoprostol   mg-mcg (ARTHROTEC 75)  mg-mcg per tablet Comments:   Reason for Stopping:               Discharge Diet:2 gram sodium diet     Activity: activity as tolerated and Activity as tolerated    Discharge Condition: Good    Disposition: Home or Self Care    Tests pending at the time of discharge: none      Time spent  on the discharge of the patient including review of hospital course with the patient. reviewing discharge medications and arranging follow-up care 35 min    Discharge examination Patient was seen and examined on the date of discharge and determined to be suitable for discharge.    Discharge plan     Future Appointments   Date Time Provider Department Center   10/5/2022  1:30 PM CARDIAC, PET IMAGING [facility] ZOHAIB [facility]   10/5/2022  3:00 PM LAB, APPOINTMENT [facility] LAB VNP [facility]   10/11/2022  1:40 PM Giselle Mosley MD [facility] [id number] [facility]   10/20/2022  1:00 PM Ashley Lagos PA-C [facility] CARDIO [facility]   10/27/2022  1:10 PM Estella Silva MD [facility] OPHTHAL [facility]       Mihaela Barbour MD

## 2022-09-29 NOTE — TELEPHONE ENCOUNTER
Reviewed telemetry strips from event monitor. Patient w new onset cardiomyopathy awaiting PET stress test had a white complex tachycardia on an event monitor.  Reported dyspnea on exertion but no lightheadedness or tachycardia.

## 2022-09-30 DIAGNOSIS — I47.20 VT (VENTRICULAR TACHYCARDIA): Primary | ICD-10-CM

## 2022-09-30 NOTE — PROGRESS NOTES
Refer to EP for evaluation of VT. Work up for cardiomyopathy is in progress. Increase carvedilol to 6.25 mg bid if SBP >100 mm Hg

## 2022-10-03 ENCOUNTER — TELEPHONE (OUTPATIENT)
Dept: CARDIOLOGY | Facility: HOSPITAL | Age: 77
End: 2022-10-03
Payer: MEDICARE

## 2022-10-03 DIAGNOSIS — I42.9 CARDIOMYOPATHY, UNSPECIFIED TYPE: Primary | ICD-10-CM

## 2022-10-04 ENCOUNTER — OFFICE VISIT (OUTPATIENT)
Dept: ELECTROPHYSIOLOGY | Facility: CLINIC | Age: 77
End: 2022-10-04
Payer: MEDICARE

## 2022-10-04 VITALS
SYSTOLIC BLOOD PRESSURE: 110 MMHG | HEART RATE: 95 BPM | HEIGHT: 69 IN | WEIGHT: 151.88 LBS | DIASTOLIC BLOOD PRESSURE: 80 MMHG | BODY MASS INDEX: 22.49 KG/M2

## 2022-10-04 DIAGNOSIS — I47.20 VT (VENTRICULAR TACHYCARDIA): ICD-10-CM

## 2022-10-04 DIAGNOSIS — I47.29 NON-SUSTAINED VENTRICULAR TACHYCARDIA: ICD-10-CM

## 2022-10-04 DIAGNOSIS — I42.9 CARDIOMYOPATHY, UNSPECIFIED TYPE: ICD-10-CM

## 2022-10-04 DIAGNOSIS — I50.20 HFREF (HEART FAILURE WITH REDUCED EJECTION FRACTION): ICD-10-CM

## 2022-10-04 DIAGNOSIS — I10 ESSENTIAL HYPERTENSION: Primary | Chronic | ICD-10-CM

## 2022-10-04 DIAGNOSIS — I50.22 CHRONIC SYSTOLIC HEART FAILURE: ICD-10-CM

## 2022-10-04 PROCEDURE — 3288F FALL RISK ASSESSMENT DOCD: CPT | Mod: CPTII,S$GLB,, | Performed by: INTERNAL MEDICINE

## 2022-10-04 PROCEDURE — 93010 ELECTROCARDIOGRAM REPORT: CPT | Mod: S$GLB,,, | Performed by: INTERNAL MEDICINE

## 2022-10-04 PROCEDURE — 3079F PR MOST RECENT DIASTOLIC BLOOD PRESSURE 80-89 MM HG: ICD-10-PCS | Mod: CPTII,S$GLB,, | Performed by: INTERNAL MEDICINE

## 2022-10-04 PROCEDURE — 1160F PR REVIEW ALL MEDS BY PRESCRIBER/CLIN PHARMACIST DOCUMENTED: ICD-10-PCS | Mod: CPTII,S$GLB,, | Performed by: INTERNAL MEDICINE

## 2022-10-04 PROCEDURE — 93005 ELECTROCARDIOGRAM TRACING: CPT | Mod: S$GLB,,, | Performed by: INTERNAL MEDICINE

## 2022-10-04 PROCEDURE — 1126F PR PAIN SEVERITY QUANTIFIED, NO PAIN PRESENT: ICD-10-PCS | Mod: CPTII,S$GLB,, | Performed by: INTERNAL MEDICINE

## 2022-10-04 PROCEDURE — 93005 RHYTHM STRIP: ICD-10-PCS | Mod: S$GLB,,, | Performed by: INTERNAL MEDICINE

## 2022-10-04 PROCEDURE — 3288F PR FALLS RISK ASSESSMENT DOCUMENTED: ICD-10-PCS | Mod: CPTII,S$GLB,, | Performed by: INTERNAL MEDICINE

## 2022-10-04 PROCEDURE — 1126F AMNT PAIN NOTED NONE PRSNT: CPT | Mod: CPTII,S$GLB,, | Performed by: INTERNAL MEDICINE

## 2022-10-04 PROCEDURE — 1160F RVW MEDS BY RX/DR IN RCRD: CPT | Mod: CPTII,S$GLB,, | Performed by: INTERNAL MEDICINE

## 2022-10-04 PROCEDURE — 1101F PR PT FALLS ASSESS DOC 0-1 FALLS W/OUT INJ PAST YR: ICD-10-PCS | Mod: CPTII,S$GLB,, | Performed by: INTERNAL MEDICINE

## 2022-10-04 PROCEDURE — 3074F SYST BP LT 130 MM HG: CPT | Mod: CPTII,S$GLB,, | Performed by: INTERNAL MEDICINE

## 2022-10-04 PROCEDURE — 99205 OFFICE O/P NEW HI 60 MIN: CPT | Mod: S$GLB,,, | Performed by: INTERNAL MEDICINE

## 2022-10-04 PROCEDURE — 1159F MED LIST DOCD IN RCRD: CPT | Mod: CPTII,S$GLB,, | Performed by: INTERNAL MEDICINE

## 2022-10-04 PROCEDURE — 99999 PR PBB SHADOW E&M-EST. PATIENT-LVL III: ICD-10-PCS | Mod: PBBFAC,,, | Performed by: INTERNAL MEDICINE

## 2022-10-04 PROCEDURE — 1101F PT FALLS ASSESS-DOCD LE1/YR: CPT | Mod: CPTII,S$GLB,, | Performed by: INTERNAL MEDICINE

## 2022-10-04 PROCEDURE — 99999 PR PBB SHADOW E&M-EST. PATIENT-LVL III: CPT | Mod: PBBFAC,,, | Performed by: INTERNAL MEDICINE

## 2022-10-04 PROCEDURE — 3079F DIAST BP 80-89 MM HG: CPT | Mod: CPTII,S$GLB,, | Performed by: INTERNAL MEDICINE

## 2022-10-04 PROCEDURE — 93010 RHYTHM STRIP: ICD-10-PCS | Mod: S$GLB,,, | Performed by: INTERNAL MEDICINE

## 2022-10-04 PROCEDURE — 99205 PR OFFICE/OUTPT VISIT, NEW, LEVL V, 60-74 MIN: ICD-10-PCS | Mod: S$GLB,,, | Performed by: INTERNAL MEDICINE

## 2022-10-04 PROCEDURE — 1159F PR MEDICATION LIST DOCUMENTED IN MEDICAL RECORD: ICD-10-PCS | Mod: CPTII,S$GLB,, | Performed by: INTERNAL MEDICINE

## 2022-10-04 PROCEDURE — 3074F PR MOST RECENT SYSTOLIC BLOOD PRESSURE < 130 MM HG: ICD-10-PCS | Mod: CPTII,S$GLB,, | Performed by: INTERNAL MEDICINE

## 2022-10-04 RX ORDER — CARVEDILOL 6.25 MG/1
6.25 TABLET ORAL 2 TIMES DAILY WITH MEALS
Qty: 180 TABLET | Refills: 3 | Status: SHIPPED | OUTPATIENT
Start: 2022-10-04 | End: 2022-10-20 | Stop reason: SDUPTHER

## 2022-10-04 RX ORDER — SACUBITRIL AND VALSARTAN 49; 51 MG/1; MG/1
1 TABLET, FILM COATED ORAL 2 TIMES DAILY
COMMUNITY
End: 2022-10-04 | Stop reason: SDUPTHER

## 2022-10-04 NOTE — PROGRESS NOTES
Subjective:    Patient ID:  Sidney Lanza is a 77 y.o. male who presents for evaluation of No chief complaint on file.      77 yoM referred for cardiomyopathy and non-sustained VT. He developed NICM and was placed on GDMT 9/2/22. The following week 9/11/22, he had a spell of weakness and flushing and word finding issues. His symptoms resolved later that day. No CVA noted on imaging. Event monitor placed. 9/26/22 5PM. He had 23 beat runs of NSVT. This event was asymptomatic and did not reproduce his symptoms from 9/11/22. No syncope or near syncope. Ischemic work up underway.     Echo 9/22:  · The left ventricle is severely enlarged with eccentric hypertrophy and severely decreased systolic function. The estimated ejection fraction is 10%.  · Mild right ventricular enlargement with mildly to moderately reduced right ventricular systolic function.  · Grade II left ventricular diastolic dysfunction.  · Severe left atrial enlargement.  · Mild mitral regurgitation.  · There is pulmonary hypertension. The estimated PA systolic pressure is 50 mmHg.  · Intermediate central venous pressure (8 mmHg).      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  No date: Joint pain  10/23/2013: Nuclear sclerosis - Both Eyes  No date: Prostate cancer  No date: Ulcer      Comment:  NSAID related   No date: Varicose vein of leg      Comment:  bilateral legs    Past Surgical History:  6/13/2018: COLONOSCOPY; N/A      Comment:  Procedure: COLONOSCOPY;  Surgeon: Adonis Stack MD;                 Location: [facility] ([address] [place] [address]);  Service: Endoscopy;                 Laterality: N/A;  4/28/2021: COLONOSCOPY; N/A      Comment:  Procedure: COLONOSCOPY;  Surgeon: Steven Watson MD;  Location: [facility] ([address] [place] [address]);  Service: Endoscopy;                Laterality: N/A;  4/25-[facility] [place]  email-tb  No date: EYE SURGERY  No date: KNEE ARTHROSCOPY W/ DEBRIDEMENT  2006: PROSTATE SURGERY      Comment:  for prostate cancer  10/2017: SHOULDER ARTHROSCOPY; Right      Comment:  RTC repair with patch  No date: ULNAR NERVE TRANSPOSITION    Social History    Socioeconomic History      Marital status:     Occupational History      Occupation: retired    Tobacco Use      Smoking status: Former        Packs/day: 1.00        Years: 30.00        Pack years: 30        Types: Cigarettes        Quit date: 1988        Years since quittin.4      Smokeless tobacco: Never    Substance and Sexual Activity      Alcohol use: Yes        Alcohol/week: 28.0 standard drinks        Types: 28 Cans of beer per week        Comment: beer - 2 a night       Drug use: No    Review of patient's family history indicates:    Current Outpatient Medications:  aspirin (ECOTRIN) 81 MG EC tablet, Take 1 tablet (81 mg total) by mouth once daily., Disp: 30 tablet, Rfl: 0  atorvastatin (LIPITOR) 40 MG tablet, Take 1 tablet (40 mg total) by mouth every evening., Disp: 90 tablet, Rfl: 3  carvediloL (COREG) 3.125 MG tablet, Take 1 tablet (3.125 mg total) by mouth 2 (two) times daily with meals., Disp: 60 tablet, Rfl: 3  clopidogreL (PLAVIX) 75 mg tablet, Take 1 tablet (75 mg total) by mouth once daily., Disp: 30 tablet, Rfl: 11  empagliflozin (JARDIANCE) 10 mg tablet, Take 1 tablet (10 mg total) by mouth once daily., Disp: 30 tablet, Rfl: 11  FIBER, DEXTRIN, ORAL, Take 1 tablet by mouth every morning., Disp: , Rfl:   fluticasone propionate (FLONASE) 50 mcg/actuation nasal spray, 2 sprays (100 mcg total) by Each Nostril route as needed for Rhinitis., Disp: , Rfl:   glucosamine-chondroitin 500-400 mg tablet, Take 2 tablets by mouth every morning., Disp: , Rfl:   multivitamin capsule, Take 1 capsule by mouth every morning. , Disp: , Rfl:    sacubitriL-valsartan (ENTRESTO) 24-26 mg per tablet, Take 1 tablet by mouth 2 (two) times daily.,  Disp: 60 tablet, Rfl: 3  spironolactone (ALDACTONE) 25 MG tablet, Take 1 tablet (25 mg total) by mouth once daily., Disp: 30 tablet, Rfl: 3  furosemide (LASIX) 20 MG tablet, Take 1 tablet (20 mg total) by mouth daily as needed. For leg swelling and or shortness of breath (Patient not taking: Reported on 10/4/2022), Disp: 30 tablet, Rfl: 3    No current facility-administered medications for this visit.            Review of Systems   Constitutional: Negative.   HENT: Negative.     Eyes: Negative.    Cardiovascular:  Negative for chest pain, dyspnea on exertion, leg swelling, near-syncope, palpitations and syncope.   Respiratory: Negative.  Negative for shortness of breath.    Endocrine: Negative.    Hematologic/Lymphatic: Negative.    Skin: Negative.    Musculoskeletal: Negative.    Gastrointestinal: Negative.    Genitourinary: Negative.    Neurological: Negative.  Negative for dizziness and light-headedness.   Psychiatric/Behavioral: Negative.     Allergic/Immunologic: Negative.       Objective:    Physical Exam  Vitals reviewed.   Constitutional:       General: He is not in acute distress.     Appearance: He is well-developed.   HENT:      Head: Normocephalic and atraumatic.   Eyes:      Pupils: Pupils are equal, round, and reactive to light.   Neck:      Thyroid: No thyromegaly.      Vascular: No JVD.   Cardiovascular:      Rate and Rhythm: Normal rate and regular rhythm.      Chest Wall: PMI is not displaced.      Heart sounds: Normal heart sounds, S1 normal and S2 normal. No murmur heard.    No friction rub. No gallop.   Pulmonary:      Effort: Pulmonary effort is normal. No respiratory distress.      Breath sounds: Normal breath sounds. No wheezing or rales.   Abdominal:      General: Bowel sounds are normal. There is no distension.      Palpations: Abdomen is soft.      Tenderness: There is no abdominal tenderness. There is no guarding or rebound.   Musculoskeletal:         General: No tenderness. Normal range  of motion.      Cervical back: Normal range of motion.   Skin:     General: Skin is warm and dry.      Findings: No erythema or rash.   Neurological:      Mental Status: He is alert and oriented to person, place, and time.      Cranial Nerves: No cranial nerve deficit.   Psychiatric:         Behavior: Behavior normal.         Thought Content: Thought content normal.         Judgment: Judgment normal.       ECg: NSR nl OH, QRS; PVCs    Assessment:       1. Essential hypertension    2. VT (ventricular tachycardia)    3. Non-sustained ventricular tachycardia    4. Chronic systolic heart failure         Plan:       77 yoM with cardiomyopathy here for ICD consideration and arrhythmia management. He had a single NSVT event lasting 23 beats. He is now wearing a life vest. Coreg from 3.125 to 6.25. Will get echo 12/2/22 and tentatively plan on Dc ICD the following 1-2 weeks. Cancel ICD if EF >35%. Extensive discussion regarding risks, benefits, and alternative approaches to the procedure was had with the patient.  The patient voices understanding and all questions have been answered.  The patient would like to proceed as planned.

## 2022-10-05 ENCOUNTER — HOSPITAL ENCOUNTER (OUTPATIENT)
Dept: CARDIOLOGY | Facility: HOSPITAL | Age: 77
Discharge: HOME | End: 2022-10-05
Attending: INTERNAL MEDICINE
Payer: MEDICARE

## 2022-10-05 VITALS
WEIGHT: 147 LBS | HEIGHT: 69 IN | HEART RATE: 76 BPM | DIASTOLIC BLOOD PRESSURE: 69 MMHG | RESPIRATION RATE: 16 BRPM | SYSTOLIC BLOOD PRESSURE: 109 MMHG | BODY MASS INDEX: 21.77 KG/M2

## 2022-10-05 DIAGNOSIS — R93.1 ABNORMAL FINDINGS ON DIAGNOSTIC IMAGING OF HEART AND CORONARY CIRCULATION: ICD-10-CM

## 2022-10-05 DIAGNOSIS — I50.20 HFREF (HEART FAILURE WITH REDUCED EJECTION FRACTION): ICD-10-CM

## 2022-10-05 DIAGNOSIS — I42.9 CARDIOMYOPATHY, UNSPECIFIED TYPE: ICD-10-CM

## 2022-10-05 LAB
CFR FLOW - ANTERIOR: 2.12
CFR FLOW - INFERIOR: 1.97
CFR FLOW - LATERAL: 1.95
CFR FLOW - MAX: 2.87
CFR FLOW - MIN: 1.38
CFR FLOW - SEPTAL: 2.14
CFR FLOW - WHOLE HEART: 2.05
CV STRESS BASE HR: 83 BPM
DIASTOLIC BLOOD PRESSURE: 87 MMHG
EJECTION FRACTION- HIGH: 65 %
END DIASTOLIC INDEX-HIGH: 153 ML/M2
END DIASTOLIC INDEX-LOW: 93 ML/M2
END SYSTOLIC INDEX-HIGH: 71 ML/M2
END SYSTOLIC INDEX-LOW: 31 ML/M2
NUC REST DIASTOLIC VOLUME INDEX: 446
NUC REST EJECTION FRACTION: 14
NUC REST SYSTOLIC VOLUME INDEX: 384
NUC STRESS DIASTOLIC VOLUME INDEX: 453
NUC STRESS EJECTION FRACTION: 14 %
NUC STRESS SYSTOLIC VOLUME INDEX: 388
OHS CV CPX 1 MINUTE RECOVERY HEART RATE: 89 BPM
OHS CV CPX 85 PERCENT MAX PREDICTED HEART RATE MALE: 122
OHS CV CPX MAX PREDICTED HEART RATE: 144
OHS CV CPX PATIENT IS FEMALE: 0
OHS CV CPX PATIENT IS MALE: 1
OHS CV CPX PEAK DIASTOLIC BLOOD PRESSURE: 64 MMHG
OHS CV CPX PEAK HEAR RATE: 76 BPM
OHS CV CPX PEAK RATE PRESSURE PRODUCT: 7296
OHS CV CPX PEAK SYSTOLIC BLOOD PRESSURE: 96 MMHG
OHS CV CPX PERCENT MAX PREDICTED HEART RATE ACHIEVED: 53
OHS CV CPX RATE PRESSURE PRODUCT PRESENTING: 9960
REST FLOW - ANTERIOR: 0.56 CC/MIN/G
REST FLOW - INFERIOR: 0.42 CC/MIN/G
REST FLOW - LATERAL: 0.62 CC/MIN/G
REST FLOW - MAX: 0.85 CC/MIN/G
REST FLOW - MIN: 0.29 CC/MIN/G
REST FLOW - SEPTAL: 0.46 CC/MIN/G
REST FLOW - WHOLE HEART: 0.52 CC/MIN/G
RETIRED EF AND QEF - SEE NOTES: 53 %
STRESS FLOW - ANTERIOR: 1.16 CC/MIN/G
STRESS FLOW - INFERIOR: 0.83 CC/MIN/G
STRESS FLOW - LATERAL: 1.2 CC/MIN/G
STRESS FLOW - MAX: 1.63 CC/MIN/G
STRESS FLOW - MIN: 0.57 CC/MIN/G
STRESS FLOW - SEPTAL: 0.98 CC/MIN/G
STRESS FLOW - WHOLE HEART: 1.04 CC/MIN/G
STRESS ST DEPRESSION: 0.5 MM
SYSTOLIC BLOOD PRESSURE: 120 MMHG

## 2022-10-05 PROCEDURE — 93016 CARDIAC PET SCAN STRESS (CUPID ONLY): ICD-10-PCS | Mod: ,,, | Performed by: INTERNAL MEDICINE

## 2022-10-05 PROCEDURE — 63600175 PHARM REV CODE 636 W HCPCS: Performed by: INTERNAL MEDICINE

## 2022-10-05 PROCEDURE — 93018 CV STRESS TEST I&R ONLY: CPT | Mod: ,,, | Performed by: INTERNAL MEDICINE

## 2022-10-05 PROCEDURE — 78431 MYOCRD IMG PET RST&STRS CT: CPT | Mod: 26,,, | Performed by: INTERNAL MEDICINE

## 2022-10-05 PROCEDURE — 78434 AQMBF PET REST & RX STRESS: CPT

## 2022-10-05 PROCEDURE — 93018 CARDIAC PET SCAN STRESS (CUPID ONLY): ICD-10-PCS | Mod: ,,, | Performed by: INTERNAL MEDICINE

## 2022-10-05 PROCEDURE — 78434 CARDIAC PET SCAN STRESS (CUPID ONLY): ICD-10-PCS | Mod: 26,,, | Performed by: INTERNAL MEDICINE

## 2022-10-05 PROCEDURE — 93016 CV STRESS TEST SUPVJ ONLY: CPT | Mod: ,,, | Performed by: INTERNAL MEDICINE

## 2022-10-05 PROCEDURE — A9555 RB82 RUBIDIUM: HCPCS

## 2022-10-05 PROCEDURE — 78431 CARDIAC PET SCAN STRESS (CUPID ONLY): ICD-10-PCS | Mod: 26,,, | Performed by: INTERNAL MEDICINE

## 2022-10-05 PROCEDURE — 78434 AQMBF PET REST & RX STRESS: CPT | Mod: 26,,, | Performed by: INTERNAL MEDICINE

## 2022-10-05 RX ORDER — CAFFEINE CITRATE 20 MG/ML
60 SOLUTION INTRAVENOUS ONCE
Status: COMPLETED | OUTPATIENT
Start: 2022-10-05 | End: 2022-10-05

## 2022-10-05 RX ORDER — REGADENOSON 0.08 MG/ML
0.4 INJECTION, SOLUTION INTRAVENOUS ONCE
Status: COMPLETED | OUTPATIENT
Start: 2022-10-05 | End: 2022-10-05

## 2022-10-05 RX ADMIN — REGADENOSON 0.4 MG: 0.08 INJECTION, SOLUTION INTRAVENOUS at 01:10

## 2022-10-05 RX ADMIN — CAFFEINE CITRATE 60 MG: 20 INJECTION INTRAVENOUS at 01:10

## 2022-10-06 RX ORDER — SACUBITRIL AND VALSARTAN 49; 51 MG/1; MG/1
1 TABLET, FILM COATED ORAL 2 TIMES DAILY
Qty: 180 TABLET | Refills: 3 | Status: SHIPPED | OUTPATIENT
Start: 2022-10-06 | End: 2023-03-30

## 2022-10-11 ENCOUNTER — OFFICE VISIT (OUTPATIENT)
Dept: NEUROLOGY | Facility: CLINIC | Age: 77
End: 2022-10-11
Payer: MEDICARE

## 2022-10-11 VITALS
HEART RATE: 96 BPM | SYSTOLIC BLOOD PRESSURE: 124 MMHG | WEIGHT: 150.56 LBS | DIASTOLIC BLOOD PRESSURE: 85 MMHG | HEIGHT: 69 IN | BODY MASS INDEX: 22.3 KG/M2

## 2022-10-11 DIAGNOSIS — I50.20 HFREF (HEART FAILURE WITH REDUCED EJECTION FRACTION): ICD-10-CM

## 2022-10-11 DIAGNOSIS — E78.49 OTHER HYPERLIPIDEMIA: ICD-10-CM

## 2022-10-11 DIAGNOSIS — I50.42 CHRONIC COMBINED SYSTOLIC AND DIASTOLIC HEART FAILURE: Primary | ICD-10-CM

## 2022-10-11 DIAGNOSIS — R29.90 EPISODE OF TRANSIENT NEUROLOGIC SYMPTOMS: ICD-10-CM

## 2022-10-11 DIAGNOSIS — Z09 HOSPITAL DISCHARGE FOLLOW-UP: ICD-10-CM

## 2022-10-11 PROCEDURE — 1101F PR PT FALLS ASSESS DOC 0-1 FALLS W/OUT INJ PAST YR: ICD-10-PCS | Mod: CPTII,S$GLB,, | Performed by: PSYCHIATRY & NEUROLOGY

## 2022-10-11 PROCEDURE — 99999 PR PBB SHADOW E&M-EST. PATIENT-LVL III: ICD-10-PCS | Mod: PBBFAC,,, | Performed by: PSYCHIATRY & NEUROLOGY

## 2022-10-11 PROCEDURE — 3074F PR MOST RECENT SYSTOLIC BLOOD PRESSURE < 130 MM HG: ICD-10-PCS | Mod: CPTII,S$GLB,, | Performed by: PSYCHIATRY & NEUROLOGY

## 2022-10-11 PROCEDURE — 1126F AMNT PAIN NOTED NONE PRSNT: CPT | Mod: CPTII,S$GLB,, | Performed by: PSYCHIATRY & NEUROLOGY

## 2022-10-11 PROCEDURE — 3074F SYST BP LT 130 MM HG: CPT | Mod: CPTII,S$GLB,, | Performed by: PSYCHIATRY & NEUROLOGY

## 2022-10-11 PROCEDURE — 1101F PT FALLS ASSESS-DOCD LE1/YR: CPT | Mod: CPTII,S$GLB,, | Performed by: PSYCHIATRY & NEUROLOGY

## 2022-10-11 PROCEDURE — 1126F PR PAIN SEVERITY QUANTIFIED, NO PAIN PRESENT: ICD-10-PCS | Mod: CPTII,S$GLB,, | Performed by: PSYCHIATRY & NEUROLOGY

## 2022-10-11 PROCEDURE — 99213 PR OFFICE/OUTPT VISIT, EST, LEVL III, 20-29 MIN: ICD-10-PCS | Mod: S$GLB,,, | Performed by: PSYCHIATRY & NEUROLOGY

## 2022-10-11 PROCEDURE — 3079F PR MOST RECENT DIASTOLIC BLOOD PRESSURE 80-89 MM HG: ICD-10-PCS | Mod: CPTII,S$GLB,, | Performed by: PSYCHIATRY & NEUROLOGY

## 2022-10-11 PROCEDURE — 99213 OFFICE O/P EST LOW 20 MIN: CPT | Mod: S$GLB,,, | Performed by: PSYCHIATRY & NEUROLOGY

## 2022-10-11 PROCEDURE — 3288F FALL RISK ASSESSMENT DOCD: CPT | Mod: CPTII,S$GLB,, | Performed by: PSYCHIATRY & NEUROLOGY

## 2022-10-11 PROCEDURE — 3288F PR FALLS RISK ASSESSMENT DOCUMENTED: ICD-10-PCS | Mod: CPTII,S$GLB,, | Performed by: PSYCHIATRY & NEUROLOGY

## 2022-10-11 PROCEDURE — 99999 PR PBB SHADOW E&M-EST. PATIENT-LVL III: CPT | Mod: PBBFAC,,, | Performed by: PSYCHIATRY & NEUROLOGY

## 2022-10-11 PROCEDURE — 3079F DIAST BP 80-89 MM HG: CPT | Mod: CPTII,S$GLB,, | Performed by: PSYCHIATRY & NEUROLOGY

## 2022-10-11 RX ORDER — ATORVASTATIN CALCIUM 40 MG/1
40 TABLET, FILM COATED ORAL NIGHTLY
Qty: 30 TABLET | Refills: 0 | Status: SHIPPED | OUTPATIENT
Start: 2022-10-11 | End: 2022-10-20 | Stop reason: SDUPTHER

## 2022-10-11 RX ORDER — SPIRONOLACTONE 25 MG/1
25 TABLET ORAL DAILY
Qty: 30 TABLET | Refills: 0 | Status: SHIPPED | OUTPATIENT
Start: 2022-10-11 | End: 2022-10-20 | Stop reason: SDUPTHER

## 2022-10-11 RX ORDER — ASPIRIN 81 MG/1
81 TABLET ORAL DAILY
Qty: 30 TABLET | Refills: 0 | Status: SHIPPED | OUTPATIENT
Start: 2022-10-11 | End: 2022-10-20 | Stop reason: SDUPTHER

## 2022-10-11 NOTE — PATIENT INSTRUCTIONS
- Continue aspiring 81 mg daily and atorvastatin 40 mg daily for stroke prevention.  - Return to ER for any new neurological symptoms (weakness, numbness, speech difficulty, sudden dizziness, vision loss).  - Followup with Cardiology and Dr. Soto    Mediterranean Diet Recommendations    Eat primarily plant-based foods, such as fruits and vegetables, whole grains, legumes (beans) and nuts.  Limit refined carbohydrates (white pasta, bread, rice).  Replace butter with healthy fats such as olive oil.  Use herbs and spices instead of salt to flavor foods.  Limit red meat and processed meats to no more than a few times a month.  Avoid sugary sodas, bakery goods, and sweets.  Eat fish and poultry at least twice a week.  Get plenty of exercise (150 minutes per week).    Adopted from [place], NEJ, 2018.

## 2022-10-11 NOTE — PROGRESS NOTES
Vascular Neurology  Clinic Note    Reason For Visit (Chief Complaint): transient neurological symptoms    HPI: 77 y.o. right handed man with PMH HTN, HLD, CHF (EF 10%), with admission in September for transient neurological symptoms.  Here for followup.    Patient presented with bilateral arm tingling, momentary difficulty getting words out while watching football.  Symptoms had resolved prior to arrival to ED.  MRI negative for stroke.  Recently diagnosed with HFrEF and started on GDMT.  BNP and troponin elevated during recent hospitalization.  He was treated as ACS/NSTEMI type II.  Also started on DAPT for possible TIA.    Complaining of SOB,  PND.  Has been out of most medications for several weeks.  Has Entresto and Coreg.  Wore heart monitor, which showed NSVT.  Currently wearing life vest with possible defribillator placement.      Brain Imaging:  MRI Brain 9/11/22:  No evidence of acute infarct or hemorrhage.  Chronic senescent and microvascular ischemic disease.  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    CTA H/N 9/11/22:  CTA head: Unremarkable CTA of the head specifically without evidence for proximal significant stenosis or occlusion.  CTA neck: Atherosclerotic plaquing of the carotid bifurcations and proximal ICAs with less than 50% proximal ICA stenosis by NASCET criteria.       Cardiac Evaluation:  TTE 9/2/22:  Severe LAE  Severe LV global hypokinesis - EF 20%  pHTN      Relevant Labwork:  Recent Labs   Lab 08/25/22  1021   Hemoglobin A1C 5.5   LDL Cholesterol 93.8   HDL 51   Triglycerides 51   Cholesterol 155       I independently viewed the above imaging studies in addition to reviewing the report.  I reviewed the above labwork.    Review of Systems  Msk: negative for muscle pain  Skin: negative for pruritis  Neuro: negative for headache  Resp: +dyspnea  All others negative    Past Medical History  Past Medical History:   Diagnosis Date    Arthritis     Chronic rhinitis     Fever blister      Hyperlipidemia     Hypertension     Impotence of organic origin     Joint pain     Nuclear sclerosis - Both Eyes 10/23/2013    Prostate cancer     Ulcer     NSAID related     Varicose vein of leg     bilateral legs     Family History  Cancer-related family history includes Cancer in his father and mother. There is no history of Melanoma.     Social History  Former smoker.  Lives with wife.  Drinks 2 beers/night.        Medication List with Changes/Refills   Current Medications    FIBER, DEXTRIN, ORAL    Take 1 tablet by mouth every morning.    FLUTICASONE PROPIONATE (FLONASE) 50 MCG/ACTUATION NASAL SPRAY    2 sprays (100 mcg total) by Each Nostril route as needed for Rhinitis.    GLUCOSAMINE-CHONDROITIN 500-400 MG TABLET    Take 2 tablets by mouth every morning.    MULTIVITAMIN CAPSULE    Take 1 capsule by mouth every morning.     SACUBITRIL-VALSARTAN (ENTRESTO) 49-51 MG PER TABLET    Take 1 tablet by mouth 2 (two) times daily.   Changed and/or Refilled Medications    Modified Medication Previous Medication    ASPIRIN (ECOTRIN) 81 MG EC TABLET aspirin (ECOTRIN) 81 MG EC tablet       Take 1 tablet (81 mg total) by mouth once daily.    Take 1 tablet (81 mg total) by mouth once daily.    ATORVASTATIN (LIPITOR) 40 MG TABLET atorvastatin (LIPITOR) 40 MG tablet       Take 1 tablet (40 mg total) by mouth every evening.    Take 1 tablet (40 mg total) by mouth every evening.    CARVEDILOL (COREG) 12.5 MG TABLET carvediloL (COREG) 6.25 MG tablet       Take 1 tablet (12.5 mg total) by mouth 2 (two) times daily with meals.    Take 1 tablet (6.25 mg total) by mouth 2 (two) times daily with meals.    EMPAGLIFLOZIN (JARDIANCE) 10 MG TABLET JARDIANCE 10 mg tablet       Take 1 tablet (10 mg total) by mouth once daily.    Take 1 tablet by mouth once daily    FUROSEMIDE (LASIX) 20 MG TABLET furosemide (LASIX) 20 MG tablet       Take 1 tablet (20 mg total) by mouth daily as needed. For leg swelling and or shortness of breath    Take 1  "tablet (20 mg total) by mouth daily as needed. For leg swelling and or shortness of breath    SPIRONOLACTONE (ALDACTONE) 25 MG TABLET spironolactone (ALDACTONE) 25 MG tablet       Take 1 tablet (25 mg total) by mouth once daily.    Take 1 tablet (25 mg total) by mouth once daily.   Discontinued Medications    CLOPIDOGREL (PLAVIX) 75 MG TABLET    Take 1 tablet (75 mg total) by mouth once daily.    EMPAGLIFLOZIN (JARDIANCE) 10 MG TABLET    Take 1 tablet (10 mg total) by mouth once daily.       EXAM  Vital Signs  Pulse: 96  BP: 124/85  Pain Score: 0-No pain  Height and Weight  Height: 5' 9" (175.3 cm)  Weight: 68.3 kg (150 lb 9.2 oz)  BSA (Calculated - sq m): 1.82 sq meters  BMI (Calculated): 22.2  Weight in (lb) to have BMI = 25: 168.9]    General: well appearing without discomfort   Neck: no carotid bruits, no elevated JVP  CV: RRR, nL S1&S2  Resp: few bibasilar rales, no wheezes  Ext: wwp, no pedal edema    Mental Status: Alert and oriented, normal attention, speech fluent and prosodic, naming and repetition intact, follows multistep embedded commands, no e/o neglect or extinction  Cranial Nerves: PERRL, EOMI, VFF, sensation intact, face symmetric, TUP midline, SCM/trap 5/5  Motor: Normal bulk and tone, no drift, finger taps symmetric  Strength 5/5 throughout  Sensory: intact light touch bilaterally  Coordination: no ataxia on finger-to-nose or heel-to-shin testing; no truncal ataxia  Gait & Stance: normal  DTR: 2+ symmetric    NIHSS - 0    ___________________  ASSESSMENT & PLAN  77 y.o. right handed man with PMH HTN, HLD, CHF (EF 10%), with admission in September for transient neurological symptoms.  Symptoms not highly suggestive of TIA - possibly related to initiation of multiple new medications several days prior?  No recurrent neurological symptoms, however patient has been out of several HF medications.      - Continue aspiring 81 mg daily and atorvastatin 40 mg daily for stroke prevention.  - Return to ER for " any new neurological symptoms.  - Followup with Cardiology and Dr. Soto.  - 1 mo refills provided of ASA, atorvastatin, aldactone, jardiance  - RTC PRN      Problem List Items Addressed This Visit          Unprioritized    Hyperlipidemia    Chronic combined systolic and diastolic heart failure - Primary    Episode of transient neurologic symptoms     Other Visit Diagnoses       Hospital discharge follow-up        HFrEF (heart failure with reduced ejection fraction)                Giselle Mosley MD  Vascular Neurology

## 2022-10-20 ENCOUNTER — OFFICE VISIT (OUTPATIENT)
Dept: CARDIOLOGY | Facility: CLINIC | Age: 77
End: 2022-10-20
Payer: MEDICARE

## 2022-10-20 VITALS
OXYGEN SATURATION: 99 % | HEART RATE: 84 BPM | BODY MASS INDEX: 22.21 KG/M2 | DIASTOLIC BLOOD PRESSURE: 86 MMHG | SYSTOLIC BLOOD PRESSURE: 125 MMHG | HEIGHT: 69 IN | WEIGHT: 149.94 LBS

## 2022-10-20 DIAGNOSIS — I50.42 CHRONIC COMBINED SYSTOLIC AND DIASTOLIC HEART FAILURE: Primary | ICD-10-CM

## 2022-10-20 DIAGNOSIS — I47.29 NON-SUSTAINED VENTRICULAR TACHYCARDIA: ICD-10-CM

## 2022-10-20 DIAGNOSIS — I50.20 HFREF (HEART FAILURE WITH REDUCED EJECTION FRACTION): ICD-10-CM

## 2022-10-20 DIAGNOSIS — E78.49 OTHER HYPERLIPIDEMIA: ICD-10-CM

## 2022-10-20 DIAGNOSIS — I10 ESSENTIAL HYPERTENSION: Chronic | ICD-10-CM

## 2022-10-20 DIAGNOSIS — I42.8 NONISCHEMIC CARDIOMYOPATHY: ICD-10-CM

## 2022-10-20 PROBLEM — I50.22 CHRONIC SYSTOLIC HEART FAILURE: Status: RESOLVED | Noted: 2022-09-11 | Resolved: 2022-10-20

## 2022-10-20 PROCEDURE — 3079F DIAST BP 80-89 MM HG: CPT | Mod: CPTII,S$GLB,, | Performed by: PHYSICIAN ASSISTANT

## 2022-10-20 PROCEDURE — 3079F PR MOST RECENT DIASTOLIC BLOOD PRESSURE 80-89 MM HG: ICD-10-PCS | Mod: CPTII,S$GLB,, | Performed by: PHYSICIAN ASSISTANT

## 2022-10-20 PROCEDURE — 3074F PR MOST RECENT SYSTOLIC BLOOD PRESSURE < 130 MM HG: ICD-10-PCS | Mod: CPTII,S$GLB,, | Performed by: PHYSICIAN ASSISTANT

## 2022-10-20 PROCEDURE — 99999 PR PBB SHADOW E&M-EST. PATIENT-LVL IV: CPT | Mod: PBBFAC,,, | Performed by: PHYSICIAN ASSISTANT

## 2022-10-20 PROCEDURE — 1126F PR PAIN SEVERITY QUANTIFIED, NO PAIN PRESENT: ICD-10-PCS | Mod: CPTII,S$GLB,, | Performed by: PHYSICIAN ASSISTANT

## 2022-10-20 PROCEDURE — 1159F PR MEDICATION LIST DOCUMENTED IN MEDICAL RECORD: ICD-10-PCS | Mod: CPTII,S$GLB,, | Performed by: PHYSICIAN ASSISTANT

## 2022-10-20 PROCEDURE — 1160F PR REVIEW ALL MEDS BY PRESCRIBER/CLIN PHARMACIST DOCUMENTED: ICD-10-PCS | Mod: CPTII,S$GLB,, | Performed by: PHYSICIAN ASSISTANT

## 2022-10-20 PROCEDURE — 99999 PR PBB SHADOW E&M-EST. PATIENT-LVL IV: ICD-10-PCS | Mod: PBBFAC,,, | Performed by: PHYSICIAN ASSISTANT

## 2022-10-20 PROCEDURE — 3288F FALL RISK ASSESSMENT DOCD: CPT | Mod: CPTII,S$GLB,, | Performed by: PHYSICIAN ASSISTANT

## 2022-10-20 PROCEDURE — 99214 PR OFFICE/OUTPT VISIT, EST, LEVL IV, 30-39 MIN: ICD-10-PCS | Mod: S$GLB,,, | Performed by: PHYSICIAN ASSISTANT

## 2022-10-20 PROCEDURE — 1101F PR PT FALLS ASSESS DOC 0-1 FALLS W/OUT INJ PAST YR: ICD-10-PCS | Mod: CPTII,S$GLB,, | Performed by: PHYSICIAN ASSISTANT

## 2022-10-20 PROCEDURE — 3288F PR FALLS RISK ASSESSMENT DOCUMENTED: ICD-10-PCS | Mod: CPTII,S$GLB,, | Performed by: PHYSICIAN ASSISTANT

## 2022-10-20 PROCEDURE — 99214 OFFICE O/P EST MOD 30 MIN: CPT | Mod: S$GLB,,, | Performed by: PHYSICIAN ASSISTANT

## 2022-10-20 PROCEDURE — 3074F SYST BP LT 130 MM HG: CPT | Mod: CPTII,S$GLB,, | Performed by: PHYSICIAN ASSISTANT

## 2022-10-20 PROCEDURE — 1126F AMNT PAIN NOTED NONE PRSNT: CPT | Mod: CPTII,S$GLB,, | Performed by: PHYSICIAN ASSISTANT

## 2022-10-20 PROCEDURE — 1101F PT FALLS ASSESS-DOCD LE1/YR: CPT | Mod: CPTII,S$GLB,, | Performed by: PHYSICIAN ASSISTANT

## 2022-10-20 PROCEDURE — 1159F MED LIST DOCD IN RCRD: CPT | Mod: CPTII,S$GLB,, | Performed by: PHYSICIAN ASSISTANT

## 2022-10-20 PROCEDURE — 1160F RVW MEDS BY RX/DR IN RCRD: CPT | Mod: CPTII,S$GLB,, | Performed by: PHYSICIAN ASSISTANT

## 2022-10-20 RX ORDER — CARVEDILOL 12.5 MG/1
12.5 TABLET ORAL 2 TIMES DAILY WITH MEALS
Qty: 180 TABLET | Refills: 3 | Status: SHIPPED | OUTPATIENT
Start: 2022-10-20 | End: 2022-11-30

## 2022-10-20 RX ORDER — ASPIRIN 81 MG/1
81 TABLET ORAL DAILY
Qty: 90 TABLET | Refills: 3 | Status: SHIPPED | OUTPATIENT
Start: 2022-10-20 | End: 2023-12-20 | Stop reason: SDUPTHER

## 2022-10-20 RX ORDER — FUROSEMIDE 20 MG/1
20 TABLET ORAL DAILY PRN
Qty: 90 TABLET | Refills: 3 | Status: SHIPPED | OUTPATIENT
Start: 2022-10-20 | End: 2022-11-30

## 2022-10-20 RX ORDER — SPIRONOLACTONE 25 MG/1
25 TABLET ORAL DAILY
Qty: 90 TABLET | Refills: 3 | Status: SHIPPED | OUTPATIENT
Start: 2022-10-20 | End: 2023-09-11 | Stop reason: SDUPTHER

## 2022-10-20 RX ORDER — ATORVASTATIN CALCIUM 40 MG/1
40 TABLET, FILM COATED ORAL NIGHTLY
Qty: 90 TABLET | Refills: 3 | Status: SHIPPED | OUTPATIENT
Start: 2022-10-20 | End: 2023-09-11 | Stop reason: SDUPTHER

## 2022-10-20 NOTE — PATIENT INSTRUCTIONS
-Increase Carvedilol to 12.5 mg twice daily     -Limiting your sodium (salt) intake is a key part of your treatment. You should eat no more than 2 grams (2000 mgm) of salt a day. DO NOT USE TABLE SALT! JUST ONE TEASPOON OF TABLE SALT CONTAINS ABOUT 2 GRAMS OF SODIUM. Sodium is found in many canned, pickled foods. Look at the food label. Use caution when eating out. Read the menu carefully and ask if food can be cooked without salt.     -Restrict your fluids to 1500 ml/day. This is about 48 ounces. This INCLUDES all fluids taken with your medications, at mealtime and between Meals. Include things like ice cream, pudding, soup, ice, jello and even fruits in your daily fluid total. Keep a record of your daily fluid intake and bring to your next appointment with you.     -Avoid all alcohol and use of tobacco products.     -Weigh yourself every morning using the same scale around the same time. A sudden weight gain is one sign that you might be retaining fluids. If you notice you have a WEIGHT GAIN of TWO or THREE pounds overnight or FIVE pounds in a week, take an extra fluid pill and contact your cardiologist.    -Regular exercise is important; spending long periods of time in bed can weaken you. Start activity slowly and progress little by little. Allow time to adjust. Don't overdo it. Take rest from becoming too tired.    -Exercise at the time of day when you are at your best energy level. Exercise only when feeling well. Wait 2 days after a cold or flu. Don't exercise vigorously after eating. Wait at least 2 hours. Choose appropriate exercise. Aerobic exercise should be included. Flexibility and strengthening exercises should also be considered for a well-rounded program.     -Call your doctor if you:   1. Have problems breathing at night.  2. Need to use more pillows or have to sit up to breathe at night

## 2022-10-20 NOTE — PROGRESS NOTES
General Cardiology Clinic Note  Reason for Visit: Follow up   Last Clinic Visit: 9/2/2022  General Cardiologist: Dr. Ayon    HPI:   Sidney Lanza is a 77 y.o. male who presents for follow up.    Problems:  Nonischemic cardiomyopathy  HFrEF  NSVT  Hypertension  Hyperlipidemia     HPI   Patient presents for follow up and up-titration of GDMT. He reports BOYD with walking one block. He reports PND and orthopnea, which seemed to be worsening three nights ago, so the past two nights he took Lasix which helped. He denies pedal edema, palpitations, chest pain, lightheadedness, syncope, weight gain. He has not been checking his BP regularly at home or doing daily weights.     ROS:      Review of Systems   Constitutional: Negative for diaphoresis, malaise/fatigue, weight gain and weight loss.   HENT:  Negative for nosebleeds.    Eyes:  Negative for vision loss in left eye, vision loss in right eye and visual disturbance.   Cardiovascular:  Positive for dyspnea on exertion, orthopnea and paroxysmal nocturnal dyspnea. Negative for chest pain, claudication, irregular heartbeat, leg swelling, near-syncope, palpitations and syncope.   Respiratory:  Positive for shortness of breath. Negative for cough, sleep disturbances due to breathing, snoring and wheezing.    Hematologic/Lymphatic: Negative for bleeding problem. Does not bruise/bleed easily.   Skin:  Negative for poor wound healing and rash.   Musculoskeletal:  Negative for muscle cramps and myalgias.   Gastrointestinal:  Negative for bloating, abdominal pain, diarrhea, heartburn, melena, nausea and vomiting.   Genitourinary:  Negative for hematuria and nocturia.   Neurological:  Negative for brief paralysis, dizziness, headaches, light-headedness, numbness and weakness.   Psychiatric/Behavioral:  Negative for depression.    Allergic/Immunologic: Negative for hives.     PMH:     Past Medical History:   Diagnosis Date    Arthritis     Chronic rhinitis     Fever  blister     Hyperlipidemia     Hypertension     Impotence of organic origin     Joint pain     Nuclear sclerosis - Both Eyes 10/23/2013    Prostate cancer     Ulcer     NSAID related     Varicose vein of leg     bilateral legs     Past Surgical History:   Procedure Laterality Date    COLONOSCOPY N/A 6/13/2018    Procedure: COLONOSCOPY;  Surgeon: Adonis Stack MD;  Location: [facility] ENDO (4TH FLR);  Service: Endoscopy;  Laterality: N/A;    COLONOSCOPY N/A 4/28/2021    Procedure: COLONOSCOPY;  Surgeon: Steven Watson MD;  Location: [facility] ENDO (4TH FLR);  Service: Endoscopy;  Laterality: N/A;  4/25-[facility] uc-inst email-tb    EYE SURGERY      KNEE ARTHROSCOPY W/ DEBRIDEMENT      PROSTATE SURGERY  2006    for prostate cancer    SHOULDER ARTHROSCOPY Right 10/2017    RTC repair with patch    ULNAR NERVE TRANSPOSITION       Allergies:   Review of patient's allergies indicates:  No Known Allergies  Medications:     Current Outpatient Medications on File Prior to Visit   Medication Sig Dispense Refill    aspirin (ECOTRIN) 81 MG EC tablet Take 1 tablet (81 mg total) by mouth once daily. 30 tablet 0    atorvastatin (LIPITOR) 40 MG tablet Take 1 tablet (40 mg total) by mouth every evening. 30 tablet 0    carvediloL (COREG) 6.25 MG tablet Take 1 tablet (6.25 mg total) by mouth 2 (two) times daily with meals. 180 tablet 3    FIBER, DEXTRIN, ORAL Take 1 tablet by mouth every morning.      fluticasone propionate (FLONASE) 50 mcg/actuation nasal spray 2 sprays (100 mcg total) by Each Nostril route as needed for Rhinitis. (Patient not taking: Reported on 10/11/2022)      furosemide (LASIX) 20 MG tablet Take 1 tablet (20 mg total) by mouth daily as needed. For leg swelling and or shortness of breath (Patient not taking: Reported on 10/4/2022) 30 tablet 3    glucosamine-chondroitin 500-400 mg tablet Take 2 tablets by mouth every morning.      JARDIANCE 10 mg tablet Take 1 tablet by mouth once daily 30 tablet 0    multivitamin  "capsule Take 1 capsule by mouth every morning.       sacubitriL-valsartan (ENTRESTO) 49-51 mg per tablet Take 1 tablet by mouth 2 (two) times daily. 180 tablet 3    spironolactone (ALDACTONE) 25 MG tablet Take 1 tablet (25 mg total) by mouth once daily. 30 tablet 0     No current facility-administered medications on file prior to visit.     Social History:     Social History     Tobacco Use    Smoking status: Former     Packs/day: 1.00     Years: 30.00     Pack years: 30.00     Types: Cigarettes     Quit date: 1988     Years since quittin.4    Smokeless tobacco: Never   Substance Use Topics    Alcohol use: Yes     Alcohol/week: 28.0 standard drinks     Types: 28 Cans of beer per week     Comment: beer - 2 a night      Family History:     Family History   Problem Relation Age of Onset    Cancer Mother         breast    Cancer Father         prostate    Heart disease Father 74        CHF    Diabetes Sister     Heart disease Sister         stroke    Diabetes Brother     Hypertension Brother     Diabetes Brother     Amblyopia Neg Hx     Blindness Neg Hx     Cataracts Neg Hx     Glaucoma Neg Hx     Macular degeneration Neg Hx     Retinal detachment Neg Hx     Strabismus Neg Hx     Stroke Neg Hx     Thyroid disease Neg Hx     Melanoma Neg Hx     Psoriasis Neg Hx     Lupus Neg Hx      Physical Exam:   /86 (BP Location: Left arm, Patient Position: Sitting, BP Method: Medium (Automatic))   Pulse 84   Ht 5' 9" (1.753 m)   Wt 68 kg (149 lb 14.6 oz)   SpO2 99%   BMI 22.14 kg/m²        Physical Exam  Vitals and nursing note reviewed.   Constitutional:       General: He is not in acute distress.     Appearance: Normal appearance.   HENT:      Head: Normocephalic and atraumatic.      Nose: Nose normal.   Eyes:      General: No scleral icterus.     Extraocular Movements: Extraocular movements intact.      Conjunctiva/sclera: Conjunctivae normal.   Neck:      Thyroid: No thyromegaly.      Vascular: JVD present. " No carotid bruit.   Cardiovascular:      Rate and Rhythm: Normal rate and regular rhythm.      Pulses: Normal pulses.      Heart sounds: Normal heart sounds. No murmur heard.    No friction rub. No gallop.   Pulmonary:      Effort: Pulmonary effort is normal.      Breath sounds: Normal breath sounds. No wheezing, rhonchi or rales.   Chest:      Chest wall: No tenderness.      Comments: Wearing LifeVest  Abdominal:      General: Bowel sounds are normal. There is no distension.      Palpations: Abdomen is soft.      Tenderness: There is no abdominal tenderness.   Musculoskeletal:      Cervical back: Neck supple.      Right lower leg: No edema.      Left lower leg: No edema.   Skin:     General: Skin is warm and dry.      Coloration: Skin is not pale.      Findings: No erythema or rash.      Nails: There is no clubbing.   Neurological:      Mental Status: He is alert and oriented to person, place, and time.   Psychiatric:         Mood and Affect: Mood and affect normal.         Behavior: Behavior normal.        Labs:     Lab Results   Component Value Date     10/05/2022    K 4.0 10/05/2022     10/05/2022    CO2 19 (L) 10/05/2022    BUN 15 10/05/2022    CREATININE 1.1 10/05/2022    ANIONGAP 9 10/05/2022     Lab Results   Component Value Date    HGBA1C 5.5 08/25/2022     Lab Results   Component Value Date    BNP 1,537 (H) 09/11/2022    BNP 1,610 (H) 09/01/2022    Lab Results   Component Value Date    WBC 8.04 09/13/2022    HGB 12.1 (L) 09/13/2022    HCT 35.7 (L) 09/13/2022    HCT 47 09/11/2022     09/13/2022    GRAN 4.5 09/13/2022    GRAN 56.4 09/13/2022     Lab Results   Component Value Date    CHOL 155 08/25/2022    HDL 51 08/25/2022    LDLCALC 93.8 08/25/2022    TRIG 51 08/25/2022          Imaging:   Echocardiograms:   TTE 9/12/2022  The left ventricle is severely enlarged with eccentric hypertrophy and severely decreased systolic function. The estimated ejection fraction is 10%.  Mild right  ventricular enlargement with mildly to moderately reduced right ventricular systolic function.  Grade II left ventricular diastolic dysfunction.  Severe left atrial enlargement.  Mild mitral regurgitation.  There is pulmonary hypertension. The estimated PA systolic pressure is 50 mmHg.  Intermediate central venous pressure (8 mmHg).    TTE 9/2/2022  Severe left atrial enlargement.  The left ventricle is severely enlarged with severely decreased systolic function.  There is severe left ventricular global hypokinesis. The estimated ejection fraction is 20%.  The 3D quantitatively derived ejection fraction is 18%.  Grade II left ventricular diastolic dysfunction.  Mild right ventricular enlargement with normal right ventricular systolic function.  Mild mitral regurgitation.  Mild tricuspid regurgitation.  Intermediate central venous pressure (8 mmHg).  There is pulmonary hypertension. The estimated PA systolic pressure is 49 mmHg.  A short burst of tachycardia with a cycle length of 334 milliseconds was noted at the end of the study.    Stress Tests:   PET Stress Test 10/5/2022    There are no clinically significant perfusion abnormalities. There is a small to moderate (10-15%) amount of mild resting heterogeneity in the basal to apical inferior wall(s) that does not change with stress. This likely represents severe cardiomyopathy, good stress flows and cfr in this distribution suggestive of patent coronary arteries.    The whole heart absolute myocardial perfusion values averaged 0.52 cc/min/g at rest, which is reduced; 1.04 cc/min/g at stress, which is moderately reduced; and CFR is 2.05 , which is mildly reduced.    CT attenuation images demonstrate mild diffuse coronary calcifications in the LAD territory.    The gated perfusion images showed an ejection fraction of 14% at rest and 14% during stress. A normal ejection fraction is greater than 53%.    There is severe global hypokinesis at rest and during stress.     The LV cavity size is severely enlarged at rest and stress.    The EKG portion of this study is uninterpretable.    During stress, rare PVCs are noted.    The patient reported no chest pain during the stress test.    Caths:   None    Other:  Event monitor 9/13/2022  Symptoms corresponding with normal sinus rhythm.  3 runs of non-sustained VT 9-23 beats    Brain MRI 9/11/2022  No evidence of acute infarct or hemorrhage.     Chronic senescent and microvascular ischemic disease.  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     CTA Stroke 9/11/2022  CTA head: Unremarkable CTA of the head specifically without evidence for proximal significant stenosis or occlusion.     CTA neck: Atherosclerotic plaquing of the carotid bifurcations and proximal ICAs with less than 50% proximal ICA stenosis by NASCET criteria.     Incidental 6-7 mm subsolid nodule right upper lobe with prominent prevascular and questionable precarinal lymph nodes.  Further evaluation with dedicated CT thorax recommended.     CT head: Patchy ill-defined decreased attenuation supratentorial white matter which is nonspecific and may be sequela of chronic ischemic change.  There is no evidence for acute intracranial hemorrhage or sulcal effacement to suggest large territory recent infarction.     Small hypodensity left basal ganglia which may be prior infarction.       Abdominal aorta ultrasound 3/30/2017  There is no evidence of an Abdominal Aortic Aneurysm.     Assessment:     1. Chronic combined systolic and diastolic heart failure    2. Nonischemic cardiomyopathy    3. Non-sustained ventricular tachycardia    4. Essential hypertension    5. Other hyperlipidemia    6. HFrEF (heart failure with reduced ejection fraction)      Plan:     Heart failure with reduced ejection fraction   Nonischemic cardiomyopathy   NYHA Class 3 symptoms. He does not appear volume overloaded on exam. He is wearing a LifeVest.   Increase Coreg to 12.5 mg bid given resting HR >70  Continue  Entresto 49-51 mg bid  Continue Spironolactone 25 mg daily   Continue Jardiance 10 mg daily   Continue Lasix 20 mg PRN   Limit sodium intake to less then 2 gram sodium and 1500cc fluid restriction.  Graded exercise program as tolerated.  Call if  more than 3 lbs in 1 day or 5 lbs in 1 week.    Nonsustained ventricular tachycardia   Following with EP  Scheduled for echo in Dec followed by ICD if EF remains <35%    Hypertension  Well controlled but with room for up-titration of GDMT as above     Hyperlipidemia   Continue statin    Follow up in 3 months for further up-titration of GDMT    Signed:  Ashley Lagos PA-C  Cardiology   [phone] - General  10/20/2022 12:13 PM

## 2022-10-25 PROBLEM — R79.89 ELEVATED TROPONIN: Status: RESOLVED | Noted: 2022-09-11 | Resolved: 2022-10-25

## 2022-10-27 ENCOUNTER — OFFICE VISIT (OUTPATIENT)
Dept: OPHTHALMOLOGY | Facility: CLINIC | Age: 77
End: 2022-10-27
Payer: MEDICARE

## 2022-10-27 DIAGNOSIS — H34.8312 STABLE BRANCH RETINAL VEIN OCCLUSION OF RIGHT EYE: ICD-10-CM

## 2022-10-27 DIAGNOSIS — H34.8310 BRANCH RETINAL VEIN OCCLUSION WITH MACULAR EDEMA OF RIGHT EYE: ICD-10-CM

## 2022-10-27 DIAGNOSIS — H25.11 NUCLEAR SCLEROTIC CATARACT OF RIGHT EYE: Primary | ICD-10-CM

## 2022-10-27 DIAGNOSIS — H25.12 NUCLEAR SCLEROTIC CATARACT OF LEFT EYE: ICD-10-CM

## 2022-10-27 PROCEDURE — 92136 IOL MASTER - OD - RIGHT EYE: ICD-10-PCS | Mod: RT,S$GLB,, | Performed by: OPHTHALMOLOGY

## 2022-10-27 PROCEDURE — 99204 PR OFFICE/OUTPT VISIT, NEW, LEVL IV, 45-59 MIN: ICD-10-PCS | Mod: S$GLB,,, | Performed by: OPHTHALMOLOGY

## 2022-10-27 PROCEDURE — 92134 POSTERIOR SEGMENT OCT RETINA (OCULAR COHERENCE TOMOGRAPHY)-BOTH EYES: ICD-10-PCS | Mod: S$GLB,,, | Performed by: OPHTHALMOLOGY

## 2022-10-27 PROCEDURE — 92136 OPHTHALMIC BIOMETRY: CPT | Mod: RT,S$GLB,, | Performed by: OPHTHALMOLOGY

## 2022-10-27 PROCEDURE — 99204 OFFICE O/P NEW MOD 45 MIN: CPT | Mod: S$GLB,,, | Performed by: OPHTHALMOLOGY

## 2022-10-27 PROCEDURE — 92134 CPTRZ OPH DX IMG PST SGM RTA: CPT | Mod: S$GLB,,, | Performed by: OPHTHALMOLOGY

## 2022-10-27 PROCEDURE — 99999 PR PBB SHADOW E&M-EST. PATIENT-LVL I: ICD-10-PCS | Mod: PBBFAC,,, | Performed by: OPHTHALMOLOGY

## 2022-10-27 PROCEDURE — 99999 PR PBB SHADOW E&M-EST. PATIENT-LVL I: CPT | Mod: PBBFAC,,, | Performed by: OPHTHALMOLOGY

## 2022-10-27 NOTE — PROGRESS NOTES
HPI    Stephy referral/ AM      EM OD --s/p 25g PPV/MP/AFx OD (3/23/16)   Macular BRVO OD (possible)   Avastin OD (5/2/16)   ozurdex OD x 1 (7/7/16)   CME   NS OU   HTN Ret OU     Patient has noticed glasses not working well anymore. Blurry VA OD>OS.   Last edited by Estella Silva MD on 10/27/2022  1:56 PM.            Assessment /Plan     For exam results, see Encounter Report.    Nuclear sclerotic cataract of right eye  -     IOL Master - OD - Right Eye    Nuclear sclerotic cataract of left eye    Stable branch retinal vein occlusion of right eye    Branch retinal vein occlusion with macular edema of right eye  -     Posterior Segment OCT Retina-Both eyes               EM OD --s/p 25g PPV/MP/AFx OD (3/23/16) - AM    Macular BRVO OD (possible)     Avastin OD (5/2/16)     ozurdex OD x 1 (7/7/16)     H/o CME   - difficult view for OCT mac today, will repeat post cat sx    NS OU     HTN Ret OU     Visually significant nuclear sclerotic cataract   - Interfering with activities of daily living.  Pt desires cataract surgery for Va rehabilitation.   - R/B/A discussed and pt agrees to proceed with surgery.   - IOL options discussed according to patient's goals and concomitant ocular pathology; and pt content with monofocal lens.    - Target: plano.    Diboo 22.0 OD  VB    (Diboo 21.0 OS)    Pt understands limited BCVA OD 2/2 retinal pathology.

## 2022-10-30 ENCOUNTER — TELEPHONE (OUTPATIENT)
Dept: OPHTHALMOLOGY | Facility: CLINIC | Age: 77
End: 2022-10-30
Payer: MEDICARE

## 2022-10-30 DIAGNOSIS — H25.11 NUCLEAR SCLEROTIC CATARACT OF RIGHT EYE: Primary | ICD-10-CM

## 2022-11-07 DIAGNOSIS — H25.11 NUCLEAR SCLEROTIC CATARACT OF RIGHT EYE: Primary | ICD-10-CM

## 2022-11-08 RX ORDER — PREDNISOLONE ACETATE-GATIFLOXACIN-BROMFENAC .75; 5; 1 MG/ML; MG/ML; MG/ML
1 SUSPENSION/ DROPS OPHTHALMIC 3 TIMES DAILY
Qty: 5 ML | Refills: 2 | Status: SHIPPED | OUTPATIENT
Start: 2022-11-08 | End: 2023-03-30

## 2022-11-15 DIAGNOSIS — I42.8 NON-ISCHEMIC CARDIOMYOPATHY: Primary | ICD-10-CM

## 2022-11-15 DIAGNOSIS — I49.9 CARDIAC ARRHYTHMIA, UNSPECIFIED CARDIAC ARRHYTHMIA TYPE: ICD-10-CM

## 2022-11-17 ENCOUNTER — PATIENT MESSAGE (OUTPATIENT)
Dept: ELECTROPHYSIOLOGY | Facility: CLINIC | Age: 77
End: 2022-11-17
Payer: MEDICARE

## 2022-11-18 ENCOUNTER — TELEPHONE (OUTPATIENT)
Dept: OPHTHALMOLOGY | Facility: CLINIC | Age: 77
End: 2022-11-18
Payer: MEDICARE

## 2022-11-22 ENCOUNTER — HOSPITAL ENCOUNTER (OUTPATIENT)
Facility: OTHER | Age: 77
Discharge: HOME | End: 2022-11-22
Attending: OPHTHALMOLOGY | Admitting: OPHTHALMOLOGY
Payer: MEDICARE

## 2022-11-22 ENCOUNTER — ANESTHESIA EVENT (OUTPATIENT)
Dept: SURGERY | Facility: OTHER | Age: 77
End: 2022-11-22
Payer: MEDICARE

## 2022-11-22 ENCOUNTER — ANESTHESIA (OUTPATIENT)
Dept: SURGERY | Facility: OTHER | Age: 77
End: 2022-11-22
Payer: MEDICARE

## 2022-11-22 VITALS
OXYGEN SATURATION: 95 % | WEIGHT: 145 LBS | BODY MASS INDEX: 20.76 KG/M2 | HEART RATE: 75 BPM | HEIGHT: 70 IN | SYSTOLIC BLOOD PRESSURE: 107 MMHG | DIASTOLIC BLOOD PRESSURE: 75 MMHG | RESPIRATION RATE: 16 BRPM | TEMPERATURE: 98 F

## 2022-11-22 DIAGNOSIS — H25.11 NUCLEAR SCLEROTIC CATARACT OF RIGHT EYE: Primary | ICD-10-CM

## 2022-11-22 DIAGNOSIS — H25.10 AGE-RELATED NUCLEAR CATARACT: ICD-10-CM

## 2022-11-22 PROCEDURE — 66982 PR REMOVAL, CATARACT, W/INSRT INTRAOC LENS, W/O ENDO CYCLO, CMPLX: ICD-10-PCS | Mod: RT,,, | Performed by: OPHTHALMOLOGY

## 2022-11-22 PROCEDURE — 37000009 HC ANESTHESIA EA ADD 15 MINS: Performed by: OPHTHALMOLOGY

## 2022-11-22 PROCEDURE — 71000015 HC POSTOP RECOV 1ST HR: Performed by: OPHTHALMOLOGY

## 2022-11-22 PROCEDURE — 25000003 PHARM REV CODE 250: Performed by: OPHTHALMOLOGY

## 2022-11-22 PROCEDURE — V2632 POST CHMBR INTRAOCULAR LENS: HCPCS | Performed by: OPHTHALMOLOGY

## 2022-11-22 PROCEDURE — 36000707: Performed by: OPHTHALMOLOGY

## 2022-11-22 PROCEDURE — 36000706: Performed by: OPHTHALMOLOGY

## 2022-11-22 PROCEDURE — 37000008 HC ANESTHESIA 1ST 15 MINUTES: Performed by: OPHTHALMOLOGY

## 2022-11-22 PROCEDURE — 63600175 PHARM REV CODE 636 W HCPCS: Performed by: NURSE ANESTHETIST, CERTIFIED REGISTERED

## 2022-11-22 PROCEDURE — 66982 XCAPSL CTRC RMVL CPLX WO ECP: CPT | Mod: RT,,, | Performed by: OPHTHALMOLOGY

## 2022-11-22 DEVICE — LENS EYHANCE +22.0D: Type: IMPLANTABLE DEVICE | Site: EYE | Status: FUNCTIONAL

## 2022-11-22 RX ORDER — LIDOCAINE HYDROCHLORIDE 10 MG/ML
INJECTION, SOLUTION EPIDURAL; INFILTRATION; INTRACAUDAL; PERINEURAL
Status: DISCONTINUED | OUTPATIENT
Start: 2022-11-22 | End: 2022-11-22 | Stop reason: HOSPADM

## 2022-11-22 RX ORDER — MOXIFLOXACIN 5 MG/ML
1 SOLUTION/ DROPS OPHTHALMIC
Status: COMPLETED | OUTPATIENT
Start: 2022-11-22 | End: 2022-11-22

## 2022-11-22 RX ORDER — ACETAMINOPHEN 325 MG/1
650 TABLET ORAL EVERY 4 HOURS PRN
Status: DISCONTINUED | OUTPATIENT
Start: 2022-11-22 | End: 2022-11-22 | Stop reason: HOSPADM

## 2022-11-22 RX ORDER — MIDAZOLAM HYDROCHLORIDE 1 MG/ML
INJECTION INTRAMUSCULAR; INTRAVENOUS
Status: DISCONTINUED | OUTPATIENT
Start: 2022-11-22 | End: 2022-11-22

## 2022-11-22 RX ORDER — PROPARACAINE HYDROCHLORIDE 5 MG/ML
1 SOLUTION/ DROPS OPHTHALMIC
Status: DISCONTINUED | OUTPATIENT
Start: 2022-11-22 | End: 2022-11-22 | Stop reason: HOSPADM

## 2022-11-22 RX ORDER — TETRACAINE HYDROCHLORIDE 5 MG/ML
1 SOLUTION OPHTHALMIC
Status: COMPLETED | OUTPATIENT
Start: 2022-11-22 | End: 2022-11-22

## 2022-11-22 RX ORDER — TROPICAMIDE 10 MG/ML
1 SOLUTION/ DROPS OPHTHALMIC
Status: COMPLETED | OUTPATIENT
Start: 2022-11-22 | End: 2022-11-22

## 2022-11-22 RX ORDER — PREDNISOLONE ACETATE 10 MG/ML
SUSPENSION/ DROPS OPHTHALMIC
Status: DISCONTINUED | OUTPATIENT
Start: 2022-11-22 | End: 2022-11-22 | Stop reason: HOSPADM

## 2022-11-22 RX ORDER — PHENYLEPHRINE HYDROCHLORIDE 25 MG/ML
1 SOLUTION/ DROPS OPHTHALMIC
Status: COMPLETED | OUTPATIENT
Start: 2022-11-22 | End: 2022-11-22

## 2022-11-22 RX ORDER — TETRACAINE HYDROCHLORIDE 5 MG/ML
SOLUTION OPHTHALMIC
Status: DISCONTINUED | OUTPATIENT
Start: 2022-11-22 | End: 2022-11-22 | Stop reason: HOSPADM

## 2022-11-22 RX ORDER — MOXIFLOXACIN 5 MG/ML
SOLUTION/ DROPS OPHTHALMIC
Status: DISCONTINUED | OUTPATIENT
Start: 2022-11-22 | End: 2022-11-22 | Stop reason: HOSPADM

## 2022-11-22 RX ORDER — SODIUM CHLORIDE 0.9 % (FLUSH) 0.9 %
2 SYRINGE (ML) INJECTION
Status: DISCONTINUED | OUTPATIENT
Start: 2022-11-22 | End: 2022-11-22 | Stop reason: HOSPADM

## 2022-11-22 RX ORDER — LIDOCAINE HYDROCHLORIDE 40 MG/ML
INJECTION, SOLUTION RETROBULBAR
Status: DISCONTINUED | OUTPATIENT
Start: 2022-11-22 | End: 2022-11-22 | Stop reason: HOSPADM

## 2022-11-22 RX ORDER — PHENYLEPHRINE HYDROCHLORIDE 100 MG/ML
1 SOLUTION/ DROPS OPHTHALMIC
Status: DISCONTINUED | OUTPATIENT
Start: 2022-11-22 | End: 2022-11-22 | Stop reason: HOSPADM

## 2022-11-22 RX ADMIN — PHENYLEPHRINE HYDROCHLORIDE 1 DROP: 25 SOLUTION/ DROPS OPHTHALMIC at 07:11

## 2022-11-22 RX ADMIN — MOXIFLOXACIN 1 DROP: 5 SOLUTION/ DROPS OPHTHALMIC at 08:11

## 2022-11-22 RX ADMIN — TROPICAMIDE 1 DROP: 10 SOLUTION/ DROPS OPHTHALMIC at 07:11

## 2022-11-22 RX ADMIN — TETRACAINE HYDROCHLORIDE 1 DROP: 5 SOLUTION OPHTHALMIC at 07:11

## 2022-11-22 RX ADMIN — TROPICAMIDE 1 DROP: 10 SOLUTION/ DROPS OPHTHALMIC at 08:11

## 2022-11-22 RX ADMIN — MIDAZOLAM HYDROCHLORIDE 0.5 MG: 1 INJECTION, SOLUTION INTRAMUSCULAR; INTRAVENOUS at 08:11

## 2022-11-22 RX ADMIN — TETRACAINE HYDROCHLORIDE 1 DROP: 5 SOLUTION OPHTHALMIC at 08:11

## 2022-11-22 RX ADMIN — MOXIFLOXACIN 1 DROP: 5 SOLUTION/ DROPS OPHTHALMIC at 09:11

## 2022-11-22 RX ADMIN — MOXIFLOXACIN 1 DROP: 5 SOLUTION/ DROPS OPHTHALMIC at 07:11

## 2022-11-22 RX ADMIN — PHENYLEPHRINE HYDROCHLORIDE 1 DROP: 25 SOLUTION/ DROPS OPHTHALMIC at 08:11

## 2022-11-22 NOTE — ANESTHESIA POSTPROCEDURE EVALUATION
Anesthesia Post Evaluation    Patient: Sidney Lanza    Procedure(s) Performed: Procedure(s) (LRB):  EXTRACTION, CATARACT, WITH IOL INSERTION (Right)    Final Anesthesia Type: MAC      Patient location during evaluation: [facility]  Patient participation: Yes- Able to Participate  Level of consciousness: awake and alert  Post-procedure vital signs: reviewed and stable  Pain management: adequate  Airway patency: patent    PONV status at discharge: No PONV  Anesthetic complications: no      Cardiovascular status: blood pressure returned to baseline and hemodynamically stable  Respiratory status: unassisted and spontaneous ventilation  Hydration status: euvolemic  Follow-up not needed.          Vitals Value Taken Time   /82 11/22/22 0738   Temp 36.5 °C (97.7 °F) 11/22/22 0738   Pulse 81 11/22/22 0738   Resp 18 11/22/22 0738   SpO2 97 % 11/22/22 0738         No case tracking events are documented in the log.      Pain/Timothy Score: No data recorded

## 2022-11-22 NOTE — BRIEF OP NOTE
BRIEF DISCHARGE NOTE:    Date of discharge: 11/22/2022    Reason for hospitalization -  Cataract surgery     Final Diagnosis - Visually significant Cataract    Procedures and treatment provided - Status post phacoemulsification with placement of intraocular lens     Diet - Advance to regular as tolerated    Activity - as tolerated    Disposition at the end of the case - Good.    Discharge: to home    The patient tolerated the procedure well and knows to follow up with me tomorrow morning in the eye clinic, sooner if needed.    Patient and family instructions (as appropriate) - Given to patient on discharge    Estella Silva MD

## 2022-11-22 NOTE — OP NOTE
DATE OF PROCEDURE: 11/22/2022    SURGEON: BARI CAPONE MD    PREOPERATIVE DIAGNOSIS:  Mature brunescent senile nuclear sclerotic cataract right eye.     POSTOPERATIVE DIAGNOSIS: Mature brunescent senile nuclear sclerotic cataract right eye.     PROCEDURE PERFORMED:  Complex phacoemulsification with placement of intraocular lens, right eye, with trypan blue    IMPLANT: DIB00  22.0    ANESTHESIA:  Topical and MAC    COMPLICATIONS: none    ESTIMATED BLOOD LOSS: <1cc    SPECIMENS: none    INDICATIONS FOR PROCEDURE:   The patient has a history of painless progressive vision loss.  The patient has described difficulties with activities of daily living, which specifically include driving, which is secondary to cataract formation and progression. After we had a thorough discussion about risks, benefits, and alternatives to cataract surgery, the patient agreed to proceed with phacoemulsification and implantation of a lens in the right eye.  These risks include, but are not limited to, hemorrhage, pain, infection, need for additional surgery, need for glasses or contacts, loss of vision, or even loss of the eye.    PROCEDURE IN DETAIL:  The patient was met in the preop holding area.  Consent was confirmed to be signed.  The operative site was marked.  The patient was brought into the operating room by the anesthesia team and placed under monitored anesthesia care.  The right eye was prepped and draped in a sterile ophthalmic fashion.  A Stella speculum was placed into the right eye.   A paracentesis site was made and 1% preservative-free lidocaine was injected into the anterior chamber.  Trypan blue was then injected and allowed to sit for 1 minute.  Then BSS was used to wash out the trypan blue. Viscoelastic material was injected into the anterior chamber.  A keratome blade was used to make a clear corneal incision.  A cystotome was used to initiate the continuous curvilinear capsulorrhexis which was completed with  Dasia forceps.  BSS on a cohn cannula was used to perform hydrodissection.  The phacoemulsification tip was introduced into the eye and the nucleus was removed in a standard divide-and-conquer fashion.  Remaining cortical material was removed from the eye using irrigation-aspiration.  The capsular bag was filled with viscoelastic material and the intraocular lens was injected and positioned into place. Remaining viscoelastic material was removed from the eye using irrigation and aspiration.  The corneal wounds were hydrated.  The eye was filled to physiologic pressure. The wounds were found to be watertight. Drops of Vigamox and prednisilone were placed into the eye.  The eye was washed, dried, and shielded.  The patient tolerated the procedure well and knows to follow up with me tomorrow morning, sooner if needed.

## 2022-11-22 NOTE — ANESTHESIA PREPROCEDURE EVALUATION
11/22/2022  Sidney Lanza is a 77 y.o., male.      Pre-op Assessment    I have reviewed the Patient Summary Reports.     I have reviewed the Nursing Notes. I have reviewed the NPO Status.   I have reviewed the Medications.     Review of Systems  Anesthesia Hx:  Denies Family Hx of Anesthesia complications.   Denies Personal Hx of Anesthesia complications.   Social:  Former Smoker    Hematology/Oncology:         -- Cancer in past history (Prostate):   EENT/Dental:   chronic allergic rhinitis   Cardiovascular:   Pacemaker (Lifevest) Hypertension CHF (EF 10%) hyperlipidemia BOYD Pulm HTN  ? The left ventricle is severely enlarged with eccentric hypertrophy and severely decreased systolic function. The estimated ejection fraction is 10%.  ? Mild right ventricular enlargement with mildly to moderately reduced right ventricular systolic function.  ? Grade II left ventricular diastolic dysfunction.  ? Severe left atrial enlargement.  ? Mild mitral regurgitation.  ? There is pulmonary hypertension. The estimated PA systolic pressure is 50 mmHg.  ? Intermediate central venous pressure (8 mmHg).   Pulmonary:   Shortness of breath    Renal/:   Chronic Renal Disease, CKD    Musculoskeletal:   Arthritis     Neurological:  Neurology Normal    Endocrine:  Endocrine Normal        Physical Exam  General: Cooperative, Oriented and Alert    Airway:  Mallampati: II   Mouth Opening: Normal  Neck ROM: Normal ROM        Anesthesia Plan  Type of Anesthesia, risks & benefits discussed:    Anesthesia Type: MAC  Intra-op Monitoring Plan: Standard ASA Monitors  Post Op Pain Control Plan: multimodal analgesia  Induction:  IV  ASA Score: 4    Ready For Surgery From Anesthesia Perspective.     .

## 2022-11-22 NOTE — PLAN OF CARE
Sidney Lanza has met all discharge criteria from Phase II. Vital Signs are stable, ambulating  without difficulty. Discharge instructions given, patient verbalized understanding. Discharged from facility via wheelchair in stable condition.

## 2022-11-23 ENCOUNTER — OFFICE VISIT (OUTPATIENT)
Dept: OPTOMETRY | Facility: CLINIC | Age: 77
End: 2022-11-23
Payer: MEDICARE

## 2022-11-23 DIAGNOSIS — Z98.41 STATUS POST CATARACT EXTRACTION AND INSERTION OF INTRAOCULAR LENS OF RIGHT EYE: Primary | ICD-10-CM

## 2022-11-23 DIAGNOSIS — Z96.1 STATUS POST CATARACT EXTRACTION AND INSERTION OF INTRAOCULAR LENS OF RIGHT EYE: Primary | ICD-10-CM

## 2022-11-23 PROCEDURE — 1126F AMNT PAIN NOTED NONE PRSNT: CPT | Mod: CPTII,S$GLB,, | Performed by: OPTOMETRIST

## 2022-11-23 PROCEDURE — 1159F PR MEDICATION LIST DOCUMENTED IN MEDICAL RECORD: ICD-10-PCS | Mod: CPTII,S$GLB,, | Performed by: OPTOMETRIST

## 2022-11-23 PROCEDURE — 1159F MED LIST DOCD IN RCRD: CPT | Mod: CPTII,S$GLB,, | Performed by: OPTOMETRIST

## 2022-11-23 PROCEDURE — 99024 PR POST-OP FOLLOW-UP VISIT: ICD-10-PCS | Mod: S$GLB,,, | Performed by: OPTOMETRIST

## 2022-11-23 PROCEDURE — 99999 PR PBB SHADOW E&M-EST. PATIENT-LVL III: ICD-10-PCS | Mod: PBBFAC,,, | Performed by: OPTOMETRIST

## 2022-11-23 PROCEDURE — 3288F PR FALLS RISK ASSESSMENT DOCUMENTED: ICD-10-PCS | Mod: CPTII,S$GLB,, | Performed by: OPTOMETRIST

## 2022-11-23 PROCEDURE — 1101F PR PT FALLS ASSESS DOC 0-1 FALLS W/OUT INJ PAST YR: ICD-10-PCS | Mod: CPTII,S$GLB,, | Performed by: OPTOMETRIST

## 2022-11-23 PROCEDURE — 1101F PT FALLS ASSESS-DOCD LE1/YR: CPT | Mod: CPTII,S$GLB,, | Performed by: OPTOMETRIST

## 2022-11-23 PROCEDURE — 3288F FALL RISK ASSESSMENT DOCD: CPT | Mod: CPTII,S$GLB,, | Performed by: OPTOMETRIST

## 2022-11-23 PROCEDURE — 1126F PR PAIN SEVERITY QUANTIFIED, NO PAIN PRESENT: ICD-10-PCS | Mod: CPTII,S$GLB,, | Performed by: OPTOMETRIST

## 2022-11-23 PROCEDURE — 99999 PR PBB SHADOW E&M-EST. PATIENT-LVL III: CPT | Mod: PBBFAC,,, | Performed by: OPTOMETRIST

## 2022-11-23 PROCEDURE — 99024 POSTOP FOLLOW-UP VISIT: CPT | Mod: S$GLB,,, | Performed by: OPTOMETRIST

## 2022-11-28 NOTE — PROGRESS NOTES
HPI     Post-op Evaluation     Additional comments: 1 day po           Comments    S/p Phaco w/IOL OD- 11/22/2022  Patient states the right eye is feeling well today. No pain. No discharge.     Triple gtts TID OD    EM OD --s/p 25g PPV/MP/AFx OD (3/23/16)   Macular BRVO OD (possible)   Avastin OD (5/2/16)   ozurdex OD x 1 (7/7/16)   CME   NS OU   HTN Ret OU           Last edited by Danya Torres, OD on 11/28/2022  2:29 PM.            Assessment /Plan     For exam results, see Encounter Report.    Status post cataract extraction and insertion of intraocular lens of right eye      Slit Lamp Exam  L/L - normal  C/s - quiet  Cornea - 3+ K edema  A/C - 2+ cell / 1+ flare  Lens - PCIOL    Dilated OD for better views due to post-surgical K edema OD, findings WNL    POD #1 s/p phaco/IOL  - doing well  - continue the following drops:     vigamox or ocuflox TID x 1 wk then stop  Pred forte or durezol or dexamethasone TID x  4 wks  Ketorolac TID until runs out     Versus:     Combination drop - 1 drop TID x total of 1 month     Appropriate precautions and post op medications reviewed.  Patient instructed to call or come in if symptoms of redness, decreased vision, or pain are experienced.     -f/up 1-2wks, sooner PRN.

## 2022-11-29 NOTE — PROGRESS NOTES
General Cardiology Clinic Note  Reason for Visit: Follow up   Last Clinic Visit: 10/20/2022  General Cardiologist: Dr. Ayon    HPI:   Sidney Lanza is a 77 y.o. male who presents for follow up.    Problems:  Nonischemic cardiomyopathy  HFrEF  NSVT  Hypertension  Hyperlipidemia     HPI   Patient presents for follow up. He states shortly after has last appt, he developed a rash, nipple soreness, and a yeast infection in his groin. These side effects lasted about a week before starting to subside. He ended up running out of the Jardiance due to insurance issues, and the yeast infection went away completely. He just started the Jardiance again a few days ago, and so far has not had issues. He still has BOYD with mild exertion. There has been no significant change in this. He has learned to modify his behavior and slow down to avoid dyspnea. He had one night of orthopnea and PND following his last appt, so he just started taking the Lasix daily after that. He denies edema, sudden weight gain, chest pain, syncope, palpitations. He has occasional lightheadedness with standing quickly. BP at home is 110s systolic on average and HR around 75.     10/20/2022 HPI  Patient presents for follow up and up-titration of GDMT. He reports BOYD with walking one block. He reports PND and orthopnea, which seemed to be worsening three nights ago, so the past two nights he took Lasix which helped. He denies pedal edema, palpitations, chest pain, lightheadedness, syncope, weight gain. He has not been checking his BP regularly at home or doing daily weights.     ROS:      Review of Systems   Constitutional: Negative for diaphoresis, malaise/fatigue, weight gain and weight loss.   HENT:  Negative for nosebleeds.    Eyes:  Negative for vision loss in left eye, vision loss in right eye and visual disturbance.   Cardiovascular:  Positive for dyspnea on exertion. Negative for chest pain, claudication, irregular heartbeat, leg swelling,  near-syncope, orthopnea, palpitations, paroxysmal nocturnal dyspnea and syncope.   Respiratory:  Positive for shortness of breath. Negative for cough, sleep disturbances due to breathing, snoring and wheezing.    Hematologic/Lymphatic: Negative for bleeding problem. Does not bruise/bleed easily.   Skin:  Negative for poor wound healing and rash.   Musculoskeletal:  Negative for muscle cramps and myalgias.   Gastrointestinal:  Negative for bloating, abdominal pain, diarrhea, heartburn, melena, nausea and vomiting.   Genitourinary:  Negative for hematuria and nocturia.   Neurological:  Negative for brief paralysis, dizziness, headaches, light-headedness, numbness and weakness.   Psychiatric/Behavioral:  Negative for depression.    Allergic/Immunologic: Negative for hives.     PMH:     Past Medical History:   Diagnosis Date    Arthritis     Chronic rhinitis     Fever blister     Hyperlipidemia     Hypertension     Impotence of organic origin     Joint pain     Nuclear sclerosis - Both Eyes 10/23/2013    Prostate cancer     Ulcer     NSAID related     Varicose vein of leg     bilateral legs     Past Surgical History:   Procedure Laterality Date    CATARACT EXTRACTION W/  INTRAOCULAR LENS IMPLANT Right 11/22/2022    Procedure: EXTRACTION, CATARACT, WITH IOL INSERTION;  Surgeon: Estella Silva MD;  Location: [facility];  Service: Ophthalmology;  Laterality: Right;    COLONOSCOPY N/A 6/13/2018    Procedure: COLONOSCOPY;  Surgeon: Adonis Stack MD;  Location: [address]);  Service: Endoscopy;  Laterality: N/A;    COLONOSCOPY N/A 4/28/2021    Procedure: COLONOSCOPY;  Surgeon: Steven Watson MD;  Location: [facility] ENDO [address]);  Service: Endoscopy;  Laterality: N/A;  4/25-[facility] uc-inst email-tb    EYE SURGERY      KNEE ARTHROSCOPY W/ DEBRIDEMENT      PROSTATE SURGERY  2006    for prostate cancer    SHOULDER ARTHROSCOPY Right 10/2017    RTC repair with patch    ULNAR NERVE TRANSPOSITION       Allergies:    Review of patient's allergies indicates:  No Known Allergies  Medications:     Current Outpatient Medications on File Prior to Visit   Medication Sig Dispense Refill    aspirin (ECOTRIN) 81 MG EC tablet Take 1 tablet (81 mg total) by mouth once daily. 90 tablet 3    atorvastatin (LIPITOR) 40 MG tablet Take 1 tablet (40 mg total) by mouth every evening. 90 tablet 3    carvediloL (COREG) 12.5 MG tablet Take 1 tablet (12.5 mg total) by mouth 2 (two) times daily with meals. 180 tablet 3    empagliflozin (JARDIANCE) 10 mg tablet Take 1 tablet (10 mg total) by mouth once daily. 90 tablet 3    FIBER, DEXTRIN, ORAL Take 1 tablet by mouth every morning.      fluticasone propionate (FLONASE) 50 mcg/actuation nasal spray 2 sprays (100 mcg total) by Each Nostril route as needed for Rhinitis.      furosemide (LASIX) 20 MG tablet Take 1 tablet (20 mg total) by mouth daily as needed. For leg swelling and or shortness of breath 90 tablet 3    glucosamine-chondroitin 500-400 mg tablet Take 2 tablets by mouth every morning.      multivitamin capsule Take 1 capsule by mouth every morning.       prednisolon/gatiflox/bromfenac (PREDNISOL ACE-GATIFLOX-BROMFEN) 1-0.5-0.075 % DrpS Apply 1 drop to eye 3 (three) times daily. One drop 3 times a day in surgical eye 5 mL 2    sacubitriL-valsartan (ENTRESTO) 49-51 mg per tablet Take 1 tablet by mouth 2 (two) times daily. 180 tablet 3    spironolactone (ALDACTONE) 25 MG tablet Take 1 tablet (25 mg total) by mouth once daily. 90 tablet 3     No current facility-administered medications on file prior to visit.     Social History:     Social History     Tobacco Use    Smoking status: Former     Packs/day: 1.00     Years: 30.00     Pack years: 30.00     Types: Cigarettes     Quit date: 1988     Years since quittin.5    Smokeless tobacco: Never   Substance Use Topics    Alcohol use: Yes     Alcohol/week: 28.0 standard drinks     Types: 28 Cans of beer per week     Comment: beer - 2 a night  "     Family History:     Family History   Problem Relation Age of Onset    Cancer Mother         breast    Cancer Father         prostate    Heart disease Father 74        CHF    Diabetes Sister     Heart disease Sister         stroke    Diabetes Brother     Hypertension Brother     Diabetes Brother     Amblyopia Neg Hx     Blindness Neg Hx     Cataracts Neg Hx     Glaucoma Neg Hx     Macular degeneration Neg Hx     Retinal detachment Neg Hx     Strabismus Neg Hx     Stroke Neg Hx     Thyroid disease Neg Hx     Melanoma Neg Hx     Psoriasis Neg Hx     Lupus Neg Hx      Physical Exam:   /77 (BP Location: Left arm, Patient Position: Sitting, BP Method: Medium (Automatic))   Pulse 81   Ht 5' 10" (1.778 m)   Wt 68.3 kg (150 lb 9.2 oz)   BMI 21.61 kg/m²        Physical Exam  Vitals and nursing note reviewed.   Constitutional:       General: He is not in acute distress.     Appearance: Normal appearance.   HENT:      Head: Normocephalic and atraumatic.      Nose: Nose normal.   Eyes:      General: No scleral icterus.     Extraocular Movements: Extraocular movements intact.      Conjunctiva/sclera: Conjunctivae normal.   Neck:      Thyroid: No thyromegaly.      Vascular: No carotid bruit or JVD.   Cardiovascular:      Rate and Rhythm: Normal rate and regular rhythm.      Pulses: Normal pulses.      Heart sounds: Normal heart sounds. No murmur heard.    No friction rub. No gallop.   Pulmonary:      Effort: Pulmonary effort is normal.      Breath sounds: Normal breath sounds. No wheezing, rhonchi or rales.   Chest:      Chest wall: No tenderness.      Comments: Wearing LifeVest  Abdominal:      General: Bowel sounds are normal. There is no distension.      Palpations: Abdomen is soft.      Tenderness: There is no abdominal tenderness.   Musculoskeletal:      Cervical back: Neck supple.      Right lower leg: No edema.      Left lower leg: No edema.   Skin:     General: Skin is warm and dry.      Coloration: Skin is " not pale.      Findings: No erythema or rash.      Nails: There is no clubbing.   Neurological:      Mental Status: He is alert and oriented to person, place, and time.   Psychiatric:         Mood and Affect: Mood and affect normal.         Behavior: Behavior normal.        Labs:     Lab Results   Component Value Date     10/05/2022    K 4.0 10/05/2022     10/05/2022    CO2 19 (L) 10/05/2022    BUN 15 10/05/2022    CREATININE 1.1 10/05/2022    ANIONGAP 9 10/05/2022     Lab Results   Component Value Date    HGBA1C 5.5 08/25/2022     Lab Results   Component Value Date    BNP 1,537 (H) 09/11/2022    BNP 1,610 (H) 09/01/2022    Lab Results   Component Value Date    WBC 8.04 09/13/2022    HGB 12.1 (L) 09/13/2022    HCT 35.7 (L) 09/13/2022    HCT 47 09/11/2022     09/13/2022    GRAN 4.5 09/13/2022    GRAN 56.4 09/13/2022     Lab Results   Component Value Date    CHOL 155 08/25/2022    HDL 51 08/25/2022    LDLCALC 93.8 08/25/2022    TRIG 51 08/25/2022          Imaging:   Echocardiograms:   TTE 9/12/2022  The left ventricle is severely enlarged with eccentric hypertrophy and severely decreased systolic function. The estimated ejection fraction is 10%.  Mild right ventricular enlargement with mildly to moderately reduced right ventricular systolic function.  Grade II left ventricular diastolic dysfunction.  Severe left atrial enlargement.  Mild mitral regurgitation.  There is pulmonary hypertension. The estimated PA systolic pressure is 50 mmHg.  Intermediate central venous pressure (8 mmHg).    TTE 9/2/2022  Severe left atrial enlargement.  The left ventricle is severely enlarged with severely decreased systolic function.  There is severe left ventricular global hypokinesis. The estimated ejection fraction is 20%.  The 3D quantitatively derived ejection fraction is 18%.  Grade II left ventricular diastolic dysfunction.  Mild right ventricular enlargement with normal right ventricular systolic  function.  Mild mitral regurgitation.  Mild tricuspid regurgitation.  Intermediate central venous pressure (8 mmHg).  There is pulmonary hypertension. The estimated PA systolic pressure is 49 mmHg.  A short burst of tachycardia with a cycle length of 334 milliseconds was noted at the end of the study.    Stress Tests:   PET Stress Test 10/5/2022    There are no clinically significant perfusion abnormalities. There is a small to moderate (10-15%) amount of mild resting heterogeneity in the basal to apical inferior wall(s) that does not change with stress. This likely represents severe cardiomyopathy, good stress flows and cfr in this distribution suggestive of patent coronary arteries.    The whole heart absolute myocardial perfusion values averaged 0.52 cc/min/g at rest, which is reduced; 1.04 cc/min/g at stress, which is moderately reduced; and CFR is 2.05 , which is mildly reduced.    CT attenuation images demonstrate mild diffuse coronary calcifications in the LAD territory.    The gated perfusion images showed an ejection fraction of 14% at rest and 14% during stress. A normal ejection fraction is greater than 53%.    There is severe global hypokinesis at rest and during stress.    The LV cavity size is severely enlarged at rest and stress.    The EKG portion of this study is uninterpretable.    During stress, rare PVCs are noted.    The patient reported no chest pain during the stress test.    Caths:   None    Other:  Event monitor 9/13/2022  Symptoms corresponding with normal sinus rhythm.  3 runs of non-sustained VT 9-23 beats    Brain MRI 9/11/2022  No evidence of acute infarct or hemorrhage.     Chronic senescent and microvascular ischemic disease.  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     CTA Stroke 9/11/2022  CTA head: Unremarkable CTA of the head specifically without evidence for proximal significant stenosis or occlusion.     CTA neck: Atherosclerotic plaquing of the carotid bifurcations and proximal  ICAs with less than 50% proximal ICA stenosis by NASCET criteria.     Incidental 6-7 mm subsolid nodule right upper lobe with prominent prevascular and questionable precarinal lymph nodes.  Further evaluation with dedicated CT thorax recommended.     CT head: Patchy ill-defined decreased attenuation supratentorial white matter which is nonspecific and may be sequela of chronic ischemic change.  There is no evidence for acute intracranial hemorrhage or sulcal effacement to suggest large territory recent infarction.     Small hypodensity left basal ganglia which may be prior infarction.       Abdominal aorta ultrasound 3/30/2017  There is no evidence of an Abdominal Aortic Aneurysm.     Assessment:     1. Chronic combined systolic and diastolic heart failure    2. Nonischemic cardiomyopathy    3. Non-sustained ventricular tachycardia    4. Essential hypertension    5. Other hyperlipidemia        Plan:     Heart failure with reduced ejection fraction   Nonischemic cardiomyopathy   NYHA Class 3 symptoms. He does not appear volume overloaded on exam. He is wearing a LifeVest.   Increase Coreg to 25 mg bid   Continue Entresto 49-51 mg bid  Continue Spironolactone 25 mg daily. If breast/nipple tenderness becomes an issue again, may need to switch to Eplerenone.   Continue Jardiance 10 mg daily. If recurrent yeast infections, may need to d/c.  Continue Lasix 20 mg daily. Check BMP.   Limit sodium intake to less then 2 gram sodium and 1500cc fluid restriction.  Graded exercise program as tolerated.  Call if  more than 3 lbs in 1 day or 5 lbs in 1 week.    Nonsustained ventricular tachycardia   Following with EP  Scheduled for echo in two days followed by ICD if EF remains <35%    Hypertension  Well controlled but with room for up-titration of GDMT as above     Hyperlipidemia   Continue statin    Signed:  Ashley Lagos PA-C  Cardiology   [phone] - General  11/29/2022 12:13 PM

## 2022-11-30 ENCOUNTER — OFFICE VISIT (OUTPATIENT)
Dept: CARDIOLOGY | Facility: CLINIC | Age: 77
End: 2022-11-30
Payer: MEDICARE

## 2022-11-30 ENCOUNTER — LAB VISIT (OUTPATIENT)
Dept: LAB | Facility: HOSPITAL | Age: 77
End: 2022-11-30
Attending: INTERNAL MEDICINE
Payer: MEDICARE

## 2022-11-30 VITALS
SYSTOLIC BLOOD PRESSURE: 119 MMHG | DIASTOLIC BLOOD PRESSURE: 77 MMHG | BODY MASS INDEX: 21.55 KG/M2 | HEART RATE: 81 BPM | HEIGHT: 70 IN | WEIGHT: 150.56 LBS

## 2022-11-30 DIAGNOSIS — I49.9 CARDIAC ARRHYTHMIA, UNSPECIFIED CARDIAC ARRHYTHMIA TYPE: ICD-10-CM

## 2022-11-30 DIAGNOSIS — I50.20 HFREF (HEART FAILURE WITH REDUCED EJECTION FRACTION): ICD-10-CM

## 2022-11-30 DIAGNOSIS — I42.8 NONISCHEMIC CARDIOMYOPATHY: ICD-10-CM

## 2022-11-30 DIAGNOSIS — I10 ESSENTIAL HYPERTENSION: Chronic | ICD-10-CM

## 2022-11-30 DIAGNOSIS — I42.8 NON-ISCHEMIC CARDIOMYOPATHY: ICD-10-CM

## 2022-11-30 DIAGNOSIS — I47.29 NON-SUSTAINED VENTRICULAR TACHYCARDIA: ICD-10-CM

## 2022-11-30 DIAGNOSIS — E78.49 OTHER HYPERLIPIDEMIA: ICD-10-CM

## 2022-11-30 DIAGNOSIS — I50.42 CHRONIC COMBINED SYSTOLIC AND DIASTOLIC HEART FAILURE: Primary | ICD-10-CM

## 2022-11-30 LAB
ANION GAP SERPL CALC-SCNC: 10 MMOL/L (ref 8–16)
APTT BLDCRRT: 25.8 SEC (ref 21–32)
BUN SERPL-MCNC: 18 MG/DL (ref 8–23)
CALCIUM SERPL-MCNC: 9.4 MG/DL (ref 8.7–10.5)
CHLORIDE SERPL-SCNC: 109 MMOL/L (ref 95–110)
CO2 SERPL-SCNC: 23 MMOL/L (ref 23–29)
CREAT SERPL-MCNC: 1.2 MG/DL (ref 0.5–1.4)
ERYTHROCYTE [DISTWIDTH] IN BLOOD BY AUTOMATED COUNT: 15.7 % (ref 11.5–14.5)
EST. GFR  (NO RACE VARIABLE): >60 ML/MIN/1.73 M^2
GLUCOSE SERPL-MCNC: 113 MG/DL (ref 70–110)
HCT VFR BLD AUTO: 38.2 % (ref 40–54)
HGB BLD-MCNC: 12.6 G/DL (ref 14–18)
INR PPP: 1.2 (ref 0.8–1.2)
MCH RBC QN AUTO: 24.2 PG (ref 27–31)
MCHC RBC AUTO-ENTMCNC: 33 G/DL (ref 32–36)
MCV RBC AUTO: 73 FL (ref 82–98)
PLATELET # BLD AUTO: 167 K/UL (ref 150–450)
PMV BLD AUTO: 11.1 FL (ref 9.2–12.9)
POTASSIUM SERPL-SCNC: 4.6 MMOL/L (ref 3.5–5.1)
PROTHROMBIN TIME: 12.5 SEC (ref 9–12.5)
RBC # BLD AUTO: 5.21 M/UL (ref 4.6–6.2)
SODIUM SERPL-SCNC: 142 MMOL/L (ref 136–145)
WBC # BLD AUTO: 6.89 K/UL (ref 3.9–12.7)

## 2022-11-30 PROCEDURE — 3078F PR MOST RECENT DIASTOLIC BLOOD PRESSURE < 80 MM HG: ICD-10-PCS | Mod: CPTII,S$GLB,, | Performed by: PHYSICIAN ASSISTANT

## 2022-11-30 PROCEDURE — 1159F PR MEDICATION LIST DOCUMENTED IN MEDICAL RECORD: ICD-10-PCS | Mod: CPTII,S$GLB,, | Performed by: PHYSICIAN ASSISTANT

## 2022-11-30 PROCEDURE — 85610 PROTHROMBIN TIME: CPT | Performed by: INTERNAL MEDICINE

## 2022-11-30 PROCEDURE — 3074F PR MOST RECENT SYSTOLIC BLOOD PRESSURE < 130 MM HG: ICD-10-PCS | Mod: CPTII,S$GLB,, | Performed by: PHYSICIAN ASSISTANT

## 2022-11-30 PROCEDURE — 3288F FALL RISK ASSESSMENT DOCD: CPT | Mod: CPTII,S$GLB,, | Performed by: PHYSICIAN ASSISTANT

## 2022-11-30 PROCEDURE — 3074F SYST BP LT 130 MM HG: CPT | Mod: CPTII,S$GLB,, | Performed by: PHYSICIAN ASSISTANT

## 2022-11-30 PROCEDURE — 1101F PR PT FALLS ASSESS DOC 0-1 FALLS W/OUT INJ PAST YR: ICD-10-PCS | Mod: CPTII,S$GLB,, | Performed by: PHYSICIAN ASSISTANT

## 2022-11-30 PROCEDURE — 99214 PR OFFICE/OUTPT VISIT, EST, LEVL IV, 30-39 MIN: ICD-10-PCS | Mod: S$GLB,,, | Performed by: PHYSICIAN ASSISTANT

## 2022-11-30 PROCEDURE — 1160F PR REVIEW ALL MEDS BY PRESCRIBER/CLIN PHARMACIST DOCUMENTED: ICD-10-PCS | Mod: CPTII,S$GLB,, | Performed by: PHYSICIAN ASSISTANT

## 2022-11-30 PROCEDURE — 3078F DIAST BP <80 MM HG: CPT | Mod: CPTII,S$GLB,, | Performed by: PHYSICIAN ASSISTANT

## 2022-11-30 PROCEDURE — 1126F PR PAIN SEVERITY QUANTIFIED, NO PAIN PRESENT: ICD-10-PCS | Mod: CPTII,S$GLB,, | Performed by: PHYSICIAN ASSISTANT

## 2022-11-30 PROCEDURE — 36415 COLL VENOUS BLD VENIPUNCTURE: CPT | Performed by: INTERNAL MEDICINE

## 2022-11-30 PROCEDURE — 85027 COMPLETE CBC AUTOMATED: CPT | Performed by: INTERNAL MEDICINE

## 2022-11-30 PROCEDURE — 1159F MED LIST DOCD IN RCRD: CPT | Mod: CPTII,S$GLB,, | Performed by: PHYSICIAN ASSISTANT

## 2022-11-30 PROCEDURE — 80048 BASIC METABOLIC PNL TOTAL CA: CPT | Performed by: INTERNAL MEDICINE

## 2022-11-30 PROCEDURE — 85730 THROMBOPLASTIN TIME PARTIAL: CPT | Performed by: INTERNAL MEDICINE

## 2022-11-30 PROCEDURE — 99999 PR PBB SHADOW E&M-EST. PATIENT-LVL IV: CPT | Mod: PBBFAC,,, | Performed by: PHYSICIAN ASSISTANT

## 2022-11-30 PROCEDURE — 1101F PT FALLS ASSESS-DOCD LE1/YR: CPT | Mod: CPTII,S$GLB,, | Performed by: PHYSICIAN ASSISTANT

## 2022-11-30 PROCEDURE — 1126F AMNT PAIN NOTED NONE PRSNT: CPT | Mod: CPTII,S$GLB,, | Performed by: PHYSICIAN ASSISTANT

## 2022-11-30 PROCEDURE — 99999 PR PBB SHADOW E&M-EST. PATIENT-LVL IV: ICD-10-PCS | Mod: PBBFAC,,, | Performed by: PHYSICIAN ASSISTANT

## 2022-11-30 PROCEDURE — 99214 OFFICE O/P EST MOD 30 MIN: CPT | Mod: S$GLB,,, | Performed by: PHYSICIAN ASSISTANT

## 2022-11-30 PROCEDURE — 3288F PR FALLS RISK ASSESSMENT DOCUMENTED: ICD-10-PCS | Mod: CPTII,S$GLB,, | Performed by: PHYSICIAN ASSISTANT

## 2022-11-30 PROCEDURE — 1160F RVW MEDS BY RX/DR IN RCRD: CPT | Mod: CPTII,S$GLB,, | Performed by: PHYSICIAN ASSISTANT

## 2022-11-30 RX ORDER — FUROSEMIDE 20 MG/1
20 TABLET ORAL DAILY
Qty: 90 TABLET | Refills: 3
Start: 2022-11-30 | End: 2023-01-30

## 2022-11-30 RX ORDER — CARVEDILOL 25 MG/1
25 TABLET ORAL 2 TIMES DAILY WITH MEALS
Qty: 180 TABLET | Refills: 3
Start: 2022-11-30 | End: 2023-12-20 | Stop reason: SDUPTHER

## 2022-12-02 ENCOUNTER — HOSPITAL ENCOUNTER (OUTPATIENT)
Dept: CARDIOLOGY | Facility: HOSPITAL | Age: 77
Discharge: HOME | End: 2022-12-02
Attending: INTERNAL MEDICINE
Payer: MEDICARE

## 2022-12-02 ENCOUNTER — OFFICE VISIT (OUTPATIENT)
Dept: OPHTHALMOLOGY | Facility: CLINIC | Age: 77
End: 2022-12-02
Payer: MEDICARE

## 2022-12-02 VITALS
WEIGHT: 145 LBS | HEART RATE: 76 BPM | BODY MASS INDEX: 20.76 KG/M2 | HEIGHT: 70 IN | SYSTOLIC BLOOD PRESSURE: 112 MMHG | DIASTOLIC BLOOD PRESSURE: 72 MMHG

## 2022-12-02 DIAGNOSIS — I47.20 VT (VENTRICULAR TACHYCARDIA): ICD-10-CM

## 2022-12-02 DIAGNOSIS — Z98.41 STATUS POST CATARACT EXTRACTION AND INSERTION OF INTRAOCULAR LENS, RIGHT: Primary | ICD-10-CM

## 2022-12-02 DIAGNOSIS — I50.22 CHRONIC SYSTOLIC HEART FAILURE: ICD-10-CM

## 2022-12-02 DIAGNOSIS — Z96.1 STATUS POST CATARACT EXTRACTION AND INSERTION OF INTRAOCULAR LENS, RIGHT: Primary | ICD-10-CM

## 2022-12-02 DIAGNOSIS — I50.20 HFREF (HEART FAILURE WITH REDUCED EJECTION FRACTION): ICD-10-CM

## 2022-12-02 DIAGNOSIS — H25.12 NUCLEAR SCLEROTIC CATARACT OF LEFT EYE: ICD-10-CM

## 2022-12-02 DIAGNOSIS — I47.29 NON-SUSTAINED VENTRICULAR TACHYCARDIA: ICD-10-CM

## 2022-12-02 LAB
ASCENDING AORTA: 2.65 CM
AV INDEX (PROSTH): 0.54
AV MEAN GRADIENT: 3 MMHG
AV PEAK GRADIENT: 5 MMHG
AV VALVE AREA: 1.77 CM2
AV VELOCITY RATIO: 0.56
BSA FOR ECHO PROCEDURE: 1.8 M2
CV ECHO LV RWT: 0.22 CM
DOP CALC AO PEAK VEL: 1.11 M/S
DOP CALC AO VTI: 18.9 CM
DOP CALC LVOT AREA: 3.3 CM2
DOP CALC LVOT DIAMETER: 2.04 CM
DOP CALC LVOT PEAK VEL: 0.62 M/S
DOP CALC LVOT STROKE VOLUME: 33.39 CM3
DOP CALCLVOT PEAK VEL VTI: 10.22 CM
E WAVE DECELERATION TIME: 113.94 MSEC
E/A RATIO: 1.11
E/E' RATIO: 23.67 M/S
ECHO LV POSTERIOR WALL: 0.82 CM (ref 0.6–1.1)
EJECTION FRACTION: 10 %
FRACTIONAL SHORTENING: 5 % (ref 28–44)
INTERVENTRICULAR SEPTUM: 0.79 CM (ref 0.6–1.1)
LA MAJOR: 5.64 CM
LA MINOR: 5.89 CM
LA WIDTH: 5.51 CM
LEFT ATRIUM SIZE: 4.23 CM
LEFT ATRIUM VOLUME INDEX MOD: 66.7 ML/M2
LEFT ATRIUM VOLUME INDEX: 62.7 ML/M2
LEFT ATRIUM VOLUME MOD: 121.41 CM3
LEFT ATRIUM VOLUME: 114.16 CM3
LEFT INTERNAL DIMENSION IN SYSTOLE: 7.21 CM (ref 2.1–4)
LEFT VENTRICLE DIASTOLIC VOLUME INDEX: 166.74 ML/M2
LEFT VENTRICLE DIASTOLIC VOLUME: 303.47 ML
LEFT VENTRICLE MASS INDEX: 155 G/M2
LEFT VENTRICLE SYSTOLIC VOLUME INDEX: 150.2 ML/M2
LEFT VENTRICLE SYSTOLIC VOLUME: 273.33 ML
LEFT VENTRICULAR INTERNAL DIMENSION IN DIASTOLE: 7.56 CM (ref 3.5–6)
LEFT VENTRICULAR MASS: 282.66 G
LV LATERAL E/E' RATIO: 17.75 M/S
LV SEPTAL E/E' RATIO: 35.5 M/S
MV PEAK A VEL: 0.64 M/S
MV PEAK E VEL: 0.71 M/S
MV STENOSIS PRESSURE HALF TIME: 33.04 MS
MV VALVE AREA P 1/2 METHOD: 6.66 CM2
PISA TR MAX VEL: 3.55 M/S
RA MAJOR: 4.37 CM
RA PRESSURE: 15 MMHG
RA WIDTH: 4.37 CM
RIGHT VENTRICULAR END-DIASTOLIC DIMENSION: 4.82 CM
RV TISSUE DOPPLER FREE WALL SYSTOLIC VELOCITY 1 (APICAL 4 CHAMBER VIEW): 5.66 CM/S
SINUS: 3.21 CM
STJ: 2.69 CM
TDI LATERAL: 0.04 M/S
TDI SEPTAL: 0.02 M/S
TDI: 0.03 M/S
TR MAX PG: 50 MMHG
TRICUSPID ANNULAR PLANE SYSTOLIC EXCURSION: 1.06 CM
TV REST PULMONARY ARTERY PRESSURE: 65 MMHG

## 2022-12-02 PROCEDURE — 92136 OPHTHALMIC BIOMETRY: CPT | Mod: 26,LT,S$GLB, | Performed by: OPHTHALMOLOGY

## 2022-12-02 PROCEDURE — 25500020 PHARM REV CODE 255: Performed by: INTERNAL MEDICINE

## 2022-12-02 PROCEDURE — 1126F PR PAIN SEVERITY QUANTIFIED, NO PAIN PRESENT: ICD-10-PCS | Mod: CPTII,S$GLB,, | Performed by: OPHTHALMOLOGY

## 2022-12-02 PROCEDURE — 93306 ECHO (CUPID ONLY): ICD-10-PCS | Mod: 26,,, | Performed by: INTERNAL MEDICINE

## 2022-12-02 PROCEDURE — 3288F FALL RISK ASSESSMENT DOCD: CPT | Mod: CPTII,S$GLB,, | Performed by: OPHTHALMOLOGY

## 2022-12-02 PROCEDURE — 99999 PR PBB SHADOW E&M-EST. PATIENT-LVL II: ICD-10-PCS | Mod: PBBFAC,,, | Performed by: OPHTHALMOLOGY

## 2022-12-02 PROCEDURE — 99024 POSTOP FOLLOW-UP VISIT: CPT | Mod: S$GLB,,, | Performed by: OPHTHALMOLOGY

## 2022-12-02 PROCEDURE — 1159F PR MEDICATION LIST DOCUMENTED IN MEDICAL RECORD: ICD-10-PCS | Mod: CPTII,S$GLB,, | Performed by: OPHTHALMOLOGY

## 2022-12-02 PROCEDURE — 3288F PR FALLS RISK ASSESSMENT DOCUMENTED: ICD-10-PCS | Mod: CPTII,S$GLB,, | Performed by: OPHTHALMOLOGY

## 2022-12-02 PROCEDURE — 1101F PT FALLS ASSESS-DOCD LE1/YR: CPT | Mod: CPTII,S$GLB,, | Performed by: OPHTHALMOLOGY

## 2022-12-02 PROCEDURE — 99999 PR PBB SHADOW E&M-EST. PATIENT-LVL II: CPT | Mod: PBBFAC,,, | Performed by: OPHTHALMOLOGY

## 2022-12-02 PROCEDURE — 1126F AMNT PAIN NOTED NONE PRSNT: CPT | Mod: CPTII,S$GLB,, | Performed by: OPHTHALMOLOGY

## 2022-12-02 PROCEDURE — 1101F PR PT FALLS ASSESS DOC 0-1 FALLS W/OUT INJ PAST YR: ICD-10-PCS | Mod: CPTII,S$GLB,, | Performed by: OPHTHALMOLOGY

## 2022-12-02 PROCEDURE — C8929 TTE W OR WO FOL WCON,DOPPLER: HCPCS

## 2022-12-02 PROCEDURE — 92136 IOL MASTER - OU - BOTH EYES: ICD-10-PCS | Mod: 26,LT,S$GLB, | Performed by: OPHTHALMOLOGY

## 2022-12-02 PROCEDURE — 93306 TTE W/DOPPLER COMPLETE: CPT | Mod: 26,,, | Performed by: INTERNAL MEDICINE

## 2022-12-02 PROCEDURE — 99024 PR POST-OP FOLLOW-UP VISIT: ICD-10-PCS | Mod: S$GLB,,, | Performed by: OPHTHALMOLOGY

## 2022-12-02 PROCEDURE — 1159F MED LIST DOCD IN RCRD: CPT | Mod: CPTII,S$GLB,, | Performed by: OPHTHALMOLOGY

## 2022-12-02 RX ADMIN — HUMAN ALBUMIN MICROSPHERES AND PERFLUTREN 0.66 MG: 10; .22 INJECTION, SOLUTION INTRAVENOUS at 01:12

## 2022-12-02 NOTE — PROGRESS NOTES
HPI    Stephy referral/ AM      EM OD --s/p 25g PPV/MP/AFx OD (3/23/16)   Macular BRVO OD (possible)   Avastin OD (5/2/16)   ozurdex OD x 1 (7/7/16)   CME   NS OU   HTN Ret OU     EYE GTTS:   COMBO 1 GTT TID OD     Pt here for 1 week Cataract sx postop in the Right eye. Pt states vision   is stable following sx. Pt denies any redness or pain.   Last edited by Kathia Nolen on 12/2/2022 11:21 AM.            Assessment /Plan     For exam results, see Encounter Report.    Status post cataract extraction and insertion of intraocular lens, right    Nuclear sclerotic cataract of left eye  -     IOL Master - OU - Both Eyes    PO week #1 s/p phaco/IOL -    - doing well, no issues    Continue combo drops for a total of 1 month versus: d/c abx gtt, continue PF/ketorolocTID for total of 1 month    EM OD --s/p 25g PPV/MP/AFx OD (3/23/16) - AM    Macular BRVO OD (possible)     Avastin OD (5/2/16)     ozurdex OD x 1 (7/7/16)     H/o CME   - difficult view for OCT mac today, will repeat post cat sx         HTN Ret OU     Visually significant nuclear sclerotic cataract   - Interfering with activities of daily living.  Pt desires cataract surgery for [facility] rehabilitation.   - R/B/A discussed and pt agrees to proceed with surgery.   - IOL options discussed according to patient's goals and concomitant ocular pathology; and pt content with monofocal lens.    - Target: plano.       (Diboo 21.0 OS)    Pt understands limited BCVA OD 2/2 retinal pathology.

## 2022-12-02 NOTE — PROGRESS NOTES
Procedure explained. 22 g sl started in left forearm for optison use. Optison given ivp via sl for imaging. Tolerated well . Sl d/leona after. Pressure applied.

## 2022-12-06 ENCOUNTER — TELEPHONE (OUTPATIENT)
Dept: ELECTROPHYSIOLOGY | Facility: CLINIC | Age: 77
End: 2022-12-06
Payer: MEDICARE

## 2022-12-06 NOTE — TELEPHONE ENCOUNTER
Spoke to Mr. Lanza    CONFIRMED procedure arrival time of 1100 12/8/22    Reiterated instructions including:  -Directions to check in desk  -NPO after midnight night prior to procedure  -High importance of HOLDING Jardiance am of procedure  -Pre-procedure LABS done, no alerts  -Confirmed no fever, cough, or shortness of breath in the past 30 days  -Bathe night prior and morning prior to procedure with Hibiclens solution or an antibacterial soap      Patient verbalized understanding of above and appreciated the call.     Parent

## 2022-12-08 ENCOUNTER — ANESTHESIA (OUTPATIENT)
Dept: MEDSURG UNIT | Facility: HOSPITAL | Age: 77
End: 2022-12-08
Payer: MEDICARE

## 2022-12-08 ENCOUNTER — ANESTHESIA EVENT (OUTPATIENT)
Dept: MEDSURG UNIT | Facility: HOSPITAL | Age: 77
End: 2022-12-08
Payer: MEDICARE

## 2022-12-08 ENCOUNTER — HOSPITAL ENCOUNTER (OUTPATIENT)
Facility: HOSPITAL | Age: 77
Discharge: HOME | End: 2022-12-08
Attending: INTERNAL MEDICINE | Admitting: INTERNAL MEDICINE
Payer: MEDICARE

## 2022-12-08 VITALS
SYSTOLIC BLOOD PRESSURE: 108 MMHG | OXYGEN SATURATION: 99 % | DIASTOLIC BLOOD PRESSURE: 73 MMHG | BODY MASS INDEX: 21.47 KG/M2 | WEIGHT: 150 LBS | RESPIRATION RATE: 18 BRPM | HEART RATE: 61 BPM | TEMPERATURE: 99 F | HEIGHT: 70 IN

## 2022-12-08 DIAGNOSIS — I42.8 NON-ISCHEMIC CARDIOMYOPATHY: ICD-10-CM

## 2022-12-08 DIAGNOSIS — I42.8 NONISCHEMIC CARDIOMYOPATHY: Primary | ICD-10-CM

## 2022-12-08 DIAGNOSIS — R29.90 EPISODE OF TRANSIENT NEUROLOGIC SYMPTOMS: ICD-10-CM

## 2022-12-08 DIAGNOSIS — Z95.9 CARDIAC DEVICE IN SITU: ICD-10-CM

## 2022-12-08 DIAGNOSIS — I49.9 ARRHYTHMIA: ICD-10-CM

## 2022-12-08 DIAGNOSIS — I47.29 NON-SUSTAINED VENTRICULAR TACHYCARDIA: ICD-10-CM

## 2022-12-08 DIAGNOSIS — I50.42 CHRONIC COMBINED SYSTOLIC AND DIASTOLIC HEART FAILURE: ICD-10-CM

## 2022-12-08 LAB — POCT GLUCOSE: 120 MG/DL (ref 70–110)

## 2022-12-08 PROCEDURE — D9220A PRA ANESTHESIA: Mod: ANES,,, | Performed by: ANESTHESIOLOGY

## 2022-12-08 PROCEDURE — C1721 AICD, DUAL CHAMBER: HCPCS | Performed by: INTERNAL MEDICINE

## 2022-12-08 PROCEDURE — 25000003 PHARM REV CODE 250: Performed by: INTERNAL MEDICINE

## 2022-12-08 PROCEDURE — D9220A PRA ANESTHESIA: Mod: CRNA,,, | Performed by: NURSE ANESTHETIST, CERTIFIED REGISTERED

## 2022-12-08 PROCEDURE — 63600175 PHARM REV CODE 636 W HCPCS: Performed by: NURSE ANESTHETIST, CERTIFIED REGISTERED

## 2022-12-08 PROCEDURE — 93010 ELECTROCARDIOGRAM REPORT: CPT | Mod: ,,, | Performed by: INTERNAL MEDICINE

## 2022-12-08 PROCEDURE — 93005 ELECTROCARDIOGRAM TRACING: CPT

## 2022-12-08 PROCEDURE — 36620 PR INSERT CATH,ART,PERCUT,SHORTTERM: ICD-10-PCS | Mod: 59,,, | Performed by: ANESTHESIOLOGY

## 2022-12-08 PROCEDURE — 93010 EKG 12-LEAD: ICD-10-PCS | Mod: ,,, | Performed by: INTERNAL MEDICINE

## 2022-12-08 PROCEDURE — 25000003 PHARM REV CODE 250: Performed by: NURSE ANESTHETIST, CERTIFIED REGISTERED

## 2022-12-08 PROCEDURE — 33249 INSJ/RPLCMT DEFIB W/LEAD(S): CPT | Performed by: INTERNAL MEDICINE

## 2022-12-08 PROCEDURE — 37000009 HC ANESTHESIA EA ADD 15 MINS: Performed by: INTERNAL MEDICINE

## 2022-12-08 PROCEDURE — 37000008 HC ANESTHESIA 1ST 15 MINUTES: Performed by: INTERNAL MEDICINE

## 2022-12-08 PROCEDURE — 63600175 PHARM REV CODE 636 W HCPCS: Performed by: INTERNAL MEDICINE

## 2022-12-08 PROCEDURE — 36620 INSERTION CATHETER ARTERY: CPT | Mod: 59,,, | Performed by: ANESTHESIOLOGY

## 2022-12-08 PROCEDURE — C1777 LEAD, AICD, ENDO SINGLE COIL: HCPCS | Performed by: INTERNAL MEDICINE

## 2022-12-08 PROCEDURE — C1898 LEAD, PMKR, OTHER THAN TRANS: HCPCS | Performed by: INTERNAL MEDICINE

## 2022-12-08 PROCEDURE — 33249 PR ICD INSERT SNGL/DUAL W/LEADS: ICD-10-PCS | Mod: ,,, | Performed by: INTERNAL MEDICINE

## 2022-12-08 PROCEDURE — 93005 ELECTROCARDIOGRAM TRACING: CPT | Mod: 59

## 2022-12-08 PROCEDURE — C1894 INTRO/SHEATH, NON-LASER: HCPCS | Performed by: INTERNAL MEDICINE

## 2022-12-08 PROCEDURE — 33249 INSJ/RPLCMT DEFIB W/LEAD(S): CPT | Mod: ,,, | Performed by: INTERNAL MEDICINE

## 2022-12-08 PROCEDURE — D9220A PRA ANESTHESIA: ICD-10-PCS | Mod: CRNA,,, | Performed by: NURSE ANESTHETIST, CERTIFIED REGISTERED

## 2022-12-08 PROCEDURE — D9220A PRA ANESTHESIA: ICD-10-PCS | Mod: ANES,,, | Performed by: ANESTHESIOLOGY

## 2022-12-08 DEVICE — IMPLANTABLE CARDIOVERTER DEFIBRILLATOR
Type: IMPLANTABLE DEVICE | Site: CHEST | Status: FUNCTIONAL
Brand: GALLANT™

## 2022-12-08 DEVICE — DEFIBRILLATION LEAD
Type: IMPLANTABLE DEVICE | Site: HEART | Status: FUNCTIONAL
Brand: DURATA™

## 2022-12-08 DEVICE — PACING LEAD
Type: IMPLANTABLE DEVICE | Site: HEART | Status: FUNCTIONAL
Brand: TENDRIL™

## 2022-12-08 RX ORDER — DOXYCYCLINE 100 MG/1
100 CAPSULE ORAL EVERY 12 HOURS
Qty: 10 CAPSULE | Refills: 0 | Status: SHIPPED | OUTPATIENT
Start: 2022-12-08 | End: 2022-12-13

## 2022-12-08 RX ORDER — VANCOMYCIN HYDROCHLORIDE 1 G/20ML
INJECTION, POWDER, LYOPHILIZED, FOR SOLUTION INTRAVENOUS
Status: DISCONTINUED | OUTPATIENT
Start: 2022-12-08 | End: 2022-12-08 | Stop reason: HOSPADM

## 2022-12-08 RX ORDER — FENTANYL CITRATE 50 UG/ML
25 INJECTION, SOLUTION INTRAMUSCULAR; INTRAVENOUS EVERY 5 MIN PRN
Status: DISCONTINUED | OUTPATIENT
Start: 2022-12-08 | End: 2022-12-08 | Stop reason: HOSPADM

## 2022-12-08 RX ORDER — HYDROCODONE BITARTRATE AND ACETAMINOPHEN 5; 325 MG/1; MG/1
1 TABLET ORAL EVERY 6 HOURS PRN
Qty: 10 TABLET | Refills: 0 | Status: SHIPPED | OUTPATIENT
Start: 2022-12-08 | End: 2022-12-08 | Stop reason: SDUPTHER

## 2022-12-08 RX ORDER — LIDOCAINE HYDROCHLORIDE 10 MG/ML
INJECTION INFILTRATION; PERINEURAL
Status: DISCONTINUED | OUTPATIENT
Start: 2022-12-08 | End: 2022-12-08 | Stop reason: HOSPADM

## 2022-12-08 RX ORDER — ONDANSETRON 2 MG/ML
4 INJECTION INTRAMUSCULAR; INTRAVENOUS DAILY PRN
Status: DISCONTINUED | OUTPATIENT
Start: 2022-12-08 | End: 2022-12-08 | Stop reason: HOSPADM

## 2022-12-08 RX ORDER — BUPIVACAINE HYDROCHLORIDE 2.5 MG/ML
INJECTION, SOLUTION EPIDURAL; INFILTRATION; INTRACAUDAL
Status: DISCONTINUED | OUTPATIENT
Start: 2022-12-08 | End: 2022-12-08 | Stop reason: HOSPADM

## 2022-12-08 RX ORDER — DEXMEDETOMIDINE HYDROCHLORIDE 100 UG/ML
INJECTION, SOLUTION INTRAVENOUS
Status: DISCONTINUED | OUTPATIENT
Start: 2022-12-08 | End: 2022-12-08

## 2022-12-08 RX ORDER — SODIUM CHLORIDE 0.9 G/100ML
IRRIGANT IRRIGATION
Status: DISCONTINUED | OUTPATIENT
Start: 2022-12-08 | End: 2022-12-08 | Stop reason: HOSPADM

## 2022-12-08 RX ORDER — ACETAMINOPHEN 325 MG/1
650 TABLET ORAL EVERY 4 HOURS PRN
Status: DISCONTINUED | OUTPATIENT
Start: 2022-12-08 | End: 2022-12-08 | Stop reason: HOSPADM

## 2022-12-08 RX ORDER — FENTANYL CITRATE 50 UG/ML
INJECTION, SOLUTION INTRAMUSCULAR; INTRAVENOUS
Status: DISCONTINUED | OUTPATIENT
Start: 2022-12-08 | End: 2022-12-08

## 2022-12-08 RX ORDER — HYDROCODONE BITARTRATE AND ACETAMINOPHEN 5; 325 MG/1; MG/1
1 TABLET ORAL EVERY 6 HOURS PRN
Qty: 10 TABLET | Refills: 0 | Status: SHIPPED | OUTPATIENT
Start: 2022-12-08 | End: 2023-03-30

## 2022-12-08 RX ADMIN — DEXMEDETOMIDINE HYDROCHLORIDE 4 MCG: 100 INJECTION, SOLUTION INTRAVENOUS at 03:12

## 2022-12-08 RX ADMIN — CEFAZOLIN 2 G: 2 INJECTION, POWDER, FOR SOLUTION INTRAMUSCULAR; INTRAVENOUS at 02:12

## 2022-12-08 RX ADMIN — SODIUM CHLORIDE: 0.9 INJECTION, SOLUTION INTRAVENOUS at 02:12

## 2022-12-08 RX ADMIN — FENTANYL CITRATE 25 MCG: 50 INJECTION INTRAMUSCULAR; INTRAVENOUS at 03:12

## 2022-12-08 RX ADMIN — ACETAMINOPHEN 650 MG: 325 TABLET ORAL at 07:12

## 2022-12-08 NOTE — PROGRESS NOTES
Luis ep lab rn brought adrien to bedside in ep pacu 4. On stretcher with pt.  12 lead ekg done and in chart. Chest xray done per md order. Pt's daughter updated over phone by ep pacu rn and st [facility] rep.  Verbalizes understanding.

## 2022-12-08 NOTE — ANESTHESIA PREPROCEDURE EVALUATION
12/08/2022  Sidney Lanza is a 77 y.o., male.  Pre-operative evaluation for Procedure(s) (LRB):  INSERTION, ICD, DUAL CHAMBER (Left)    Sidney Lanza is a 77 y.o. male       Patient Active Problem List   Diagnosis    Hyperlipidemia    Chronic rhinitis    Impotence of organic origin    Essential hypertension    Nuclear sclerosis - Both Eyes    Branch retinal vein occlusion    Epiretinal membrane, right eye    Hypertensive retinopathy of both eyes    ERM OD (epiretinal membrane, right eye)    Branch retinal vein occlusion with macular edema of right eye    Right arm weakness    History of prostate cancer    Osteoarthritis of multiple joints    Anemia    Varicose veins of both lower extremities    Right rotator cuff tear    Finger mass, right    Colon cancer screening    History of colon polyps    Dyspnea    Chronic combined systolic and diastolic heart failure    Episode of transient neurologic symptoms    CHARLES (acute kidney injury)    Non-sustained ventricular tachycardia    Nonischemic cardiomyopathy       Past Surgical History:   Procedure Laterality Date    CATARACT EXTRACTION W/  INTRAOCULAR LENS IMPLANT Right 11/22/2022    Procedure: EXTRACTION, CATARACT, WITH IOL INSERTION;  Surgeon: Estella Silva MD;  Location: [facility];  Service: Ophthalmology;  Laterality: Right;    COLONOSCOPY N/A 6/13/2018    Procedure: COLONOSCOPY;  Surgeon: Adonis Stack MD;  Location: [facility] ([address] [place] [address]);  Service: Endoscopy;  Laterality: N/A;    COLONOSCOPY N/A 4/28/2021    Procedure: COLONOSCOPY;  Surgeon: Steven Watson MD;  Location: [facility] ([address] [place] [address]);  Service: Endoscopy;  Laterality: N/A;  4/25-[facility] uc-inst email-tb    EYE SURGERY      KNEE ARTHROSCOPY W/ DEBRIDEMENT      PROSTATE SURGERY  2006    for prostate cancer    SHOULDER ARTHROSCOPY Right 10/2017    RTC repair  with patch    ULNAR NERVE TRANSPOSITION         Social History     Socioeconomic History    Marital status:    Occupational History    Occupation: retired   Tobacco Use    Smoking status: Former     Packs/day: 1.00     Years: 30.00     Pack years: 30.00     Types: Cigarettes     Quit date: 1988     Years since quittin.5    Smokeless tobacco: Never   Substance and Sexual Activity    Alcohol use: Yes     Alcohol/week: 28.0 standard drinks     Types: 28 Cans of beer per week     Comment: beer - 2 a night     Drug use: No       No current facility-administered medications on file prior to encounter.     Current Outpatient Medications on File Prior to Encounter   Medication Sig Dispense Refill    aspirin (ECOTRIN) 81 MG EC tablet Take 1 tablet (81 mg total) by mouth once daily. 90 tablet 3    atorvastatin (LIPITOR) 40 MG tablet Take 1 tablet (40 mg total) by mouth every evening. 90 tablet 3    empagliflozin (JARDIANCE) 10 mg tablet Take 1 tablet (10 mg total) by mouth once daily. 90 tablet 3    FIBER, DEXTRIN, ORAL Take 1 tablet by mouth every morning.      fluticasone propionate (FLONASE) 50 mcg/actuation nasal spray 2 sprays (100 mcg total) by Each Nostril route as needed for Rhinitis.      glucosamine-chondroitin 500-400 mg tablet Take 2 tablets by mouth every morning.      multivitamin capsule Take 1 capsule by mouth every morning.       prednisolon/gatiflox/bromfenac (PREDNISOL ACE-GATIFLOX-BROMFEN) 1-0.5-0.075 % DrpS Apply 1 drop to eye 3 (three) times daily. One drop 3 times a day in surgical eye 5 mL 2    sacubitriL-valsartan (ENTRESTO) 49-51 mg per tablet Take 1 tablet by mouth 2 (two) times daily. 180 tablet 3    spironolactone (ALDACTONE) 25 MG tablet Take 1 tablet (25 mg total) by mouth once daily. 90 tablet 3       Review of patient's allergies indicates:  No Known Allergies      CBC: No results for input(s): WBC, RBC, HGB, HCT, PLT, MCV, MCH, MCHC in the last 72  hours.    CMP: No results for input(s): NA, K, CL, CO2, BUN, CREATININE, GLU, MG, PHOS, CALCIUM, ALBUMIN, PROT, ALKPHOS, ALT, AST, BILITOT in the last 72 hours.    INR  No results for input(s): PT, INR, PROTIME, APTT in the last 72 hours.      Diagnostic Studies:    EKG:   Results for orders placed or performed during the hospital encounter of 09/11/22   ECG 12 lead    Collection Time: 09/11/22  4:02 PM    Narrative    Test Reason : I63.9,    Vent. Rate : 086 BPM     Atrial Rate : 086 BPM     P-R Int : 128 ms          QRS Dur : 114 ms      QT Int : 374 ms       P-R-T Axes : 066 -12 205 degrees     QTc Int : 447 ms    Sinus rhythm with occasional Premature ventricular complexes  Septal infarct ,age undetermined  Nonspecific ST and T wave abnormality  Abnormal ECG  When compared with ECG of 02-SEP-2022 14:43,  Premature atrial complexes are no longer Present  Confirmed by Jaziel Irwin MD ([id number]) on 9/12/2022 11:07:26 PM    Referred By: [email or web address]   SELF           Confirmed By:Jaziel Irwin MD       TTE:  Results for orders placed or performed during the hospital encounter of 12/02/22   Echo   Result Value Ref Range    Ascending aorta 2.65 cm    STJ 2.69 cm    AV mean gradient 3 mmHg    Ao peak peewee 1.11 m/s    Ao VTI 18.90 cm    IVS 0.79 0.6 - 1.1 cm    LA size 4.23 cm    Left Atrium Major Axis 5.64 cm    Left Atrium Minor Axis 5.89 cm    LVIDd 7.56 (A) 3.5 - 6.0 cm    LVIDs 7.21 (A) 2.1 - 4.0 cm    LVOT diameter 2.04 cm    LVOT peak VTI 10.22 cm    Posterior Wall 0.82 0.6 - 1.1 cm    MV Peak A Epewee 0.64 m/s    E wave deceleration time 113.94 msec    MV Peak E Peewee 0.71 m/s    RA Major Axis 4.37 cm    RA Width 4.37 cm    RVDD 4.82 cm    Sinus 3.21 cm    TAPSE 1.06 cm    TR Max Peewee 3.55 m/s    TDI LATERAL 0.04 m/s    TDI SEPTAL 0.02 m/s    LA WIDTH 5.51 cm    MV stenosis pressure 1/2 time 33.04 ms    LV Diastolic Volume 303.47 mL    LV Systolic Volume 273.33 mL    RV S' 5.66 cm/s    LVOT peak peewee 0.62 m/s    LA volume (mod)  121.41 cm3    LV LATERAL E/E' RATIO 17.75 m/s    LV SEPTAL E/E' RATIO 35.50 m/s    FS 5 %    LA volume 114.16 cm3    LV mass 282.66 g    Left Ventricle Relative Wall Thickness 0.22 cm    AV valve area 1.77 cm2    AV Velocity Ratio 0.56     AV index (prosthetic) 0.54     MV valve area p 1/2 method 6.66 cm2    E/A ratio 1.11     Mean e' 0.03 m/s    LVOT area 3.3 cm2    LVOT stroke volume 33.39 cm3    AV peak gradient 5 mmHg    E/E' ratio 23.67 m/s    Triscuspid Valve Regurgitation Peak Gradient 50 mmHg    BSA 1.8 m2    LV Systolic Volume Index 150.2 mL/m2    LV Diastolic Volume Index 166.74 mL/m2    LA Volume Index 62.7 mL/m2    LV Mass Index 155 g/m2    LA Volume Index (Mod) 66.7 mL/m2    Right Atrial Pressure (from IVC) 15 mmHg    EF 10 %    TV rest pulmonary artery pressure 65 mmHg    Narrative    · The left ventricle is severely enlarged with eccentric hypertrophy and   severely decreased systolic function.  · The estimated ejection fraction is 10% or slightly higher.  · There is severe left ventricular global hypokinesis.  · There is abnormal septal wall motion.  · Grade II left ventricular diastolic dysfunction.  · Severe left atrial enlargement.  · Moderate right ventricular enlargement with moderately reduced right   ventricular systolic function.  · Mild mitral regurgitation.  · Mild tricuspid regurgitation.  · Mild to moderate pulmonic regurgitation.  · Elevated central venous pressure (15 mmHg).  · The estimated PA systolic pressure is 65 mmHg.  · There is moderate-severe pulmonary hypertension.        EF   Date Value Ref Range Status   12/02/2022 10 % Final   09/12/2022 10 % Final     Nuc Stress EF   Date Value Ref Range Status   10/05/2022 14 % Final     Nuc Rest EF   Date Value Ref Range Status   10/05/2022 14  Final      No results found for this or any previous visit.    JOHN:  No results found for this or any previous visit.    Stress Test:  No results found for this or any previous visit.        LHC:  No results found for this or any previous visit.       PFT:  No results found for: FEV1, FVC, IBY3DIE, TLC, DLCO     ALLERGIES:   Review of patient's allergies indicates:  No Known Allergies  LDA:      Lines/Drains/Airways     None                Anesthesia Evaluation      Airway   Mallampati: II  TM distance: > 6 cm  Neck ROM: Normal ROM  Dental    (+) Intact    Pulmonary    Cardiovascular   (+) hypertension, CHF,     Rate: Normal    Neuro/Psych      GI/Hepatic/Renal      Endo/Other    (+) arthritis  Abdominal                       Pre-op Assessment    I have reviewed the Patient Summary Reports.     I have reviewed the Nursing Notes. I have reviewed the NPO Status.   I have reviewed the Medications.     Review of Systems  Anesthesia Hx:  No problems with previous Anesthesia  Denies Family Hx of Anesthesia complications.   Denies Personal Hx of Anesthesia complications.   Cardiovascular:   Hypertension CHF    Pulmonary:  Pulmonary Normal    Renal/:  Renal/ Normal     Hepatic/GI:  Hepatic/GI Normal    Musculoskeletal:   Arthritis     Neurological:  Neurology Normal    Endocrine:  Endocrine Normal        Physical Exam  General: Well nourished    Airway:  Mallampati: II   Mouth Opening: Normal  TM Distance: > 6 cm  Tongue: Normal  Neck ROM: Normal ROM    Dental:  Intact    Chest/Lungs:  Normal Respiratory Rate    Heart:  Rate: Normal        Anesthesia Plan  Type of Anesthesia, risks & benefits discussed:    Anesthesia Type: Gen Natural Airway, MAC  Intra-op Monitoring Plan: Standard ASA Monitors and Art Line  Post Op Pain Control Plan: multimodal analgesia and IV/PO Opioids PRN  Induction:  IV  Airway Plan: Direct, Post-Induction  Informed Consent: Informed consent signed with the Patient and all parties understand the risks and agree with anesthesia plan.  All questions answered. Patient consented to blood products? Yes  ASA Score: 4  Day of Surgery Review of History & Physical: H&P Update referred to  the surgeon/provider.    Ready For Surgery From Anesthesia Perspective.     .

## 2022-12-08 NOTE — NURSING TRANSFER
Nursing Transfer Note      12/8/2022     Reason patient is being transferred: ep pacu 4 to [facility] 12/home    Transfer To: ep pacu 4 to [facility] 12/home    Transfer via stretcher    Transfer with cardiac monitoring, tele box on confirmed by tele tech    Transported by hayes [place]    Medicines sent: none    Any special needs or follow-up needed: bedrest x3hrs from 1545. Done at 1845    Chart send with patient: Yes    Notified: daughter    Patient reassessed at: 12/8/22 1645    Upon arrival to floor: cardiac monitor applied, patient oriented to room, call bell in reach, and bed in lowest position.  Pt's life vest on stretcher with him. [facility] booklet/card in chart.  Rep uploaded americo on pt's phone. Daughter has cell phone.

## 2022-12-08 NOTE — ANESTHESIA PROCEDURE NOTES
Arterial    Diagnosis: cardiomyopathy    Patient location during procedure: done in OR    Staffing  Authorizing Provider: Trenton Vee MD  Performing Provider: Trenton Vee MD    Anesthesiologist was present at the time of the procedure.    Preanesthetic Checklist  Completed: patient identified, IV checked, site marked, risks and benefits discussed, surgical consent, monitors and equipment checked, pre-op evaluation, timeout performed and anesthesia consent givenArterial  Skin Prep: chlorhexidine gluconate  Local Infiltration: lidocaine  Orientation: left  Location: radial    Catheter Size: 20 G  Catheter placement by Anatomical landmarks. Heme positive aspiration all ports. Insertion Attempts: 1  Assessment  Dressing: secured with tape and tegaderm  Patient: Tolerated well

## 2022-12-08 NOTE — PLAN OF CARE
S/p icd dual chamber placement to left chest wall. Pt arrived to ep pacu 4 with left chest wall incision dermabond, aquacell ag  already on. Ice pack placed and left arm sling on. Pt denies pain. Offered prn tylenol, did not want to take at this time. Sscu rn aware.  Pt's daughter updated over phone by ep pacu rn. Verbalizes understanding. Bedrest x3hrs from 1545. Done at 1845. Pt tolerating sips of water. Pt's lifevest brought from ep lab and remains on stretcher with pt. Daughter updated by [facility] rep over phone.  Booklet/card in chart.  12 lead ekg done and chest xray done per md order. Tele box on [facility]. See flowsheet for full assessment. S/p [facility] icd. Ddd low limit 60. On cm, a paced noted. Pt aaox4 follows commands.

## 2022-12-08 NOTE — DISCHARGE SUMMARY
[facility]  Cardiology  Discharge Summary      Patient Name: Sidney Lanza  MRN: [id number]  Admission Date: 12/8/2022  Hospital Length of Stay: 0 days  Discharge Date and Time:  12/08/2022 3:48 PM  Attending Physician: Chalino Wahl MD  Discharging Provider: Luis Dowd MD  Primary Care Physician: Sunny Soto MD    HPI:  Sidney Lanza is a 77 y.o. male who presents for evaluation of No chief complaint on file.        77 yoM referred for cardiomyopathy and non-sustained VT. He developed NICM and was placed on GDMT 9/2/22. The following week 9/11/22, he had a spell of weakness and flushing and word finding issues. His symptoms resolved later that day. No CVA noted on imaging. Event monitor placed. 9/26/22 5PM. He had 23 beat runs of NSVT. This event was asymptomatic and did not reproduce his symptoms from 9/11/22. No syncope or near syncope. Ischemic work up underway.      Echo 9/22:  ·           The left ventricle is severely enlarged with eccentric hypertrophy and severely decreased systolic function. The estimated ejection fraction is 10%.  ·           Mild right ventricular enlargement with mildly to moderately reduced right ventricular systolic function.  ·           Grade II left ventricular diastolic dysfunction.  ·           Severe left atrial enlargement.  ·           Mild mitral regurgitation.  ·           There is pulmonary hypertension. The estimated PA systolic pressure is 50 mmHg.  ·           Intermediate central venous pressure (8 mmHg).           Interval History:  Patient denies chest pain fevers chillss SOB. Not on anticoagulation     Procedure(s) (LRB):  INSERTION, ICD, DUAL CHAMBER (Left)     Indwelling Lines/Drains at time of discharge:  Lines/Drains/Airways       Arterial Line  Duration             Arterial Line 12/08/22 1446 Left Radial <1 day                    Hospital Course (synopsis of major diagnoses, care, treatment, and services provided during the course  of the hospital stay): Patient arrived fasting for ICD. Patient tolerated procedure well. Patient monitored post op without issues and discharged.      Pending Diagnostic Studies:       Procedure Component Value Units Date/Time    EKG 12-lead [[id number]]     Order Status: Sent Lab Status: No result     X-Ray Chest AP Portable [[id number]]     Order Status: Sent Lab Status: No result             There are no hospital problems to display for this patient.      Discharged Condition: good    Follow Up:   Follow-up Information       Chalino Wahl MD Follow up in 4 month(s).    Specialties: Electrophysiology, Cardiovascular Disease  Contact information:  [address]  [facility] [address]  [phone]               DEVICE CHECK CLINIC Follow up in 1 week(s).                           Patient Instructions:   Sling to left arm - wear for 24 hours, then only at night for 6 weeks.  NO HEPARIN PRODUCTS  Doxycycline 100 mg PO BID for 5 days at discharge  No lifting left elbow above shoulder height  No lifting over 5 pounds  No driving for 1 week and for 4 weeks if patient uses left arm to make turns  Patient may shower in 24 hours, do not let beam of shower hit site directly and no scrubbing in area  Follow up in device clinic in 1 week and with Dr. Wahl in 4 months.       Medications:  Reconciled Home Medications:      Medication List        START taking these medications      doxycycline 100 MG Cap  Commonly known as: VIBRAMYCIN  Take 1 capsule (100 mg total) by mouth every 12 (twelve) hours. for 5 days            CONTINUE taking these medications      aspirin 81 MG EC tablet  Commonly known as: ECOTRIN  Take 1 tablet (81 mg total) by mouth once daily.     atorvastatin 40 MG tablet  Commonly known as: LIPITOR  Take 1 tablet (40 mg total) by mouth every evening.     carvediloL 25 MG tablet  Commonly known as: COREG  Take 1 tablet (25 mg total) by mouth 2 (two) times daily with meals.     empagliflozin 10 mg  tablet  Commonly known as: JARDIANCE  Take 1 tablet (10 mg total) by mouth once daily.     ENTRESTO 49-51 mg per tablet  Generic drug: sacubitriL-valsartan  Take 1 tablet by mouth 2 (two) times daily.     FIBER (DEXTRIN) ORAL  Take 1 tablet by mouth every morning.     fluticasone propionate 50 mcg/actuation nasal spray  Commonly known as: FLONASE  2 sprays (100 mcg total) by Each Nostril route as needed for Rhinitis.     furosemide 20 MG tablet  Commonly known as: LASIX  Take 1 tablet (20 mg total) by mouth once daily.     glucosamine-chondroitin 500-400 mg tablet  Take 2 tablets by mouth every morning.     multivitamin capsule  Take 1 capsule by mouth every morning.     prednisol ace-gatiflox-bromfen 1-0.5-0.075 % Drps  Apply 1 drop to eye 3 (three) times daily. One drop 3 times a day in surgical eye     spironolactone 25 MG tablet  Commonly known as: ALDACTONE  Take 1 tablet (25 mg total) by mouth once daily.              Time spent on the discharge of patient: 30 minutes    Luis Dowd MD  Cardiology  [facility]

## 2022-12-08 NOTE — DISCHARGE INSTRUCTIONS
Other restrictions (specify):   Scheduling Instructions: Instructions to patient:  -  Please take the following precautions in the days/weeks following your procedure  - Please refer to the written post procedure hand out that was given to you.   - For the first 6 weeks, while your implanted leads become permanently fixed, we ask that you avoid raising your left elbow above your shoulder,  and lifting greater than 5-10 lbs.   -WEAR SLING AT NIGHT FOR 6 WEEKS OR ANYTIME YOU ARE UNAWARE OF THE MOVEMENT OF YOUR LEFT ARM      Call MD for:  temperature >100.4      Call MD for:  persistent nausea and vomiting or diarrhea      Call MD for:  severe uncontrolled pain      Call MD for:  redness, tenderness, or signs of infection (pain, swelling, redness, odor or green/yellow discharge around incision site)      Call MD for:  difficulty breathing or increased cough      Call MD for:  severe persistent headache      Call MD for:  worsening rash      Call MD for:  persistent dizziness, light-headedness, or visual disturbances      Call MD for:  increased confusion or weakness          Call MD for:   Scheduling Instructions: For any concerning medical symptoms

## 2022-12-08 NOTE — H&P
Electrophysiology Pre Procedure H&P    HPI:    Sidney Lanza is a 77 y.o. male who presents for evaluation of No chief complaint on file.        77 yoM referred for cardiomyopathy and non-sustained VT. He developed NICM and was placed on GDMT 22. The following week 22, he had a spell of weakness and flushing and word finding issues. His symptoms resolved later that day. No CVA noted on imaging. Event monitor placed. 22 5PM. He had 23 beat runs of NSVT. This event was asymptomatic and did not reproduce his symptoms from 22. No syncope or near syncope. Ischemic work up underway.      Echo :  ·           The left ventricle is severely enlarged with eccentric hypertrophy and severely decreased systolic function. The estimated ejection fraction is 10%.  ·           Mild right ventricular enlargement with mildly to moderately reduced right ventricular systolic function.  ·           Grade II left ventricular diastolic dysfunction.  ·           Severe left atrial enlargement.  ·           Mild mitral regurgitation.  ·           There is pulmonary hypertension. The estimated PA systolic pressure is 50 mmHg.  ·           Intermediate central venous pressure (8 mmHg).          Interval History:  Patient denies chest pain fevers chillss SOB. Not on anticoagulation     Past Medical History:   Diagnosis Date    Arthritis     Chronic rhinitis     Fever blister     Hyperlipidemia     Hypertension     Impotence of organic origin     Joint pain     Nuclear sclerosis - Both Eyes 10/23/2013    Prostate cancer     Ulcer     NSAID related     Varicose vein of leg     bilateral legs        Social History     Socioeconomic History    Marital status:    Occupational History    Occupation: retired   Tobacco Use    Smoking status: Former     Packs/day: 1.00     Years: 30.00     Pack years: 30.00     Types: Cigarettes     Quit date: 1988     Years since quittin.5    Smokeless tobacco: Never  "  Substance and Sexual Activity    Alcohol use: Yes     Alcohol/week: 28.0 standard drinks     Types: 28 Cans of beer per week     Comment: beer - 2 a night     Drug use: No        Family History   Problem Relation Age of Onset    Cancer Mother         breast    Cancer Father         prostate    Heart disease Father 74        CHF    Diabetes Sister     Heart disease Sister         stroke    Diabetes Brother     Hypertension Brother     Diabetes Brother     Amblyopia Neg Hx     Blindness Neg Hx     Cataracts Neg Hx     Glaucoma Neg Hx     Macular degeneration Neg Hx     Retinal detachment Neg Hx     Strabismus Neg Hx     Stroke Neg Hx     Thyroid disease Neg Hx     Melanoma Neg Hx     Psoriasis Neg Hx     Lupus Neg Hx         Current Facility-Administered Medications   Medication Dose Route Frequency Provider Last Rate Last Admin    ceFAZolin 2 g in dextrose 5 % in water (D5W) 5 % 50 mL IVPB (MB+)  2 g Intravenous On Call Procedure Chalino Wahl MD          ROS:  Constitutional: (-)fevers, (-)chills, (-)night sweats  HEENT: (-) headaches (-) lightheadedness (-) blurry vision  Cardiovascular: (-)chest pain (-)paroxysmal nocturnal dyspnea (-)orthopnea  Respiratory: (-)shortness of breath, (-)dyspnea on exertion (-)hemoptysis  Gastrointestinal: (-)abdominal pain (-)nausea (-)vomiting (-)tarry stools  Musculoskeletal: (-)arthralgias (-)limited range of motion  Neurologic: (-)parasthesias (-)mood disorder (-)anxiety  Endo: (-)polyuria (-)polydipsia (-)heat/cold intolerance  Skin: (-)rash     Vitals:    12/08/22 1202   Weight: 68 kg (150 lb)   Height: 5' 10" (1.778 m)     Physical Exam:   Gen: no acute distress, pleasant patient answering questions appropriately  HEENT: extra-ocular muscles intact, normocephalic-atraumatic  Neck: no jugular venous distension, no lymphadenopathy, no thyromegaly, no carotid bruits  CVS: regular rate and rhythm, S1S2 normal, no murmurs/rubs/gallops  CHEST: clear to auscultation " bilaterally, no wheezes/rales/ronchi  ABD: Soft, non-tender, nondistended, bowel sounds present  EXT: No Edema, 2+ pulses bilaterally (radial, femoral, dorsales pedis, posterior tibial)  NEURO: awake, alert, and oriented   Skin: No rash     Review of Labs:   At baseline      Most recent ECG  pending     Assessment/Plan:  Sidney Lanza is a 77 y.o. male with  77 yoM with cardiomyopathy here for ICD consideration and arrhythmia management. He had a single NSVT event lasting 23 beats. He is now wearing a life vest. Coreg from 3.125 to 6.25. Will get echo 12/2/22 and tentatively plan on Dc ICD the following 1-2 weeks. Cancel ICD if EF >35%. Extensive discussion regarding risks, benefits, and alternative approaches to the procedure was had with the patient.  The patient voices understanding and all questions have been answered.  The patient would like to proceed as planned.

## 2022-12-08 NOTE — TRANSFER OF CARE
"Anesthesia Transfer of Care Note    Patient: Sidney Lanza    Procedure(s) Performed: Procedure(s) (LRB):  INSERTION, ICD, DUAL CHAMBER (Left)    Patient location: Other: ep pacu    Anesthesia Type: MAC    Transport from OR: Transported from OR on room air with adequate spontaneous ventilation    Post pain: adequate analgesia    Post assessment: no apparent anesthetic complications and tolerated procedure well    Post vital signs: stable    Level of consciousness: awake, alert and oriented    Nausea/Vomiting: no nausea/vomiting    Complications: none    Transfer of care protocol was followed      Last vitals:   Visit Vitals  /79 (BP Location: Left arm, Patient Position: Lying)   Pulse 64   Temp 36.6 °C (97.9 °F) (Temporal)   Resp 18   Ht 5' 10" (1.778 m)   Wt 68 kg (150 lb)   SpO2 100%   BMI 21.52 kg/m²     "

## 2022-12-08 NOTE — PATIENT INSTRUCTIONS
Sling to left arm - wear for 24 hours, then only at night for 6 weeks.  NO HEPARIN PRODUCTS  Doxycycline 100 mg PO BID for 5 days at discharge  No lifting left elbow above shoulder height  No lifting over 5 pounds  No driving for 1 week and for 4 weeks if patient uses left arm to make turns  Patient may shower in 24 hours, do not let beam of shower hit site directly and no scrubbing in area  Follow up in device clinic in 1 week and with Dr. Wahl in 4 months.

## 2022-12-08 NOTE — PLAN OF CARE
Pt arrived to unit accompanied by his daughter.  Pre op orders and assessment initiated.  Pt remains npo.  Call bell within reach.

## 2022-12-08 NOTE — Clinical Note
The chest was prepped. The site was prepped with ChloraPrep and Ioban. The site was clipped. The patient was draped. The patient was positioned supine. The patient was secured using safety straps, with a wedge under the patient and to an armboard.

## 2022-12-09 ENCOUNTER — PATIENT MESSAGE (OUTPATIENT)
Dept: CARDIOLOGY | Facility: HOSPITAL | Age: 77
End: 2022-12-09
Payer: MEDICARE

## 2022-12-09 NOTE — ANESTHESIA POSTPROCEDURE EVALUATION
Anesthesia Post Evaluation    Patient: Sidney Lanza    Procedure(s) Performed: Procedure(s) (LRB):  INSERTION, ICD, DUAL CHAMBER (Left)    Final Anesthesia Type: general      Patient location during evaluation: PACU  Patient participation: Yes- Able to Participate  Level of consciousness: awake and alert  Post-procedure vital signs: reviewed and stable  Pain management: adequate  Airway patency: patent    PONV status at discharge: No PONV  Anesthetic complications: no      Cardiovascular status: blood pressure returned to baseline  Respiratory status: unassisted  Follow-up not needed.          Vitals Value Taken Time   /73 12/08/22 1845   Temp 37 °C (98.6 °F) 12/08/22 1630   Pulse 61 12/08/22 1845   Resp 18 12/08/22 1845   SpO2 99 % 12/08/22 1700         No case tracking events are documented in the log.      Pain/Timothy Score: Pain Rating Prior to Med Admin: 4 (12/8/2022  7:01 PM)  Timothy Score: 9 (12/8/2022  4:45 PM)

## 2022-12-09 NOTE — PROGRESS NOTES
Patient has ambulated and voided; helped patient get dressed to demonstrate how to put the sling on as well as his shirt; discharge instructions explained; questions answered; patient request for tylenol prior to leaving due to some discomfort from the procedure area; waiting on daughter to come pick patient up

## 2022-12-12 ENCOUNTER — TELEPHONE (OUTPATIENT)
Dept: OPHTHALMOLOGY | Facility: CLINIC | Age: 77
End: 2022-12-12
Payer: MEDICARE

## 2022-12-12 DIAGNOSIS — H25.12 NUCLEAR SCLEROTIC CATARACT OF LEFT EYE: Primary | ICD-10-CM

## 2022-12-12 PROBLEM — N17.9 AKI (ACUTE KIDNEY INJURY): Status: RESOLVED | Noted: 2022-09-12 | Resolved: 2022-12-12

## 2022-12-15 ENCOUNTER — CLINICAL SUPPORT (OUTPATIENT)
Dept: CARDIOLOGY | Facility: HOSPITAL | Age: 77
End: 2022-12-15
Attending: INTERNAL MEDICINE
Payer: MEDICARE

## 2022-12-15 DIAGNOSIS — I42.8 NON-ISCHEMIC CARDIOMYOPATHY: ICD-10-CM

## 2022-12-15 PROCEDURE — 93283 CARDIAC DEVICE CHECK - IN CLINIC & HOSPITAL: ICD-10-PCS | Mod: 26,,, | Performed by: INTERNAL MEDICINE

## 2022-12-15 PROCEDURE — 93283 PRGRMG EVAL IMPLANTABLE DFB: CPT | Mod: 26,,, | Performed by: INTERNAL MEDICINE

## 2022-12-15 PROCEDURE — 93283 PRGRMG EVAL IMPLANTABLE DFB: CPT

## 2022-12-20 DIAGNOSIS — H25.12 NUCLEAR SCLEROTIC CATARACT OF LEFT EYE: Primary | ICD-10-CM

## 2022-12-21 RX ORDER — PREDNISOLONE ACETATE-GATIFLOXACIN-BROMFENAC .75; 5; 1 MG/ML; MG/ML; MG/ML
1 SUSPENSION/ DROPS OPHTHALMIC 3 TIMES DAILY
Qty: 5 ML | Refills: 3 | Status: SHIPPED | OUTPATIENT
Start: 2022-12-21 | End: 2023-03-30

## 2022-12-23 NOTE — TELEPHONE ENCOUNTER
----- Message from Melanie Mix MA sent at 12/23/2022  4:40 PM CST -----  Yesi the patient would like you to sent his Rx. Carvedilol 25 mg to [facility] mail  order. [phone]. Thank you,

## 2023-01-05 NOTE — H&P
History    Chief complaint:  Painless progressive vision loss    Present Ilness/Diagnosis: Nuclear sclerotic Cataract    Past Medical History:  has a past medical history of Arthritis, Chronic rhinitis, Fever blister, Heart failure, unspecified, Hyperlipidemia, Hypertension, Impotence of organic origin, Joint pain, Nuclear sclerosis - Both Eyes (10/23/2013), Prostate cancer, Ulcer, and Varicose vein of leg.    Family History/Social History: refer to chart    Allergies: Review of patient's allergies indicates:  No Known Allergies    Current Medications: No current facility-administered medications for this encounter.    Current Outpatient Medications:     aspirin (ECOTRIN) 81 MG EC tablet, Take 1 tablet (81 mg total) by mouth once daily., Disp: 90 tablet, Rfl: 3    atorvastatin (LIPITOR) 40 MG tablet, Take 1 tablet (40 mg total) by mouth every evening., Disp: 90 tablet, Rfl: 3    carvediloL (COREG) 25 MG tablet, Take 1 tablet (25 mg total) by mouth 2 (two) times daily with meals., Disp: 180 tablet, Rfl: 3    empagliflozin (JARDIANCE) 10 mg tablet, Take 1 tablet (10 mg total) by mouth once daily., Disp: 90 tablet, Rfl: 3    fluticasone propionate (FLONASE) 50 mcg/actuation nasal spray, USE 2 SPRAYS IN BOTH  NOSTRILS ONCE DAILY, Disp: 48 g, Rfl: 3    furosemide (LASIX) 20 MG tablet, Take 1 tablet (20 mg total) by mouth once daily., Disp: 90 tablet, Rfl: 3    glucosamine-chondroitin 500-400 mg tablet, Take 2 tablets by mouth every morning., Disp: , Rfl:     multivitamin capsule, Take 1 capsule by mouth every morning. , Disp: , Rfl:     sacubitriL-valsartan (ENTRESTO) 49-51 mg per tablet, Take 1 tablet by mouth 2 (two) times daily., Disp: 180 tablet, Rfl: 3    spironolactone (ALDACTONE) 25 MG tablet, Take 1 tablet (25 mg total) by mouth once daily., Disp: 90 tablet, Rfl: 3    FIBER, DEXTRIN, ORAL, Take 1 tablet by mouth every morning., Disp: , Rfl:     HYDROcodone-acetaminophen (NORCO) 5-325 mg per tablet, Take 1 tablet  by mouth every 6 (six) hours as needed for Pain., Disp: 10 tablet, Rfl: 0    prednisolon/gatiflox/bromfenac (PREDNISOL ACE-GATIFLOX-BROMFEN) 1-0.5-0.075 % DrpS, Apply 1 drop to eye 3 (three) times daily. One drop 3 times a day in surgical eye, Disp: 5 mL, Rfl: 2    prednisolon/gatiflox/bromfenac (PREDNISOL ACE-GATIFLOX-BROMFEN) 1-0.5-0.075 % DrpS, Apply 1 drop to eye 3 (three) times daily. One drop 3 times a day in surgical eye, Disp: 5 mL, Rfl: 3    Physical Exam    BP: Vital signs stable  General: No apparent distress  HEENT: nuclear sclerotic cataract  Lungs: adequate respirations  Heart: + pulses  Abdomen: soft  Rectal/pelvic: deferred    Impression: Visually significant Cataract.    See previous clinic notes for surgical indications.    Plan: Phacoemulsification with implantation of Intraocular lens

## 2023-01-09 ENCOUNTER — TELEPHONE (OUTPATIENT)
Dept: OPHTHALMOLOGY | Facility: CLINIC | Age: 78
End: 2023-01-09
Payer: MEDICARE

## 2023-01-11 ENCOUNTER — ANESTHESIA (OUTPATIENT)
Dept: SURGERY | Facility: OTHER | Age: 78
End: 2023-01-11
Payer: MEDICARE

## 2023-01-11 ENCOUNTER — HOSPITAL ENCOUNTER (OUTPATIENT)
Facility: OTHER | Age: 78
Discharge: HOME | End: 2023-01-11
Attending: OPHTHALMOLOGY | Admitting: OPHTHALMOLOGY
Payer: MEDICARE

## 2023-01-11 ENCOUNTER — ANESTHESIA EVENT (OUTPATIENT)
Dept: SURGERY | Facility: OTHER | Age: 78
End: 2023-01-11
Payer: MEDICARE

## 2023-01-11 VITALS
HEART RATE: 68 BPM | OXYGEN SATURATION: 100 % | DIASTOLIC BLOOD PRESSURE: 84 MMHG | WEIGHT: 145 LBS | HEIGHT: 70 IN | RESPIRATION RATE: 16 BRPM | TEMPERATURE: 99 F | SYSTOLIC BLOOD PRESSURE: 120 MMHG | BODY MASS INDEX: 20.76 KG/M2

## 2023-01-11 DIAGNOSIS — H25.12 NUCLEAR SCLEROSIS OF LEFT EYE: Primary | ICD-10-CM

## 2023-01-11 DIAGNOSIS — H25.12 AGE-RELATED NUCLEAR CATARACT, LEFT: ICD-10-CM

## 2023-01-11 PROCEDURE — 36000706: Performed by: OPHTHALMOLOGY

## 2023-01-11 PROCEDURE — 37000009 HC ANESTHESIA EA ADD 15 MINS: Performed by: OPHTHALMOLOGY

## 2023-01-11 PROCEDURE — 71000015 HC POSTOP RECOV 1ST HR: Performed by: OPHTHALMOLOGY

## 2023-01-11 PROCEDURE — 66984 PR REMOVAL, CATARACT, W/INSRT INTRAOC LENS, W/O ENDO CYCLO: ICD-10-PCS | Mod: 79,LT,, | Performed by: OPHTHALMOLOGY

## 2023-01-11 PROCEDURE — V2632 POST CHMBR INTRAOCULAR LENS: HCPCS | Performed by: OPHTHALMOLOGY

## 2023-01-11 PROCEDURE — 25000003 PHARM REV CODE 250: Performed by: OPHTHALMOLOGY

## 2023-01-11 PROCEDURE — 36000707: Performed by: OPHTHALMOLOGY

## 2023-01-11 PROCEDURE — 63600175 PHARM REV CODE 636 W HCPCS: Performed by: NURSE ANESTHETIST, CERTIFIED REGISTERED

## 2023-01-11 PROCEDURE — 37000008 HC ANESTHESIA 1ST 15 MINUTES: Performed by: OPHTHALMOLOGY

## 2023-01-11 PROCEDURE — 66984 XCAPSL CTRC RMVL W/O ECP: CPT | Mod: 79,LT,, | Performed by: OPHTHALMOLOGY

## 2023-01-11 DEVICE — LENS EYHANCE +21.0D: Type: IMPLANTABLE DEVICE | Site: EYE | Status: FUNCTIONAL

## 2023-01-11 RX ORDER — PHENYLEPHRINE HYDROCHLORIDE 100 MG/ML
1 SOLUTION/ DROPS OPHTHALMIC
Status: ACTIVE | OUTPATIENT
Start: 2023-01-11

## 2023-01-11 RX ORDER — MOXIFLOXACIN 5 MG/ML
SOLUTION/ DROPS OPHTHALMIC
Status: DISCONTINUED | OUTPATIENT
Start: 2023-01-11 | End: 2023-01-11 | Stop reason: HOSPADM

## 2023-01-11 RX ORDER — LIDOCAINE HYDROCHLORIDE 10 MG/ML
INJECTION, SOLUTION EPIDURAL; INFILTRATION; INTRACAUDAL; PERINEURAL
Status: DISCONTINUED | OUTPATIENT
Start: 2023-01-11 | End: 2023-01-11 | Stop reason: HOSPADM

## 2023-01-11 RX ORDER — SODIUM CHLORIDE 0.9 % (FLUSH) 0.9 %
2 SYRINGE (ML) INJECTION
Status: ACTIVE | OUTPATIENT
Start: 2023-01-11

## 2023-01-11 RX ORDER — MOXIFLOXACIN 5 MG/ML
1 SOLUTION/ DROPS OPHTHALMIC
Status: COMPLETED | OUTPATIENT
Start: 2023-01-11 | End: 2023-01-11

## 2023-01-11 RX ORDER — ACETAMINOPHEN 325 MG/1
650 TABLET ORAL EVERY 4 HOURS PRN
Status: DISCONTINUED | OUTPATIENT
Start: 2023-01-11 | End: 2023-01-11 | Stop reason: HOSPADM

## 2023-01-11 RX ORDER — TETRACAINE HYDROCHLORIDE 5 MG/ML
1 SOLUTION OPHTHALMIC
Status: COMPLETED | OUTPATIENT
Start: 2023-01-11 | End: 2023-01-11

## 2023-01-11 RX ORDER — PREDNISOLONE ACETATE 10 MG/ML
SUSPENSION/ DROPS OPHTHALMIC
Status: DISCONTINUED | OUTPATIENT
Start: 2023-01-11 | End: 2023-01-11 | Stop reason: HOSPADM

## 2023-01-11 RX ORDER — MIDAZOLAM HYDROCHLORIDE 1 MG/ML
INJECTION INTRAMUSCULAR; INTRAVENOUS
Status: DISCONTINUED | OUTPATIENT
Start: 2023-01-11 | End: 2023-01-11

## 2023-01-11 RX ORDER — PHENYLEPHRINE HYDROCHLORIDE 25 MG/ML
1 SOLUTION/ DROPS OPHTHALMIC
Status: COMPLETED | OUTPATIENT
Start: 2023-01-11 | End: 2023-01-11

## 2023-01-11 RX ORDER — LIDOCAINE HYDROCHLORIDE 40 MG/ML
INJECTION, SOLUTION RETROBULBAR
Status: DISCONTINUED | OUTPATIENT
Start: 2023-01-11 | End: 2023-01-11 | Stop reason: HOSPADM

## 2023-01-11 RX ORDER — PROPARACAINE HYDROCHLORIDE 5 MG/ML
1 SOLUTION/ DROPS OPHTHALMIC
Status: DISCONTINUED | OUTPATIENT
Start: 2023-01-11 | End: 2023-01-11 | Stop reason: HOSPADM

## 2023-01-11 RX ORDER — TETRACAINE HYDROCHLORIDE 5 MG/ML
SOLUTION OPHTHALMIC
Status: DISCONTINUED | OUTPATIENT
Start: 2023-01-11 | End: 2023-01-11 | Stop reason: HOSPADM

## 2023-01-11 RX ORDER — TROPICAMIDE 10 MG/ML
1 SOLUTION/ DROPS OPHTHALMIC
Status: COMPLETED | OUTPATIENT
Start: 2023-01-11 | End: 2023-01-11

## 2023-01-11 RX ADMIN — PHENYLEPHRINE HYDROCHLORIDE 1 DROP: 25 SOLUTION/ DROPS OPHTHALMIC at 09:01

## 2023-01-11 RX ADMIN — TETRACAINE HYDROCHLORIDE 1 DROP: 5 SOLUTION OPHTHALMIC at 09:01

## 2023-01-11 RX ADMIN — MOXIFLOXACIN 1 DROP: 5 SOLUTION/ DROPS OPHTHALMIC at 09:01

## 2023-01-11 RX ADMIN — MOXIFLOXACIN 1 DROP: 5 SOLUTION/ DROPS OPHTHALMIC at 11:01

## 2023-01-11 RX ADMIN — TROPICAMIDE 1 DROP: 10 SOLUTION/ DROPS OPHTHALMIC at 09:01

## 2023-01-11 RX ADMIN — MIDAZOLAM HYDROCHLORIDE 1 MG: 1 INJECTION, SOLUTION INTRAMUSCULAR; INTRAVENOUS at 10:01

## 2023-01-11 NOTE — BRIEF OP NOTE
BRIEF DISCHARGE NOTE:    Date of discharge: 01/11/2023    Reason for hospitalization -  Cataract surgery     Final Diagnosis - Visually significant Cataract    Procedures and treatment provided - Status post phacoemulsification with placement of intraocular lens     Diet - Advance to regular as tolerated    Activity - as tolerated    Disposition at the end of the case - Good.    Discharge: to home    The patient tolerated the procedure well and knows to follow up with me tomorrow morning in the eye clinic, sooner if needed.    Patient and family instructions (as appropriate) - Given to patient on discharge    Estella Silva MD

## 2023-01-11 NOTE — ANESTHESIA POSTPROCEDURE EVALUATION
Anesthesia Post Evaluation    Patient: Sidney Lanza    Procedure(s) Performed: Procedure(s) (LRB):  EXTRACTION, CATARACT, WITH IOL INSERTION (Left)    Final Anesthesia Type: MAC      Patient location during evaluation: [facility]  Patient participation: Yes- Able to Participate  Level of consciousness: awake and alert  Post-procedure vital signs: reviewed and stable  Pain management: adequate  Airway patency: patent    PONV status at discharge: No PONV  Anesthetic complications: no      Cardiovascular status: blood pressure returned to baseline  Respiratory status: unassisted  Hydration status: euvolemic  Follow-up not needed.          Vitals Value Taken Time   /88 01/11/23 0949   Temp 36.9 °C (98.5 °F) 01/11/23 0949   Pulse 77 01/11/23 0941   Resp 16 01/11/23 0941   SpO2 99 % 01/11/23 0941         No case tracking events are documented in the log.      Pain/Timothy Score: No data recorded

## 2023-01-11 NOTE — ANESTHESIA PREPROCEDURE EVALUATION
01/11/2023  Sidney Lanza is a 77 y.o., male.      Pre-op Assessment    I have reviewed the Patient Summary Reports.     I have reviewed the Nursing Notes. I have reviewed the NPO Status.   I have reviewed the Medications.     Review of Systems  Anesthesia Hx:  Denies Family Hx of Anesthesia complications.   Denies Personal Hx of Anesthesia complications.   Social:  Former Smoker    Hematology/Oncology:         -- Cancer in past history (Prostate):   EENT/Dental:   chronic allergic rhinitis   Cardiovascular:   Pacemaker (Lifevest) Hypertension CHF (EF 10%) hyperlipidemia BOYD Pulm HTN  ? The left ventricle is severely enlarged with eccentric hypertrophy and severely decreased systolic function. The estimated ejection fraction is 10%.  ? Mild right ventricular enlargement with mildly to moderately reduced right ventricular systolic function.  ? Grade II left ventricular diastolic dysfunction.  ? Severe left atrial enlargement.  ? Mild mitral regurgitation.  ? There is pulmonary hypertension. The estimated PA systolic pressure is 50 mmHg.  ? Intermediate central venous pressure (8 mmHg).   Pulmonary:   Shortness of breath    Renal/:   Chronic Renal Disease, CKD    Musculoskeletal:   Arthritis     Neurological:  Neurology Normal    Endocrine:  Endocrine Normal        Physical Exam  General: Cooperative, Oriented and Alert    Airway:  Mallampati: II   Mouth Opening: Normal  Neck ROM: Normal ROM        Anesthesia Plan  Type of Anesthesia, risks & benefits discussed:    Anesthesia Type: MAC  Intra-op Monitoring Plan: Standard ASA Monitors  Post Op Pain Control Plan: multimodal analgesia  Induction:  IV  ASA Score: 4    Ready For Surgery From Anesthesia Perspective.     .

## 2023-01-11 NOTE — OP NOTE
DATE OF PROCEDURE: 01/11/2023    SURGEON: BARI CAPONE MD    PREOPERATIVE DIAGNOSIS:  Senile nuclear sclerotic cataract left eye.     POSTOPERATIVE DIAGNOSIS: Senile nuclear sclerotic cataract left eye.     PROCEDURE PERFORMED:  Phacoemulsification with placement of intraocular lens, left eye.    IMPLANT:  DIB00 21.0    ANESTHESIA:  Topical and MAC    COMPLICATIONS: none    ESTIMATED BLOOD LOSS: <1cc    SPECIMENS: none    INDICATIONS FOR PROCEDURE:   The patient has a history of painless progressive vision loss.  The patient has described difficulties with activities of daily living, which specifically include driving, which is secondary to cataract formation and progression. After we had a thorough discussion about risks, benefits, and alternatives to cataract surgery, the patient agreed to proceed with phacoemulsification and implantation of a lens in the left eye.  These risks include, but are not limited to, hemorrhage, pain, infection, need for additional surgery, need for glasses or contacts, loss of vision, or even loss of the eye.    PROCEDURE IN DETAIL:  The patient was met in the preop holding area.  Consent was confirmed to be signed.  The operative site was marked.  The patient was brought into the operating room by the anesthesia team and placed under monitored anesthesia care.  The left eye was prepped and draped in a sterile ophthalmic fashion.  A Stella speculum was placed into the left eye.   A paracentesis site was made and 1% preservative-free lidocaine was injected into the anterior chamber.  Viscoelastic  material was injected into the anterior chamber.  A keratome blade was used to make a clear corneal incision.  A cystotome was used to initiate the continuous curvilinear capsulorrhexis which was completed with Utrata forceps.  BSS on a cohn cannula was used to perform hydrodissection.  The phacoemulsification tip was introduced into the eye and the nucleus was removed in a standard  divide-and-conquer fashion.  Remaining cortical material was removed from the eye using irrigation-aspiration.  The capsular bag was filled with viscoelastic material and the intraocular lens was injected and positioned into place. Remaining viscoelastic material was removed from the eye using irrigation and aspiration.  The corneal wounds were hydrated.  The eye was filled to physiologic pressure. The wounds were found to be watertight. Drops of Vigamox and prednisilone were placed into the eye.  The eye was washed, dried, and shielded.  The patient tolerated the procedure well and knows to follow up with me tomorrow morning, sooner if needed.

## 2023-01-12 ENCOUNTER — OFFICE VISIT (OUTPATIENT)
Dept: OPHTHALMOLOGY | Facility: CLINIC | Age: 78
End: 2023-01-12
Payer: MEDICARE

## 2023-01-12 DIAGNOSIS — Z96.1 STATUS POST CATARACT EXTRACTION AND INSERTION OF INTRAOCULAR LENS, LEFT: Primary | ICD-10-CM

## 2023-01-12 DIAGNOSIS — Z98.42 STATUS POST CATARACT EXTRACTION AND INSERTION OF INTRAOCULAR LENS, LEFT: Primary | ICD-10-CM

## 2023-01-12 LAB — POCT GLUCOSE: 111 MG/DL (ref 70–110)

## 2023-01-12 PROCEDURE — 99024 PR POST-OP FOLLOW-UP VISIT: ICD-10-PCS | Mod: S$GLB,,, | Performed by: OPHTHALMOLOGY

## 2023-01-12 PROCEDURE — 1126F AMNT PAIN NOTED NONE PRSNT: CPT | Mod: CPTII,S$GLB,, | Performed by: OPHTHALMOLOGY

## 2023-01-12 PROCEDURE — 1159F PR MEDICATION LIST DOCUMENTED IN MEDICAL RECORD: ICD-10-PCS | Mod: CPTII,S$GLB,, | Performed by: OPHTHALMOLOGY

## 2023-01-12 PROCEDURE — 1101F PR PT FALLS ASSESS DOC 0-1 FALLS W/OUT INJ PAST YR: ICD-10-PCS | Mod: CPTII,S$GLB,, | Performed by: OPHTHALMOLOGY

## 2023-01-12 PROCEDURE — 3288F PR FALLS RISK ASSESSMENT DOCUMENTED: ICD-10-PCS | Mod: CPTII,S$GLB,, | Performed by: OPHTHALMOLOGY

## 2023-01-12 PROCEDURE — 1126F PR PAIN SEVERITY QUANTIFIED, NO PAIN PRESENT: ICD-10-PCS | Mod: CPTII,S$GLB,, | Performed by: OPHTHALMOLOGY

## 2023-01-12 PROCEDURE — 3288F FALL RISK ASSESSMENT DOCD: CPT | Mod: CPTII,S$GLB,, | Performed by: OPHTHALMOLOGY

## 2023-01-12 PROCEDURE — 99999 PR PBB SHADOW E&M-EST. PATIENT-LVL III: ICD-10-PCS | Mod: PBBFAC,,, | Performed by: OPHTHALMOLOGY

## 2023-01-12 PROCEDURE — 1159F MED LIST DOCD IN RCRD: CPT | Mod: CPTII,S$GLB,, | Performed by: OPHTHALMOLOGY

## 2023-01-12 PROCEDURE — 99999 PR PBB SHADOW E&M-EST. PATIENT-LVL III: CPT | Mod: PBBFAC,,, | Performed by: OPHTHALMOLOGY

## 2023-01-12 PROCEDURE — 1101F PT FALLS ASSESS-DOCD LE1/YR: CPT | Mod: CPTII,S$GLB,, | Performed by: OPHTHALMOLOGY

## 2023-01-12 PROCEDURE — 99024 POSTOP FOLLOW-UP VISIT: CPT | Mod: S$GLB,,, | Performed by: OPHTHALMOLOGY

## 2023-01-17 NOTE — PROGRESS NOTES
HPI    Stephy referral/ AM      EM OD --s/p 25g PPV/MP/AFx OD (3/23/16)   Macular BRVO OD (possible)   Avastin OD (5/2/16)   ozurdex OD x 1 (7/7/16)   CME   HTN Ret OU   Cataract sx OS 1/11/2023    EYE GTTS:   COMBO 1 GTT TID OS     Pt here for 1 day Cataract sx postop in the OS. Pt states vision is stable   following sx. Pt denies any redness or pain.   Last edited by Estella Silva MD on 1/12/2023  3:57 PM.            Assessment /Plan     For exam results, see Encounter Report.    Status post cataract extraction and insertion of intraocular lens, left      Slit Lamp Exam  L/L - normal  C/s - quiet  Cornea - clear  A/C - 1+ cell  Lens - PCIOL    POD #1 s/p phaco/IOL  - doing well  - continue the following drops:    vigamox or ocuflox TID x 1 wk then stop  Pred forte or durezol or dexamethasone TID x  4 wks  Ketorolac TID until runs out    Versus:    Combination drop - 1 drop TID x total of 1 month    Appropriate precautions and post op medications reviewed.  Patient instructed to call or come in if symptoms of redness, decreased vision, or pain are experienced.    -f/up 1-2wks, sooner PRN.

## 2023-01-23 ENCOUNTER — PATIENT MESSAGE (OUTPATIENT)
Dept: CARDIOLOGY | Facility: CLINIC | Age: 78
End: 2023-01-23
Payer: MEDICARE

## 2023-01-23 ENCOUNTER — PATIENT MESSAGE (OUTPATIENT)
Dept: INTERNAL MEDICINE | Facility: CLINIC | Age: 78
End: 2023-01-23
Payer: MEDICARE

## 2023-01-23 NOTE — TELEPHONE ENCOUNTER
CATRACHO- See email.  She sent email and called.    I messaged back to bring him to er., had recent admit 12/8 for cardiomyopathy.   daughter also sent msg to card.

## 2023-01-23 NOTE — TELEPHONE ENCOUNTER
----- Message from Joya Galeano sent at 1/23/2023  4:24 PM CST -----  Contact: Sidney sales [phone]  [email or web address]     Patient is calling for Medical Advice regarding:    How long has patient had these symptoms:    Pharmacy name and phone#:    Would like response via [facility]:call back    Comments: Pt's daughter is calling because for the last 2 weeks pt is having gas,bloating,nausea,loss of appetite,abdominal discomfort and pain and is getting worse also shortness of breath. She also stated he has some adema in lower limbs, and would like the nurse to call and let dad know what he should do

## 2023-01-30 ENCOUNTER — OFFICE VISIT (OUTPATIENT)
Dept: CARDIOLOGY | Facility: CLINIC | Age: 78
End: 2023-01-30
Payer: MEDICARE

## 2023-01-30 VITALS
HEART RATE: 73 BPM | SYSTOLIC BLOOD PRESSURE: 116 MMHG | BODY MASS INDEX: 21.97 KG/M2 | DIASTOLIC BLOOD PRESSURE: 73 MMHG | WEIGHT: 153.44 LBS | HEIGHT: 70 IN

## 2023-01-30 DIAGNOSIS — I10 ESSENTIAL HYPERTENSION: Chronic | ICD-10-CM

## 2023-01-30 DIAGNOSIS — Z95.0 CARDIAC PACEMAKER: ICD-10-CM

## 2023-01-30 DIAGNOSIS — I50.20 HFREF (HEART FAILURE WITH REDUCED EJECTION FRACTION): ICD-10-CM

## 2023-01-30 DIAGNOSIS — I42.8 NONISCHEMIC CARDIOMYOPATHY: Primary | ICD-10-CM

## 2023-01-30 PROCEDURE — 3074F PR MOST RECENT SYSTOLIC BLOOD PRESSURE < 130 MM HG: ICD-10-PCS | Mod: CPTII,S$GLB,, | Performed by: INTERNAL MEDICINE

## 2023-01-30 PROCEDURE — 99214 OFFICE O/P EST MOD 30 MIN: CPT | Mod: S$GLB,,, | Performed by: INTERNAL MEDICINE

## 2023-01-30 PROCEDURE — 99999 PR PBB SHADOW E&M-EST. PATIENT-LVL V: ICD-10-PCS | Mod: PBBFAC,,, | Performed by: INTERNAL MEDICINE

## 2023-01-30 PROCEDURE — 1160F RVW MEDS BY RX/DR IN RCRD: CPT | Mod: CPTII,S$GLB,, | Performed by: INTERNAL MEDICINE

## 2023-01-30 PROCEDURE — 3288F FALL RISK ASSESSMENT DOCD: CPT | Mod: CPTII,S$GLB,, | Performed by: INTERNAL MEDICINE

## 2023-01-30 PROCEDURE — 3078F DIAST BP <80 MM HG: CPT | Mod: CPTII,S$GLB,, | Performed by: INTERNAL MEDICINE

## 2023-01-30 PROCEDURE — 1159F PR MEDICATION LIST DOCUMENTED IN MEDICAL RECORD: ICD-10-PCS | Mod: CPTII,S$GLB,, | Performed by: INTERNAL MEDICINE

## 2023-01-30 PROCEDURE — 1101F PT FALLS ASSESS-DOCD LE1/YR: CPT | Mod: CPTII,S$GLB,, | Performed by: INTERNAL MEDICINE

## 2023-01-30 PROCEDURE — 3078F PR MOST RECENT DIASTOLIC BLOOD PRESSURE < 80 MM HG: ICD-10-PCS | Mod: CPTII,S$GLB,, | Performed by: INTERNAL MEDICINE

## 2023-01-30 PROCEDURE — 1101F PR PT FALLS ASSESS DOC 0-1 FALLS W/OUT INJ PAST YR: ICD-10-PCS | Mod: CPTII,S$GLB,, | Performed by: INTERNAL MEDICINE

## 2023-01-30 PROCEDURE — 3074F SYST BP LT 130 MM HG: CPT | Mod: CPTII,S$GLB,, | Performed by: INTERNAL MEDICINE

## 2023-01-30 PROCEDURE — 3288F PR FALLS RISK ASSESSMENT DOCUMENTED: ICD-10-PCS | Mod: CPTII,S$GLB,, | Performed by: INTERNAL MEDICINE

## 2023-01-30 PROCEDURE — 1160F PR REVIEW ALL MEDS BY PRESCRIBER/CLIN PHARMACIST DOCUMENTED: ICD-10-PCS | Mod: CPTII,S$GLB,, | Performed by: INTERNAL MEDICINE

## 2023-01-30 PROCEDURE — 1126F AMNT PAIN NOTED NONE PRSNT: CPT | Mod: CPTII,S$GLB,, | Performed by: INTERNAL MEDICINE

## 2023-01-30 PROCEDURE — 99999 PR PBB SHADOW E&M-EST. PATIENT-LVL V: CPT | Mod: PBBFAC,,, | Performed by: INTERNAL MEDICINE

## 2023-01-30 PROCEDURE — 1159F MED LIST DOCD IN RCRD: CPT | Mod: CPTII,S$GLB,, | Performed by: INTERNAL MEDICINE

## 2023-01-30 PROCEDURE — 99214 PR OFFICE/OUTPT VISIT, EST, LEVL IV, 30-39 MIN: ICD-10-PCS | Mod: S$GLB,,, | Performed by: INTERNAL MEDICINE

## 2023-01-30 PROCEDURE — 1126F PR PAIN SEVERITY QUANTIFIED, NO PAIN PRESENT: ICD-10-PCS | Mod: CPTII,S$GLB,, | Performed by: INTERNAL MEDICINE

## 2023-01-30 RX ORDER — FUROSEMIDE 20 MG/1
40 TABLET ORAL DAILY
Qty: 180 TABLET | Refills: 3 | Status: SHIPPED | OUTPATIENT
Start: 2023-01-30 | End: 2023-03-16 | Stop reason: SDUPTHER

## 2023-01-30 NOTE — PATIENT INSTRUCTIONS
"Your new dose of Lasix (furosemide) is 40 mg once a day.  I'd like to increase the dose of Entresto (sacubitril/valsartan) but will have to hold off on it for now    Check your weight daily.  Extra dose of Lasix if weight increases 2 or more lbs. in a 24 hr period, or 3-4 lbs. over a 3-4 day period. Contact our office if weight does not return to baseline within 1-2 days.  Foods labeled Hint of Sodium" will contain less sodium than foods carrying the label "Low Sodium".   "

## 2023-01-30 NOTE — PROGRESS NOTES
Subjective:     Problem List:  Nonischemic cardiomyopathy - new 8/2022  HFrEF  NSVT  Hypertension  Hyperlipidemia     HPI:   Sidney Lanza is a 77 y.o. male who presents for follow-up of heart failure w reduced EF  He developed abdominal discomfort and shortness of breath. He had abdominal bloating that he felt was gas, lost his appetite and also felt short of breath.  He had side effects with Jardiance but then ran out of it for about a week.  When he resumed it the side effects did not recur.      Review of patient's allergies indicates:  No Known Allergies     Current Outpatient Medications   Medication Sig    aspirin (ECOTRIN) 81 MG EC tablet Take 1 tablet (81 mg total) by mouth once daily.    atorvastatin (LIPITOR) 40 MG tablet Take 1 tablet (40 mg total) by mouth every evening.    carvediloL (COREG) 25 MG tablet Take 1 tablet (25 mg total) by mouth 2 (two) times daily with meals.    empagliflozin (JARDIANCE) 10 mg tablet Take 1 tablet (10 mg total) by mouth once daily.    FIBER, DEXTRIN, ORAL Take 1 tablet by mouth every morning.    fluticasone propionate (FLONASE) 50 mcg/actuation nasal spray USE 2 SPRAYS IN BOTH  NOSTRILS ONCE DAILY    glucosamine-chondroitin 500-400 mg tablet Take 2 tablets by mouth every morning.    HYDROcodone-acetaminophen (NORCO) 5-325 mg per tablet Take 1 tablet by mouth every 6 (six) hours as needed for Pain.    multivitamin capsule Take 1 capsule by mouth every morning.     prednisolon/gatiflox/bromfenac (PREDNISOL ACE-GATIFLOX-BROMFEN) 1-0.5-0.075 % DrpS Apply 1 drop to eye 3 (three) times daily. One drop 3 times a day in surgical eye    prednisolon/gatiflox/bromfenac (PREDNISOL ACE-GATIFLOX-BROMFEN) 1-0.5-0.075 % DrpS Apply 1 drop to eye 3 (three) times daily. One drop 3 times a day in surgical eye    sacubitriL-valsartan (ENTRESTO) 49-51 mg per tablet Take 1 tablet by mouth 2 (two) times daily.    spironolactone (ALDACTONE) 25 MG tablet Take 1 tablet (25 mg total) by mouth  "once daily.    furosemide (LASIX) 20 MG tablet Take 1 tablet (20 mg total) by mouth once daily.         Social history:  Sidney Lanza  reports that he quit smoking about 34 years ago. His smoking use included cigarettes. He has a 30.00 pack-year smoking history. He has never used smokeless tobacco. He reports current alcohol use of about 14.0 standard drinks per week. He reports that he does not use drugs.      Objective:     Physical Exam  Constitutional:       Appearance: He is well-developed.      Comments: /73 (BP Location: Right arm, Patient Position: Sitting)   Pulse 73   Ht 5' 10" (1.778 m)   Wt 69.6 kg (153 lb 7 oz)   BMI 22.02 kg/m²      HENT:      Head: Normocephalic.   Neck:      Vascular: No JVD.   Cardiovascular:      Rate and Rhythm: Normal rate and regular rhythm.      Pulses:           Radial pulses are 2+ on the right side and 2+ on the left side.      Heart sounds: S1 normal and S2 normal. No murmur heard.  Pulmonary:      Breath sounds: Normal breath sounds.   Chest:      Chest wall: There is no dullness to percussion.   Abdominal:      Palpations: Abdomen is soft. There is no hepatomegaly, splenomegaly or mass.   Musculoskeletal:      Right lower leg: No edema.      Left lower leg: No edema.   Skin:     Findings: No bruising.      Nails: There is no clubbing.   Neurological:      Mental Status: He is alert and oriented to person, place, and time.      Gait: Gait normal.   Psychiatric:         Speech: Speech normal.         Behavior: Behavior normal.           Lab Results   Component Value Date    CHOL 155 08/25/2022    HDL 51 08/25/2022    LDLCALC 93.8 08/25/2022    TRIG 51 08/25/2022    CHOLHDL 32.9 08/25/2022     Lab Results   Component Value Date     (H) 11/30/2022    CREATININE 1.2 11/30/2022    BUN 18 11/30/2022     11/30/2022    K 4.6 11/30/2022     11/30/2022    CO2 23 11/30/2022     Lab Results   Component Value Date    ALT 47 (H) 09/11/2022    AST 44 (H) " 09/11/2022    ALKPHOS 53 (L) 09/11/2022    BILITOT 0.4 09/11/2022       Results for orders placed during the hospital encounter of 12/02/22    Echo    Interpretation Summary  · The left ventricle is severely enlarged with eccentric hypertrophy and severely decreased systolic function.  · The estimated ejection fraction is 10% or slightly higher.  · There is severe left ventricular global hypokinesis.  · There is abnormal septal wall motion.  · Grade II left ventricular diastolic dysfunction.  · Severe left atrial enlargement.  · Moderate right ventricular enlargement with moderately reduced right ventricular systolic function.  · Mild mitral regurgitation.  · Mild tricuspid regurgitation.  · Mild to moderate pulmonic regurgitation.  · Elevated central venous pressure (15 mmHg).  · The estimated PA systolic pressure is 65 mmHg.  · There is moderate-severe pulmonary hypertension.       Cardiac PET 9/2022:  There are no clinically significant perfusion abnormalities. There is a small to moderate (10-15%) amount of mild resting heterogeneity in the basal to apical inferior wall(s) that does not change with stress. This likely represents severe cardiomyopathy, good stress flows and cfr in this distribution suggestive of patent coronary arteries.    The whole heart absolute myocardial perfusion values averaged 0.52 cc/min/g at rest, which is reduced; 1.04 cc/min/g at stress, which is moderately reduced; and CFR is 2.05 , which is mildly reduced.    No valid procedures specified.        Assessment and Plan:       ICD-10-CM ICD-9-CM   1. Nonischemic cardiomyopathy  I42.8 425.4   2. HFrEF (heart failure with reduced ejection fraction)  I50.20 428.20   3. Essential hypertension  I10 401.9   4. Cardiac pacemaker  Z95.0 V45.01           Increase furosemide to 40 mg a day for the next week. Then obtain a BMP.  If shortness of breath has improved and renal function remains unchanged will continue 40 mg a day.  I would like to  increase the dose of sacubitril/valsartan but will hold off for now.    Orders placed during this encounter:     Nonischemic cardiomyopathy    HFrEF (heart failure with reduced ejection fraction)  -     furosemide (LASIX) 20 MG tablet; Take 2 tablets (40 mg total) by mouth once daily.  Dispense: 180 tablet; Refill: 3    Essential hypertension    Cardiac pacemaker         Follow up in about 3 months (around 4/30/2023).

## 2023-02-09 ENCOUNTER — OFFICE VISIT (OUTPATIENT)
Dept: OPTOMETRY | Facility: CLINIC | Age: 78
End: 2023-02-09
Payer: MEDICARE

## 2023-02-09 DIAGNOSIS — Z98.41 STATUS POST CATARACT EXTRACTION OF BOTH EYES WITH INSERTION OF INTRAOCULAR LENS: Primary | ICD-10-CM

## 2023-02-09 DIAGNOSIS — Z96.1 STATUS POST CATARACT EXTRACTION OF BOTH EYES WITH INSERTION OF INTRAOCULAR LENS: Primary | ICD-10-CM

## 2023-02-09 DIAGNOSIS — Z98.42 STATUS POST CATARACT EXTRACTION OF BOTH EYES WITH INSERTION OF INTRAOCULAR LENS: Primary | ICD-10-CM

## 2023-02-09 PROCEDURE — 99024 POSTOP FOLLOW-UP VISIT: CPT | Mod: S$GLB,,, | Performed by: OPTOMETRIST

## 2023-02-09 PROCEDURE — 92015 DETERMINE REFRACTIVE STATE: CPT | Mod: S$GLB,,, | Performed by: OPTOMETRIST

## 2023-02-09 PROCEDURE — 1159F PR MEDICATION LIST DOCUMENTED IN MEDICAL RECORD: ICD-10-PCS | Mod: CPTII,S$GLB,, | Performed by: OPTOMETRIST

## 2023-02-09 PROCEDURE — 99999 PR PBB SHADOW E&M-EST. PATIENT-LVL III: ICD-10-PCS | Mod: PBBFAC,,, | Performed by: OPTOMETRIST

## 2023-02-09 PROCEDURE — 3288F PR FALLS RISK ASSESSMENT DOCUMENTED: ICD-10-PCS | Mod: CPTII,S$GLB,, | Performed by: OPTOMETRIST

## 2023-02-09 PROCEDURE — 99999 PR PBB SHADOW E&M-EST. PATIENT-LVL III: CPT | Mod: PBBFAC,,, | Performed by: OPTOMETRIST

## 2023-02-09 PROCEDURE — 92015 PR REFRACTION: ICD-10-PCS | Mod: S$GLB,,, | Performed by: OPTOMETRIST

## 2023-02-09 PROCEDURE — 1159F MED LIST DOCD IN RCRD: CPT | Mod: CPTII,S$GLB,, | Performed by: OPTOMETRIST

## 2023-02-09 PROCEDURE — 1126F AMNT PAIN NOTED NONE PRSNT: CPT | Mod: CPTII,S$GLB,, | Performed by: OPTOMETRIST

## 2023-02-09 PROCEDURE — 1126F PR PAIN SEVERITY QUANTIFIED, NO PAIN PRESENT: ICD-10-PCS | Mod: CPTII,S$GLB,, | Performed by: OPTOMETRIST

## 2023-02-09 PROCEDURE — 99024 PR POST-OP FOLLOW-UP VISIT: ICD-10-PCS | Mod: S$GLB,,, | Performed by: OPTOMETRIST

## 2023-02-09 PROCEDURE — 1101F PR PT FALLS ASSESS DOC 0-1 FALLS W/OUT INJ PAST YR: ICD-10-PCS | Mod: CPTII,S$GLB,, | Performed by: OPTOMETRIST

## 2023-02-09 PROCEDURE — 1101F PT FALLS ASSESS-DOCD LE1/YR: CPT | Mod: CPTII,S$GLB,, | Performed by: OPTOMETRIST

## 2023-02-09 PROCEDURE — 3288F FALL RISK ASSESSMENT DOCD: CPT | Mod: CPTII,S$GLB,, | Performed by: OPTOMETRIST

## 2023-02-22 NOTE — PROGRESS NOTES
HPI    Sidney Lanza is here today for post op evaluation. Patient states that   overall vision has improved post cataract removal surgeries. States that   he sometimes feels as if he sees figures passing on side of him while   looking forward when no one is there occasionally OU. Patient would like   an updated glasses Rx.   DLS: 1/12/23 Dr. Silva  (-)Flashes (-)Floaters (-)Diplopia (-)Headaches   (-)Itching (-)Tearing (-) Burning (-)Dryness (-)Photophobia  (-)Glare  Past Eye Sx: Cataract Removal-December 2022 OD, January 2023 OS                         Retina Repair (vitrectomy) 2016 OD  Eye Meds: Combination Gtts-OS only  Last edited by Danya Torres, OD on 2/9/2023  3:21 PM.            Assessment /Plan     For exam results, see Encounter Report.    Status post cataract extraction of both eyes with insertion of intraocular lens         Educated on today's WNL findings OU. Eyes well healed from cataract surgery OU. Pt OK to discontinue post operative medications.     Eyeglass Final Rx       Eyeglass Final Rx         Sphere Cylinder Axis Add    Right [place] +1.50 010 +2.50    Left -1.00 +0.75 150 +2.50      Type: PAL    Expiration Date: 2/9/2024                   RTC in 1 year for annual eye exam unless changes noted sooner.

## 2023-03-08 ENCOUNTER — CLINICAL SUPPORT (OUTPATIENT)
Dept: CARDIOLOGY | Facility: HOSPITAL | Age: 78
End: 2023-03-08
Payer: MEDICARE

## 2023-03-08 DIAGNOSIS — Z95.810 PRESENCE OF AUTOMATIC (IMPLANTABLE) CARDIAC DEFIBRILLATOR: ICD-10-CM

## 2023-03-08 PROCEDURE — 93295 CARDIAC DEVICE CHECK - REMOTE: ICD-10-PCS | Mod: ,,, | Performed by: INTERNAL MEDICINE

## 2023-03-08 PROCEDURE — 93295 DEV INTERROG REMOTE 1/2/MLT: CPT | Mod: ,,, | Performed by: INTERNAL MEDICINE

## 2023-03-08 PROCEDURE — 93296 REM INTERROG EVL PM/IDS: CPT | Performed by: INTERNAL MEDICINE

## 2023-03-16 DIAGNOSIS — I50.20 HFREF (HEART FAILURE WITH REDUCED EJECTION FRACTION): ICD-10-CM

## 2023-03-16 RX ORDER — FUROSEMIDE 20 MG/1
40 TABLET ORAL DAILY
Qty: 180 TABLET | Refills: 3 | OUTPATIENT
Start: 2023-03-16 | End: 2023-03-30 | Stop reason: SDUPTHER

## 2023-03-16 NOTE — TELEPHONE ENCOUNTER
----- Message from Melanie Mix MA sent at 3/16/2023 11:59 AM CDT -----  The patient need a new Rx to be sent to [facility] day with three refill Lasix 40 mg the patient can be reach at [phone]. Thank you.

## 2023-03-30 ENCOUNTER — LAB VISIT (OUTPATIENT)
Dept: LAB | Facility: HOSPITAL | Age: 78
End: 2023-03-30
Payer: MEDICARE

## 2023-03-30 ENCOUNTER — OFFICE VISIT (OUTPATIENT)
Dept: CARDIOLOGY | Facility: CLINIC | Age: 78
End: 2023-03-30
Payer: MEDICARE

## 2023-03-30 VITALS
BODY MASS INDEX: 21.24 KG/M2 | HEART RATE: 71 BPM | DIASTOLIC BLOOD PRESSURE: 67 MMHG | HEIGHT: 70 IN | WEIGHT: 148.38 LBS | SYSTOLIC BLOOD PRESSURE: 103 MMHG

## 2023-03-30 DIAGNOSIS — R10.13 DYSPEPSIA: ICD-10-CM

## 2023-03-30 DIAGNOSIS — I50.42 CHRONIC COMBINED SYSTOLIC AND DIASTOLIC HEART FAILURE: ICD-10-CM

## 2023-03-30 DIAGNOSIS — I47.29 NON-SUSTAINED VENTRICULAR TACHYCARDIA: ICD-10-CM

## 2023-03-30 DIAGNOSIS — I10 ESSENTIAL HYPERTENSION: Chronic | ICD-10-CM

## 2023-03-30 DIAGNOSIS — E78.49 OTHER HYPERLIPIDEMIA: ICD-10-CM

## 2023-03-30 DIAGNOSIS — I50.42 CHRONIC COMBINED SYSTOLIC AND DIASTOLIC HEART FAILURE: Primary | ICD-10-CM

## 2023-03-30 DIAGNOSIS — I50.20 HFREF (HEART FAILURE WITH REDUCED EJECTION FRACTION): ICD-10-CM

## 2023-03-30 DIAGNOSIS — I42.8 NONISCHEMIC CARDIOMYOPATHY: ICD-10-CM

## 2023-03-30 LAB
ANION GAP SERPL CALC-SCNC: 9 MMOL/L (ref 8–16)
BUN SERPL-MCNC: 24 MG/DL (ref 8–23)
CALCIUM SERPL-MCNC: 9.5 MG/DL (ref 8.7–10.5)
CHLORIDE SERPL-SCNC: 107 MMOL/L (ref 95–110)
CO2 SERPL-SCNC: 26 MMOL/L (ref 23–29)
CREAT SERPL-MCNC: 1.4 MG/DL (ref 0.5–1.4)
EST. GFR  (NO RACE VARIABLE): 52.1 ML/MIN/1.73 M^2
GLUCOSE SERPL-MCNC: 130 MG/DL (ref 70–110)
POTASSIUM SERPL-SCNC: 4.6 MMOL/L (ref 3.5–5.1)
SODIUM SERPL-SCNC: 142 MMOL/L (ref 136–145)

## 2023-03-30 PROCEDURE — 3288F PR FALLS RISK ASSESSMENT DOCUMENTED: ICD-10-PCS | Mod: CPTII,S$GLB,, | Performed by: PHYSICIAN ASSISTANT

## 2023-03-30 PROCEDURE — 1126F AMNT PAIN NOTED NONE PRSNT: CPT | Mod: CPTII,S$GLB,, | Performed by: PHYSICIAN ASSISTANT

## 2023-03-30 PROCEDURE — 99999 PR PBB SHADOW E&M-EST. PATIENT-LVL V: ICD-10-PCS | Mod: PBBFAC,,, | Performed by: PHYSICIAN ASSISTANT

## 2023-03-30 PROCEDURE — 1101F PR PT FALLS ASSESS DOC 0-1 FALLS W/OUT INJ PAST YR: ICD-10-PCS | Mod: CPTII,S$GLB,, | Performed by: PHYSICIAN ASSISTANT

## 2023-03-30 PROCEDURE — 80048 BASIC METABOLIC PNL TOTAL CA: CPT | Performed by: PHYSICIAN ASSISTANT

## 2023-03-30 PROCEDURE — 36415 COLL VENOUS BLD VENIPUNCTURE: CPT | Performed by: PHYSICIAN ASSISTANT

## 2023-03-30 PROCEDURE — 1160F RVW MEDS BY RX/DR IN RCRD: CPT | Mod: CPTII,S$GLB,, | Performed by: PHYSICIAN ASSISTANT

## 2023-03-30 PROCEDURE — 1126F PR PAIN SEVERITY QUANTIFIED, NO PAIN PRESENT: ICD-10-PCS | Mod: CPTII,S$GLB,, | Performed by: PHYSICIAN ASSISTANT

## 2023-03-30 PROCEDURE — 3074F SYST BP LT 130 MM HG: CPT | Mod: CPTII,S$GLB,, | Performed by: PHYSICIAN ASSISTANT

## 2023-03-30 PROCEDURE — 1160F PR REVIEW ALL MEDS BY PRESCRIBER/CLIN PHARMACIST DOCUMENTED: ICD-10-PCS | Mod: CPTII,S$GLB,, | Performed by: PHYSICIAN ASSISTANT

## 2023-03-30 PROCEDURE — 3074F PR MOST RECENT SYSTOLIC BLOOD PRESSURE < 130 MM HG: ICD-10-PCS | Mod: CPTII,S$GLB,, | Performed by: PHYSICIAN ASSISTANT

## 2023-03-30 PROCEDURE — 3078F PR MOST RECENT DIASTOLIC BLOOD PRESSURE < 80 MM HG: ICD-10-PCS | Mod: CPTII,S$GLB,, | Performed by: PHYSICIAN ASSISTANT

## 2023-03-30 PROCEDURE — 99999 PR PBB SHADOW E&M-EST. PATIENT-LVL V: CPT | Mod: PBBFAC,,, | Performed by: PHYSICIAN ASSISTANT

## 2023-03-30 PROCEDURE — 1159F PR MEDICATION LIST DOCUMENTED IN MEDICAL RECORD: ICD-10-PCS | Mod: CPTII,S$GLB,, | Performed by: PHYSICIAN ASSISTANT

## 2023-03-30 PROCEDURE — 99214 OFFICE O/P EST MOD 30 MIN: CPT | Mod: S$GLB,,, | Performed by: PHYSICIAN ASSISTANT

## 2023-03-30 PROCEDURE — 3288F FALL RISK ASSESSMENT DOCD: CPT | Mod: CPTII,S$GLB,, | Performed by: PHYSICIAN ASSISTANT

## 2023-03-30 PROCEDURE — 1159F MED LIST DOCD IN RCRD: CPT | Mod: CPTII,S$GLB,, | Performed by: PHYSICIAN ASSISTANT

## 2023-03-30 PROCEDURE — 99214 PR OFFICE/OUTPT VISIT, EST, LEVL IV, 30-39 MIN: ICD-10-PCS | Mod: S$GLB,,, | Performed by: PHYSICIAN ASSISTANT

## 2023-03-30 PROCEDURE — 1101F PT FALLS ASSESS-DOCD LE1/YR: CPT | Mod: CPTII,S$GLB,, | Performed by: PHYSICIAN ASSISTANT

## 2023-03-30 PROCEDURE — 3078F DIAST BP <80 MM HG: CPT | Mod: CPTII,S$GLB,, | Performed by: PHYSICIAN ASSISTANT

## 2023-03-30 RX ORDER — FUROSEMIDE 40 MG/1
40 TABLET ORAL DAILY
Qty: 30 TABLET | Refills: 11 | Status: SHIPPED | OUTPATIENT
Start: 2023-03-30 | End: 2023-04-04 | Stop reason: SDUPTHER

## 2023-03-30 RX ORDER — SACUBITRIL AND VALSARTAN 97; 103 MG/1; MG/1
1 TABLET, FILM COATED ORAL 2 TIMES DAILY
Qty: 60 TABLET | Refills: 11 | Status: SHIPPED | OUTPATIENT
Start: 2023-03-30 | End: 2023-04-04 | Stop reason: SDUPTHER

## 2023-03-30 NOTE — PROGRESS NOTES
"    General Cardiology Clinic Note  Reason for Visit: Follow up   Last Clinic Visit: 1/30/2023  General Cardiologist: Dr. Ayon    HPI:   Sidney Lanza is a 77 y.o. male who presents for follow up.    Problems:  Nonischemic cardiomyopathy  HFrEF  NSVT  Hypertension  Hyperlipidemia     HPI   Patient presents for follow up. Main complaint today is of "gurgling", discomfort, and excessive gas after eating. States is consistently happens after every meal. He has tried Pepto Bismal which did help, but is wondering what is safe to take on a regular basis. Denies nausea, vomiting, diarrhea, constipation. From a cardiac standpoint, he is doing better. He now only has SOB with moderate-strenuous exertion (mopping, cutting the grass, walking more than 3 blocks). He denies orthopnea, PND, pedal edema. He weighs himself at home and weight fluctuates between 142-144 lbs. He denies CP, palpitations, syncope, near syncope. He has not checked his BP recently, but previously was ranging from 95-110s systolic. He is asymptomatic when it is in the 90s systolic. He is walking laps in his driveway for exercise, walks about 6-8 laps (could do > 20 before HF diagnosis).    1/30/2023 HPI (Dr. Ayon)  Sidney Lanza is a 77 y.o. male who presents for follow-up of heart failure w reduced EF  .He developed abdominal discomfort and shortness of breath. He had abdominal bloating that he felt was gas, lost his appetite and also felt short of breath.  He had side effects with Jardiance but then ran out of it for about a week.  When he resumed it the side effects did not recur.    11/30/2022 HPI  Patient presents for follow up. He states shortly after has last appt, he developed a rash, nipple soreness, and a yeast infection in his groin. These side effects lasted about a week before starting to subside. He ended up running out of the Jardiance due to insurance issues, and the yeast infection went away completely. He just started the " Jardiance again a few days ago, and so far has not had issues. He still has BOYD with mild exertion. There has been no significant change in this. He has learned to modify his behavior and slow down to avoid dyspnea. He had one night of orthopnea and PND following his last appt, so he just started taking the Lasix daily after that. He denies edema, sudden weight gain, chest pain, syncope, palpitations. He has occasional lightheadedness with standing quickly. BP at home is 110s systolic on average and HR around 75.     10/20/2022 HPI  Patient presents for follow up and up-titration of GDMT. He reports BOYD with walking one block. He reports PND and orthopnea, which seemed to be worsening three nights ago, so the past two nights he took Lasix which helped. He denies pedal edema, palpitations, chest pain, lightheadedness, syncope, weight gain. He has not been checking his BP regularly at home or doing daily weights.     ROS:      Review of Systems   Constitutional: Negative for diaphoresis, malaise/fatigue, weight gain and weight loss.   HENT:  Negative for nosebleeds.    Eyes:  Negative for vision loss in left eye, vision loss in right eye and visual disturbance.   Cardiovascular:  Positive for dyspnea on exertion. Negative for chest pain, claudication, irregular heartbeat, leg swelling, near-syncope, orthopnea, palpitations, paroxysmal nocturnal dyspnea and syncope.   Respiratory:  Positive for shortness of breath. Negative for cough, sleep disturbances due to breathing, snoring and wheezing.    Hematologic/Lymphatic: Negative for bleeding problem. Does not bruise/bleed easily.   Skin:  Negative for poor wound healing and rash.   Musculoskeletal:  Negative for muscle cramps and myalgias.   Gastrointestinal:  Positive for bloating, abdominal pain and flatus. Negative for diarrhea, heartburn, melena, nausea and vomiting.   Genitourinary:  Negative for hematuria and nocturia.   Neurological:  Negative for brief paralysis,  dizziness, headaches, light-headedness, numbness and weakness.   Psychiatric/Behavioral:  Negative for depression.    Allergic/Immunologic: Negative for hives.     PMH:     Past Medical History:   Diagnosis Date    Arthritis     Chronic rhinitis     Fever blister     Heart failure, unspecified     Hyperlipidemia     Hypertension     Impotence of organic origin     Joint pain     Nuclear sclerosis - Both Eyes 10/23/2013    Prostate cancer     Ulcer     NSAID related     Varicose vein of leg     bilateral legs     Past Surgical History:   Procedure Laterality Date    CATARACT EXTRACTION W/  INTRAOCULAR LENS IMPLANT Right 11/22/2022    Procedure: EXTRACTION, CATARACT, WITH IOL INSERTION;  Surgeon: Estella Silva MD;  Location: [facility] OR;  Service: Ophthalmology;  Laterality: Right;    CATARACT EXTRACTION W/  INTRAOCULAR LENS IMPLANT Left 1/11/2023    Procedure: EXTRACTION, CATARACT, WITH IOL INSERTION;  Surgeon: Estella Silva MD;  Location: [facility] OR;  Service: Ophthalmology;  Laterality: Left;    COLONOSCOPY N/A 6/13/2018    Procedure: COLONOSCOPY;  Surgeon: Adonis Stack MD;  Location: [facility] ENDO ([facility]  Service: Endoscopy;  Laterality: N/A;    COLONOSCOPY N/A 4/28/2021    Procedure: COLONOSCOPY;  Surgeon: Steven Watson MD;  Location: [facility] ENDO ([facility]);  Service: Endoscopy;  Laterality: N/A;  4/25-[facility]-inst email-tb    EYE SURGERY      INSERTION, ICD, DUAL CHAMBER Left 12/8/2022    Procedure: INSERTION, ICD, DUAL CHAMBER;  Surgeon: Chalino Wahl MD;  Location: [facility] EP LAB;  Service: Cardiology;  Laterality: Left;  FLORIDALMAM, Jovanny, SJM, karen, MB, 3 Prep    KNEE ARTHROSCOPY W/ DEBRIDEMENT      PROSTATE SURGERY  2006    for prostate cancer    SHOULDER ARTHROSCOPY Right 10/2017    RTC repair with patch    ULNAR NERVE TRANSPOSITION       Allergies:   Review of patient's allergies indicates:  No Known Allergies  Medications:     Current Outpatient Medications on File Prior to Visit   Medication  Sig Dispense Refill    aspirin (ECOTRIN) 81 MG EC tablet Take 1 tablet (81 mg total) by mouth once daily. 90 tablet 3    atorvastatin (LIPITOR) 40 MG tablet Take 1 tablet (40 mg total) by mouth every evening. 90 tablet 3    carvediloL (COREG) 25 MG tablet Take 1 tablet (25 mg total) by mouth 2 (two) times daily with meals. 180 tablet 3    empagliflozin (JARDIANCE) 10 mg tablet Take 1 tablet (10 mg total) by mouth once daily. 90 tablet 3    FIBER, DEXTRIN, ORAL Take 1 tablet by mouth every morning.      fluticasone propionate (FLONASE) 50 mcg/actuation nasal spray USE 2 SPRAYS IN BOTH  NOSTRILS ONCE DAILY 48 g 3    furosemide (LASIX) 20 MG tablet Take 2 tablets (40 mg total) by mouth once daily. 180 tablet 3    glucosamine-chondroitin 500-400 mg tablet Take 2 tablets by mouth every morning.      HYDROcodone-acetaminophen (NORCO) 5-325 mg per tablet Take 1 tablet by mouth every 6 (six) hours as needed for Pain. 10 tablet 0    multivitamin capsule Take 1 capsule by mouth every morning.       prednisolon/gatiflox/bromfenac (PREDNISOL ACE-GATIFLOX-BROMFEN) 1-0.5-0.075 % DrpS Apply 1 drop to eye 3 (three) times daily. One drop 3 times a day in surgical eye 5 mL 2    prednisolon/gatiflox/bromfenac (PREDNISOL ACE-GATIFLOX-BROMFEN) 1-0.5-0.075 % DrpS Apply 1 drop to eye 3 (three) times daily. One drop 3 times a day in surgical eye 5 mL 3    sacubitriL-valsartan (ENTRESTO) 49-51 mg per tablet Take 1 tablet by mouth 2 (two) times daily. 180 tablet 3    spironolactone (ALDACTONE) 25 MG tablet Take 1 tablet (25 mg total) by mouth once daily. 90 tablet 3     Current Facility-Administered Medications on File Prior to Visit   Medication Dose Route Frequency Provider Last Rate Last Admin    balanced salt irrigation intra-ocular solution 1 drop  1 drop Left Eye On Call Procedure Estella Silva MD        phenylephrine HCL 10% ophthalmic solution 1 drop  1 drop Left Eye PRN Estella Silva MD        sodium chloride 0.9% flush 2 mL  " 2 mL Intravenous PRN Estella Silva MD         Social History:     Social History     Tobacco Use    Smoking status: Former     Packs/day: 1.00     Years: 30.00     Pack years: 30.00     Types: Cigarettes     Quit date: 1988     Years since quittin.8    Smokeless tobacco: Never   Substance Use Topics    Alcohol use: Yes     Alcohol/week: 14.0 standard drinks     Types: 14 Cans of beer per week     Comment: beer - 2 a night      Family History:     Family History   Problem Relation Age of Onset    Cancer Mother         breast    Cancer Father         prostate    Heart disease Father 74        CHF    Diabetes Sister     Heart disease Sister         stroke    Diabetes Brother     Hypertension Brother     Diabetes Brother     Amblyopia Neg Hx     Blindness Neg Hx     Cataracts Neg Hx     Glaucoma Neg Hx     Macular degeneration Neg Hx     Retinal detachment Neg Hx     Strabismus Neg Hx     Stroke Neg Hx     Thyroid disease Neg Hx     Melanoma Neg Hx     Psoriasis Neg Hx     Lupus Neg Hx      Physical Exam:   /67 (BP Location: Left arm, Patient Position: Sitting)   Pulse 71   Ht 5' 10" (1.778 m)   Wt 67.3 kg (148 lb 5.9 oz)   BMI 21.29 kg/m²      Physical Exam  Vitals and nursing note reviewed.   Constitutional:       General: He is not in acute distress.     Appearance: Normal appearance.   HENT:      Head: Normocephalic and atraumatic.      Nose: Nose normal.   Eyes:      General: No scleral icterus.     Extraocular Movements: Extraocular movements intact.      Conjunctiva/sclera: Conjunctivae normal.   Neck:      Thyroid: No thyromegaly.      Vascular: No carotid bruit or JVD.   Cardiovascular:      Rate and Rhythm: Normal rate and regular rhythm.      Pulses: Normal pulses.      Heart sounds: Normal heart sounds. No murmur heard.    No friction rub. No gallop.   Pulmonary:      Effort: Pulmonary effort is normal.      Breath sounds: Normal breath sounds. No wheezing, rhonchi or rales.   Chest: "      Chest wall: No tenderness.   Abdominal:      General: Bowel sounds are normal. There is no distension.      Palpations: Abdomen is soft.      Tenderness: There is no abdominal tenderness.   Musculoskeletal:      Cervical back: Neck supple.      Right lower leg: No edema.      Left lower leg: No edema.   Skin:     General: Skin is warm and dry.      Coloration: Skin is not pale.      Findings: No erythema or rash.      Nails: There is no clubbing.   Neurological:      Mental Status: He is alert and oriented to person, place, and time.   Psychiatric:         Mood and Affect: Mood and affect normal.         Behavior: Behavior normal.        Labs:     Lab Results   Component Value Date     11/30/2022    K 4.6 11/30/2022     11/30/2022    CO2 23 11/30/2022    BUN 18 11/30/2022    CREATININE 1.2 11/30/2022    ANIONGAP 10 11/30/2022     Lab Results   Component Value Date    HGBA1C 5.5 08/25/2022     Lab Results   Component Value Date    BNP 1,537 (H) 09/11/2022    BNP 1,610 (H) 09/01/2022    Lab Results   Component Value Date    WBC 6.89 11/30/2022    HGB 12.6 (L) 11/30/2022    HCT 38.2 (L) 11/30/2022    HCT 47 09/11/2022     11/30/2022    GRAN 4.5 09/13/2022    GRAN 56.4 09/13/2022     Lab Results   Component Value Date    CHOL 155 08/25/2022    HDL 51 08/25/2022    LDLCALC 93.8 08/25/2022    TRIG 51 08/25/2022          Imaging:   Echocardiograms:   TTE 12/2/2022  The left ventricle is severely enlarged with eccentric hypertrophy and severely decreased systolic function.  The estimated ejection fraction is 10% or slightly higher.  There is severe left ventricular global hypokinesis.  There is abnormal septal wall motion.  Grade II left ventricular diastolic dysfunction.  Severe left atrial enlargement.  Moderate right ventricular enlargement with moderately reduced right ventricular systolic function.  Mild mitral regurgitation.  Mild tricuspid regurgitation.  Mild to moderate pulmonic  regurgitation.  Elevated central venous pressure (15 mmHg).  The estimated PA systolic pressure is 65 mmHg.  There is moderate-severe pulmonary hypertension.    TTE 9/12/2022  The left ventricle is severely enlarged with eccentric hypertrophy and severely decreased systolic function. The estimated ejection fraction is 10%.  Mild right ventricular enlargement with mildly to moderately reduced right ventricular systolic function.  Grade II left ventricular diastolic dysfunction.  Severe left atrial enlargement.  Mild mitral regurgitation.  There is pulmonary hypertension. The estimated PA systolic pressure is 50 mmHg.  Intermediate central venous pressure (8 mmHg).    TTE 9/2/2022  Severe left atrial enlargement.  The left ventricle is severely enlarged with severely decreased systolic function.  There is severe left ventricular global hypokinesis. The estimated ejection fraction is 20%.  The 3D quantitatively derived ejection fraction is 18%.  Grade II left ventricular diastolic dysfunction.  Mild right ventricular enlargement with normal right ventricular systolic function.  Mild mitral regurgitation.  Mild tricuspid regurgitation.  Intermediate central venous pressure (8 mmHg).  There is pulmonary hypertension. The estimated PA systolic pressure is 49 mmHg.  A short burst of tachycardia with a cycle length of 334 milliseconds was noted at the end of the study.    Stress Tests:   PET Stress Test 10/5/2022    There are no clinically significant perfusion abnormalities. There is a small to moderate (10-15%) amount of mild resting heterogeneity in the basal to apical inferior wall(s) that does not change with stress. This likely represents severe cardiomyopathy, good stress flows and cfr in this distribution suggestive of patent coronary arteries.    The whole heart absolute myocardial perfusion values averaged 0.52 cc/min/g at rest, which is reduced; 1.04 cc/min/g at stress, which is moderately reduced; and CFR is  2.05 , which is mildly reduced.    CT attenuation images demonstrate mild diffuse coronary calcifications in the LAD territory.    The gated perfusion images showed an ejection fraction of 14% at rest and 14% during stress. A normal ejection fraction is greater than 53%.    There is severe global hypokinesis at rest and during stress.    The LV cavity size is severely enlarged at rest and stress.    The EKG portion of this study is uninterpretable.    During stress, rare PVCs are noted.    The patient reported no chest pain during the stress test.    Caths:   None    Other:  Event monitor 9/13/2022  Symptoms corresponding with normal sinus rhythm.  3 runs of non-sustained VT 9-23 beats    Brain MRI 9/11/2022  No evidence of acute infarct or hemorrhage.     Chronic senescent and microvascular ischemic disease.  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     CTA Stroke 9/11/2022  CTA head: Unremarkable CTA of the head specifically without evidence for proximal significant stenosis or occlusion.     CTA neck: Atherosclerotic plaquing of the carotid bifurcations and proximal ICAs with less than 50% proximal ICA stenosis by NASCET criteria.     Incidental 6-7 mm subsolid nodule right upper lobe with prominent prevascular and questionable precarinal lymph nodes.  Further evaluation with dedicated CT thorax recommended.     CT head: Patchy ill-defined decreased attenuation supratentorial white matter which is nonspecific and may be sequela of chronic ischemic change.  There is no evidence for acute intracranial hemorrhage or sulcal effacement to suggest large territory recent infarction.     Small hypodensity left basal ganglia which may be prior infarction.       Abdominal aorta ultrasound 3/30/2017  There is no evidence of an Abdominal Aortic Aneurysm.     Assessment:     1. Chronic combined systolic and diastolic heart failure    2. Nonischemic cardiomyopathy    3. Non-sustained ventricular tachycardia    4. Essential  hypertension    5. Other hyperlipidemia      Plan:     Heart failure with reduced ejection fraction   Nonischemic cardiomyopathy   S/p ICD  NYHA Class 2 symptoms. Compensated on exam. Symptomatically improved despite persistently low LVEF.   Increase Entresto to  mg bid    Continue Coreg 25 mg bid   Continue Spironolactone 25 mg daily.   Continue Jardiance 10 mg daily.   Continue Lasix 40 mg daily.   Obtain BMP.   Limit sodium intake to less then 2 gram sodium and 1500cc fluid restriction.  Graded exercise program as tolerated.  Call if  more than 3 lbs in 1 day or 5 lbs in 1 week.    Nonsustained ventricular tachycardia s/p ICD  Following with EP    Hypertension  Well controlled but with room for up-titration of GDMT as above. Discussed that if he develops symptomatic hypotension on higher dose of Entresto, will reduce back to middle dose     Hyperlipidemia   Continue statin    Signed:  Ashley Lagos PA-C  Cardiology   [phone] - General  3/30/2023 12:13 PM

## 2023-03-31 ENCOUNTER — OFFICE VISIT (OUTPATIENT)
Dept: PHYSICAL MEDICINE AND REHAB | Facility: CLINIC | Age: 78
End: 2023-03-31
Payer: MEDICARE

## 2023-03-31 VITALS
BODY MASS INDEX: 21.24 KG/M2 | DIASTOLIC BLOOD PRESSURE: 65 MMHG | HEIGHT: 70 IN | SYSTOLIC BLOOD PRESSURE: 98 MMHG | WEIGHT: 148.38 LBS | HEART RATE: 64 BPM

## 2023-03-31 DIAGNOSIS — R10.13 DYSPEPSIA: ICD-10-CM

## 2023-03-31 PROCEDURE — 1159F MED LIST DOCD IN RCRD: CPT | Mod: CPTII,S$GLB,, | Performed by: PHYSICAL MEDICINE & REHABILITATION

## 2023-03-31 PROCEDURE — 3288F FALL RISK ASSESSMENT DOCD: CPT | Mod: CPTII,S$GLB,, | Performed by: PHYSICAL MEDICINE & REHABILITATION

## 2023-03-31 PROCEDURE — 99499 NO LOS: ICD-10-PCS | Mod: S$GLB,,, | Performed by: PHYSICAL MEDICINE & REHABILITATION

## 2023-03-31 PROCEDURE — 99999 PR PBB SHADOW E&M-EST. PATIENT-LVL III: CPT | Mod: PBBFAC,,, | Performed by: PHYSICAL MEDICINE & REHABILITATION

## 2023-03-31 PROCEDURE — 99999 PR PBB SHADOW E&M-EST. PATIENT-LVL III: ICD-10-PCS | Mod: PBBFAC,,, | Performed by: PHYSICAL MEDICINE & REHABILITATION

## 2023-03-31 PROCEDURE — 3074F SYST BP LT 130 MM HG: CPT | Mod: CPTII,S$GLB,, | Performed by: PHYSICAL MEDICINE & REHABILITATION

## 2023-03-31 PROCEDURE — 3288F PR FALLS RISK ASSESSMENT DOCUMENTED: ICD-10-PCS | Mod: CPTII,S$GLB,, | Performed by: PHYSICAL MEDICINE & REHABILITATION

## 2023-03-31 PROCEDURE — 1101F PT FALLS ASSESS-DOCD LE1/YR: CPT | Mod: CPTII,S$GLB,, | Performed by: PHYSICAL MEDICINE & REHABILITATION

## 2023-03-31 PROCEDURE — 1125F PR PAIN SEVERITY QUANTIFIED, PAIN PRESENT: ICD-10-PCS | Mod: CPTII,S$GLB,, | Performed by: PHYSICAL MEDICINE & REHABILITATION

## 2023-03-31 PROCEDURE — 3078F DIAST BP <80 MM HG: CPT | Mod: CPTII,S$GLB,, | Performed by: PHYSICAL MEDICINE & REHABILITATION

## 2023-03-31 PROCEDURE — 1125F AMNT PAIN NOTED PAIN PRSNT: CPT | Mod: CPTII,S$GLB,, | Performed by: PHYSICAL MEDICINE & REHABILITATION

## 2023-03-31 PROCEDURE — 1159F PR MEDICATION LIST DOCUMENTED IN MEDICAL RECORD: ICD-10-PCS | Mod: CPTII,S$GLB,, | Performed by: PHYSICAL MEDICINE & REHABILITATION

## 2023-03-31 PROCEDURE — 1101F PR PT FALLS ASSESS DOC 0-1 FALLS W/OUT INJ PAST YR: ICD-10-PCS | Mod: CPTII,S$GLB,, | Performed by: PHYSICAL MEDICINE & REHABILITATION

## 2023-03-31 PROCEDURE — 3074F PR MOST RECENT SYSTOLIC BLOOD PRESSURE < 130 MM HG: ICD-10-PCS | Mod: CPTII,S$GLB,, | Performed by: PHYSICAL MEDICINE & REHABILITATION

## 2023-03-31 PROCEDURE — 99499 UNLISTED E&M SERVICE: CPT | Mod: S$GLB,,, | Performed by: PHYSICAL MEDICINE & REHABILITATION

## 2023-03-31 PROCEDURE — 3078F PR MOST RECENT DIASTOLIC BLOOD PRESSURE < 80 MM HG: ICD-10-PCS | Mod: CPTII,S$GLB,, | Performed by: PHYSICAL MEDICINE & REHABILITATION

## 2023-04-03 ENCOUNTER — TELEPHONE (OUTPATIENT)
Dept: ENDOSCOPY | Facility: HOSPITAL | Age: 78
End: 2023-04-03
Payer: MEDICARE

## 2023-04-03 DIAGNOSIS — R10.13 DYSPEPSIA: Primary | ICD-10-CM

## 2023-04-03 NOTE — TELEPHONE ENCOUNTER
----- Message from Jing Valdovinos sent at 4/3/2023  1:49 PM CDT -----  Regarding: abe [email or web address]  Contact: [phone]  Pt calling  in to schedule  for  Dyspepsia referral is in the system I attempted to schedule with no availability please call to discuss further

## 2023-04-03 NOTE — PROGRESS NOTES
Patient was sent to the clinic with a diagnosis of dyspepsia.  He denies any neck pain, back pain, joint pain.  He denies any mobility problems requiring mobility devices.  I told him he is in the wrong clinic.  He should be in Gastroenterology.  I apologize for the mistake.  I sent a message to his Primary Care Provider asking for referral to GI.

## 2023-04-04 DIAGNOSIS — I50.20 HFREF (HEART FAILURE WITH REDUCED EJECTION FRACTION): ICD-10-CM

## 2023-04-04 RX ORDER — FUROSEMIDE 40 MG/1
40 TABLET ORAL DAILY
Qty: 30 TABLET | Refills: 11 | Status: SHIPPED | OUTPATIENT
Start: 2023-04-04 | End: 2023-06-12 | Stop reason: SDUPTHER

## 2023-04-04 RX ORDER — SACUBITRIL AND VALSARTAN 97; 103 MG/1; MG/1
1 TABLET, FILM COATED ORAL 2 TIMES DAILY
Qty: 60 TABLET | Refills: 11 | Status: SHIPPED | OUTPATIENT
Start: 2023-04-04 | End: 2023-06-12 | Stop reason: SDUPTHER

## 2023-04-05 NOTE — PROGRESS NOTES
Mr. Lanza is a patient of Dr. Wahl and was last seen in clinic 10/4/2022.      Subjective:   Patient ID:  Sidney Lanza is a 77 y.o. male who presents for follow up of ICD  .     HPI:    Mr. Lanza is a 77 y.o. male with NSVT, NICM here for follow up after ICD implantation.    Background:    77 yoM referred for cardiomyopathy and non-sustained VT. He developed NICM and was placed on GDMT 9/2/22. The following week 9/11/22, he had a spell of weakness and flushing and word finding issues. His symptoms resolved later that day. No CVA noted on imaging. Event monitor placed. 9/26/22 5PM. He had 23 beat runs of NSVT. This event was asymptomatic and did not reproduce his symptoms from 9/11/22. No syncope or near syncope. Ischemic work up underway.     77 yoM with cardiomyopathy here for ICD consideration and arrhythmia management. He had a single NSVT event lasting 23 beats. He is now wearing a life vest. Coreg from 3.125 to 6.25. Will get echo 12/2/22 and tentatively plan on Dc ICD the following 1-2 weeks. Cancel ICD if EF >35%. Extensive discussion regarding risks, benefits, and alternative approaches to the procedure was had with the patient.  The patient voices understanding and all questions have been answered.  The patient would like to proceed as planned.    Update (04/06/2023):    12/8/2022: Successful implantation of ICD Dual placed for primary intervention.    Today he says he has been feeling stable. No worsening BOYD, CP, palps, LH, syncope.  NSVT around time of cataract surgery - asymptomatic.    Device Interrogation (4/6/2023) reveals an intrinsic SB/SR with stable lead and device function. AMSx2, max 6 seconds, c/w NSAT. NSVTx2, max 12 seconds. No treated episodes were noted.  He paces 38% in the RA and <1% in the RV. Estimated battery longevity 8.4-9 years.     I have personally reviewed the patient's EKG today, which shows sinus rhythm with LBBB at 60bpm. DC interval is 98. QRS is 130. QT is  452.    Relevant Cardiac Test Results:    2D Echo (12/2/2022):  The left ventricle is severely enlarged with eccentric hypertrophy and severely decreased systolic function.  The estimated ejection fraction is 10% or slightly higher.  There is severe left ventricular global hypokinesis.  There is abnormal septal wall motion.  Grade II left ventricular diastolic dysfunction.  Severe left atrial enlargement.  Moderate right ventricular enlargement with moderately reduced right ventricular systolic function.  Mild mitral regurgitation.  Mild tricuspid regurgitation.  Mild to moderate pulmonic regurgitation.  Elevated central venous pressure (15 mmHg).  The estimated PA systolic pressure is 65 mmHg.  There is moderate-severe pulmonary hypertension.    Current Outpatient Medications   Medication Sig    aspirin (ECOTRIN) 81 MG EC tablet Take 1 tablet (81 mg total) by mouth once daily.    atorvastatin (LIPITOR) 40 MG tablet Take 1 tablet (40 mg total) by mouth every evening.    carvediloL (COREG) 25 MG tablet Take 1 tablet (25 mg total) by mouth 2 (two) times daily with meals.    empagliflozin (JARDIANCE) 10 mg tablet Take 1 tablet (10 mg total) by mouth once daily.    FIBER, DEXTRIN, ORAL Take 1 tablet by mouth every morning.    fluticasone propionate (FLONASE) 50 mcg/actuation nasal spray USE 2 SPRAYS IN BOTH  NOSTRILS ONCE DAILY    furosemide (LASIX) 40 MG tablet Take 1 tablet (40 mg total) by mouth once daily.    glucosamine-chondroitin 500-400 mg tablet Take 2 tablets by mouth every morning.    multivitamin capsule Take 1 capsule by mouth every morning.     sacubitriL-valsartan (ENTRESTO)  mg per tablet Take 1 tablet by mouth 2 (two) times daily.    spironolactone (ALDACTONE) 25 MG tablet Take 1 tablet (25 mg total) by mouth once daily.     No current facility-administered medications for this visit.     Facility-Administered Medications Ordered in Other Visits   Medication    balanced salt irrigation  "intra-ocular solution 1 drop    phenylephrine HCL 10% ophthalmic solution 1 drop    sodium chloride 0.9% flush 2 mL       Review of Systems   Constitutional: Negative for malaise/fatigue.   Cardiovascular:  Negative for chest pain, dyspnea on exertion, irregular heartbeat, leg swelling and palpitations.   Respiratory:  Negative for shortness of breath.    Hematologic/Lymphatic: Negative for bleeding problem.   Skin:  Negative for rash.   Musculoskeletal:  Negative for myalgias.   Gastrointestinal:  Negative for hematemesis, hematochezia and nausea.   Genitourinary:  Negative for hematuria.   Neurological:  Negative for light-headedness.   Psychiatric/Behavioral:  Negative for altered mental status.    Allergic/Immunologic: Negative for persistent infections.     Objective:          BP 99/71   Pulse (!) 58   Ht 5' 9" (1.753 m)   Wt 66.4 kg (146 lb 6.2 oz)   BMI 21.62 kg/m²     Physical Exam  Vitals and nursing note reviewed.   Constitutional:       Appearance: Normal appearance. He is well-developed.   HENT:      Head: Normocephalic.      Nose: Nose normal.   Eyes:      Pupils: Pupils are equal, round, and reactive to light.   Cardiovascular:      Rate and Rhythm: Normal rate and regular rhythm.   Pulmonary:      Effort: No respiratory distress.      Breath sounds: Normal breath sounds.   Chest:      Comments: Device to LUCW. Incision and pocket in good repair.    Musculoskeletal:         General: Normal range of motion.   Skin:     General: Skin is warm and dry.      Findings: No erythema.   Neurological:      Mental Status: He is alert and oriented to person, place, and time.   Psychiatric:         Speech: Speech normal.         Behavior: Behavior normal.         Lab Results   Component Value Date     03/30/2023    K 4.6 03/30/2023    MG 1.9 09/13/2022    BUN 24 (H) 03/30/2023    CREATININE 1.4 03/30/2023    ALT 47 (H) 09/11/2022    AST 44 (H) 09/11/2022    HGB 12.6 (L) 11/30/2022    HCT 38.2 (L) " 11/30/2022    HCT 47 09/11/2022    TSH 0.671 09/11/2022    LDLCALC 93.8 08/25/2022       Recent Labs   Lab 09/11/22  1610 09/11/22  1622 09/11/22 2054 11/30/22  1625   INR 1.2 1.0 1.2 1.2       Assessment:     1. ICD (implantable cardioverter-defibrillator) in place    2. Non-sustained ventricular tachycardia    3. Essential hypertension    4. Chronic combined systolic and diastolic heart failure      Plan:     In summary, Mr. Lanza is a 77 y.o. male with NSVT, NICM here for follow up after ICD implantation.  Mr. Lanza is doing well from a device perspective with stable lead and device function. No sustained arrhythmia noted. No treated episodes. On GDMT. Follows with cardiology.    Continue current medication regimen and device settings.   Follow up in device clinic as scheduled.   Follow up in EP clinic in 1 year, sooner as needed.     *A copy of this note has been sent to Dr. Wahl*    Follow up in about 1 year (around 4/6/2024).    ------------------------------------------------------------------    TRANG Harrington, NP-C  Cardiac Electrophysiology

## 2023-04-06 ENCOUNTER — CLINICAL SUPPORT (OUTPATIENT)
Dept: CARDIOLOGY | Facility: HOSPITAL | Age: 78
End: 2023-04-06
Attending: INTERNAL MEDICINE
Payer: MEDICARE

## 2023-04-06 ENCOUNTER — HOSPITAL ENCOUNTER (OUTPATIENT)
Dept: CARDIOLOGY | Facility: CLINIC | Age: 78
Discharge: HOME | End: 2023-04-06
Payer: MEDICARE

## 2023-04-06 ENCOUNTER — OFFICE VISIT (OUTPATIENT)
Dept: ELECTROPHYSIOLOGY | Facility: CLINIC | Age: 78
End: 2023-04-06
Payer: MEDICARE

## 2023-04-06 VITALS
BODY MASS INDEX: 21.68 KG/M2 | WEIGHT: 146.38 LBS | SYSTOLIC BLOOD PRESSURE: 99 MMHG | HEIGHT: 69 IN | HEART RATE: 58 BPM | DIASTOLIC BLOOD PRESSURE: 71 MMHG

## 2023-04-06 DIAGNOSIS — I50.42 CHRONIC COMBINED SYSTOLIC AND DIASTOLIC HEART FAILURE: ICD-10-CM

## 2023-04-06 DIAGNOSIS — I42.8 NON-ISCHEMIC CARDIOMYOPATHY: ICD-10-CM

## 2023-04-06 DIAGNOSIS — I47.29 NON-SUSTAINED VENTRICULAR TACHYCARDIA: ICD-10-CM

## 2023-04-06 DIAGNOSIS — I42.9 CARDIOMYOPATHY, UNSPECIFIED TYPE: ICD-10-CM

## 2023-04-06 DIAGNOSIS — Z95.810 ICD (IMPLANTABLE CARDIOVERTER-DEFIBRILLATOR) IN PLACE: Primary | ICD-10-CM

## 2023-04-06 DIAGNOSIS — I10 ESSENTIAL HYPERTENSION: Chronic | ICD-10-CM

## 2023-04-06 PROCEDURE — 93005 RHYTHM STRIP: ICD-10-PCS | Mod: S$GLB,,, | Performed by: INTERNAL MEDICINE

## 2023-04-06 PROCEDURE — 1101F PT FALLS ASSESS-DOCD LE1/YR: CPT | Mod: CPTII,S$GLB,, | Performed by: NURSE PRACTITIONER

## 2023-04-06 PROCEDURE — 3288F FALL RISK ASSESSMENT DOCD: CPT | Mod: CPTII,S$GLB,, | Performed by: NURSE PRACTITIONER

## 2023-04-06 PROCEDURE — 93283 PRGRMG EVAL IMPLANTABLE DFB: CPT | Mod: 26,,, | Performed by: INTERNAL MEDICINE

## 2023-04-06 PROCEDURE — 3078F DIAST BP <80 MM HG: CPT | Mod: CPTII,S$GLB,, | Performed by: NURSE PRACTITIONER

## 2023-04-06 PROCEDURE — 93010 RHYTHM STRIP: ICD-10-PCS | Mod: S$GLB,,, | Performed by: INTERNAL MEDICINE

## 2023-04-06 PROCEDURE — 93283 CARDIAC DEVICE CHECK - IN CLINIC & HOSPITAL: ICD-10-PCS | Mod: 26,,, | Performed by: INTERNAL MEDICINE

## 2023-04-06 PROCEDURE — 93005 ELECTROCARDIOGRAM TRACING: CPT | Mod: S$GLB,,, | Performed by: INTERNAL MEDICINE

## 2023-04-06 PROCEDURE — 99214 OFFICE O/P EST MOD 30 MIN: CPT | Mod: S$GLB,,, | Performed by: NURSE PRACTITIONER

## 2023-04-06 PROCEDURE — 3078F PR MOST RECENT DIASTOLIC BLOOD PRESSURE < 80 MM HG: ICD-10-PCS | Mod: CPTII,S$GLB,, | Performed by: NURSE PRACTITIONER

## 2023-04-06 PROCEDURE — 3074F SYST BP LT 130 MM HG: CPT | Mod: CPTII,S$GLB,, | Performed by: NURSE PRACTITIONER

## 2023-04-06 PROCEDURE — 3288F PR FALLS RISK ASSESSMENT DOCUMENTED: ICD-10-PCS | Mod: CPTII,S$GLB,, | Performed by: NURSE PRACTITIONER

## 2023-04-06 PROCEDURE — 1101F PR PT FALLS ASSESS DOC 0-1 FALLS W/OUT INJ PAST YR: ICD-10-PCS | Mod: CPTII,S$GLB,, | Performed by: NURSE PRACTITIONER

## 2023-04-06 PROCEDURE — 99214 PR OFFICE/OUTPT VISIT, EST, LEVL IV, 30-39 MIN: ICD-10-PCS | Mod: S$GLB,,, | Performed by: NURSE PRACTITIONER

## 2023-04-06 PROCEDURE — 1159F MED LIST DOCD IN RCRD: CPT | Mod: CPTII,S$GLB,, | Performed by: NURSE PRACTITIONER

## 2023-04-06 PROCEDURE — 3074F PR MOST RECENT SYSTOLIC BLOOD PRESSURE < 130 MM HG: ICD-10-PCS | Mod: CPTII,S$GLB,, | Performed by: NURSE PRACTITIONER

## 2023-04-06 PROCEDURE — 99999 PR PBB SHADOW E&M-EST. PATIENT-LVL III: CPT | Mod: PBBFAC,,, | Performed by: NURSE PRACTITIONER

## 2023-04-06 PROCEDURE — 99999 PR PBB SHADOW E&M-EST. PATIENT-LVL III: ICD-10-PCS | Mod: PBBFAC,,, | Performed by: NURSE PRACTITIONER

## 2023-04-06 PROCEDURE — 1160F PR REVIEW ALL MEDS BY PRESCRIBER/CLIN PHARMACIST DOCUMENTED: ICD-10-PCS | Mod: CPTII,S$GLB,, | Performed by: NURSE PRACTITIONER

## 2023-04-06 PROCEDURE — 1159F PR MEDICATION LIST DOCUMENTED IN MEDICAL RECORD: ICD-10-PCS | Mod: CPTII,S$GLB,, | Performed by: NURSE PRACTITIONER

## 2023-04-06 PROCEDURE — 93010 ELECTROCARDIOGRAM REPORT: CPT | Mod: S$GLB,,, | Performed by: INTERNAL MEDICINE

## 2023-04-06 PROCEDURE — 1126F AMNT PAIN NOTED NONE PRSNT: CPT | Mod: CPTII,S$GLB,, | Performed by: NURSE PRACTITIONER

## 2023-04-06 PROCEDURE — 1160F RVW MEDS BY RX/DR IN RCRD: CPT | Mod: CPTII,S$GLB,, | Performed by: NURSE PRACTITIONER

## 2023-04-06 PROCEDURE — 1126F PR PAIN SEVERITY QUANTIFIED, NO PAIN PRESENT: ICD-10-PCS | Mod: CPTII,S$GLB,, | Performed by: NURSE PRACTITIONER

## 2023-04-06 PROCEDURE — 93283 PRGRMG EVAL IMPLANTABLE DFB: CPT

## 2023-04-14 ENCOUNTER — LAB VISIT (OUTPATIENT)
Dept: LAB | Facility: HOSPITAL | Age: 78
End: 2023-04-14
Payer: MEDICARE

## 2023-04-14 DIAGNOSIS — I50.42 CHRONIC COMBINED SYSTOLIC AND DIASTOLIC HEART FAILURE: ICD-10-CM

## 2023-04-14 LAB
ANION GAP SERPL CALC-SCNC: 10 MMOL/L (ref 8–16)
BUN SERPL-MCNC: 21 MG/DL (ref 8–23)
CALCIUM SERPL-MCNC: 9.4 MG/DL (ref 8.7–10.5)
CHLORIDE SERPL-SCNC: 108 MMOL/L (ref 95–110)
CO2 SERPL-SCNC: 22 MMOL/L (ref 23–29)
CREAT SERPL-MCNC: 1.2 MG/DL (ref 0.5–1.4)
EST. GFR  (NO RACE VARIABLE): >60 ML/MIN/1.73 M^2
GLUCOSE SERPL-MCNC: 104 MG/DL (ref 70–110)
POTASSIUM SERPL-SCNC: 4.7 MMOL/L (ref 3.5–5.1)
SODIUM SERPL-SCNC: 140 MMOL/L (ref 136–145)

## 2023-04-14 PROCEDURE — 36415 COLL VENOUS BLD VENIPUNCTURE: CPT | Performed by: PHYSICIAN ASSISTANT

## 2023-04-14 PROCEDURE — 80048 BASIC METABOLIC PNL TOTAL CA: CPT | Performed by: PHYSICIAN ASSISTANT

## 2023-06-06 ENCOUNTER — CLINICAL SUPPORT (OUTPATIENT)
Dept: CARDIOLOGY | Facility: HOSPITAL | Age: 78
End: 2023-06-06
Payer: MEDICARE

## 2023-06-06 DIAGNOSIS — I47.29 OTHER VENTRICULAR TACHYCARDIA: ICD-10-CM

## 2023-06-06 DIAGNOSIS — I50.9 HEART FAILURE, UNSPECIFIED: ICD-10-CM

## 2023-06-06 DIAGNOSIS — Z95.810 PRESENCE OF AUTOMATIC (IMPLANTABLE) CARDIAC DEFIBRILLATOR: ICD-10-CM

## 2023-06-06 PROCEDURE — 93296 REM INTERROG EVL PM/IDS: CPT | Performed by: INTERNAL MEDICINE

## 2023-06-12 DIAGNOSIS — I50.20 HFREF (HEART FAILURE WITH REDUCED EJECTION FRACTION): ICD-10-CM

## 2023-06-12 RX ORDER — FUROSEMIDE 40 MG/1
40 TABLET ORAL DAILY
Qty: 90 TABLET | Refills: 3 | Status: SHIPPED | OUTPATIENT
Start: 2023-06-12 | End: 2024-06-06

## 2023-06-12 RX ORDER — SACUBITRIL AND VALSARTAN 97; 103 MG/1; MG/1
1 TABLET, FILM COATED ORAL 2 TIMES DAILY
Qty: 180 TABLET | Refills: 3 | Status: SHIPPED | OUTPATIENT
Start: 2023-06-12 | End: 2024-06-06

## 2023-06-13 ENCOUNTER — PATIENT MESSAGE (OUTPATIENT)
Dept: CARDIOLOGY | Facility: CLINIC | Age: 78
End: 2023-06-13
Payer: MEDICARE

## 2023-06-13 ENCOUNTER — PATIENT MESSAGE (OUTPATIENT)
Dept: INTERNAL MEDICINE | Facility: CLINIC | Age: 78
End: 2023-06-13
Payer: MEDICARE

## 2023-06-13 NOTE — TELEPHONE ENCOUNTER
Pt denies dizziness or lightheadedness at this time.  notified, advised pt take 1/2 usual doses of Cardvedilol Entresto this PM.  also ordered pt take 1/2 dose of Furosemide for the next  few days,  advised pt notify our office of his symptoms on Monday 6/19.  ordered pt weight himself daily and resume Furosemide to 40mg if he gains 3 pounds or more. Pt's daughter, Debora notified  spoke with pt's daughter regarding his BP readings.

## 2023-09-04 ENCOUNTER — CLINICAL SUPPORT (OUTPATIENT)
Dept: CARDIOLOGY | Facility: HOSPITAL | Age: 78
End: 2023-09-04
Payer: MEDICARE

## 2023-09-04 DIAGNOSIS — Z95.810 PRESENCE OF AUTOMATIC (IMPLANTABLE) CARDIAC DEFIBRILLATOR: ICD-10-CM

## 2023-09-04 PROCEDURE — 93296 REM INTERROG EVL PM/IDS: CPT | Performed by: INTERNAL MEDICINE

## 2023-09-04 PROCEDURE — 93295 DEV INTERROG REMOTE 1/2/MLT: CPT | Mod: ,,, | Performed by: INTERNAL MEDICINE

## 2023-09-04 PROCEDURE — 93295 CARDIAC DEVICE CHECK - REMOTE: ICD-10-PCS | Mod: ,,, | Performed by: INTERNAL MEDICINE

## 2023-09-05 ENCOUNTER — TELEPHONE (OUTPATIENT)
Dept: INTERNAL MEDICINE | Facility: CLINIC | Age: 78
End: 2023-09-05
Payer: MEDICARE

## 2023-09-05 DIAGNOSIS — D64.9 ANEMIA, UNSPECIFIED TYPE: ICD-10-CM

## 2023-09-05 DIAGNOSIS — R79.9 ABNORMAL FINDING OF BLOOD CHEMISTRY, UNSPECIFIED: ICD-10-CM

## 2023-09-05 DIAGNOSIS — I10 ESSENTIAL HYPERTENSION: Primary | ICD-10-CM

## 2023-09-05 DIAGNOSIS — Z85.46 HISTORY OF PROSTATE CANCER: ICD-10-CM

## 2023-09-05 DIAGNOSIS — D51.3 OTHER DIETARY VITAMIN B12 DEFICIENCY ANEMIA: ICD-10-CM

## 2023-09-05 NOTE — TELEPHONE ENCOUNTER
"----- Message from Gladys Smith sent at 9/5/2023  1:19 PM CDT -----  Contact: Pt [phone]  type: Lab    Caller is requesting to schedule their Lab appointment prior to annual appointment.  Order is not listed in EPIC.  Please enter order and contact patient to schedule.    Name of Caller:Sidney Lanza    Preferred Date and Time of Labs 09/06/23  Date of Annual Physical Appointment:09/11/23    Where would they like the lab performed?[facility]     Would the patient rather a call back or a response via [facility]? call    Best Call Back Number:    Additional Information:none    "Do not send messages requesting lab orders prior to Physical appt on these providers: Gregorio Costello Giambrone,  Jojo Ying and Dallin."       "

## 2023-09-06 ENCOUNTER — LAB VISIT (OUTPATIENT)
Dept: LAB | Facility: HOSPITAL | Age: 78
End: 2023-09-06
Payer: MEDICARE

## 2023-09-06 DIAGNOSIS — R79.9 ABNORMAL FINDING OF BLOOD CHEMISTRY, UNSPECIFIED: ICD-10-CM

## 2023-09-06 DIAGNOSIS — Z85.46 HISTORY OF PROSTATE CANCER: ICD-10-CM

## 2023-09-06 DIAGNOSIS — D64.9 ANEMIA, UNSPECIFIED TYPE: ICD-10-CM

## 2023-09-06 DIAGNOSIS — D51.3 OTHER DIETARY VITAMIN B12 DEFICIENCY ANEMIA: ICD-10-CM

## 2023-09-06 DIAGNOSIS — I10 ESSENTIAL HYPERTENSION: ICD-10-CM

## 2023-09-06 LAB
ALBUMIN SERPL BCP-MCNC: 4.2 G/DL (ref 3.5–5.2)
ALP SERPL-CCNC: 58 U/L (ref 55–135)
ALT SERPL W/O P-5'-P-CCNC: 24 U/L (ref 10–44)
ANION GAP SERPL CALC-SCNC: 11 MMOL/L (ref 8–16)
AST SERPL-CCNC: 27 U/L (ref 10–40)
BACTERIA #/AREA URNS AUTO: NORMAL /HPF
BASOPHILS # BLD AUTO: 0.05 K/UL (ref 0–0.2)
BASOPHILS NFR BLD: 0.9 % (ref 0–1.9)
BILIRUB SERPL-MCNC: 0.7 MG/DL (ref 0.1–1)
BILIRUB UR QL STRIP: NEGATIVE
BUN SERPL-MCNC: 22 MG/DL (ref 8–23)
CALCIUM SERPL-MCNC: 9.7 MG/DL (ref 8.7–10.5)
CHLORIDE SERPL-SCNC: 107 MMOL/L (ref 95–110)
CHOLEST SERPL-MCNC: 120 MG/DL (ref 120–199)
CHOLEST/HDLC SERPL: 2.3 {RATIO} (ref 2–5)
CLARITY UR REFRACT.AUTO: CLEAR
CO2 SERPL-SCNC: 24 MMOL/L (ref 23–29)
COLOR UR AUTO: YELLOW
COMPLEXED PSA SERPL-MCNC: <0.01 NG/ML (ref 0–4)
CREAT SERPL-MCNC: 1.3 MG/DL (ref 0.5–1.4)
DIFFERENTIAL METHOD: ABNORMAL
EOSINOPHIL # BLD AUTO: 0.1 K/UL (ref 0–0.5)
EOSINOPHIL NFR BLD: 2.4 % (ref 0–8)
ERYTHROCYTE [DISTWIDTH] IN BLOOD BY AUTOMATED COUNT: 16.4 % (ref 11.5–14.5)
EST. GFR  (NO RACE VARIABLE): 56.6 ML/MIN/1.73 M^2
ESTIMATED AVG GLUCOSE: 117 MG/DL (ref 68–131)
GLUCOSE SERPL-MCNC: 113 MG/DL (ref 70–110)
GLUCOSE UR QL STRIP: ABNORMAL
HBA1C MFR BLD: 5.7 % (ref 4–5.6)
HCT VFR BLD AUTO: 38.2 % (ref 40–54)
HDLC SERPL-MCNC: 52 MG/DL (ref 40–75)
HDLC SERPL: 43.3 % (ref 20–50)
HGB BLD-MCNC: 11.9 G/DL (ref 14–18)
HGB UR QL STRIP: NEGATIVE
IMM GRANULOCYTES # BLD AUTO: 0.01 K/UL (ref 0–0.04)
IMM GRANULOCYTES NFR BLD AUTO: 0.2 % (ref 0–0.5)
KETONES UR QL STRIP: NEGATIVE
LDLC SERPL CALC-MCNC: 56.6 MG/DL (ref 63–159)
LEUKOCYTE ESTERASE UR QL STRIP: NEGATIVE
LYMPHOCYTES # BLD AUTO: 2.4 K/UL (ref 1–4.8)
LYMPHOCYTES NFR BLD: 41.1 % (ref 18–48)
MCH RBC QN AUTO: 23.8 PG (ref 27–31)
MCHC RBC AUTO-ENTMCNC: 31.2 G/DL (ref 32–36)
MCV RBC AUTO: 76 FL (ref 82–98)
MICROSCOPIC COMMENT: NORMAL
MONOCYTES # BLD AUTO: 0.6 K/UL (ref 0.3–1)
MONOCYTES NFR BLD: 9.5 % (ref 4–15)
NEUTROPHILS # BLD AUTO: 2.7 K/UL (ref 1.8–7.7)
NEUTROPHILS NFR BLD: 45.9 % (ref 38–73)
NITRITE UR QL STRIP: NEGATIVE
NONHDLC SERPL-MCNC: 68 MG/DL
NRBC BLD-RTO: 0 /100 WBC
PH UR STRIP: 6 [PH] (ref 5–8)
PLATELET # BLD AUTO: 183 K/UL (ref 150–450)
PMV BLD AUTO: 10.5 FL (ref 9.2–12.9)
POTASSIUM SERPL-SCNC: 4.7 MMOL/L (ref 3.5–5.1)
PROT SERPL-MCNC: 7.3 G/DL (ref 6–8.4)
PROT UR QL STRIP: NEGATIVE
RBC # BLD AUTO: 5 M/UL (ref 4.6–6.2)
RBC #/AREA URNS AUTO: 1 /HPF (ref 0–4)
SODIUM SERPL-SCNC: 142 MMOL/L (ref 136–145)
SP GR UR STRIP: 1.02 (ref 1–1.03)
TRIGL SERPL-MCNC: 57 MG/DL (ref 30–150)
TSH SERPL DL<=0.005 MIU/L-ACNC: 1.94 UIU/ML (ref 0.4–4)
URN SPEC COLLECT METH UR: ABNORMAL
VIT B12 SERPL-MCNC: 1245 PG/ML (ref 210–950)
WBC # BLD AUTO: 5.82 K/UL (ref 3.9–12.7)
WBC #/AREA URNS AUTO: 1 /HPF (ref 0–5)
YEAST UR QL AUTO: NORMAL

## 2023-09-06 PROCEDURE — 84443 ASSAY THYROID STIM HORMONE: CPT | Performed by: INTERNAL MEDICINE

## 2023-09-06 PROCEDURE — 80053 COMPREHEN METABOLIC PANEL: CPT | Performed by: INTERNAL MEDICINE

## 2023-09-06 PROCEDURE — 84153 ASSAY OF PSA TOTAL: CPT | Performed by: INTERNAL MEDICINE

## 2023-09-06 PROCEDURE — 83036 HEMOGLOBIN GLYCOSYLATED A1C: CPT | Performed by: INTERNAL MEDICINE

## 2023-09-06 PROCEDURE — 81001 URINALYSIS AUTO W/SCOPE: CPT | Performed by: INTERNAL MEDICINE

## 2023-09-06 PROCEDURE — 82607 VITAMIN B-12: CPT | Performed by: INTERNAL MEDICINE

## 2023-09-06 PROCEDURE — 80061 LIPID PANEL: CPT | Performed by: INTERNAL MEDICINE

## 2023-09-06 PROCEDURE — 85025 COMPLETE CBC W/AUTO DIFF WBC: CPT | Performed by: INTERNAL MEDICINE

## 2023-09-06 PROCEDURE — 36415 COLL VENOUS BLD VENIPUNCTURE: CPT | Performed by: INTERNAL MEDICINE

## 2023-09-11 ENCOUNTER — OFFICE VISIT (OUTPATIENT)
Dept: INTERNAL MEDICINE | Facility: CLINIC | Age: 78
End: 2023-09-11
Payer: MEDICARE

## 2023-09-11 ENCOUNTER — LAB VISIT (OUTPATIENT)
Dept: LAB | Facility: HOSPITAL | Age: 78
End: 2023-09-11
Attending: INTERNAL MEDICINE
Payer: MEDICARE

## 2023-09-11 VITALS
TEMPERATURE: 98 F | BODY MASS INDEX: 21.77 KG/M2 | RESPIRATION RATE: 16 BRPM | WEIGHT: 147 LBS | DIASTOLIC BLOOD PRESSURE: 62 MMHG | HEART RATE: 56 BPM | SYSTOLIC BLOOD PRESSURE: 100 MMHG | HEIGHT: 69 IN

## 2023-09-11 DIAGNOSIS — M15.9 PRIMARY OSTEOARTHRITIS INVOLVING MULTIPLE JOINTS: ICD-10-CM

## 2023-09-11 DIAGNOSIS — D64.9 ANEMIA, UNSPECIFIED TYPE: ICD-10-CM

## 2023-09-11 DIAGNOSIS — I10 ESSENTIAL HYPERTENSION: Primary | ICD-10-CM

## 2023-09-11 DIAGNOSIS — I50.20 HFREF (HEART FAILURE WITH REDUCED EJECTION FRACTION): ICD-10-CM

## 2023-09-11 DIAGNOSIS — K21.9 GASTROESOPHAGEAL REFLUX DISEASE, UNSPECIFIED WHETHER ESOPHAGITIS PRESENT: ICD-10-CM

## 2023-09-11 LAB
FERRITIN SERPL-MCNC: 252 NG/ML (ref 20–300)
IRON SERPL-MCNC: 104 UG/DL (ref 45–160)
RETICS/RBC NFR AUTO: 1.1 % (ref 0.4–2)
SATURATED IRON: 32 % (ref 20–50)
TOTAL IRON BINDING CAPACITY: 330 UG/DL (ref 250–450)
TRANSFERRIN SERPL-MCNC: 223 MG/DL (ref 200–375)

## 2023-09-11 PROCEDURE — 85045 AUTOMATED RETICULOCYTE COUNT: CPT | Performed by: INTERNAL MEDICINE

## 2023-09-11 PROCEDURE — 1101F PR PT FALLS ASSESS DOC 0-1 FALLS W/OUT INJ PAST YR: ICD-10-PCS | Mod: CPTII,S$GLB,, | Performed by: INTERNAL MEDICINE

## 2023-09-11 PROCEDURE — 99215 OFFICE O/P EST HI 40 MIN: CPT | Mod: S$GLB,,, | Performed by: INTERNAL MEDICINE

## 2023-09-11 PROCEDURE — 99215 PR OFFICE/OUTPT VISIT, EST, LEVL V, 40-54 MIN: ICD-10-PCS | Mod: S$GLB,,, | Performed by: INTERNAL MEDICINE

## 2023-09-11 PROCEDURE — 1101F PT FALLS ASSESS-DOCD LE1/YR: CPT | Mod: CPTII,S$GLB,, | Performed by: INTERNAL MEDICINE

## 2023-09-11 PROCEDURE — 84466 ASSAY OF TRANSFERRIN: CPT | Performed by: INTERNAL MEDICINE

## 2023-09-11 PROCEDURE — 1159F PR MEDICATION LIST DOCUMENTED IN MEDICAL RECORD: ICD-10-PCS | Mod: CPTII,S$GLB,, | Performed by: INTERNAL MEDICINE

## 2023-09-11 PROCEDURE — 3078F DIAST BP <80 MM HG: CPT | Mod: CPTII,S$GLB,, | Performed by: INTERNAL MEDICINE

## 2023-09-11 PROCEDURE — 3074F SYST BP LT 130 MM HG: CPT | Mod: CPTII,S$GLB,, | Performed by: INTERNAL MEDICINE

## 2023-09-11 PROCEDURE — 3078F PR MOST RECENT DIASTOLIC BLOOD PRESSURE < 80 MM HG: ICD-10-PCS | Mod: CPTII,S$GLB,, | Performed by: INTERNAL MEDICINE

## 2023-09-11 PROCEDURE — 1126F AMNT PAIN NOTED NONE PRSNT: CPT | Mod: CPTII,S$GLB,, | Performed by: INTERNAL MEDICINE

## 2023-09-11 PROCEDURE — 3074F PR MOST RECENT SYSTOLIC BLOOD PRESSURE < 130 MM HG: ICD-10-PCS | Mod: CPTII,S$GLB,, | Performed by: INTERNAL MEDICINE

## 2023-09-11 PROCEDURE — 83540 ASSAY OF IRON: CPT | Performed by: INTERNAL MEDICINE

## 2023-09-11 PROCEDURE — 99999 PR PBB SHADOW E&M-EST. PATIENT-LVL III: CPT | Mod: PBBFAC,,, | Performed by: INTERNAL MEDICINE

## 2023-09-11 PROCEDURE — 36415 COLL VENOUS BLD VENIPUNCTURE: CPT | Mod: PO | Performed by: INTERNAL MEDICINE

## 2023-09-11 PROCEDURE — 1126F PR PAIN SEVERITY QUANTIFIED, NO PAIN PRESENT: ICD-10-PCS | Mod: CPTII,S$GLB,, | Performed by: INTERNAL MEDICINE

## 2023-09-11 PROCEDURE — 99999 PR PBB SHADOW E&M-EST. PATIENT-LVL III: ICD-10-PCS | Mod: PBBFAC,,, | Performed by: INTERNAL MEDICINE

## 2023-09-11 PROCEDURE — 3288F PR FALLS RISK ASSESSMENT DOCUMENTED: ICD-10-PCS | Mod: CPTII,S$GLB,, | Performed by: INTERNAL MEDICINE

## 2023-09-11 PROCEDURE — 82728 ASSAY OF FERRITIN: CPT | Performed by: INTERNAL MEDICINE

## 2023-09-11 PROCEDURE — 1159F MED LIST DOCD IN RCRD: CPT | Mod: CPTII,S$GLB,, | Performed by: INTERNAL MEDICINE

## 2023-09-11 PROCEDURE — 3288F FALL RISK ASSESSMENT DOCD: CPT | Mod: CPTII,S$GLB,, | Performed by: INTERNAL MEDICINE

## 2023-09-11 RX ORDER — SPIRONOLACTONE 25 MG/1
25 TABLET ORAL DAILY
Qty: 90 TABLET | Refills: 3 | Status: SHIPPED | OUTPATIENT
Start: 2023-09-11 | End: 2024-09-10

## 2023-09-11 RX ORDER — PANTOPRAZOLE SODIUM 40 MG/1
40 TABLET, DELAYED RELEASE ORAL DAILY
Qty: 90 TABLET | Refills: 2 | Status: SHIPPED | OUTPATIENT
Start: 2023-09-11 | End: 2024-03-06

## 2023-09-11 RX ORDER — ATORVASTATIN CALCIUM 40 MG/1
40 TABLET, FILM COATED ORAL NIGHTLY
Qty: 90 TABLET | Refills: 3 | Status: SHIPPED | OUTPATIENT
Start: 2023-09-11 | End: 2024-09-10

## 2023-10-15 NOTE — PROGRESS NOTES
Subjective:       Patient ID: Sidney Lanza is a 78 y.o. male.    Chief Complaint: Follow-up, Hypertension, and Congestive Heart Failure    HPI  The patient presents for follow-up of medical conditions which include hypertension CHF hyperlipidemia anemia osteoarthritis.  The patient reports he does have decreased energy and dyspnea on exertion with mild activities such as showering.  No PND or orthopnea is described.  He states he is unable to cut the grass around his house.    He still has active reflux which could occur with liquids or solid food.  He walks a little crack exercise but he is unable to carry weights while walking.    He has osteoarthritis of hands.  He is not currently experiencing any lower back pain.  Does experience some symptoms of depression due to decreased activity level.  There are no suicidal thoughts however.    Review of Systems   Constitutional:  Positive for activity change and fatigue. Negative for appetite change and unexpected weight change.   HENT:  Negative for sinus pressure/congestion and sore throat.    Eyes:  Negative for visual disturbance.   Respiratory:  Positive for shortness of breath. Negative for cough, chest tightness and wheezing.    Cardiovascular:  Negative for chest pain, palpitations and leg swelling.   Gastrointestinal:  Negative for abdominal pain, blood in stool, nausea and vomiting.   Genitourinary:  Negative for dysuria, hematuria and urgency.   Musculoskeletal:  Negative for arthralgias, back pain, gait problem, joint swelling, myalgias and neck stiffness.   Integumentary:  Negative for color change and rash.   Neurological:  Negative for dizziness, syncope, weakness, light-headedness, numbness and headaches.   Psychiatric/Behavioral:  Positive for dysphoric mood. Negative for sleep disturbance and suicidal ideas. The patient is not nervous/anxious.             Physical Exam  Vitals and nursing note reviewed.   Constitutional:       General: He is not in  acute distress.     Appearance: Normal appearance. He is well-developed.   HENT:      Head: Normocephalic and atraumatic.   Eyes:      General: No scleral icterus.     Extraocular Movements: Extraocular movements intact.      Conjunctiva/sclera: Conjunctivae normal.   Neck:      Thyroid: No thyromegaly.      Vascular: No JVD.   Cardiovascular:      Rate and Rhythm: Normal rate and regular rhythm.      Heart sounds: Normal heart sounds. No murmur heard.     No friction rub. No gallop.   Pulmonary:      Effort: Pulmonary effort is normal. No respiratory distress.      Breath sounds: Normal breath sounds. No wheezing or rales.   Abdominal:      General: Bowel sounds are normal.      Palpations: Abdomen is soft. There is no mass.      Tenderness: There is no abdominal tenderness.   Musculoskeletal:         General: Deformity present. No tenderness. Normal range of motion.      Cervical back: Normal range of motion and neck supple.      Right lower leg: No edema.      Left lower leg: No edema.      Comments: Hands:  Hypertrophic joint changes are noted of the thumbs.  Heberden's and Aline's nodes are present bilaterally.  Thenar muscle atrophy is noted in the right hand.   Lymphadenopathy:      Cervical: No cervical adenopathy.   Skin:     General: Skin is warm and dry.      Findings: No rash.   Neurological:      Mental Status: He is alert and oriented to person, place, and time.      Comments: Gait is normal.   Psychiatric:         Mood and Affect: Mood normal.         Behavior: Behavior normal.           Lab Visit on 09/11/2023   Component Date Value Ref Range Status    Ferritin 09/11/2023 252  20.0 - 300.0 ng/mL Final    Retic 09/11/2023 1.1  0.4 - 2.0 % Final    Iron 09/11/2023 104  45 - 160 ug/dL Final    Transferrin 09/11/2023 223  200 - 375 mg/dL Final    TIBC 09/11/2023 330  250 - 450 ug/dL Final    Saturated Iron 09/11/2023 32  20 - 50 % Final   Lab Visit on 09/06/2023   Component Date Value Ref Range Status     Sodium 09/06/2023 142  136 - 145 mmol/L Final    Potassium 09/06/2023 4.7  3.5 - 5.1 mmol/L Final    Chloride 09/06/2023 107  95 - 110 mmol/L Final    CO2 09/06/2023 24  23 - 29 mmol/L Final    Glucose 09/06/2023 113 (H)  70 - 110 mg/dL Final    BUN 09/06/2023 22  8 - 23 mg/dL Final    Creatinine 09/06/2023 1.3  0.5 - 1.4 mg/dL Final    Calcium 09/06/2023 9.7  8.7 - 10.5 mg/dL Final    Total Protein 09/06/2023 7.3  6.0 - 8.4 g/dL Final    Albumin 09/06/2023 4.2  3.5 - 5.2 g/dL Final    Total Bilirubin 09/06/2023 0.7  0.1 - 1.0 mg/dL Final    Comment: For infants and newborns, interpretation of results should be based  on gestational age, weight and in agreement with clinical  observations.    Premature Infant recommended reference ranges:  Up to 24 hours.............<8.0 mg/dL  Up to 48 hours............<12.0 mg/dL  3-5 days..................<15.0 mg/dL  6-29 days.................<15.0 mg/dL      Alkaline Phosphatase 09/06/2023 58  55 - 135 U/L Final    AST 09/06/2023 27  10 - 40 U/L Final    ALT 09/06/2023 24  10 - 44 U/L Final    eGFR 09/06/2023 56.6 (A)  >60 mL/min/1.73 m^2 Final    Anion Gap 09/06/2023 11  8 - 16 mmol/L Final    Cholesterol 09/06/2023 120  120 - 199 mg/dL Final    Comment: The National Cholesterol Education Program (NCEP) has set the  following guidelines (reference ranges) for Cholesterol:  Optimal.....................<200 mg/dL  Borderline High.............200-239 mg/dL  High........................> or = 240 mg/dL      Triglycerides 09/06/2023 57  30 - 150 mg/dL Final    Comment: The National Cholesterol Education Program (NCEP) has set the  following guidelines (reference values) for triglycerides:  Normal......................<150 mg/dL  Borderline High.............150-199 mg/dL  High........................200-499 mg/dL      HDL 09/06/2023 52  40 - 75 mg/dL Final    Comment: The National Cholesterol Education Program (NCEP) has set the  following guidelines (reference values) for HDL  Cholesterol:  Low...............<40 mg/dL  Optimal...........>60 mg/dL      LDL Cholesterol 09/06/2023 56.6 (L)  63.0 - 159.0 mg/dL Final    Comment: The National Cholesterol Education Program (NCEP) has set the  following guidelines (reference values) for LDL Cholesterol:  Optimal.......................<130 mg/dL  Borderline High...............130-159 mg/dL  High..........................160-189 mg/dL  Very High.....................>190 mg/dL      HDL/Cholesterol Ratio 09/06/2023 43.3  20.0 - 50.0 % Final    Total Cholesterol/HDL Ratio 09/06/2023 2.3  2.0 - 5.0 Final    Non-HDL Cholesterol 09/06/2023 68  mg/dL Final    Comment: Risk category and Non-HDL cholesterol goals:  Coronary heart disease (CHD)or equivalent (10-year risk of CHD >20%):  Non-HDL cholesterol goal     <130 mg/dL  Two or more CHD risk factors and 10-year risk of CHD <= 20%:  Non-HDL cholesterol goal     <160 mg/dL  0 to 1 CHD risk factor:  Non-HDL cholesterol goal     <190 mg/dL      WBC 09/06/2023 5.82  3.90 - 12.70 K/uL Final    RBC 09/06/2023 5.00  4.60 - 6.20 M/uL Final    Hemoglobin 09/06/2023 11.9 (L)  14.0 - 18.0 g/dL Final    Hematocrit 09/06/2023 38.2 (L)  40.0 - 54.0 % Final    MCV 09/06/2023 76 (L)  82 - 98 fL Final    MCH 09/06/2023 23.8 (L)  27.0 - 31.0 pg Final    MCHC 09/06/2023 31.2 (L)  32.0 - 36.0 g/dL Final    RDW 09/06/2023 16.4 (H)  11.5 - 14.5 % Final    Platelets 09/06/2023 183  150 - 450 K/uL Final    MPV 09/06/2023 10.5  9.2 - 12.9 fL Final    Immature Granulocytes 09/06/2023 0.2  0.0 - 0.5 % Final    Gran # (ANC) 09/06/2023 2.7  1.8 - 7.7 K/uL Final    Immature Grans (Abs) 09/06/2023 0.01  0.00 - 0.04 K/uL Final    Comment: Mild elevation in immature granulocytes is non specific and   can be seen in a variety of conditions including stress response,   acute inflammation, trauma and pregnancy. Correlation with other   laboratory and clinical findings is essential.      Lymph # 09/06/2023 2.4  1.0 - 4.8 K/uL Final    Mono  # 09/06/2023 0.6  0.3 - 1.0 K/uL Final    Eos # 09/06/2023 0.1  0.0 - 0.5 K/uL Final    Baso # 09/06/2023 0.05  0.00 - 0.20 K/uL Final    nRBC 09/06/2023 0  0 /100 WBC Final    Gran % 09/06/2023 45.9  38.0 - 73.0 % Final    Lymph % 09/06/2023 41.1  18.0 - 48.0 % Final    Mono % 09/06/2023 9.5  4.0 - 15.0 % Final    Eosinophil % 09/06/2023 2.4  0.0 - 8.0 % Final    Basophil % 09/06/2023 0.9  0.0 - 1.9 % Final    Differential Method 09/06/2023 Automated   Final    TSH 09/06/2023 1.938  0.400 - 4.000 uIU/mL Final    PSA Diagnostic 09/06/2023 <0.01  0.00 - 4.00 ng/mL Final    Comment: The testing method is a chemiluminescent microparticle immunoassay   manufactured by [employer] and performed on the [employer]   or   [employer] system. Values obtained with different assay manufacturers   for   methods may be different and cannot be used interchangeably.  PSA Expected levels:  Hormonal Therapy: <0.05 ng/ml  Prostatectomy: <0.01 ng/ml  Radiation Therapy: <1.00 ng/ml      Vitamin B-12 09/06/2023 1245 (H)  210 - 950 pg/mL Final    Hemoglobin A1C 09/06/2023 5.7 (H)  4.0 - 5.6 % Final    Comment: ADA Screening Guidelines:  5.7-6.4%  Consistent with prediabetes  >or=6.5%  Consistent with diabetes    High levels of fetal hemoglobin interfere with the HbA1C  assay. Heterozygous hemoglobin variants (HbS, HgC, etc)do  not significantly interfere with this assay.   However, presence of multiple variants may affect accuracy.      Estimated Avg Glucose 09/06/2023 117  68 - 131 mg/dL Final    Specimen UA 09/06/2023 Urine, Clean Catch   Final    Color, UA 09/06/2023 Yellow  Yellow, Straw, Lanette Final    Appearance, UA 09/06/2023 Clear  Clear Final    pH, UA 09/06/2023 6.0  5.0 - 8.0 Final    Specific Gravity, UA 09/06/2023 1.020  1.005 - 1.030 Final    Protein, UA 09/06/2023 Negative  Negative Final    Comment: Recommend a 24 hour urine protein or a urine   protein/creatinine ratio if globulin induced proteinuria is  clinically  suspected.      Glucose, UA 09/06/2023 3+ (A)  Negative Final    Ketones, UA 09/06/2023 Negative  Negative Final    Bilirubin (UA) 09/06/2023 Negative  Negative Final    Occult Blood UA 09/06/2023 Negative  Negative Final    Nitrite, UA 09/06/2023 Negative  Negative Final    Leukocytes, UA 09/06/2023 Negative  Negative Final    RBC, UA 09/06/2023 1  0 - 4 /hpf Final    WBC, UA 09/06/2023 1  0 - 5 /hpf Final    Bacteria 09/06/2023 None  None-Occ /hpf Final    Yeast, UA 09/06/2023 None  None Final    Microscopic Comment 09/06/2023 SEE COMMENT   Final    Comment: Other formed elements not mentioned in the report are not   present in the microscopic examination.          Assessment & Plan:      Sidney was seen today for follow-up, hypertension and congestive heart failure.  Additional anemia workup will be ordered.  Current medications will be continued for treatment of hypertension and heart failure.    Protonix will be ordered for treatment of reflux.    Laboratory studies will be repeated in 4 months.    Total visit time was 40 minutes.    Diagnoses and all orders for this visit:    Essential hypertension  -     Comprehensive Metabolic Panel; Future  -     CBC Auto Differential; Future    HFrEF (heart failure with reduced ejection fraction)  -     spironolactone (ALDACTONE) 25 MG tablet; Take 1 tablet (25 mg total) by mouth once daily.  -     Comprehensive Metabolic Panel; Future  -     CBC Auto Differential; Future    Anemia, unspecified type  -     Ferritin; Future  -     Reticulocytes; Future  -     Iron and TIBC; Future  -     Comprehensive Metabolic Panel; Future  -     CBC Auto Differential; Future    Primary osteoarthritis involving multiple joints    Gastroesophageal reflux disease, unspecified whether esophagitis present    Other orders  -     empagliflozin (JARDIANCE) 10 mg tablet; Take 1 tablet (10 mg total) by mouth once daily.  -     atorvastatin (LIPITOR) 40 MG tablet; Take 1 tablet (40 mg total) by  mouth every evening.  -     pantoprazole (PROTONIX) 40 MG tablet; Take 1 tablet (40 mg total) by mouth once daily. For reflux.         Follow up in about 4 months (around 1/11/2024).     Sunny Soto MD

## 2023-11-07 ENCOUNTER — IMMUNIZATION (OUTPATIENT)
Dept: INTERNAL MEDICINE | Facility: CLINIC | Age: 78
End: 2023-11-07
Payer: MEDICARE

## 2023-11-07 PROCEDURE — 90694 FLU VACCINE - QUADRIVALENT - ADJUVANTED: ICD-10-PCS | Mod: S$GLB,,, | Performed by: INTERNAL MEDICINE

## 2023-11-07 PROCEDURE — 90694 VACC AIIV4 NO PRSRV 0.5ML IM: CPT | Mod: S$GLB,,, | Performed by: INTERNAL MEDICINE

## 2023-11-07 PROCEDURE — G0008 FLU VACCINE - QUADRIVALENT - ADJUVANTED: ICD-10-PCS | Mod: S$GLB,,, | Performed by: INTERNAL MEDICINE

## 2023-11-07 PROCEDURE — G0008 ADMIN INFLUENZA VIRUS VAC: HCPCS | Mod: S$GLB,,, | Performed by: INTERNAL MEDICINE

## 2023-11-29 ENCOUNTER — IMMUNIZATION (OUTPATIENT)
Dept: INTERNAL MEDICINE | Facility: CLINIC | Age: 78
End: 2023-11-29
Payer: MEDICARE

## 2023-11-29 DIAGNOSIS — Z23 NEED FOR VACCINATION: Primary | ICD-10-CM

## 2023-11-29 PROCEDURE — 90480 ADMN SARSCOV2 VAC 1/ONLY CMP: CPT | Mod: S$GLB,,, | Performed by: INTERNAL MEDICINE

## 2023-11-29 PROCEDURE — 90480 COVID-19 VAC, MRNA 2023 (MODERNA)(PF) 50 MCG/0.5 ML IM SUSR (12+): ICD-10-PCS | Mod: S$GLB,,, | Performed by: INTERNAL MEDICINE

## 2023-11-29 PROCEDURE — 91322 SARSCOV2 VAC 50 MCG/0.5ML IM: CPT | Mod: S$GLB,,, | Performed by: INTERNAL MEDICINE

## 2023-11-29 PROCEDURE — 91322 COVID-19 VAC, MRNA 2023 (MODERNA)(PF) 50 MCG/0.5 ML IM SUSR (12+): ICD-10-PCS | Mod: S$GLB,,, | Performed by: INTERNAL MEDICINE

## 2023-12-04 ENCOUNTER — CLINICAL SUPPORT (OUTPATIENT)
Dept: CARDIOLOGY | Facility: HOSPITAL | Age: 78
End: 2023-12-04
Payer: MEDICARE

## 2023-12-04 ENCOUNTER — CLINICAL SUPPORT (OUTPATIENT)
Dept: CARDIOLOGY | Facility: HOSPITAL | Age: 78
End: 2023-12-04
Attending: INTERNAL MEDICINE
Payer: MEDICARE

## 2023-12-04 DIAGNOSIS — Z95.810 PRESENCE OF AUTOMATIC (IMPLANTABLE) CARDIAC DEFIBRILLATOR: ICD-10-CM

## 2023-12-04 PROCEDURE — 93295 DEV INTERROG REMOTE 1/2/MLT: CPT | Mod: ,,, | Performed by: INTERNAL MEDICINE

## 2023-12-04 PROCEDURE — 93295 CARDIAC DEVICE CHECK CHECK - REMOTE ALERT: ICD-10-PCS | Mod: ,,, | Performed by: INTERNAL MEDICINE

## 2023-12-04 PROCEDURE — 93296 REM INTERROG EVL PM/IDS: CPT | Performed by: INTERNAL MEDICINE

## 2023-12-08 LAB
OHS CV AF BURDEN PERCENT: < 1
OHS CV DC REMOTE DEVICE TYPE: NORMAL
OHS CV ICD SHOCK: NO
OHS CV RV PACING PERCENT: 1 %

## 2023-12-20 DIAGNOSIS — I50.20 HFREF (HEART FAILURE WITH REDUCED EJECTION FRACTION): ICD-10-CM

## 2023-12-20 RX ORDER — ASPIRIN 81 MG/1
81 TABLET ORAL DAILY
Qty: 90 TABLET | Refills: 3 | Status: SHIPPED | OUTPATIENT
Start: 2023-12-20 | End: 2024-12-19

## 2023-12-20 RX ORDER — CARVEDILOL 25 MG/1
25 TABLET ORAL 2 TIMES DAILY WITH MEALS
Qty: 180 TABLET | Refills: 3 | Status: SHIPPED | OUTPATIENT
Start: 2023-12-20 | End: 2024-12-19

## 2023-12-20 NOTE — TELEPHONE ENCOUNTER
----- Message from Stefanie Asher sent at 12/20/2023  1:58 PM CST -----  Regarding: Refill  Pt requesting refill on his Coreg 25 mg and Aspirin 81 mg and he states been trying to get these meds filled with no replies.    LOV 3/30/23 GERALD Garcia    [facility] - [place], [place] - [address] [facility]   Phone: [phone]  Fax: [phone]      Thanks

## 2024-01-01 NOTE — NURSING
Discharge instructions reviewed with patient and given to take home. IV removed, telemetry box taken off. Follow up appointments reviewed. Pt ambulating independently, all questions addressed. Patient states he will  his medications from [facility] on his way out.   
Patient arrived to the unit. Telemetry box applied and vital signs documented in flow sheet. Fall risk band placed on patient. Oriented to room, call bell with in reach, bed is in low lock position. NPO initiated. Admit completed, plan of care initiated. Will continue to monitor.    
Patient back in room from testing area. Patient in no distress.   
Patient left unit via stretcher with patient escort to testing area. Patient in no distress.    
Patient, spouse, and daughter requesting to speak with MD for update on plan of care. Dr. Km MD paged via hospital . Awaiting callback. Patient/family updated and aware. Patient in no distress.   
Pt came back from US.   
Pt left floor for US via stretcher by transport.   
Report given to PATRIZIA Rice. Nurse verbalized understanding.Stated she had no further questions. Patient in no distress, RN signing off.    
O positive

## 2024-01-09 ENCOUNTER — LAB VISIT (OUTPATIENT)
Dept: LAB | Facility: HOSPITAL | Age: 79
End: 2024-01-09
Payer: MEDICARE

## 2024-01-09 DIAGNOSIS — I10 ESSENTIAL HYPERTENSION: ICD-10-CM

## 2024-01-09 DIAGNOSIS — I50.20 HFREF (HEART FAILURE WITH REDUCED EJECTION FRACTION): ICD-10-CM

## 2024-01-09 DIAGNOSIS — D64.9 ANEMIA, UNSPECIFIED TYPE: ICD-10-CM

## 2024-01-09 LAB
ALBUMIN SERPL BCP-MCNC: 3.9 G/DL (ref 3.5–5.2)
ALP SERPL-CCNC: 50 U/L (ref 55–135)
ALT SERPL W/O P-5'-P-CCNC: 14 U/L (ref 10–44)
ANION GAP SERPL CALC-SCNC: 7 MMOL/L (ref 8–16)
AST SERPL-CCNC: 17 U/L (ref 10–40)
BASOPHILS # BLD AUTO: 0.04 K/UL (ref 0–0.2)
BASOPHILS NFR BLD: 0.5 % (ref 0–1.9)
BILIRUB SERPL-MCNC: 0.7 MG/DL (ref 0.1–1)
BUN SERPL-MCNC: 23 MG/DL (ref 8–23)
CALCIUM SERPL-MCNC: 9 MG/DL (ref 8.7–10.5)
CHLORIDE SERPL-SCNC: 109 MMOL/L (ref 95–110)
CO2 SERPL-SCNC: 26 MMOL/L (ref 23–29)
CREAT SERPL-MCNC: 1.3 MG/DL (ref 0.5–1.4)
DIFFERENTIAL METHOD BLD: ABNORMAL
EOSINOPHIL # BLD AUTO: 0.1 K/UL (ref 0–0.5)
EOSINOPHIL NFR BLD: 1.4 % (ref 0–8)
ERYTHROCYTE [DISTWIDTH] IN BLOOD BY AUTOMATED COUNT: 17.4 % (ref 11.5–14.5)
EST. GFR  (NO RACE VARIABLE): 56.6 ML/MIN/1.73 M^2
GLUCOSE SERPL-MCNC: 98 MG/DL (ref 70–110)
HCT VFR BLD AUTO: 35.3 % (ref 40–54)
HGB BLD-MCNC: 11.3 G/DL (ref 14–18)
IMM GRANULOCYTES # BLD AUTO: 0.02 K/UL (ref 0–0.04)
IMM GRANULOCYTES NFR BLD AUTO: 0.3 % (ref 0–0.5)
LYMPHOCYTES # BLD AUTO: 2.9 K/UL (ref 1–4.8)
LYMPHOCYTES NFR BLD: 38.3 % (ref 18–48)
MCH RBC QN AUTO: 24.2 PG (ref 27–31)
MCHC RBC AUTO-ENTMCNC: 32 G/DL (ref 32–36)
MCV RBC AUTO: 76 FL (ref 82–98)
MONOCYTES # BLD AUTO: 0.5 K/UL (ref 0.3–1)
MONOCYTES NFR BLD: 6.2 % (ref 4–15)
NEUTROPHILS # BLD AUTO: 4 K/UL (ref 1.8–7.7)
NEUTROPHILS NFR BLD: 53.3 % (ref 38–73)
NRBC BLD-RTO: 0 /100 WBC
PLATELET # BLD AUTO: 188 K/UL (ref 150–450)
PMV BLD AUTO: 11 FL (ref 9.2–12.9)
POTASSIUM SERPL-SCNC: 4.1 MMOL/L (ref 3.5–5.1)
PROT SERPL-MCNC: 6.7 G/DL (ref 6–8.4)
RBC # BLD AUTO: 4.66 M/UL (ref 4.6–6.2)
SODIUM SERPL-SCNC: 142 MMOL/L (ref 136–145)
WBC # BLD AUTO: 7.59 K/UL (ref 3.9–12.7)

## 2024-01-09 PROCEDURE — 80053 COMPREHEN METABOLIC PANEL: CPT | Performed by: INTERNAL MEDICINE

## 2024-01-09 PROCEDURE — 36415 COLL VENOUS BLD VENIPUNCTURE: CPT | Performed by: INTERNAL MEDICINE

## 2024-01-09 PROCEDURE — 85025 COMPLETE CBC W/AUTO DIFF WBC: CPT | Performed by: INTERNAL MEDICINE

## 2024-01-18 ENCOUNTER — OFFICE VISIT (OUTPATIENT)
Dept: INTERNAL MEDICINE | Facility: CLINIC | Age: 79
End: 2024-01-18
Payer: MEDICARE

## 2024-01-18 VITALS
WEIGHT: 145 LBS | RESPIRATION RATE: 16 BRPM | DIASTOLIC BLOOD PRESSURE: 68 MMHG | BODY MASS INDEX: 21.48 KG/M2 | SYSTOLIC BLOOD PRESSURE: 126 MMHG | TEMPERATURE: 98 F | HEART RATE: 64 BPM | HEIGHT: 69 IN

## 2024-01-18 DIAGNOSIS — R79.9 ABNORMAL FINDING OF BLOOD CHEMISTRY, UNSPECIFIED: ICD-10-CM

## 2024-01-18 DIAGNOSIS — K21.9 GASTROESOPHAGEAL REFLUX DISEASE, UNSPECIFIED WHETHER ESOPHAGITIS PRESENT: ICD-10-CM

## 2024-01-18 DIAGNOSIS — I50.43 ACUTE ON CHRONIC COMBINED SYSTOLIC (CONGESTIVE) AND DIASTOLIC (CONGESTIVE) HEART FAILURE: ICD-10-CM

## 2024-01-18 DIAGNOSIS — I10 ESSENTIAL HYPERTENSION: Primary | ICD-10-CM

## 2024-01-18 DIAGNOSIS — N18.31 CHRONIC KIDNEY DISEASE, STAGE 3A: ICD-10-CM

## 2024-01-18 DIAGNOSIS — I50.20 HFREF (HEART FAILURE WITH REDUCED EJECTION FRACTION): ICD-10-CM

## 2024-01-18 PROBLEM — I47.29 NON-SUSTAINED VENTRICULAR TACHYCARDIA: Status: RESOLVED | Noted: 2022-10-04 | Resolved: 2024-01-18

## 2024-01-18 PROCEDURE — 1126F AMNT PAIN NOTED NONE PRSNT: CPT | Mod: CPTII,S$GLB,, | Performed by: INTERNAL MEDICINE

## 2024-01-18 PROCEDURE — 3288F FALL RISK ASSESSMENT DOCD: CPT | Mod: CPTII,S$GLB,, | Performed by: INTERNAL MEDICINE

## 2024-01-18 PROCEDURE — 3074F SYST BP LT 130 MM HG: CPT | Mod: CPTII,S$GLB,, | Performed by: INTERNAL MEDICINE

## 2024-01-18 PROCEDURE — 1101F PT FALLS ASSESS-DOCD LE1/YR: CPT | Mod: CPTII,S$GLB,, | Performed by: INTERNAL MEDICINE

## 2024-01-18 PROCEDURE — 3078F DIAST BP <80 MM HG: CPT | Mod: CPTII,S$GLB,, | Performed by: INTERNAL MEDICINE

## 2024-01-18 PROCEDURE — 1160F RVW MEDS BY RX/DR IN RCRD: CPT | Mod: CPTII,S$GLB,, | Performed by: INTERNAL MEDICINE

## 2024-01-18 PROCEDURE — 99999 PR PBB SHADOW E&M-EST. PATIENT-LVL IV: CPT | Mod: PBBFAC,,, | Performed by: INTERNAL MEDICINE

## 2024-01-18 PROCEDURE — 99214 OFFICE O/P EST MOD 30 MIN: CPT | Mod: S$GLB,,, | Performed by: INTERNAL MEDICINE

## 2024-01-18 PROCEDURE — 1159F MED LIST DOCD IN RCRD: CPT | Mod: CPTII,S$GLB,, | Performed by: INTERNAL MEDICINE

## 2024-01-21 NOTE — PROGRESS NOTES
Subjective:       Patient ID: Sidney Lanza is a 78 y.o. male.    Chief Complaint: Hypertension (With labs printed )    HPI   The patient presents for follow-up of medical conditions. The patient has been treated for hypertension, chronic kidney disease, and GERD.  The patient does not monitor blood pressures but he is tolerating his medication well without side effects.  The patient states his exercise tolerance is variable.  Decreased energy is reported.  No PND, orthopnea, chest pain, or edema is noted.  Occasional episodes of nocturia reported.  He denies having any morning headache or dizziness.    Review of Systems   Constitutional:  Positive for fatigue. Negative for activity change, appetite change and unexpected weight change.   HENT:  Negative for sinus pressure/congestion and sore throat.    Eyes:  Negative for visual disturbance.   Respiratory:  Negative for cough, chest tightness, shortness of breath and wheezing.    Cardiovascular:  Negative for chest pain, palpitations and leg swelling.   Gastrointestinal:  Negative for abdominal pain, blood in stool, nausea and vomiting.   Genitourinary:  Negative for dysuria, hematuria and urgency.   Musculoskeletal:  Negative for arthralgias, back pain, gait problem, joint swelling, myalgias and neck stiffness.   Integumentary:  Negative for color change and rash.   Neurological:  Negative for dizziness, syncope, weakness, light-headedness, numbness and headaches.   Psychiatric/Behavioral:  Negative for sleep disturbance.             Physical Exam  Vitals and nursing note reviewed.   Constitutional:       General: He is not in acute distress.     Appearance: Normal appearance. He is well-developed.   HENT:      Head: Normocephalic and atraumatic.   Eyes:      General: No scleral icterus.     Extraocular Movements: Extraocular movements intact.      Conjunctiva/sclera: Conjunctivae normal.   Neck:      Thyroid: No thyromegaly.      Vascular: No JVD.    Cardiovascular:      Rate and Rhythm: Normal rate and regular rhythm.      Heart sounds: Normal heart sounds. No murmur heard.     No friction rub. No gallop.   Pulmonary:      Effort: Pulmonary effort is normal. No respiratory distress.      Breath sounds: Normal breath sounds. No wheezing or rales.   Abdominal:      General: Bowel sounds are normal.      Palpations: Abdomen is soft. There is no mass.      Tenderness: There is no abdominal tenderness.   Musculoskeletal:         General: No tenderness. Normal range of motion.      Cervical back: Normal range of motion and neck supple.      Right lower leg: No edema.      Left lower leg: No edema.   Lymphadenopathy:      Cervical: No cervical adenopathy.   Skin:     General: Skin is warm and dry.      Findings: No rash.   Neurological:      Mental Status: He is alert and oriented to person, place, and time.      Comments: Gait is normal.   Psychiatric:         Mood and Affect: Mood normal.         Behavior: Behavior normal.           Lab Visit on 01/09/2024   Component Date Value Ref Range Status    Sodium 01/09/2024 142  136 - 145 mmol/L Final    Potassium 01/09/2024 4.1  3.5 - 5.1 mmol/L Final    Chloride 01/09/2024 109  95 - 110 mmol/L Final    CO2 01/09/2024 26  23 - 29 mmol/L Final    Glucose 01/09/2024 98  70 - 110 mg/dL Final    BUN 01/09/2024 23  8 - 23 mg/dL Final    Creatinine 01/09/2024 1.3  0.5 - 1.4 mg/dL Final    Calcium 01/09/2024 9.0  8.7 - 10.5 mg/dL Final    Total Protein 01/09/2024 6.7  6.0 - 8.4 g/dL Final    Albumin 01/09/2024 3.9  3.5 - 5.2 g/dL Final    Total Bilirubin 01/09/2024 0.7  0.1 - 1.0 mg/dL Final    Comment: For infants and newborns, interpretation of results should be based  on gestational age, weight and in agreement with clinical  observations.    Premature Infant recommended reference ranges:  Up to 24 hours.............<8.0 mg/dL  Up to 48 hours............<12.0 mg/dL  3-5 days..................<15.0 mg/dL  6-29  days.................<15.0 mg/dL      Alkaline Phosphatase 01/09/2024 50 (L)  55 - 135 U/L Final    AST 01/09/2024 17  10 - 40 U/L Final    ALT 01/09/2024 14  10 - 44 U/L Final    eGFR 01/09/2024 56.6 (A)  >60 mL/min/1.73 m^2 Final    Anion Gap 01/09/2024 7 (L)  8 - 16 mmol/L Final    WBC 01/09/2024 7.59  3.90 - 12.70 K/uL Final    RBC 01/09/2024 4.66  4.60 - 6.20 M/uL Final    Hemoglobin 01/09/2024 11.3 (L)  14.0 - 18.0 g/dL Final    Hematocrit 01/09/2024 35.3 (L)  40.0 - 54.0 % Final    MCV 01/09/2024 76 (L)  82 - 98 fL Final    MCH 01/09/2024 24.2 (L)  27.0 - 31.0 pg Final    MCHC 01/09/2024 32.0  32.0 - 36.0 g/dL Final    RDW 01/09/2024 17.4 (H)  11.5 - 14.5 % Final    Platelets 01/09/2024 188  150 - 450 K/uL Final    MPV 01/09/2024 11.0  9.2 - 12.9 fL Final    Immature Granulocytes 01/09/2024 0.3  0.0 - 0.5 % Final    Gran # (ANC) 01/09/2024 4.0  1.8 - 7.7 K/uL Final    Immature Grans (Abs) 01/09/2024 0.02  0.00 - 0.04 K/uL Final    Comment: Mild elevation in immature granulocytes is non specific and   can be seen in a variety of conditions including stress response,   acute inflammation, trauma and pregnancy. Correlation with other   laboratory and clinical findings is essential.      Lymph # 01/09/2024 2.9  1.0 - 4.8 K/uL Final    Mono # 01/09/2024 0.5  0.3 - 1.0 K/uL Final    Eos # 01/09/2024 0.1  0.0 - 0.5 K/uL Final    Baso # 01/09/2024 0.04  0.00 - 0.20 K/uL Final    nRBC 01/09/2024 0  0 /100 WBC Final    Gran % 01/09/2024 53.3  38.0 - 73.0 % Final    Lymph % 01/09/2024 38.3  18.0 - 48.0 % Final    Mono % 01/09/2024 6.2  4.0 - 15.0 % Final    Eosinophil % 01/09/2024 1.4  0.0 - 8.0 % Final    Basophil % 01/09/2024 0.5  0.0 - 1.9 % Final    Differential Method 01/09/2024 Automated   Final   Clinical Support on 12/04/2023   Component Date Value Ref Range Status    ICD Shock 12/04/2023 No   Final    Device Type 12/04/2023 ICD   Final    AF [place] % 12/04/2023 < 1   Final    RV Paccing % 12/04/2023 1  % Final        Assessment & Plan:      Sidney was seen today for hypertension.  Current therapy will be continued for management of hypertension.  The patient will continue his daily exercises at home.  A BMP will be obtained in 3 months for follow-up of kidney function.    Diagnoses and all orders for this visit:    Essential hypertension  -     Basic Metabolic Panel; Future  -     Comprehensive Metabolic Panel; Future  -     Lipid Panel; Future  -     CBC Auto Differential; Future  -     Hemoglobin A1C; Future    Chronic kidney disease, stage 3a  -     Basic Metabolic Panel; Future  -     Comprehensive Metabolic Panel; Future  -     Lipid Panel; Future  -     CBC Auto Differential; Future  -     Hemoglobin A1C; Future    Gastroesophageal reflux disease, unspecified whether esophagitis present    HFrEF (heart failure with reduced ejection fraction)  -     Basic Metabolic Panel; Future  -     Comprehensive Metabolic Panel; Future  -     Lipid Panel; Future  -     CBC Auto Differential; Future  -     Hemoglobin A1C; Future    Acute on chronic combined systolic (congestive) and diastolic (congestive) heart failure  -     Basic Metabolic Panel; Future  -     Comprehensive Metabolic Panel; Future  -     Lipid Panel; Future  -     CBC Auto Differential; Future  -     Hemoglobin A1C; Future    Abnormal finding of blood chemistry, unspecified  -     Hemoglobin A1C; Future         Follow up in about 6 months (around 7/18/2024).     Sunny Soto MD

## 2024-01-30 ENCOUNTER — OFFICE VISIT (OUTPATIENT)
Dept: CARDIOLOGY | Facility: CLINIC | Age: 79
End: 2024-01-30
Payer: MEDICARE

## 2024-01-30 VITALS
OXYGEN SATURATION: 100 % | BODY MASS INDEX: 21.87 KG/M2 | HEART RATE: 60 BPM | DIASTOLIC BLOOD PRESSURE: 66 MMHG | WEIGHT: 147.69 LBS | HEIGHT: 69 IN | SYSTOLIC BLOOD PRESSURE: 106 MMHG

## 2024-01-30 DIAGNOSIS — I42.8 NONISCHEMIC CARDIOMYOPATHY: ICD-10-CM

## 2024-01-30 DIAGNOSIS — I10 ESSENTIAL HYPERTENSION: Primary | Chronic | ICD-10-CM

## 2024-01-30 DIAGNOSIS — I50.42 CHRONIC COMBINED SYSTOLIC AND DIASTOLIC CHF (CONGESTIVE HEART FAILURE): ICD-10-CM

## 2024-01-30 DIAGNOSIS — N18.31 CHRONIC KIDNEY DISEASE, STAGE 3A: ICD-10-CM

## 2024-01-30 DIAGNOSIS — E78.49 OTHER HYPERLIPIDEMIA: ICD-10-CM

## 2024-01-30 PROCEDURE — 1159F MED LIST DOCD IN RCRD: CPT | Mod: CPTII,S$GLB,, | Performed by: PHYSICIAN ASSISTANT

## 2024-01-30 PROCEDURE — 99214 OFFICE O/P EST MOD 30 MIN: CPT | Mod: S$GLB,,, | Performed by: PHYSICIAN ASSISTANT

## 2024-01-30 PROCEDURE — 1101F PT FALLS ASSESS-DOCD LE1/YR: CPT | Mod: CPTII,S$GLB,, | Performed by: PHYSICIAN ASSISTANT

## 2024-01-30 PROCEDURE — 1126F AMNT PAIN NOTED NONE PRSNT: CPT | Mod: CPTII,S$GLB,, | Performed by: PHYSICIAN ASSISTANT

## 2024-01-30 PROCEDURE — 3078F DIAST BP <80 MM HG: CPT | Mod: CPTII,S$GLB,, | Performed by: PHYSICIAN ASSISTANT

## 2024-01-30 PROCEDURE — 1160F RVW MEDS BY RX/DR IN RCRD: CPT | Mod: CPTII,S$GLB,, | Performed by: PHYSICIAN ASSISTANT

## 2024-01-30 PROCEDURE — 3288F FALL RISK ASSESSMENT DOCD: CPT | Mod: CPTII,S$GLB,, | Performed by: PHYSICIAN ASSISTANT

## 2024-01-30 PROCEDURE — 99999 PR PBB SHADOW E&M-EST. PATIENT-LVL IV: CPT | Mod: PBBFAC,,, | Performed by: PHYSICIAN ASSISTANT

## 2024-01-30 PROCEDURE — 3074F SYST BP LT 130 MM HG: CPT | Mod: CPTII,S$GLB,, | Performed by: PHYSICIAN ASSISTANT

## 2024-01-30 NOTE — PROGRESS NOTES
"    General Cardiology Clinic Note  Reason for Visit: Follow up   Last Clinic Visit: 3/30/2023  General Cardiologist: Dr. Ayon    HPI:   Sidney Lanza is a 78 y.o. male who presents for follow up.    Problems:  Nonischemic cardiomyopathy  HFrEF  NSVT s/p ICD  Hypertension  Hyperlipidemia     HPI   Patient presents for follow up. He does report BOYD with moderate exertion. May be slightly better since last visit. He can walk at least 1/2 mile before needing to stop, but did have to give up weed eating because it was too difficult for him. He is still able to keep up with all the cleaning around the house. Denies orthopnea, PND, edema, weight gain. He has some lightheadedness only if he stands quickly, so he stopped standing quickly. He denies syncope and near syncope. Walks about 15 minutes carrying light weights twice a week.     3/30/2023 HPI   Patient presents for follow up. Main complaint today is of "gurgling", discomfort, and excessive gas after eating. States is consistently happens after every meal. He has tried Pepto Bismal which did help, but is wondering what is safe to take on a regular basis. Denies nausea, vomiting, diarrhea, constipation. From a cardiac standpoint, he is doing better. He now only has SOB with moderate-strenuous exertion (mopping, cutting the grass, walking more than 3 blocks). He denies orthopnea, PND, pedal edema. He weighs himself at home and weight fluctuates between 142-144 lbs. He denies CP, palpitations, syncope, near syncope. He has not checked his BP recently, but previously was ranging from 95-110s systolic. He is asymptomatic when it is in the 90s systolic. He is walking laps in his driveway for exercise, walks about 6-8 laps (could do > 20 before HF diagnosis).    1/30/2023 HPI (Dr. Ayon)  Sidney Lanza is a 77 y.o. male who presents for follow-up of heart failure w reduced EF  .He developed abdominal discomfort and shortness of breath. He had abdominal bloating " that he felt was gas, lost his appetite and also felt short of breath.  He had side effects with Jardiance but then ran out of it for about a week.  When he resumed it the side effects did not recur.    11/30/2022 [facility] [place]  Patient presents for follow up. He states shortly after has last appt, he developed a rash, nipple soreness, and a yeast infection in his groin. These side effects lasted about a week before starting to subside. He ended up running out of the Jardiance due to insurance issues, and the yeast infection went away completely. He just started the Jardiance again a few days ago, and so far has not had issues. He still has BOYD with mild exertion. There has been no significant change in this. He has learned to modify his behavior and slow down to avoid dyspnea. He had one night of orthopnea and PND following his last appt, so he just started taking the Lasix daily after that. He denies edema, sudden weight gain, chest pain, syncope, palpitations. He has occasional lightheadedness with standing quickly. BP at home is 110s systolic on average and HR around 75.     10/20/2022 [facility] [place]  Patient presents for follow up and up-titration of GDMT. He reports BOYD with walking one block. He reports PND and orthopnea, which seemed to be worsening three nights ago, so the past two nights he took Lasix which helped. He denies pedal edema, palpitations, chest pain, lightheadedness, syncope, weight gain. He has not been checking his BP regularly at home or doing daily weights.     ROS:      Review of Systems   Constitutional: Negative for diaphoresis, malaise/fatigue, weight gain and weight loss.   HENT:  Negative for nosebleeds.    Eyes:  Negative for vision loss in left eye, vision loss in right eye and visual disturbance.   Cardiovascular:  Positive for dyspnea on exertion. Negative for chest pain, claudication, irregular heartbeat, leg swelling, near-syncope, orthopnea, palpitations, paroxysmal nocturnal dyspnea and syncope.    Respiratory:  Positive for shortness of breath. Negative for cough, sleep disturbances due to breathing, snoring and wheezing.    Hematologic/Lymphatic: Negative for bleeding problem. Does not bruise/bleed easily.   Skin:  Negative for poor wound healing and rash.   Musculoskeletal:  Negative for muscle cramps and myalgias.   Gastrointestinal:  Negative for bloating, abdominal pain, diarrhea, flatus, heartburn, melena, nausea and vomiting.   Genitourinary:  Negative for hematuria and nocturia.   Neurological:  Negative for brief paralysis, dizziness, headaches, light-headedness, numbness and weakness.   Psychiatric/Behavioral:  Negative for depression.    Allergic/Immunologic: Negative for hives.       PMH:     Past Medical History:   Diagnosis Date    Arthritis     Chronic rhinitis     Fever blister     Heart failure, unspecified     Hyperlipidemia     Hypertension     Impotence of organic origin     Joint pain     Nuclear sclerosis - Both Eyes 10/23/2013    Prostate cancer     Ulcer     NSAID related     Varicose vein of leg     bilateral legs     Past Surgical History:   Procedure Laterality Date    CATARACT EXTRACTION W/  INTRAOCULAR LENS IMPLANT Right 11/22/2022    Procedure: EXTRACTION, CATARACT, WITH IOL INSERTION;  Surgeon: Estella Silva MD;  Location: [facility];  Service: Ophthalmology;  Laterality: Right;    CATARACT EXTRACTION W/  INTRAOCULAR LENS IMPLANT Left 1/11/2023    Procedure: EXTRACTION, CATARACT, WITH IOL INSERTION;  Surgeon: Estella Silva MD;  Location: [facility];  Service: Ophthalmology;  Laterality: Left;    COLONOSCOPY N/A 6/13/2018    Procedure: COLONOSCOPY;  Surgeon: Adonis Stack MD;  Location: [address]);  Service: Endoscopy;  Laterality: N/A;    COLONOSCOPY N/A 4/28/2021    Procedure: COLONOSCOPY;  Surgeon: Steven Watson MD;  Location: [address]);  Service: Endoscopy;  Laterality: N/A;  4/25-[facility] uc-inst [id number]    EYE SURGERY      INSERTION, ICD,  DUAL CHAMBER Left 12/8/2022    Procedure: INSERTION, ICD, DUAL CHAMBER;  Surgeon: Chalino Wahl MD;  Location: [facility];  Service: Cardiology;  Laterality: Left;  NICM, Jovanny, SJM, karen, MB, 3 Prep    KNEE ARTHROSCOPY W/ DEBRIDEMENT      PROSTATE SURGERY  2006    for prostate cancer    SHOULDER ARTHROSCOPY Right 10/2017    RTC repair with patch    ULNAR NERVE TRANSPOSITION       Allergies:   Review of patient's allergies indicates:  No Known Allergies  Medications:     Current Outpatient Medications on File Prior to Visit   Medication Sig Dispense Refill    aspirin (ECOTRIN) 81 MG EC tablet Take 1 tablet (81 mg total) by mouth once daily. 90 tablet 3    atorvastatin (LIPITOR) 40 MG tablet Take 1 tablet (40 mg total) by mouth every evening. 90 tablet 3    carvediloL (COREG) 25 MG tablet Take 1 tablet (25 mg total) by mouth 2 (two) times daily with meals. 180 tablet 3    empagliflozin (JARDIANCE) 10 mg tablet Take 1 tablet (10 mg total) by mouth once daily. 90 tablet 3    FIBER, DEXTRIN, ORAL Take 1 tablet by mouth every morning.      fluticasone propionate (FLONASE) 50 mcg/actuation nasal spray USE 2 SPRAYS IN BOTH  NOSTRILS ONCE DAILY 48 g 3    furosemide (LASIX) 40 MG tablet Take 1 tablet (40 mg total) by mouth once daily. 90 tablet 3    glucosamine-chondroitin 500-400 mg tablet Take 2 tablets by mouth every morning.      multivitamin capsule Take 1 capsule by mouth every morning.       pantoprazole (PROTONIX) 40 MG tablet Take 1 tablet (40 mg total) by mouth once daily. For reflux. 90 tablet 2    sacubitriL-valsartan (ENTRESTO)  mg per tablet Take 1 tablet by mouth 2 (two) times daily. 180 tablet 3    spironolactone (ALDACTONE) 25 MG tablet Take 1 tablet (25 mg total) by mouth once daily. 90 tablet 3     Current Facility-Administered Medications on File Prior to Visit   Medication Dose Route Frequency Provider Last Rate Last Admin    balanced salt irrigation intra-ocular solution 1 drop  1 drop Left  "[facility] Procedure Estella Silva MD        phenylephrine HCL 10% ophthalmic solution 1 drop  1 drop Left Eye PRN Estella Silva MD        sodium chloride 0.9% flush 2 mL  2 mL Intravenous PRN Estella Silva MD         Social History:     Social History     Tobacco Use    Smoking status: Former     Current packs/day: 0.00     Average packs/day: 1 pack/day for 30.0 years (30.0 ttl pk-yrs)     Types: Cigarettes     Start date: 1958     Quit date: 1988     Years since quittin.7    Smokeless tobacco: Never   Substance Use Topics    Alcohol use: Yes     Alcohol/week: 14.0 standard drinks of alcohol     Types: 14 Cans of beer per week     Comment: beer - 2 a night      Family History:     Family History   Problem Relation Age of Onset    Cancer Mother         breast    Cancer Father         prostate    Heart disease Father 74        CHF    Diabetes Sister     Heart disease Sister         stroke    Diabetes Brother     Hypertension Brother     Diabetes Brother     Amblyopia Neg Hx     Blindness Neg Hx     Cataracts Neg Hx     Glaucoma Neg Hx     Macular degeneration Neg Hx     Retinal detachment Neg Hx     Strabismus Neg Hx     Stroke Neg Hx     Thyroid disease Neg Hx     Melanoma Neg Hx     Psoriasis Neg Hx     Lupus Neg Hx      Physical Exam:   /66   Pulse 60   Ht 5' 9" (1.753 m)   Wt 67 kg (147 lb 11.3 oz)   SpO2 100%   BMI 21.81 kg/m²      Physical Exam  Vitals and nursing note reviewed.   Constitutional:       General: He is not in acute distress.     Appearance: Normal appearance.   HENT:      Head: Normocephalic and atraumatic.      Nose: Nose normal.   Eyes:      General: No scleral icterus.     Extraocular Movements: Extraocular movements intact.      Conjunctiva/sclera: Conjunctivae normal.   Neck:      Thyroid: No thyromegaly.      Vascular: No carotid bruit or JVD.   Cardiovascular:      Rate and Rhythm: Normal rate and regular rhythm.      Pulses: Normal pulses.      " Heart sounds: Normal heart sounds. No murmur heard.     No friction rub. No gallop.   Pulmonary:      Effort: Pulmonary effort is normal.      Breath sounds: Normal breath sounds. No wheezing, rhonchi or rales.   Chest:      Chest wall: No tenderness.   Abdominal:      General: Bowel sounds are normal. There is no distension.      Palpations: Abdomen is soft.      Tenderness: There is no abdominal tenderness.   Musculoskeletal:      Cervical back: Neck supple.      Right lower leg: No edema.      Left lower leg: No edema.   Skin:     General: Skin is warm and dry.      Coloration: Skin is not pale.      Findings: No erythema or rash.      Nails: There is no clubbing.   Neurological:      Mental Status: He is alert and oriented to person, place, and time.   Psychiatric:         Mood and Affect: Mood and affect normal.         Behavior: Behavior normal.          Labs:     Lab Results   Component Value Date     01/09/2024    K 4.1 01/09/2024     01/09/2024    CO2 26 01/09/2024    BUN 23 01/09/2024    CREATININE 1.3 01/09/2024    ANIONGAP 7 (L) 01/09/2024     Lab Results   Component Value Date    HGBA1C 5.7 (H) 09/06/2023     Lab Results   Component Value Date    BNP 1,537 (H) 09/11/2022    BNP 1,610 (H) 09/01/2022    Lab Results   Component Value Date    WBC 7.59 01/09/2024    HGB 11.3 (L) 01/09/2024    HCT 35.3 (L) 01/09/2024    HCT 47 09/11/2022     01/09/2024    GRAN 4.0 01/09/2024    GRAN 53.3 01/09/2024     Lab Results   Component Value Date    CHOL 120 09/06/2023    HDL 52 09/06/2023    LDLCALC 56.6 (L) 09/06/2023    TRIG 57 09/06/2023          Imaging:   Echocardiograms:   TTE 12/2/2022  The left ventricle is severely enlarged with eccentric hypertrophy and severely decreased systolic function.  The estimated ejection fraction is 10% or slightly higher.  There is severe left ventricular global hypokinesis.  There is abnormal septal wall motion.  Grade II left ventricular diastolic  dysfunction.  Severe left atrial enlargement.  Moderate right ventricular enlargement with moderately reduced right ventricular systolic function.  Mild mitral regurgitation.  Mild tricuspid regurgitation.  Mild to moderate pulmonic regurgitation.  Elevated central venous pressure (15 mmHg).  The estimated PA systolic pressure is 65 mmHg.  There is moderate-severe pulmonary hypertension.    TTE 9/12/2022  The left ventricle is severely enlarged with eccentric hypertrophy and severely decreased systolic function. The estimated ejection fraction is 10%.  Mild right ventricular enlargement with mildly to moderately reduced right ventricular systolic function.  Grade II left ventricular diastolic dysfunction.  Severe left atrial enlargement.  Mild mitral regurgitation.  There is pulmonary hypertension. The estimated PA systolic pressure is 50 mmHg.  Intermediate central venous pressure (8 mmHg).    TTE 9/2/2022  Severe left atrial enlargement.  The left ventricle is severely enlarged with severely decreased systolic function.  There is severe left ventricular global hypokinesis. The estimated ejection fraction is 20%.  The 3D quantitatively derived ejection fraction is 18%.  Grade II left ventricular diastolic dysfunction.  Mild right ventricular enlargement with normal right ventricular systolic function.  Mild mitral regurgitation.  Mild tricuspid regurgitation.  Intermediate central venous pressure (8 mmHg).  There is pulmonary hypertension. The estimated PA systolic pressure is 49 mmHg.  A short burst of tachycardia with a cycle length of 334 milliseconds was noted at the end of the study.    Stress Tests:   PET Stress Test 10/5/2022    There are no clinically significant perfusion abnormalities. There is a small to moderate (10-15%) amount of mild resting heterogeneity in the basal to apical inferior wall(s) that does not change with stress. This likely represents severe cardiomyopathy, good stress flows and cfr in  this distribution suggestive of patent coronary arteries.    The whole heart absolute myocardial perfusion values averaged 0.52 cc/min/g at rest, which is reduced; 1.04 cc/min/g at stress, which is moderately reduced; and CFR is 2.05 , which is mildly reduced.    CT attenuation images demonstrate mild diffuse coronary calcifications in the LAD territory.    The gated perfusion images showed an ejection fraction of 14% at rest and 14% during stress. A normal ejection fraction is greater than 53%.    There is severe global hypokinesis at rest and during stress.    The LV cavity size is severely enlarged at rest and stress.    The EKG portion of this study is uninterpretable.    During stress, rare PVCs are noted.    The patient reported no chest pain during the stress test.    Caths:   None    Other:  Event monitor 9/13/2022  Symptoms corresponding with normal sinus rhythm.  3 runs of non-sustained VT 9-23 beats    Brain MRI 9/11/2022  No evidence of acute infarct or hemorrhage.     Chronic senescent and microvascular ischemic disease.  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     CTA Stroke 9/11/2022  CTA head: Unremarkable CTA of the head specifically without evidence for proximal significant stenosis or occlusion.     CTA neck: Atherosclerotic plaquing of the carotid bifurcations and proximal ICAs with less than 50% proximal ICA stenosis by NASCET criteria.     Incidental 6-7 mm subsolid nodule right upper lobe with prominent prevascular and questionable precarinal lymph nodes.  Further evaluation with dedicated CT thorax recommended.     CT head: Patchy ill-defined decreased attenuation supratentorial white matter which is nonspecific and may be sequela of chronic ischemic change.  There is no evidence for acute intracranial hemorrhage or sulcal effacement to suggest large territory recent infarction.     Small hypodensity left basal ganglia which may be prior infarction.       Abdominal aorta ultrasound  3/30/2017  There is no evidence of an Abdominal Aortic Aneurysm.     Assessment:     1. Essential hypertension    2. Other hyperlipidemia    3. Nonischemic cardiomyopathy    4. Chronic combined systolic and diastolic CHF (congestive heart failure)    5. Chronic kidney disease, stage 3a        Plan:     Heart failure with reduced ejection fraction   Nonischemic cardiomyopathy   S/p ICD  NYHA Class 2 symptoms. Compensated on exam.   Obtain updated echo now that he has been on optimized GDMT for over a year   Continue Entresto  mg bid    Continue Coreg 25 mg bid   Continue Spironolactone 25 mg daily.   Continue Jardiance 10 mg daily.   Continue Lasix 40 mg daily.   Limit sodium intake to less then 2 gram sodium and 1500cc fluid restriction.  Graded exercise program as tolerated.  Call if  more than 3 lbs in 1 day or 5 lbs in 1 week.    Nonsustained ventricular tachycardia s/p ICD  Following with EP    Hypertension  Well controlled on current medications    Hyperlipidemia   At goal  Continue statin    Signed:  Ashley Lagos PA-C  Cardiology   [phone] - General  1/30/2024 12:13 PM

## 2024-03-04 ENCOUNTER — CLINICAL SUPPORT (OUTPATIENT)
Dept: CARDIOLOGY | Facility: HOSPITAL | Age: 79
End: 2024-03-04
Attending: INTERNAL MEDICINE
Payer: MEDICARE

## 2024-03-04 ENCOUNTER — CLINICAL SUPPORT (OUTPATIENT)
Dept: CARDIOLOGY | Facility: HOSPITAL | Age: 79
End: 2024-03-04
Payer: MEDICARE

## 2024-03-04 DIAGNOSIS — I42.8 OTHER CARDIOMYOPATHIES: ICD-10-CM

## 2024-03-04 DIAGNOSIS — Z95.810 PRESENCE OF AUTOMATIC (IMPLANTABLE) CARDIAC DEFIBRILLATOR: ICD-10-CM

## 2024-03-04 DIAGNOSIS — R00.1 BRADYCARDIA, UNSPECIFIED: ICD-10-CM

## 2024-03-04 PROCEDURE — 93295 DEV INTERROG REMOTE 1/2/MLT: CPT | Mod: ,,, | Performed by: INTERNAL MEDICINE

## 2024-03-04 PROCEDURE — 93296 REM INTERROG EVL PM/IDS: CPT | Performed by: INTERNAL MEDICINE

## 2024-03-07 ENCOUNTER — HOSPITAL ENCOUNTER (OUTPATIENT)
Dept: CARDIOLOGY | Facility: HOSPITAL | Age: 79
Discharge: HOME | End: 2024-03-07
Attending: PHYSICIAN ASSISTANT
Payer: MEDICARE

## 2024-03-07 VITALS
HEIGHT: 69 IN | SYSTOLIC BLOOD PRESSURE: 105 MMHG | DIASTOLIC BLOOD PRESSURE: 65 MMHG | HEART RATE: 62 BPM | WEIGHT: 145 LBS | BODY MASS INDEX: 21.48 KG/M2

## 2024-03-07 DIAGNOSIS — I50.42 CHRONIC COMBINED SYSTOLIC AND DIASTOLIC CHF (CONGESTIVE HEART FAILURE): ICD-10-CM

## 2024-03-07 LAB
ASCENDING AORTA: 3.2 CM
AV INDEX (PROSTH): 0.54
AV MEAN GRADIENT: 3 MMHG
AV PEAK GRADIENT: 5 MMHG
AV VALVE AREA BY VELOCITY RATIO: 1.99 CM²
AV VALVE AREA: 2.4 CM²
AV VELOCITY RATIO: 0.45
BSA FOR ECHO PROCEDURE: 1.79 M2
CV ECHO LV RWT: 0.11 CM
DOP CALC AO PEAK VEL: 1.12 M/S
DOP CALC AO VTI: 17.91 CM
DOP CALC LVOT AREA: 4.4 CM2
DOP CALC LVOT DIAMETER: 2.38 CM
DOP CALC LVOT PEAK VEL: 0.5 M/S
DOP CALC LVOT STROKE VOLUME: 43 CM3
DOP CALCLVOT PEAK VEL VTI: 9.67 CM
E WAVE DECELERATION TIME: 205.81 MSEC
E/A RATIO: 1.1
E/E' RATIO: 19.71 M/S
ECHO LV POSTERIOR WALL: 0.46 CM (ref 0.6–1.1)
EJECTION FRACTION: 10 %
FRACTIONAL SHORTENING: 2 % (ref 28–44)
INTERVENTRICULAR SEPTUM: 0.48 CM (ref 0.6–1.1)
LA MAJOR: 5.98 CM
LA MINOR: 5.08 CM
LA WIDTH: 5.01 CM
LEFT ATRIUM SIZE: 3.5 CM
LEFT ATRIUM VOLUME INDEX MOD: 48.6 ML/M2
LEFT ATRIUM VOLUME INDEX: 45.5 ML/M2
LEFT ATRIUM VOLUME MOD: 87.55 CM3
LEFT ATRIUM VOLUME: 81.88 CM3
LEFT INTERNAL DIMENSION IN SYSTOLE: 8.2 CM (ref 2.1–4)
LEFT VENTRICLE DIASTOLIC VOLUME INDEX: 209.62 ML/M2
LEFT VENTRICLE DIASTOLIC VOLUME: 377.31 ML
LEFT VENTRICLE MASS INDEX: 101 G/M2
LEFT VENTRICLE SYSTOLIC VOLUME INDEX: 202.1 ML/M2
LEFT VENTRICLE SYSTOLIC VOLUME: 363.82 ML
LEFT VENTRICULAR INTERNAL DIMENSION IN DIASTOLE: 8.33 CM (ref 3.5–6)
LEFT VENTRICULAR MASS: 182.47 G
LV LATERAL E/E' RATIO: 17.25 M/S
LV SEPTAL E/E' RATIO: 23 M/S
MV PEAK A VEL: 0.63 M/S
MV PEAK E VEL: 0.69 M/S
MV STENOSIS PRESSURE HALF TIME: 59.68 MS
MV VALVE AREA P 1/2 METHOD: 3.69 CM2
PISA TR MAX VEL: 2.42 M/S
RA MAJOR: 4.13 CM
RA PRESSURE ESTIMATED: 8 MMHG
RA WIDTH: 4.09 CM
RIGHT ATRIAL AREA: 15 CM2
RIGHT VENTRICULAR END-DIASTOLIC DIMENSION: 4.2 CM
RV TB RVSP: 10 MMHG
SINUS: 3.4 CM
STJ: 2.8 CM
TDI LATERAL: 0.04 M/S
TDI SEPTAL: 0.03 M/S
TDI: 0.04 M/S
TR MAX PG: 23 MMHG
TRICUSPID ANNULAR PLANE SYSTOLIC EXCURSION: 2.67 CM
TV REST PULMONARY ARTERY PRESSURE: 31 MMHG
Z-SCORE OF LEFT VENTRICULAR DIMENSION IN END DIASTOLE: 5.15
Z-SCORE OF LEFT VENTRICULAR DIMENSION IN END SYSTOLE: 7.75

## 2024-03-07 PROCEDURE — 93306 TTE W/DOPPLER COMPLETE: CPT

## 2024-03-07 PROCEDURE — 93306 TTE W/DOPPLER COMPLETE: CPT | Mod: 26,,, | Performed by: INTERNAL MEDICINE

## 2024-03-07 NOTE — PROGRESS NOTES
Pt here for echo. Failed attempt x 3 left forearm for iv start. Difficult stick , veins are hardened and pt prob dehydrated. Pressure applied. Unable to use optison.

## 2024-03-11 ENCOUNTER — TELEPHONE (OUTPATIENT)
Dept: CARDIOLOGY | Facility: CLINIC | Age: 79
End: 2024-03-11
Payer: MEDICARE

## 2024-03-11 NOTE — TELEPHONE ENCOUNTER
Discussed echo results with patient, as well as with Dr. Ayon. His EF remains severely reduced. He is already on maximally tolerated GDMT. Discussed potential for upgrading ICD device to CRT. Patient is not interested at this time as he feels he is doing well on his current therapy. He declined having an appt scheduled with Dr. Wahl at this time. Can revisit in the future if he changes his mind.

## 2024-03-15 LAB
OHS CV AF BURDEN PERCENT: < 1
OHS CV DC REMOTE DEVICE TYPE: NORMAL
OHS CV RV PACING PERCENT: 1 %

## 2024-04-25 ENCOUNTER — LAB VISIT (OUTPATIENT)
Dept: LAB | Facility: HOSPITAL | Age: 79
End: 2024-04-25
Attending: INTERNAL MEDICINE
Payer: MEDICARE

## 2024-04-25 DIAGNOSIS — I50.43 ACUTE ON CHRONIC COMBINED SYSTOLIC (CONGESTIVE) AND DIASTOLIC (CONGESTIVE) HEART FAILURE: ICD-10-CM

## 2024-04-25 DIAGNOSIS — I10 ESSENTIAL HYPERTENSION: ICD-10-CM

## 2024-04-25 DIAGNOSIS — I50.20 HFREF (HEART FAILURE WITH REDUCED EJECTION FRACTION): ICD-10-CM

## 2024-04-25 DIAGNOSIS — N18.31 CHRONIC KIDNEY DISEASE, STAGE 3A: ICD-10-CM

## 2024-04-25 LAB
ANION GAP SERPL CALC-SCNC: 8 MMOL/L (ref 8–16)
BUN SERPL-MCNC: 23 MG/DL (ref 8–23)
CALCIUM SERPL-MCNC: 9.3 MG/DL (ref 8.7–10.5)
CHLORIDE SERPL-SCNC: 108 MMOL/L (ref 95–110)
CO2 SERPL-SCNC: 25 MMOL/L (ref 23–29)
CREAT SERPL-MCNC: 1.4 MG/DL (ref 0.5–1.4)
EST. GFR  (NO RACE VARIABLE): 51.8 ML/MIN/1.73 M^2
GLUCOSE SERPL-MCNC: 101 MG/DL (ref 70–110)
POTASSIUM SERPL-SCNC: 4.3 MMOL/L (ref 3.5–5.1)
SODIUM SERPL-SCNC: 141 MMOL/L (ref 136–145)

## 2024-04-25 PROCEDURE — 80048 BASIC METABOLIC PNL TOTAL CA: CPT | Performed by: INTERNAL MEDICINE

## 2024-04-25 PROCEDURE — 36415 COLL VENOUS BLD VENIPUNCTURE: CPT | Performed by: INTERNAL MEDICINE

## 2024-06-03 ENCOUNTER — CLINICAL SUPPORT (OUTPATIENT)
Dept: CARDIOLOGY | Facility: HOSPITAL | Age: 79
End: 2024-06-03
Payer: MEDICARE

## 2024-06-03 ENCOUNTER — CLINICAL SUPPORT (OUTPATIENT)
Dept: CARDIOLOGY | Facility: HOSPITAL | Age: 79
End: 2024-06-03
Attending: INTERNAL MEDICINE
Payer: MEDICARE

## 2024-06-03 DIAGNOSIS — I42.8 OTHER CARDIOMYOPATHIES: ICD-10-CM

## 2024-06-03 DIAGNOSIS — R00.1 BRADYCARDIA, UNSPECIFIED: ICD-10-CM

## 2024-06-03 DIAGNOSIS — Z95.810 PRESENCE OF AUTOMATIC (IMPLANTABLE) CARDIAC DEFIBRILLATOR: ICD-10-CM

## 2024-06-03 PROCEDURE — 93295 DEV INTERROG REMOTE 1/2/MLT: CPT | Mod: ,,, | Performed by: INTERNAL MEDICINE

## 2024-06-03 PROCEDURE — 93296 REM INTERROG EVL PM/IDS: CPT | Performed by: INTERNAL MEDICINE

## 2024-06-07 DIAGNOSIS — I50.20 HFREF (HEART FAILURE WITH REDUCED EJECTION FRACTION): ICD-10-CM

## 2024-06-07 RX ORDER — SPIRONOLACTONE 25 MG/1
25 TABLET ORAL
Qty: 90 TABLET | Refills: 2 | Status: SHIPPED | OUTPATIENT
Start: 2024-06-07

## 2024-06-07 RX ORDER — EMPAGLIFLOZIN 10 MG/1
10 TABLET, FILM COATED ORAL
Qty: 90 TABLET | Refills: 0 | Status: SHIPPED | OUTPATIENT
Start: 2024-06-07

## 2024-06-07 RX ORDER — ATORVASTATIN CALCIUM 40 MG/1
40 TABLET, FILM COATED ORAL NIGHTLY
Qty: 90 TABLET | Refills: 0 | Status: SHIPPED | OUTPATIENT
Start: 2024-06-07

## 2024-06-07 NOTE — TELEPHONE ENCOUNTER
Care Due:                  Date            Visit Type   Department     Provider  --------------------------------------------------------------------------------                                EP -                              PRIMARY      [facility] INTERNAL  Last Visit: 01-      [facility] ([facility])   MEDICINE       Sunnynaila Soto                              EP -                              PRIMARY      [facility] INTERNAL  Next Visit: 07-      [facility] ([facility])   MEDICINE       Sunnyjasper Soto                                                            Last  Test          Frequency    Reason                     Performed    Due Date  --------------------------------------------------------------------------------    HBA1C.......  6 months...  empagliflozin............  09- 03-    Lipid Panel.  12 months..  atorvastatin.............  09- 08-    [employer] Embedded Care Due Messages. Reference number: [id number].   6/07/2024 5:08:07 PM CDT

## 2024-06-08 NOTE — TELEPHONE ENCOUNTER
Provider Staff:  Action required for this patient    Requires labs      Please see care gap opportunities below in Care Due Message.    Thanks!  [facility]     Appointments      Date Provider   Last Visit   1/18/2024 Sunny Soto MD   Next Visit   7/18/2024 Sunny Soto MD     Refill Decision Note   Sidney Lanza  is requesting a refill authorization.  Brief Assessment and Rationale for Refill:  Approve     Medication Therapy Plan:        Comments:     Note composed:10:56 PM 06/07/2024

## 2024-06-13 DIAGNOSIS — I50.20 HFREF (HEART FAILURE WITH REDUCED EJECTION FRACTION): ICD-10-CM

## 2024-06-17 RX ORDER — SACUBITRIL AND VALSARTAN 97; 103 MG/1; MG/1
1 TABLET, FILM COATED ORAL 2 TIMES DAILY
Qty: 180 TABLET | Refills: 3 | Status: SHIPPED | OUTPATIENT
Start: 2024-06-17 | End: 2025-06-12

## 2024-06-17 RX ORDER — FUROSEMIDE 40 MG/1
40 TABLET ORAL DAILY
Qty: 90 TABLET | Refills: 3 | Status: SHIPPED | OUTPATIENT
Start: 2024-06-17 | End: 2025-06-12

## 2024-06-24 LAB
OHS CV AF BURDEN PERCENT: < 1
OHS CV DC REMOTE DEVICE TYPE: NORMAL
OHS CV RV PACING PERCENT: 1 %

## 2024-07-11 ENCOUNTER — LAB VISIT (OUTPATIENT)
Dept: LAB | Facility: HOSPITAL | Age: 79
End: 2024-07-11
Attending: INTERNAL MEDICINE
Payer: MEDICARE

## 2024-07-11 DIAGNOSIS — R79.9 ABNORMAL FINDING OF BLOOD CHEMISTRY, UNSPECIFIED: ICD-10-CM

## 2024-07-11 DIAGNOSIS — I50.43 ACUTE ON CHRONIC COMBINED SYSTOLIC (CONGESTIVE) AND DIASTOLIC (CONGESTIVE) HEART FAILURE: ICD-10-CM

## 2024-07-11 DIAGNOSIS — N18.31 CHRONIC KIDNEY DISEASE, STAGE 3A: ICD-10-CM

## 2024-07-11 DIAGNOSIS — I10 ESSENTIAL HYPERTENSION: ICD-10-CM

## 2024-07-11 DIAGNOSIS — I50.20 HFREF (HEART FAILURE WITH REDUCED EJECTION FRACTION): ICD-10-CM

## 2024-07-11 LAB
ALBUMIN SERPL BCP-MCNC: 3.9 G/DL (ref 3.5–5.2)
ALP SERPL-CCNC: 53 U/L (ref 55–135)
ALT SERPL W/O P-5'-P-CCNC: 15 U/L (ref 10–44)
ANION GAP SERPL CALC-SCNC: 8 MMOL/L (ref 8–16)
AST SERPL-CCNC: 25 U/L (ref 10–40)
BASOPHILS # BLD AUTO: 0.04 K/UL (ref 0–0.2)
BASOPHILS NFR BLD: 0.6 % (ref 0–1.9)
BILIRUB SERPL-MCNC: 0.5 MG/DL (ref 0.1–1)
BUN SERPL-MCNC: 26 MG/DL (ref 8–23)
CALCIUM SERPL-MCNC: 9.3 MG/DL (ref 8.7–10.5)
CHLORIDE SERPL-SCNC: 107 MMOL/L (ref 95–110)
CHOLEST SERPL-MCNC: 113 MG/DL (ref 120–199)
CHOLEST/HDLC SERPL: 2.4 {RATIO} (ref 2–5)
CO2 SERPL-SCNC: 25 MMOL/L (ref 23–29)
CREAT SERPL-MCNC: 1.6 MG/DL (ref 0.5–1.4)
DIFFERENTIAL METHOD BLD: ABNORMAL
EOSINOPHIL # BLD AUTO: 0.1 K/UL (ref 0–0.5)
EOSINOPHIL NFR BLD: 1.7 % (ref 0–8)
ERYTHROCYTE [DISTWIDTH] IN BLOOD BY AUTOMATED COUNT: 16.6 % (ref 11.5–14.5)
EST. GFR  (NO RACE VARIABLE): 44.1 ML/MIN/1.73 M^2
ESTIMATED AVG GLUCOSE: 126 MG/DL (ref 68–131)
GLUCOSE SERPL-MCNC: 105 MG/DL (ref 70–110)
HBA1C MFR BLD: 6 % (ref 4–5.6)
HCT VFR BLD AUTO: 36.9 % (ref 40–54)
HDLC SERPL-MCNC: 48 MG/DL (ref 40–75)
HDLC SERPL: 42.5 % (ref 20–50)
HGB BLD-MCNC: 11.6 G/DL (ref 14–18)
IMM GRANULOCYTES # BLD AUTO: 0.01 K/UL (ref 0–0.04)
IMM GRANULOCYTES NFR BLD AUTO: 0.1 % (ref 0–0.5)
LDLC SERPL CALC-MCNC: 53.8 MG/DL (ref 63–159)
LYMPHOCYTES # BLD AUTO: 2.7 K/UL (ref 1–4.8)
LYMPHOCYTES NFR BLD: 38.1 % (ref 18–48)
MCH RBC QN AUTO: 24.4 PG (ref 27–31)
MCHC RBC AUTO-ENTMCNC: 31.4 G/DL (ref 32–36)
MCV RBC AUTO: 78 FL (ref 82–98)
MONOCYTES # BLD AUTO: 0.4 K/UL (ref 0.3–1)
MONOCYTES NFR BLD: 6 % (ref 4–15)
NEUTROPHILS # BLD AUTO: 3.8 K/UL (ref 1.8–7.7)
NEUTROPHILS NFR BLD: 53.5 % (ref 38–73)
NONHDLC SERPL-MCNC: 65 MG/DL
NRBC BLD-RTO: 0 /100 WBC
PLATELET # BLD AUTO: 183 K/UL (ref 150–450)
PMV BLD AUTO: 11.7 FL (ref 9.2–12.9)
POTASSIUM SERPL-SCNC: 4.5 MMOL/L (ref 3.5–5.1)
PROT SERPL-MCNC: 6.8 G/DL (ref 6–8.4)
RBC # BLD AUTO: 4.75 M/UL (ref 4.6–6.2)
SODIUM SERPL-SCNC: 140 MMOL/L (ref 136–145)
TRIGL SERPL-MCNC: 56 MG/DL (ref 30–150)
WBC # BLD AUTO: 7.14 K/UL (ref 3.9–12.7)

## 2024-07-11 PROCEDURE — 80053 COMPREHEN METABOLIC PANEL: CPT | Performed by: INTERNAL MEDICINE

## 2024-07-11 PROCEDURE — 80061 LIPID PANEL: CPT | Performed by: INTERNAL MEDICINE

## 2024-07-11 PROCEDURE — 36415 COLL VENOUS BLD VENIPUNCTURE: CPT | Performed by: INTERNAL MEDICINE

## 2024-07-11 PROCEDURE — 83036 HEMOGLOBIN GLYCOSYLATED A1C: CPT | Performed by: INTERNAL MEDICINE

## 2024-07-11 PROCEDURE — 85025 COMPLETE CBC W/AUTO DIFF WBC: CPT | Performed by: INTERNAL MEDICINE

## 2024-07-18 ENCOUNTER — OFFICE VISIT (OUTPATIENT)
Dept: INTERNAL MEDICINE | Facility: CLINIC | Age: 79
End: 2024-07-18
Payer: MEDICARE

## 2024-07-18 VITALS
BODY MASS INDEX: 21.59 KG/M2 | HEIGHT: 69 IN | OXYGEN SATURATION: 98 % | DIASTOLIC BLOOD PRESSURE: 60 MMHG | TEMPERATURE: 97 F | SYSTOLIC BLOOD PRESSURE: 122 MMHG | RESPIRATION RATE: 16 BRPM | WEIGHT: 145.75 LBS | HEART RATE: 62 BPM

## 2024-07-18 DIAGNOSIS — Z12.11 COLON CANCER SCREENING: ICD-10-CM

## 2024-07-18 DIAGNOSIS — Z12.5 ENCOUNTER FOR SCREENING FOR MALIGNANT NEOPLASM OF PROSTATE: ICD-10-CM

## 2024-07-18 DIAGNOSIS — I10 ESSENTIAL HYPERTENSION: Primary | ICD-10-CM

## 2024-07-18 DIAGNOSIS — R79.9 ABNORMAL FINDING OF BLOOD CHEMISTRY, UNSPECIFIED: ICD-10-CM

## 2024-07-18 DIAGNOSIS — Z86.010 HX OF COLONIC POLYPS: ICD-10-CM

## 2024-07-18 DIAGNOSIS — I50.42 CHRONIC COMBINED SYSTOLIC AND DIASTOLIC CHF (CONGESTIVE HEART FAILURE): ICD-10-CM

## 2024-07-18 DIAGNOSIS — I42.8 NONISCHEMIC CARDIOMYOPATHY: ICD-10-CM

## 2024-07-18 DIAGNOSIS — E78.49 OTHER HYPERLIPIDEMIA: ICD-10-CM

## 2024-07-18 DIAGNOSIS — K21.9 GASTROESOPHAGEAL REFLUX DISEASE, UNSPECIFIED WHETHER ESOPHAGITIS PRESENT: ICD-10-CM

## 2024-07-18 DIAGNOSIS — N18.31 CHRONIC KIDNEY DISEASE, STAGE 3A: ICD-10-CM

## 2024-07-18 PROCEDURE — 1160F RVW MEDS BY RX/DR IN RCRD: CPT | Mod: CPTII,S$GLB,, | Performed by: INTERNAL MEDICINE

## 2024-07-18 PROCEDURE — 1101F PT FALLS ASSESS-DOCD LE1/YR: CPT | Mod: CPTII,S$GLB,, | Performed by: INTERNAL MEDICINE

## 2024-07-18 PROCEDURE — 1159F MED LIST DOCD IN RCRD: CPT | Mod: CPTII,S$GLB,, | Performed by: INTERNAL MEDICINE

## 2024-07-18 PROCEDURE — 99999 PR PBB SHADOW E&M-EST. PATIENT-LVL IV: CPT | Mod: PBBFAC,,, | Performed by: INTERNAL MEDICINE

## 2024-07-18 PROCEDURE — 1126F AMNT PAIN NOTED NONE PRSNT: CPT | Mod: CPTII,S$GLB,, | Performed by: INTERNAL MEDICINE

## 2024-07-18 PROCEDURE — 3288F FALL RISK ASSESSMENT DOCD: CPT | Mod: CPTII,S$GLB,, | Performed by: INTERNAL MEDICINE

## 2024-07-18 PROCEDURE — 99214 OFFICE O/P EST MOD 30 MIN: CPT | Mod: S$GLB,,, | Performed by: INTERNAL MEDICINE

## 2024-07-18 PROCEDURE — 3074F SYST BP LT 130 MM HG: CPT | Mod: CPTII,S$GLB,, | Performed by: INTERNAL MEDICINE

## 2024-07-18 PROCEDURE — 3078F DIAST BP <80 MM HG: CPT | Mod: CPTII,S$GLB,, | Performed by: INTERNAL MEDICINE

## 2024-07-18 NOTE — PROGRESS NOTES
Subjective:       Patient ID: Sidney Lanza is a 79 y.o. male.    Chief Complaint: 6m f/u    HPI  The patient presents for follow-up of medical conditions which include hypertension, CHF, GERD, chronic kidney disease.  The patient has nonischemic cardiomyopathy.  He is treated for CHF which has been stable on Entresto, spironolactone, Jardiance, carvedilol, and furosemide.  He is on atorvastatin for management of hyperlipidemia.  He has GERD and uses pantoprazole daily for management.  He takes it every morning.  He notes reflux episodes even after drinking water.  Bowel movements are regular.  No nausea or vomiting is noted.    The patient denies experiencing any shortness of breath with his current level of physical activity.  No PND, orthopnea, or ankle edema is noted.  No exertional chest pain has been noted.    Review of Systems   Constitutional:  Negative for activity change, appetite change, fatigue and unexpected weight change.   HENT:  Negative for sinus pressure/congestion and sore throat.    Eyes:  Negative for visual disturbance.   Respiratory:  Negative for cough, chest tightness, shortness of breath and wheezing.    Cardiovascular:  Negative for chest pain, palpitations and leg swelling.   Gastrointestinal:  Negative for abdominal pain, blood in stool, nausea and vomiting.   Genitourinary:  Negative for dysuria, hematuria and urgency.   Musculoskeletal:  Negative for arthralgias, back pain, gait problem, joint swelling, myalgias and neck stiffness.   Integumentary:  Negative for color change and rash.   Neurological:  Negative for dizziness, syncope, weakness, light-headedness, numbness and headaches.   Psychiatric/Behavioral:  Negative for sleep disturbance.             Physical Exam  Vitals and nursing note reviewed.   Constitutional:       General: He is not in acute distress.     Appearance: Normal appearance. He is well-developed.   HENT:      Head: Normocephalic and atraumatic.   Eyes:       General: No scleral icterus.     Extraocular Movements: Extraocular movements intact.      Conjunctiva/sclera: Conjunctivae normal.   Neck:      Thyroid: No thyromegaly.      Vascular: No JVD.   Cardiovascular:      Rate and Rhythm: Normal rate and regular rhythm.      Heart sounds: Normal heart sounds. No murmur heard.     No friction rub. No gallop.   Pulmonary:      Effort: Pulmonary effort is normal. No respiratory distress.      Breath sounds: Normal breath sounds. No wheezing or rales.   Abdominal:      General: Bowel sounds are normal.      Palpations: Abdomen is soft. There is no mass.      Tenderness: There is no abdominal tenderness.   Musculoskeletal:         General: No tenderness. Normal range of motion.      Cervical back: Normal range of motion and neck supple.      Right lower leg: No edema.      Left lower leg: No edema.   Lymphadenopathy:      Cervical: No cervical adenopathy.   Skin:     General: Skin is warm and dry.      Findings: No rash.   Neurological:      Mental Status: He is alert and oriented to person, place, and time.      Comments: Gait is normal.   Psychiatric:         Mood and Affect: Mood normal.         Behavior: Behavior normal.           Lab Visit on 07/11/2024   Component Date Value Ref Range Status    Sodium 07/11/2024 140  136 - 145 mmol/L Final    Potassium 07/11/2024 4.5  3.5 - 5.1 mmol/L Final    Chloride 07/11/2024 107  95 - 110 mmol/L Final    CO2 07/11/2024 25  23 - 29 mmol/L Final    Glucose 07/11/2024 105  70 - 110 mg/dL Final    BUN 07/11/2024 26 (H)  8 - 23 mg/dL Final    Creatinine 07/11/2024 1.6 (H)  0.5 - 1.4 mg/dL Final    Calcium 07/11/2024 9.3  8.7 - 10.5 mg/dL Final    Total Protein 07/11/2024 6.8  6.0 - 8.4 g/dL Final    Albumin 07/11/2024 3.9  3.5 - 5.2 g/dL Final    Total Bilirubin 07/11/2024 0.5  0.1 - 1.0 mg/dL Final    Comment: For infants and newborns, interpretation of results should be based  on gestational age, weight and in agreement with  clinical  observations.    Premature Infant recommended reference ranges:  Up to 24 hours.............<8.0 mg/dL  Up to 48 hours............<12.0 mg/dL  3-5 days..................<15.0 mg/dL  6-29 days.................<15.0 mg/dL      Alkaline Phosphatase 07/11/2024 53 (L)  55 - 135 U/L Final    AST 07/11/2024 25  10 - 40 U/L Final    ALT 07/11/2024 15  10 - 44 U/L Final    eGFR 07/11/2024 44.1 (A)  >60 mL/min/1.73 m^2 Final    Anion Gap 07/11/2024 8  8 - 16 mmol/L Final    Cholesterol 07/11/2024 113 (L)  120 - 199 mg/dL Final    Comment: The National Cholesterol Education Program (NCEP) has set the  following guidelines (reference ranges) for Cholesterol:  Optimal.....................<200 mg/dL  Borderline High.............200-239 mg/dL  High........................> or = 240 mg/dL      Triglycerides 07/11/2024 56  30 - 150 mg/dL Final    Comment: The National Cholesterol Education Program (NCEP) has set the  following guidelines (reference values) for triglycerides:  Normal......................<150 mg/dL  Borderline High.............150-199 mg/dL  High........................200-499 mg/dL      HDL 07/11/2024 48  40 - 75 mg/dL Final    Comment: The National Cholesterol Education Program (NCEP) has set the  following guidelines (reference values) for HDL Cholesterol:  Low...............<40 mg/dL  Optimal...........>60 mg/dL      LDL Cholesterol 07/11/2024 53.8 (L)  63.0 - 159.0 mg/dL Final    Comment: The National Cholesterol Education Program (NCEP) has set the  following guidelines (reference values) for LDL Cholesterol:  Optimal.......................<130 mg/dL  Borderline High...............130-159 mg/dL  High..........................160-189 mg/dL  Very High.....................>190 mg/dL      HDL/Cholesterol Ratio 07/11/2024 42.5  20.0 - 50.0 % Final    Total Cholesterol/HDL Ratio 07/11/2024 2.4  2.0 - 5.0 Final    Non-HDL Cholesterol 07/11/2024 65  mg/dL Final    Comment: Risk category and Non-HDL  cholesterol goals:  Coronary heart disease (CHD)or equivalent (10-year risk of CHD >20%):  Non-HDL cholesterol goal     <130 mg/dL  Two or more CHD risk factors and 10-year risk of CHD <= 20%:  Non-HDL cholesterol goal     <160 mg/dL  0 to 1 CHD risk factor:  Non-HDL cholesterol goal     <190 mg/dL      WBC 07/11/2024 7.14  3.90 - 12.70 K/uL Final    RBC 07/11/2024 4.75  4.60 - 6.20 M/uL Final    Hemoglobin 07/11/2024 11.6 (L)  14.0 - 18.0 g/dL Final    Hematocrit 07/11/2024 36.9 (L)  40.0 - 54.0 % Final    MCV 07/11/2024 78 (L)  82 - 98 fL Final    MCH 07/11/2024 24.4 (L)  27.0 - 31.0 pg Final    MCHC 07/11/2024 31.4 (L)  32.0 - 36.0 g/dL Final    RDW 07/11/2024 16.6 (H)  11.5 - 14.5 % Final    Platelets 07/11/2024 183  150 - 450 K/uL Final    MPV 07/11/2024 11.7  9.2 - 12.9 fL Final    Immature Granulocytes 07/11/2024 0.1  0.0 - 0.5 % Final    Gran # (ANC) 07/11/2024 3.8  1.8 - 7.7 K/uL Final    Immature Grans (Abs) 07/11/2024 0.01  0.00 - 0.04 K/uL Final    Comment: Mild elevation in immature granulocytes is non specific and   can be seen in a variety of conditions including stress response,   acute inflammation, trauma and pregnancy. Correlation with other   laboratory and clinical findings is essential.      Lymph # 07/11/2024 2.7  1.0 - 4.8 K/uL Final    Mono # 07/11/2024 0.4  0.3 - 1.0 K/uL Final    Eos # 07/11/2024 0.1  0.0 - 0.5 K/uL Final    Baso # 07/11/2024 0.04  0.00 - 0.20 K/uL Final    nRBC 07/11/2024 0  0 /100 WBC Final    Gran % 07/11/2024 53.5  38.0 - 73.0 % Final    Lymph % 07/11/2024 38.1  18.0 - 48.0 % Final    Mono % 07/11/2024 6.0  4.0 - 15.0 % Final    Eosinophil % 07/11/2024 1.7  0.0 - 8.0 % Final    Basophil % 07/11/2024 0.6  0.0 - 1.9 % Final    Differential Method 07/11/2024 Automated   Final    Hemoglobin A1C 07/11/2024 6.0 (H)  4.0 - 5.6 % Final    Comment: ADA Screening Guidelines:  5.7-6.4%  Consistent with prediabetes  >or=6.5%  Consistent with diabetes    High levels of fetal  hemoglobin interfere with the HbA1C  assay. Heterozygous hemoglobin variants (HbS, HgC, etc)do  not significantly interfere with this assay.   However, presence of multiple variants may affect accuracy.      Estimated Avg Glucose 07/11/2024 126  68 - 131 mg/dL Final   Clinical Support on 06/03/2024   Component Date Value Ref Range Status    Device Type 06/03/2024 ICD   Final    AF Nixa % 06/03/2024 < 1   Final    RV Paccing % 06/03/2024 1  % Final   Lab Visit on 04/25/2024   Component Date Value Ref Range Status    Sodium 04/25/2024 141  136 - 145 mmol/L Final    Potassium 04/25/2024 4.3  3.5 - 5.1 mmol/L Final    Chloride 04/25/2024 108  95 - 110 mmol/L Final    CO2 04/25/2024 25  23 - 29 mmol/L Final    Glucose 04/25/2024 101  70 - 110 mg/dL Final    BUN 04/25/2024 23  8 - 23 mg/dL Final    Creatinine 04/25/2024 1.4  0.5 - 1.4 mg/dL Final    Calcium 04/25/2024 9.3  8.7 - 10.5 mg/dL Final    Anion Gap 04/25/2024 8  8 - 16 mmol/L Final    eGFR 04/25/2024 51.8 (A)  >60 mL/min/1.73 m^2 Final       Assessment & Plan:      Sidney was seen today for 6m f/u.  Current therapy will be continued for management of hypertension and CHF.    We discussed the recommended RSV vaccine for the patient which is available through his local pharmacy.    Screening colonoscopy will be obtained.    Updated labs will be obtained in 4 months.  A follow-up visit in 6 months is recommended.    Diagnoses and all orders for this visit:    Essential hypertension  -     Comprehensive Metabolic Panel; Future  -     CBC Auto Differential; Future  -     Hemoglobin A1C; Future  -     PSA, Screening; Future    Colon cancer screening  -     Ambulatory referral/consult to Endo Procedure ; Future  -     Comprehensive Metabolic Panel; Future  -     CBC Auto Differential; Future  -     Hemoglobin A1C; Future  -     PSA, Screening; Future    Gastroesophageal reflux disease, unspecified whether esophagitis present    Chronic kidney disease, stage  3a    Other hyperlipidemia    Nonischemic cardiomyopathy    Chronic combined systolic and diastolic CHF (congestive heart failure)    Hx of colonic polyps  -     Ambulatory referral/consult to Endo Procedure ; Future  -     Comprehensive Metabolic Panel; Future  -     CBC Auto Differential; Future  -     Hemoglobin A1C; Future  -     PSA, Screening; Future    Abnormal finding of blood chemistry, unspecified  -     Hemoglobin A1C; Future    Encounter for screening for malignant neoplasm of prostate  -     PSA, Screening; Future         Follow up in about 6 months (around 1/18/2025).     Sunny Soto MD

## 2024-09-02 ENCOUNTER — CLINICAL SUPPORT (OUTPATIENT)
Dept: CARDIOLOGY | Facility: HOSPITAL | Age: 79
End: 2024-09-02
Payer: MEDICARE

## 2024-09-02 DIAGNOSIS — Z95.810 PRESENCE OF AUTOMATIC (IMPLANTABLE) CARDIAC DEFIBRILLATOR: ICD-10-CM

## 2024-09-02 DIAGNOSIS — I42.8 OTHER CARDIOMYOPATHIES: ICD-10-CM

## 2024-09-02 DIAGNOSIS — R00.1 BRADYCARDIA, UNSPECIFIED: ICD-10-CM

## 2024-09-02 PROCEDURE — 93295 DEV INTERROG REMOTE 1/2/MLT: CPT | Mod: ,,, | Performed by: INTERNAL MEDICINE

## 2024-09-03 ENCOUNTER — CLINICAL SUPPORT (OUTPATIENT)
Dept: CARDIOLOGY | Facility: HOSPITAL | Age: 79
End: 2024-09-03
Attending: INTERNAL MEDICINE
Payer: MEDICARE

## 2024-09-03 DIAGNOSIS — I42.8 OTHER CARDIOMYOPATHIES: ICD-10-CM

## 2024-09-03 DIAGNOSIS — R00.1 BRADYCARDIA, UNSPECIFIED: ICD-10-CM

## 2024-09-03 DIAGNOSIS — Z95.810 PRESENCE OF AUTOMATIC (IMPLANTABLE) CARDIAC DEFIBRILLATOR: ICD-10-CM

## 2024-09-03 PROCEDURE — 93296 REM INTERROG EVL PM/IDS: CPT | Performed by: INTERNAL MEDICINE

## 2024-09-06 DIAGNOSIS — I47.29 NON-SUSTAINED VENTRICULAR TACHYCARDIA: Primary | ICD-10-CM

## 2024-09-12 NOTE — PROGRESS NOTES
Mr. Lanza is a patient of Dr. Wahl and was last seen in clinic 4/6/2023.      Subjective:   Patient ID:  Sidney Lanza is a 79 y.o. male who presents for follow up of ICD  .     HPI:    Mr. Lanza is a 79 y.o. male with NSVT, NICM, ICD here for follow up.    Background:    Pt initially referred for cardiomyopathy and non-sustained VT. He developed NICM and was placed on GDMT 9/2/22. The following week 9/11/22, he had a spell of weakness and flushing and word finding issues. His symptoms resolved later that day. No CVA noted on imaging. Event monitor placed. 9/26/22 5PM. He had 23 beat runs of NSVT. This event was asymptomatic and did not reproduce his symptoms from 9/11/22. No syncope or near syncope. Ischemic work up underway.   He had a single NSVT event lasting 23 beats. He is now wearing a life vest. Coreg from 3.125 to 6.25. Will get echo 12/2/22 and tentatively plan on Dc ICD the following 1-2 weeks. Cancel ICD if EF >35%. Extensive discussion regarding risks, benefits, and alternative approaches to the procedure was had with the patient.  The patient voices understanding and all questions have been answered.  The patient would like to proceed as planned.    12/8/2022: Successful implantation of ICD Dual placed for primary intervention.    4/6/2023: Mr. Lanza is doing well from a device perspective with stable lead and device function. No sustained arrhythmia noted. No treated episodes. On GDMT. Follows with cardiology.    Update (09/16/2024):    Today he says he is feeling well. No cardiac complaints. Mr. Lanza reports no chest pain with exertion or at rest, palpitations, SOB, BOYD, dizziness, or syncope.    Device Interrogation (9/16/2024) reveals an intrinsic SR with stable lead and device function. NSATx16, max 12 seconds. NSVTx8, max 13 seconds.  He paces 55% in the RA and 0% in the RV. Estimated battery longevity 7 years.     I have personally reviewed the patient's EKG today, which  shows sinus rhythm with occ PVCs at 64bpm. DE interval is 148. QRS is 128. QTc is 449.    Relevant Cardiac Test Results:    2D Echo (3/7/2024):    Left Ventricle: The left ventricle is severely dilated. Ventricular mass is normal. Normal wall thickness. Global hypokinesis present. There is severely reduced systolic function with a visually estimated ejection fraction of less than 30%. Ejection fraction by visual approximation is 10%. Grade II diastolic dysfunction.    Right Ventricle: The right ventricular cavity is upper limit of normal in size. Right ventricle wall motion  is normal. Systolic function is normal. Pacemaker lead present in the ventricle. Compared to the echo on 2/12/22, RV systolic function has improved.    Left Atrium: Left atrium is severely dilated.    Mitral Valve: There is mild regurgitation.    Pulmonary Artery: The estimated pulmonary artery systolic pressure is 31 mmHg.    IVC/SVC: Intermediate venous pressure at 8 mmHg.    Current Outpatient Medications   Medication Sig    aspirin (ECOTRIN) 81 MG EC tablet Take 1 tablet (81 mg total) by mouth once daily.    atorvastatin (LIPITOR) 40 MG tablet TAKE 1 TABLET BY MOUTH IN THE  EVENING    carvediloL (COREG) 25 MG tablet Take 1 tablet (25 mg total) by mouth 2 (two) times daily with meals.    FIBER, DEXTRIN, ORAL Take 1 tablet by mouth every morning.    fluticasone propionate (FLONASE) 50 mcg/actuation nasal spray USE 2 SPRAYS IN BOTH NOSTRILS  ONCE DAILY    furosemide (LASIX) 40 MG tablet Take 1 tablet (40 mg total) by mouth once daily.    glucosamine-chondroitin 500-400 mg tablet Take 2 tablets by mouth every morning.    JARDIANCE 10 mg tablet TAKE 1 TABLET BY MOUTH ONCE  DAILY    multivitamin capsule Take 1 capsule by mouth every morning.     pantoprazole (PROTONIX) 40 MG tablet TAKE 1 TABLET BY MOUTH ONCE  DAILY FOR REFLUX    sacubitriL-valsartan (ENTRESTO)  mg per tablet Take 1 tablet by mouth 2 (two) times daily.    spironolactone  "(ALDACTONE) 25 MG tablet TAKE 1 TABLET BY MOUTH ONCE  DAILY     No current facility-administered medications for this visit.     Facility-Administered Medications Ordered in Other Visits   Medication    balanced salt irrigation intra-ocular solution 1 drop    phenylephrine HCL 10% ophthalmic solution 1 drop    sodium chloride 0.9% flush 2 mL       Review of Systems   Constitutional: Negative for malaise/fatigue.   Cardiovascular:  Negative for chest pain, dyspnea on exertion, irregular heartbeat, leg swelling and palpitations.   Respiratory:  Negative for shortness of breath.    Hematologic/Lymphatic: Negative for bleeding problem.   Skin:  Negative for rash.   Musculoskeletal:  Negative for myalgias.   Gastrointestinal:  Negative for hematemesis, hematochezia and nausea.   Genitourinary:  Negative for hematuria.   Neurological:  Negative for light-headedness.   Psychiatric/Behavioral:  Negative for altered mental status.    Allergic/Immunologic: Negative for persistent infections.       Objective:          BP (!) 93/58   Pulse 64   Ht 5' 9" (1.753 m)   Wt 65.1 kg (143 lb 8.3 oz)   BMI 21.19 kg/m²     Physical Exam  Vitals and nursing note reviewed.   Constitutional:       Appearance: Normal appearance. He is well-developed.   HENT:      Head: Normocephalic.      Nose: Nose normal.   Eyes:      Pupils: Pupils are equal, round, and reactive to light.   Cardiovascular:      Rate and Rhythm: Normal rate and regular rhythm.   Pulmonary:      Effort: No respiratory distress.   Chest:      Comments: Device to LUCW. Incision and pocket in good repair.    Musculoskeletal:         General: Normal range of motion.   Skin:     General: Skin is warm and dry.      Findings: No erythema.   Neurological:      Mental Status: He is alert and oriented to person, place, and time.   Psychiatric:         Speech: Speech normal.         Behavior: Behavior normal.           Lab Results   Component Value Date     07/11/2024    K " 4.5 07/11/2024    MG 1.9 09/13/2022    BUN 26 (H) 07/11/2024    CREATININE 1.6 (H) 07/11/2024    ALT 15 07/11/2024    AST 25 07/11/2024    HGB 11.6 (L) 07/11/2024    HCT 36.9 (L) 07/11/2024    HCT 47 09/11/2022    TSH 1.938 09/06/2023    LDLCALC 53.8 (L) 07/11/2024       Recent Labs   Lab 09/11/22  1610 09/11/22  1622 09/11/22 2054 11/30/22  1625   INR 1.2 1.0 1.2 1.2       Assessment:     1. Cardiac pacemaker    2. Nonischemic cardiomyopathy    3. Essential hypertension    4. Chronic combined systolic and diastolic CHF (congestive heart failure)    5. Non-sustained ventricular tachycardia        Plan:     In summary, Mr. Lanza is a 79 y.o. male with NSVT, NICM, ICD here for follow up.  Mr. Lanza is doing well from a device perspective with stable lead and device function. No sustained arrhythmia noted. No treated episodes.  No RV pacing. No CHF symptoms or recent hospitalizations. On GDMT. He is feeling well.     Continue current medication regimen and device settings.   Follow up in device clinic as scheduled.   Follow up in EP clinic in 1 year, sooner as needed.     *A copy of this note has been sent to Dr. Wahl*    Follow up in about 1 year (around 9/16/2025).    ------------------------------------------------------------------    TRANG Harrington, NP-C  Cardiac Electrophysiology

## 2024-09-16 ENCOUNTER — IMMUNIZATION (OUTPATIENT)
Dept: INTERNAL MEDICINE | Facility: CLINIC | Age: 79
End: 2024-09-16
Payer: MEDICARE

## 2024-09-16 ENCOUNTER — OFFICE VISIT (OUTPATIENT)
Dept: ELECTROPHYSIOLOGY | Facility: CLINIC | Age: 79
End: 2024-09-16
Attending: INTERNAL MEDICINE
Payer: MEDICARE

## 2024-09-16 ENCOUNTER — CLINICAL SUPPORT (OUTPATIENT)
Dept: CARDIOLOGY | Facility: HOSPITAL | Age: 79
End: 2024-09-16
Attending: INTERNAL MEDICINE
Payer: MEDICARE

## 2024-09-16 VITALS
HEART RATE: 64 BPM | SYSTOLIC BLOOD PRESSURE: 93 MMHG | HEIGHT: 69 IN | DIASTOLIC BLOOD PRESSURE: 58 MMHG | WEIGHT: 143.5 LBS | BODY MASS INDEX: 21.25 KG/M2

## 2024-09-16 DIAGNOSIS — I10 ESSENTIAL HYPERTENSION: Chronic | ICD-10-CM

## 2024-09-16 DIAGNOSIS — I42.8 NONISCHEMIC CARDIOMYOPATHY: ICD-10-CM

## 2024-09-16 DIAGNOSIS — I47.29 NON-SUSTAINED VENTRICULAR TACHYCARDIA: ICD-10-CM

## 2024-09-16 DIAGNOSIS — Z95.0 CARDIAC PACEMAKER: Primary | ICD-10-CM

## 2024-09-16 DIAGNOSIS — I50.42 CHRONIC COMBINED SYSTOLIC AND DIASTOLIC CHF (CONGESTIVE HEART FAILURE): ICD-10-CM

## 2024-09-16 DIAGNOSIS — Z23 NEED FOR VACCINATION: Primary | ICD-10-CM

## 2024-09-16 LAB
OHS QRS DURATION: 128 MS
OHS QTC CALCULATION: 449 MS

## 2024-09-16 PROCEDURE — 93005 ELECTROCARDIOGRAM TRACING: CPT | Mod: S$GLB,,, | Performed by: INTERNAL MEDICINE

## 2024-09-16 PROCEDURE — 3078F DIAST BP <80 MM HG: CPT | Mod: CPTII,S$GLB,, | Performed by: NURSE PRACTITIONER

## 2024-09-16 PROCEDURE — 1101F PT FALLS ASSESS-DOCD LE1/YR: CPT | Mod: CPTII,S$GLB,, | Performed by: NURSE PRACTITIONER

## 2024-09-16 PROCEDURE — 1159F MED LIST DOCD IN RCRD: CPT | Mod: CPTII,S$GLB,, | Performed by: NURSE PRACTITIONER

## 2024-09-16 PROCEDURE — 99214 OFFICE O/P EST MOD 30 MIN: CPT | Mod: S$GLB,,, | Performed by: NURSE PRACTITIONER

## 2024-09-16 PROCEDURE — G0008 ADMIN INFLUENZA VIRUS VAC: HCPCS | Mod: S$GLB,,, | Performed by: INTERNAL MEDICINE

## 2024-09-16 PROCEDURE — 99999 PR PBB SHADOW E&M-EST. PATIENT-LVL III: CPT | Mod: PBBFAC,,, | Performed by: NURSE PRACTITIONER

## 2024-09-16 PROCEDURE — 90662 IIV NO PRSV INCREASED AG IM: CPT | Mod: S$GLB,,, | Performed by: INTERNAL MEDICINE

## 2024-09-16 PROCEDURE — 93283 PRGRMG EVAL IMPLANTABLE DFB: CPT

## 2024-09-16 PROCEDURE — 1126F AMNT PAIN NOTED NONE PRSNT: CPT | Mod: CPTII,S$GLB,, | Performed by: NURSE PRACTITIONER

## 2024-09-16 PROCEDURE — 1160F RVW MEDS BY RX/DR IN RCRD: CPT | Mod: CPTII,S$GLB,, | Performed by: NURSE PRACTITIONER

## 2024-09-16 PROCEDURE — 3288F FALL RISK ASSESSMENT DOCD: CPT | Mod: CPTII,S$GLB,, | Performed by: NURSE PRACTITIONER

## 2024-09-16 PROCEDURE — 3074F SYST BP LT 130 MM HG: CPT | Mod: CPTII,S$GLB,, | Performed by: NURSE PRACTITIONER

## 2024-09-16 PROCEDURE — 93010 ELECTROCARDIOGRAM REPORT: CPT | Mod: S$GLB,,, | Performed by: INTERNAL MEDICINE

## 2024-09-19 RX ORDER — ATORVASTATIN CALCIUM 40 MG/1
40 TABLET, FILM COATED ORAL NIGHTLY
Qty: 90 TABLET | Refills: 3 | Status: SHIPPED | OUTPATIENT
Start: 2024-09-19

## 2024-09-19 NOTE — TELEPHONE ENCOUNTER
No care due was identified.  [facility] Embedded Care Due Messages. Reference number: [id number].   9/19/2024 4:07:04 AM CDT

## 2024-09-19 NOTE — TELEPHONE ENCOUNTER
Refill Decision Note   Sidney Pool  is requesting a refill authorization.  Brief Assessment and Rationale for Refill:  Approve     Medication Therapy Plan:         Comments:     Note composed:5:32 PM 09/19/2024

## 2024-09-27 LAB
OHS CV AF BURDEN PERCENT: < 1
OHS CV DC REMOTE DEVICE TYPE: NORMAL
OHS CV RV PACING PERCENT: 1 %

## 2024-09-30 ENCOUNTER — IMMUNIZATION (OUTPATIENT)
Dept: INTERNAL MEDICINE | Facility: CLINIC | Age: 79
End: 2024-09-30
Payer: MEDICARE

## 2024-09-30 DIAGNOSIS — Z23 NEED FOR VACCINATION: Primary | ICD-10-CM

## 2024-09-30 PROCEDURE — 90480 ADMN SARSCOV2 VAC 1/ONLY CMP: CPT | Mod: S$GLB,,, | Performed by: INTERNAL MEDICINE

## 2024-09-30 PROCEDURE — 91320 SARSCV2 VAC 30MCG TRS-SUC IM: CPT | Mod: S$GLB,,, | Performed by: INTERNAL MEDICINE

## 2024-10-02 RX ORDER — EMPAGLIFLOZIN 10 MG/1
10 TABLET, FILM COATED ORAL
Qty: 90 TABLET | Refills: 1 | Status: SHIPPED | OUTPATIENT
Start: 2024-10-02

## 2024-10-02 NOTE — TELEPHONE ENCOUNTER
Refill Decision Note   Sidney Lanza  is requesting a refill authorization.  Brief Assessment and Rationale for Refill:  Approve     Medication Therapy Plan:         Pharmacist review requested: Yes   Extended chart review required: Yes   Comments:     Note composed:1:43 PM 10/02/2024

## 2024-10-02 NOTE — TELEPHONE ENCOUNTER
Refill Routing Note   Medication(s) are not appropriate for processing by [facility] for the following reason(s):        Required labs abnormal    [facility] action(s):  Defer           Pharmacist review requested: Yes     Appointments  past 12m or future 3m with PCP    Date Provider   Last Visit   7/18/2024 Sunny Soto MD   Next Visit   11/19/2024 Sunny Soto MD   ED visits in past 90 days: 0        Note composed:9:25 AM 10/02/2024

## 2024-10-02 NOTE — TELEPHONE ENCOUNTER
No care due was identified.  Health [facility] Embedded Care Due Messages. Reference number: [id number].   10/01/2024 9:21:02 PM CDT

## 2024-10-25 ENCOUNTER — CLINICAL SUPPORT (OUTPATIENT)
Dept: ENDOSCOPY | Facility: HOSPITAL | Age: 79
End: 2024-10-25
Attending: INTERNAL MEDICINE
Payer: MEDICARE

## 2024-10-25 ENCOUNTER — TELEPHONE (OUTPATIENT)
Dept: ENDOSCOPY | Facility: HOSPITAL | Age: 79
End: 2024-10-25

## 2024-10-25 VITALS — WEIGHT: 144 LBS | BODY MASS INDEX: 21.33 KG/M2 | HEIGHT: 69 IN

## 2024-10-25 DIAGNOSIS — Z86.0100 HX OF COLONIC POLYPS: ICD-10-CM

## 2024-10-25 DIAGNOSIS — Z12.11 COLON CANCER SCREENING: ICD-10-CM

## 2024-10-25 RX ORDER — SODIUM, POTASSIUM,MAG SULFATES 17.5-3.13G
1 SOLUTION, RECONSTITUTED, ORAL ORAL DAILY
Qty: 1 KIT | Refills: 0 | Status: SHIPPED | OUTPATIENT
Start: 2024-10-25 | End: 2024-10-27

## 2024-10-25 NOTE — TELEPHONE ENCOUNTER
Referral for procedure from PAT appointment      Spoke to patient to schedule procedure(s) Colonoscopy       Physician to perform procedure(s) Dr. WILI Clayton  Date of Procedure (s) 11/6/24  Arrival Time 8:35 AM  Time of Procedure(s) 9:35 AM   Location of Procedure(s) [place] [address] Floor  Type of Rx Prep sent to patient: Suprep  Instructions provided to patient via [facility]    Patient was informed on the following information and verbalized understanding. Screening questionnaire reviewed with patient and complete. If procedure requires anesthesia, a responsible adult needs to be present to accompany the patient home, patient cannot drive after receiving anesthesia. Appointment details are tentative, especially check-in time. Patient will receive a prep-op call 7 days prior to confirm check-in time for procedure. If applicable the patient should contact their pharmacy to verify Rx for procedure prep is ready for pick-up. Patient was advised to call the scheduling department at [phone] if pharmacy states no Rx is available. Patient was advised to call the endoscopy scheduling department if any questions or concerns arise.       Endoscopy Scheduling Department         Please stop taking Jardiance on 11/3/24.

## 2024-10-29 ENCOUNTER — TELEPHONE (OUTPATIENT)
Dept: ENDOSCOPY | Facility: HOSPITAL | Age: 79
End: 2024-10-29
Payer: MEDICARE

## 2024-10-31 ENCOUNTER — PATIENT MESSAGE (OUTPATIENT)
Dept: ENDOSCOPY | Facility: HOSPITAL | Age: 79
End: 2024-10-31
Payer: MEDICARE

## 2024-10-31 ENCOUNTER — TELEPHONE (OUTPATIENT)
Dept: ENDOSCOPY | Facility: HOSPITAL | Age: 79
End: 2024-10-31
Payer: MEDICARE

## 2024-11-06 ENCOUNTER — ANESTHESIA EVENT (OUTPATIENT)
Dept: ENDOSCOPY | Facility: HOSPITAL | Age: 79
End: 2024-11-06
Payer: MEDICARE

## 2024-11-06 ENCOUNTER — ANESTHESIA (OUTPATIENT)
Dept: ENDOSCOPY | Facility: HOSPITAL | Age: 79
End: 2024-11-06
Payer: MEDICARE

## 2024-11-06 ENCOUNTER — HOSPITAL ENCOUNTER (OUTPATIENT)
Facility: HOSPITAL | Age: 79
Discharge: HOME | End: 2024-11-06
Attending: COLON & RECTAL SURGERY | Admitting: COLON & RECTAL SURGERY
Payer: MEDICARE

## 2024-11-06 VITALS
WEIGHT: 140 LBS | SYSTOLIC BLOOD PRESSURE: 96 MMHG | BODY MASS INDEX: 20.73 KG/M2 | HEART RATE: 63 BPM | HEIGHT: 69 IN | OXYGEN SATURATION: 99 % | TEMPERATURE: 97 F | DIASTOLIC BLOOD PRESSURE: 61 MMHG | RESPIRATION RATE: 14 BRPM

## 2024-11-06 DIAGNOSIS — Z12.11 SCREENING FOR COLON CANCER: ICD-10-CM

## 2024-11-06 PROCEDURE — 25000003 PHARM REV CODE 250

## 2024-11-06 PROCEDURE — 45385 COLONOSCOPY W/LESION REMOVAL: CPT | Mod: PT,,, | Performed by: COLON & RECTAL SURGERY

## 2024-11-06 PROCEDURE — D9220A PRA ANESTHESIA: Mod: PT,CRNA,,

## 2024-11-06 PROCEDURE — 45385 COLONOSCOPY W/LESION REMOVAL: CPT | Mod: PT | Performed by: COLON & RECTAL SURGERY

## 2024-11-06 PROCEDURE — 27201012 HC FORCEPS, HOT/COLD, DISP: Performed by: COLON & RECTAL SURGERY

## 2024-11-06 PROCEDURE — 27201089 HC SNARE, DISP (ANY): Performed by: COLON & RECTAL SURGERY

## 2024-11-06 PROCEDURE — 45380 COLONOSCOPY AND BIOPSY: CPT | Mod: PT,59,, | Performed by: COLON & RECTAL SURGERY

## 2024-11-06 PROCEDURE — 45380 COLONOSCOPY AND BIOPSY: CPT | Mod: PT,59 | Performed by: COLON & RECTAL SURGERY

## 2024-11-06 PROCEDURE — 37000008 HC ANESTHESIA 1ST 15 MINUTES: Performed by: COLON & RECTAL SURGERY

## 2024-11-06 PROCEDURE — 88305 TISSUE EXAM BY PATHOLOGIST: CPT | Performed by: STUDENT IN AN ORGANIZED HEALTH CARE EDUCATION/TRAINING PROGRAM

## 2024-11-06 PROCEDURE — 63600175 PHARM REV CODE 636 W HCPCS

## 2024-11-06 PROCEDURE — 88305 TISSUE EXAM BY PATHOLOGIST: CPT | Mod: 26,,, | Performed by: STUDENT IN AN ORGANIZED HEALTH CARE EDUCATION/TRAINING PROGRAM

## 2024-11-06 PROCEDURE — D9220A PRA ANESTHESIA: Mod: PT,ANES,, | Performed by: ANESTHESIOLOGY

## 2024-11-06 PROCEDURE — 37000009 HC ANESTHESIA EA ADD 15 MINS: Performed by: COLON & RECTAL SURGERY

## 2024-11-06 RX ORDER — DEXMEDETOMIDINE HYDROCHLORIDE 100 UG/ML
INJECTION, SOLUTION INTRAVENOUS
Status: DISCONTINUED | OUTPATIENT
Start: 2024-11-06 | End: 2024-11-06

## 2024-11-06 RX ORDER — ETOMIDATE 2 MG/ML
INJECTION INTRAVENOUS
Status: DISCONTINUED | OUTPATIENT
Start: 2024-11-06 | End: 2024-11-06

## 2024-11-06 RX ORDER — PROPOFOL 10 MG/ML
VIAL (ML) INTRAVENOUS CONTINUOUS PRN
Status: DISCONTINUED | OUTPATIENT
Start: 2024-11-06 | End: 2024-11-06

## 2024-11-06 RX ORDER — LIDOCAINE HYDROCHLORIDE 20 MG/ML
INJECTION, SOLUTION EPIDURAL; INFILTRATION; INTRACAUDAL; PERINEURAL
Status: DISCONTINUED | OUTPATIENT
Start: 2024-11-06 | End: 2024-11-06

## 2024-11-06 RX ORDER — ONDANSETRON HYDROCHLORIDE 2 MG/ML
INJECTION, SOLUTION INTRAVENOUS
Status: DISCONTINUED | OUTPATIENT
Start: 2024-11-06 | End: 2024-11-06

## 2024-11-06 RX ORDER — PHENYLEPHRINE HYDROCHLORIDE 10 MG/ML
INJECTION INTRAVENOUS
Status: DISCONTINUED | OUTPATIENT
Start: 2024-11-06 | End: 2024-11-06

## 2024-11-06 RX ORDER — GLUCAGON 1 MG
1 KIT INJECTION
Status: DISCONTINUED | OUTPATIENT
Start: 2024-11-06 | End: 2024-11-06 | Stop reason: HOSPADM

## 2024-11-06 RX ORDER — SODIUM CHLORIDE 0.9 % (FLUSH) 0.9 %
10 SYRINGE (ML) INJECTION
Status: DISCONTINUED | OUTPATIENT
Start: 2024-11-06 | End: 2024-11-06 | Stop reason: HOSPADM

## 2024-11-06 RX ADMIN — ETOMIDATE 4 MG: 2 INJECTION INTRAVENOUS at 10:11

## 2024-11-06 RX ADMIN — PHENYLEPHRINE HYDROCHLORIDE 100 MCG: 10 INJECTION INTRAVENOUS at 10:11

## 2024-11-06 RX ADMIN — DEXMEDETOMIDINE 8 MCG: 100 INJECTION, SOLUTION, CONCENTRATE INTRAVENOUS at 09:11

## 2024-11-06 RX ADMIN — ONDANSETRON 4 MG: 2 INJECTION INTRAMUSCULAR; INTRAVENOUS at 10:11

## 2024-11-06 RX ADMIN — DEXMEDETOMIDINE 4 MCG: 100 INJECTION, SOLUTION, CONCENTRATE INTRAVENOUS at 09:11

## 2024-11-06 RX ADMIN — ETOMIDATE 4 MG: 2 INJECTION INTRAVENOUS at 09:11

## 2024-11-06 RX ADMIN — LIDOCAINE HYDROCHLORIDE 60 MG: 20 INJECTION, SOLUTION EPIDURAL; INFILTRATION; INTRACAUDAL; PERINEURAL at 10:11

## 2024-11-06 RX ADMIN — PROPOFOL 50 MCG/KG/MIN: 10 INJECTION, EMULSION INTRAVENOUS at 09:11

## 2024-11-06 NOTE — [employer]ATION PATIENT INSTRUCTIONS
Discharge Summary/Instructions after an Endoscopic Procedure  Patient Name: Sidney Lanza  Patient MRN: [id number]  Patient YOB: 1945 Wednesday, November 6, 2024  Max Clayton MD  Dear patient,  As a result of recent federal legislation (The Federal Cures Act), you may   receive lab or pathology results from your procedure in your [facility]   account before your physician is able to contact you. Your physician or   their representative will relay the results to you with their   recommendations at their soonest availability.  Thank you,  RESTRICTIONS:  During your procedure today, you received medications for sedation.  These   medications may affect your judgment, balance and coordination.  Therefore,   for 24 hours, you have the following restrictions:   - DO NOT drive a car, operate machinery, make legal/financial decisions,   sign important papers or drink alcohol.    ACTIVITY:  Today: no heavy lifting, straining or running due to procedural   sedation/anesthesia.  The following day: return to full activity including work.  DIET:  Eat and drink normally unless instructed otherwise.     TREATMENT FOR COMMON SIDE EFFECTS:  - Mild abdominal pain, nausea, belching, bloating or excessive gas:  rest,   eat lightly and use a heating pad.  - Sore Throat: treat with throat lozenges and/or gargle with warm salt   water.  - Because air was used during the procedure, expelling large amounts of air   from your rectum or belching is normal.  - If a bowel prep was taken, you may not have a bowel movement for 1-3 days.    This is normal.  SYMPTOMS TO WATCH FOR AND REPORT TO YOUR PHYSICIAN:  1. Abdominal pain or bloating, other than gas cramps.  2. Chest pain.  3. Back pain.  4. Signs of infection such as: chills or fever occurring within 24 hours   after the procedure.  5. Rectal bleeding, which would show as bright red, maroon, or black stools.   (A tablespoon of blood from the rectum is not serious,  especially if   hemorrhoids are present.)  6. Vomiting.  7. Weakness or dizziness.  GO DIRECTLY TO THE NEAREST EMERGENCY ROOM IF YOU HAVE ANY OF THE FOLLOWING:      Difficulty breathing              Chills and/or fever over 101 F   Persistent vomiting and/or vomiting blood   Severe abdominal pain   Severe chest pain   Black, tarry stools   Bleeding- more than one tablespoon   Any other symptom or condition that you feel may need urgent attention  Your doctor recommends these additional instructions:  If any biopsies were taken, your doctors clinic will contact you in 1 to 2   weeks with any results.  - Discharge patient to home.   - High fiber diet.   - Continue present medications.   - Await pathology results.   - Repeat colonoscopy in 5 years for surveillance.   - Return to primary care physician PRN.  For questions, problems or results please call your physician - Max Clayton MD at Work:  [phone].  [facility], EMERGENCY ROOM PHONE NUMBER: [phone]  IF A COMPLICATION OR EMERGENCY SITUATION ARISES AND YOU ARE UNABLE TO REACH   YOUR PHYSICIAN - GO DIRECTLY TO THE EMERGENCY ROOM.  MD Max Hayes MD  11/6/2024 10:22:01 AM  This report has been verified and signed electronically.  Dear patient,  As a result of recent federal legislation (The Federal Cures Act), you may   receive lab or pathology results from your procedure in your [facility]   account before your physician is able to contact you. Your physician or   their representative will relay the results to you with their   recommendations at their soonest availability.  Thank you,  PROVATION

## 2024-11-06 NOTE — ANESTHESIA PREPROCEDURE EVALUATION
[facility]  Anesthesia Pre-Operative Evaluation     Mr Lanza is a79 yo with hx of  NSV, NICM, s/p ICD placement with an EF of  10%      Heart failure pt told to hold his jiardiance x3d prior to colonoscopy.      he has a current medication list which includes the following long-term medication(s): aspirin, atorvastatin, carvedilol, furosemide, pantoprazole, and spironolactone.   Current Outpatient Medications   Medication Instructions    aspirin (ECOTRIN) 81 mg, Oral, Daily    atorvastatin (LIPITOR) 40 mg, Oral, Nightly    carvediloL (COREG) 25 mg, Oral, 2 times daily with meals    FIBER, DEXTRIN, ORAL 1 tablet, Oral, Every morning    fluticasone propionate (FLONASE) 50 mcg/actuation nasal spray USE 2 SPRAYS IN BOTH NOSTRILS  ONCE DAILY    furosemide (LASIX) 40 mg, Oral, Daily    glucosamine-chondroitin 500-400 mg tablet 2 tablets, Every morning    JARDIANCE 10 mg, Oral    multivitamin capsule 1 capsule, Every morning    pantoprazole (PROTONIX) 40 MG tablet TAKE 1 TABLET BY MOUTH ONCE  DAILY FOR REFLUX    sacubitriL-valsartan (ENTRESTO)  mg per tablet 1 tablet, Oral, 2 times daily    spironolactone (ALDACTONE) 25 mg, Oral     ALLERGIES:   Review of patient's allergies indicates:  No Known Allergies  LDA:   AIRWAY:         * No LDAs found *      Lines/Drains/Airways       None                  Anesthesia Evaluation      Airway   Mallampati: III  TM distance: Normal  Neck ROM: Normal ROM  Dental    (+) Intact    Pulmonary    (+) shortness of breath  Cardiovascular   (+) pacemaker, hypertension, CHF    Neuro/Psych      GI/Hepatic/Renal    (+) chronic renal disease    Endo/Other    (+) arthritis  Abdominal                    MEDICATIONS:     Current Outpatient Medications on File Prior to Encounter   Medication Sig Dispense Refill Last Dose/Taking    aspirin (ECOTRIN) 81 MG EC tablet Take 1 tablet (81 mg total) by mouth once daily. 90 tablet 3 Past Week    atorvastatin (LIPITOR) 40 MG tablet  Take 1 tablet (40 mg total) by mouth every evening. 90 tablet 3 11/6/2024 Morning    carvediloL (COREG) 25 MG tablet Take 1 tablet (25 mg total) by mouth 2 (two) times daily with meals. 180 tablet 3 11/6/2024 Morning    fluticasone propionate (FLONASE) 50 mcg/actuation nasal spray USE 2 SPRAYS IN BOTH NOSTRILS  ONCE DAILY 48 g 3 11/6/2024 Morning    furosemide (LASIX) 40 MG tablet Take 1 tablet (40 mg total) by mouth once daily. 90 tablet 3 11/6/2024 Morning    glucosamine-chondroitin 500-400 mg tablet Take 2 tablets by mouth every morning.   11/6/2024 Morning    JARDIANCE 10 mg tablet TAKE 1 TABLET BY MOUTH ONCE  DAILY 90 tablet 1 Past Week    multivitamin capsule Take 1 capsule by mouth every morning.    11/6/2024 Morning    pantoprazole (PROTONIX) 40 MG tablet TAKE 1 TABLET BY MOUTH ONCE  DAILY FOR REFLUX 90 tablet 3 11/6/2024 Morning    sacubitriL-valsartan (ENTRESTO)  mg per tablet Take 1 tablet by mouth 2 (two) times daily. 180 tablet 3 11/6/2024 Morning    spironolactone (ALDACTONE) 25 MG tablet TAKE 1 TABLET BY MOUTH ONCE  DAILY 90 tablet 2 11/6/2024 Morning    FIBER, DEXTRIN, ORAL Take 1 tablet by mouth every morning.         Inpatient Medications:  Antibiotics (From admission, onward)      None          VTE Risk Mitigation (From admission, onward)      None              No current facility-administered medications for this encounter.     Facility-Administered Medications Ordered in Other Encounters   Medication Dose Route Frequency Provider Last Rate Last Admin    balanced salt irrigation intra-ocular solution 1 drop  1 drop Left Eye On Call Procedure Estella Silva MD        phenylephrine HCL 10% ophthalmic solution 1 drop  1 drop Left Eye PRN Estella Silva MD        sodium chloride 0.9% flush 2 mL  2 mL Intravenous PRN Estella Silva MD              History:   There are no hospital problems to display for this patient.    Surgical History:    has a past surgical history that includes  Prostate surgery (2006); Knee arthroscopy w/ debridement; Ulnar nerve transposition; Eye surgery; Shoulder arthroscopy (Right, 10/2017); Colonoscopy (N/A, 6/13/2018); Colonoscopy (N/A, 4/28/2021); Cataract extraction w/  intraocular lens implant (Right, 11/22/2022); insertion, icd, dual chamber (Left, 12/8/2022); and Cataract extraction w/  intraocular lens implant (Left, 1/11/2023).   Social History:    has no history on file for sexual activity.  reports that he quit smoking about 36 years ago. His smoking use included cigarettes. He started smoking about 66 years ago. He has a 30 pack-year smoking history. He has never used smokeless tobacco. He reports current alcohol use of about 14.0 standard drinks of alcohol per week. He reports that he does not use drugs.    There were no vitals filed for this visit.  Vital Signs Range (Last 24H):       There is no height or weight on file to calculate BMI.  Wt Readings from Last 4 Encounters:   10/25/24 65.3 kg (144 lb)   09/16/24 65.1 kg (143 lb 8.3 oz)   07/18/24 66.1 kg (145 lb 11.6 oz)   03/07/24 65.8 kg (145 lb)        Intake/Output - Last 3 Shifts       None          Lab Results   Component Value Date    WBC 7.14 07/11/2024    HGB 11.6 (L) 07/11/2024    HCT 36.9 (L) 07/11/2024     07/11/2024     07/11/2024    K 4.5 07/11/2024     07/11/2024    CREATININE 1.6 (H) 07/11/2024    BUN 26 (H) 07/11/2024    CO2 25 07/11/2024     07/11/2024    CALCIUM 9.3 07/11/2024    MG 1.9 09/13/2022    PHOS 3.8 09/13/2022    ALKPHOS 53 (L) 07/11/2024    ALT 15 07/11/2024    AST 25 07/11/2024    ALBUMIN 3.9 07/11/2024    INR 1.2 11/30/2022    APTT 25.8 11/30/2022    HGBA1C 6.0 (H) 07/11/2024    TROPONINI 0.445 (H) 09/12/2022    BNP 1,537 (H) 09/11/2022       EKG:   Results for orders placed or performed during the hospital encounter of 12/08/22   EKG 12-lead    Collection Time: 12/08/22  4:00 PM    Narrative    Test Reason : I49.9,    Vent. Rate : 063 BPM      Atrial Rate : 063 BPM     P-R Int : 130 ms          QRS Dur : 120 ms      QT Int : 466 ms       P-R-T Axes : 024 -36 158 degrees     QTc Int : 476 ms    Normal sinus rhythm  Left axis deviation  Left bundle branch block    Abnormal ECG  When compared with ECG of 08-DEC-2022 11:44,  Premature ventricular complexes are no longer Present    Confirmed by Jaziel Irwin MD ([id number]) on 12/9/2022 12:30:14 PM    Referred By: JEWEL DYER           Confirmed By:Jaziel Irwin MD     TTE:  Results for orders placed during the hospital encounter of 03/07/24    Echo    Interpretation Summary    Left Ventricle: The left ventricle is severely dilated. Ventricular mass is normal. Normal wall thickness. Global hypokinesis present. There is severely reduced systolic function with a visually estimated ejection fraction of less than 30%. Ejection fraction by visual approximation is 10%. Grade II diastolic dysfunction.    Right Ventricle: The right ventricular cavity is upper limit of normal in size. Right ventricle wall motion  is normal. Systolic function is normal. Pacemaker lead present in the ventricle. Compared to the echo on 2/12/22, RV systolic function has improved.    Left Atrium: Left atrium is severely dilated.    Mitral Valve: There is mild regurgitation.    Pulmonary Artery: The estimated pulmonary artery systolic pressure is 31 mmHg.    IVC/SVC: Intermediate venous pressure at 8 mmHg.      Cardiac Device Check   Results for orders placed in visit on 09/16/24    Cardiac device check - In Clinic [facility] Hospital    Results for orders placed in visit on 09/03/24    Cardiac device check - Remote        Pre-op Assessment          Review of Systems  Cardiovascular:    Pacemaker Hypertension       CHF                Shortness of Breath       Congestive Heart Failure (CHF)                Hypertension         Pulmonary:      Shortness of breath                  Renal/:  Chronic Renal Disease        Kidney Function/Disease              Musculoskeletal:  Arthritis        Arthritis          Neurological:      Arthritis                               Physical Exam  General: Well nourished    Airway:  Mallampati: III / II  Mouth Opening: Normal  TM Distance: Normal  Tongue: Normal  Neck ROM: Normal ROM    Dental:  Intact        Anesthesia Plan  Type of Anesthesia, risks & benefits discussed:    Anesthesia Type: Gen ETT, Gen Natural Airway, MAC  Intra-op Monitoring Plan: Standard ASA Monitors  Post Op Pain Control Plan: multimodal analgesia and IV/PO Opioids PRN  Induction:  IV  Airway Plan: Direct and Video, Post-Induction  Informed Consent: Informed consent signed with the Patient and all parties understand the risks and agree with anesthesia plan.  All questions answered.   ASA Score: 4  Day of Surgery Review of History & Physical: H&P completed by Anesthesiologist.  Anesthesia Plan Notes:       Ready For Surgery From Anesthesia Perspective.     .

## 2024-11-06 NOTE — BRIEF OP NOTE
[facility] ([facility])  Brief Operative Note     SUMMARY     Surgery Date: 11/6/2024     Surgeons and Role:     * Max Clayton MD - Primary    Assisting Surgeon: None    Pre-op Diagnosis:  Colon cancer screening [Z12.11]  Hx of colonic polyps [Z86.0100]    Post-op Diagnosis:  Post-Op Diagnosis Codes:     * Colon cancer screening [Z12.11]     * Hx of colonic polyps [Z86.0100]    Procedure(s) (LRB):  COLONOSCOPY, SCREENING, HIGH RISK PATIENT (N/A)    Anesthesia: Choice    Description of the findings of the procedure: two polyps removed    Estimated Blood Loss: * No values recorded between 11/6/2024  9:39 AM and 11/6/2024 10:27 AM *         Specimens:   Specimen (24h ago, onward)       Start     Ordered    11/06/24 1008  Specimen to Pathology, Surgery Gastrointestinal tract  Once        Comments: Pre-op Diagnosis: Colon cancer screening [Z12.11]Hx of colonic polyps [Z86.0100]Procedure(s):COLONOSCOPY, SCREENING, HIGH RISK PATIENT Number of specimens: 2Name of specimens: Jar 1 Cecal polyp-cold forceps;Jar 2 Hepatic flexure polyp-cold snare;     References:    Click here for ordering Quick Tip   Question Answer Comment   Procedure Type: Gastrointestinal tract    Specimen Class: Routine/Screening    Release to patient Immediate        11/06/24 1020                    Discharge Note    SUMMARY     Admit Date: 11/6/2024    Discharge Date and Time: 11/6/2024 10:28 AM    Hospital Course Patient was seen in the preoperative area by both myself and anesthesia. All consents were verified and all questions appropriately answered. All risks, benefits and alternatives explained to patient. Patient proceeded to endoscopy suite for colonoscopy and was discharged home postoperative once cleared by anesthesia.    Final Diagnosis: Post-Op Diagnosis Codes:     * Colon cancer screening [Z12.11]     * Hx of colonic polyps [Z86.0100]    Disposition: Home or Self Care    Follow Up/Patient Instructions: See Provation  report    Medications:  Reconciled Home Medications:      Medication List        CONTINUE taking these medications      aspirin 81 MG EC tablet  Commonly known as: ECOTRIN  Take 1 tablet (81 mg total) by mouth once daily.     atorvastatin 40 MG tablet  Commonly known as: LIPITOR  Take 1 tablet (40 mg total) by mouth every evening.     carvediloL 25 MG tablet  Commonly known as: COREG  Take 1 tablet (25 mg total) by mouth 2 (two) times daily with meals.     ENTRESTO  mg per tablet  Generic drug: sacubitriL-valsartan  Take 1 tablet by mouth 2 (two) times daily.     FIBER (DEXTRIN) ORAL  Take 1 tablet by mouth every morning.     fluticasone propionate 50 mcg/actuation nasal spray  Commonly known as: FLONASE  USE 2 SPRAYS IN BOTH NOSTRILS  ONCE DAILY     furosemide 40 MG tablet  Commonly known as: LASIX  Take 1 tablet (40 mg total) by mouth once daily.     glucosamine-chondroitin 500-400 mg tablet  Take 2 tablets by mouth every morning.     JARDIANCE 10 mg tablet  Generic drug: empagliflozin  TAKE 1 TABLET BY MOUTH ONCE  DAILY     multivitamin capsule  Take 1 capsule by mouth every morning.     pantoprazole 40 MG tablet  Commonly known as: PROTONIX  TAKE 1 TABLET BY MOUTH ONCE  DAILY FOR REFLUX     spironolactone 25 MG tablet  Commonly known as: ALDACTONE  TAKE 1 TABLET BY MOUTH ONCE  DAILY            Discharge Procedure Orders   Diet general     Call MD for:  temperature >100.4     Call MD for:  persistent nausea and vomiting     Call MD for:  severe uncontrolled pain     Call MD for:  difficulty breathing, headache or visual disturbances     Call MD for:  redness, tenderness, or signs of infection (pain, swelling, redness, odor or green/yellow discharge around incision site)     Call MD for:  hives     Call MD for:  persistent dizziness or light-headedness     Call MD for:  extreme fatigue     Activity as tolerated      Follow-up Information       Sunny Soto MD Follow up.    Specialty: Internal  Medicine  Why: As needed  Contact information:  [address] [facility] [place] [address]  [phone]

## 2024-11-06 NOTE — H&P
[facility] beds at the [facility] 4th Floor  Colon and Rectal Surgery  History & Physical    Patient Name: Sidney Lanza  MRN: [id number]  Admission Date: 11/6/2024  Attending Physician: Max Clayton MD  Primary Care Provider: Sunny Soto MD    Patient information was obtained from patient and medical records.    Subjective:     Chief Complaint/Reason for Admission: Here for Colonoscopy    History of Present Illness:  Patient is a 79 y.o. male presents for colonoscopy. Last cscope in 2021 with multiple adenomas removed. No current hematochezia or melena. No fam hx of CRC.    Current Facility-Administered Medications on File Prior to Encounter   Medication    balanced salt irrigation intra-ocular solution 1 drop    phenylephrine HCL 10% ophthalmic solution 1 drop    sodium chloride 0.9% flush 2 mL     Current Outpatient Medications on File Prior to Encounter   Medication Sig    aspirin (ECOTRIN) 81 MG EC tablet Take 1 tablet (81 mg total) by mouth once daily.    atorvastatin (LIPITOR) 40 MG tablet Take 1 tablet (40 mg total) by mouth every evening.    carvediloL (COREG) 25 MG tablet Take 1 tablet (25 mg total) by mouth 2 (two) times daily with meals.    fluticasone propionate (FLONASE) 50 mcg/actuation nasal spray USE 2 SPRAYS IN BOTH NOSTRILS  ONCE DAILY    furosemide (LASIX) 40 MG tablet Take 1 tablet (40 mg total) by mouth once daily.    glucosamine-chondroitin 500-400 mg tablet Take 2 tablets by mouth every morning.    JARDIANCE 10 mg tablet TAKE 1 TABLET BY MOUTH ONCE  DAILY    multivitamin capsule Take 1 capsule by mouth every morning.     pantoprazole (PROTONIX) 40 MG tablet TAKE 1 TABLET BY MOUTH ONCE  DAILY FOR REFLUX    sacubitriL-valsartan (ENTRESTO)  mg per tablet Take 1 tablet by mouth 2 (two) times daily.    spironolactone (ALDACTONE) 25 MG tablet TAKE 1 TABLET BY MOUTH ONCE  DAILY    FIBER, DEXTRIN, ORAL Take 1 tablet by mouth every morning.       Review of patient's allergies indicates:  No Known  Allergies    Past Medical History:   Diagnosis Date    Arthritis     Chronic rhinitis     Fever blister     Heart failure, unspecified     Hyperlipidemia     Hypertension     Impotence of organic origin     Joint pain     Nuclear sclerosis - Both Eyes 10/23/2013    Prostate cancer     Ulcer     NSAID related     Varicose vein of leg     bilateral legs     Past Surgical History:   Procedure Laterality Date    CATARACT EXTRACTION W/  INTRAOCULAR LENS IMPLANT Right 11/22/2022    Procedure: EXTRACTION, CATARACT, WITH IOL INSERTION;  Surgeon: Estella Silva MD;  Location: [facility] OR;  Service: Ophthalmology;  Laterality: Right;    CATARACT EXTRACTION W/  INTRAOCULAR LENS IMPLANT Left 1/11/2023    Procedure: EXTRACTION, CATARACT, WITH IOL INSERTION;  Surgeon: Estella Silva MD;  Location: [facility] OR;  Service: Ophthalmology;  Laterality: Left;    COLONOSCOPY N/A 6/13/2018    Procedure: COLONOSCOPY;  Surgeon: Adonis Stack MD;  Location: [facility] ENDO (4TH FLR);  Service: Endoscopy;  Laterality: N/A;    COLONOSCOPY N/A 4/28/2021    Procedure: COLONOSCOPY;  Surgeon: Steven Watson MD;  Location: [facility] ENDO (4TH FLR);  Service: Endoscopy;  Laterality: N/A;  4/25-[facility] uc-inst email-tb    EYE SURGERY      INSERTION, ICD, DUAL CHAMBER Left 12/8/2022    Procedure: INSERTION, ICD, DUAL CHAMBER;  Surgeon: Chalino Wahl MD;  Location: [facility] EP LAB;  Service: Cardiology;  Laterality: Left;  NICM, dcDAMEOND, SJM, karen, MB, 3 Prep    KNEE ARTHROSCOPY W/ DEBRIDEMENT      PROSTATE SURGERY  2006    for prostate cancer    SHOULDER ARTHROSCOPY Right 10/2017    RTC repair with patch    ULNAR NERVE TRANSPOSITION       Family History       Problem Relation (Age of Onset)    Cancer Mother, Father    Diabetes Sister, Brother, Brother    Heart disease Father (74), Sister    Hypertension Brother          Tobacco Use    Smoking status: Former     Current packs/day: 0.00     Average packs/day: 1 pack/day for 30.0 years (30.0 ttl pk-yrs)      Types: Cigarettes     Start date: 1958     Quit date: 1988     Years since quittin.5    Smokeless tobacco: Never   Substance and Sexual Activity    Alcohol use: Yes     Alcohol/week: 14.0 standard drinks of alcohol     Types: 14 Cans of beer per week     Comment: beer - 2 a night     Drug use: No    Sexual activity: Not on file     Review of Systems   Constitutional:  Negative for activity change, appetite change, chills, fatigue, fever and unexpected weight change.   HENT:  Negative for congestion, ear pain, sore throat and trouble swallowing.    Eyes:  Negative for pain, redness and itching.   Respiratory:  Negative for cough, shortness of breath and wheezing.    Cardiovascular:  Negative for chest pain, palpitations and leg swelling.   Gastrointestinal:  Negative for abdominal distention, abdominal pain, anal bleeding, blood in stool, constipation, diarrhea, nausea, rectal pain and vomiting.   Endocrine: Negative for cold intolerance, heat intolerance and polyuria.   Genitourinary:  Negative for dysuria, flank pain, frequency and hematuria.   Musculoskeletal:  Negative for gait problem, joint swelling and neck pain.   Skin:  Negative for color change, rash and wound.   Allergic/Immunologic: Negative for environmental allergies and immunocompromised state.   Neurological:  Negative for dizziness, speech difficulty, weakness and numbness.   Psychiatric/Behavioral:  Negative for agitation, confusion and hallucinations.      Objective:     Vital Signs (Most Recent):  Temp: 97.4 °F (36.3 °C) (24 08)  Pulse: 60 (24 08)  Resp: 16 (24 08)  BP: 109/66 (24 08)  SpO2: 98 % (24 08) Vital Signs (24h Range):  Temp:  [97.4 °F (36.3 °C)] 97.4 °F (36.3 °C)  Pulse:  [60] 60  Resp:  [16] 16  SpO2:  [98 %] 98 %  BP: (109)/(66) 109/66     Weight: 63.5 kg (140 lb)  Body mass index is 20.67 kg/m².    Physical Exam  Constitutional:       Appearance: He is well-developed.   HENT:       Head: Normocephalic and atraumatic.   Eyes:      Conjunctiva/sclera: Conjunctivae normal.   Neck:      Thyroid: No thyromegaly.   Cardiovascular:      Rate and Rhythm: Normal rate and regular rhythm.   Pulmonary:      Effort: Pulmonary effort is normal. No respiratory distress.   Abdominal:      General: There is no distension.      Palpations: Abdomen is soft. There is no mass.      Tenderness: There is no abdominal tenderness.   Musculoskeletal:         General: No tenderness. Normal range of motion.      Cervical back: Normal range of motion.   Skin:     General: Skin is warm and dry.      Capillary Refill: Capillary refill takes less than 2 seconds.      Findings: No rash.   Neurological:      Mental Status: He is alert and oriented to person, place, and time.           Assessment/Plan:     Patient is a 79 y.o. male who presents for colonoscopy     - Ok to proceed to endoscopy suite for colonoscopy  - Consent obtained. All risks, benefits and alternatives fully explained to patient, including but not limited to bleeding, infection, perforation, and missed polyps. All questions appropriately answered to patient's satisfaction. Consent signed and placed on chart.    There are no hospital problems to display for this patient.    VTE Risk Mitigation (From admission, onward)      None            Max Clayton MD  Colon and Rectal Surgery  [facility] 4th [facility]

## 2024-11-06 NOTE — TRANSFER OF CARE
"Anesthesia Transfer of Care Note    Patient: Sidney Lanza    Procedure(s) Performed: Procedure(s) (LRB):  COLONOSCOPY, SCREENING, HIGH RISK PATIENT (N/A)    Patient location: [facility]    Anesthesia Type: general    Transport from OR: Transported from OR on room air with adequate spontaneous ventilation    Post pain: adequate analgesia    Post assessment: no apparent anesthetic complications and tolerated procedure well    Post vital signs: stable    Level of consciousness: awake    Nausea/Vomiting: no nausea/vomiting    Complications: none    Transfer of care protocol was followed      Last vitals: Visit Vitals  /66   Pulse 60   Temp 36.3 °C (97.4 °F)   Resp 16   Ht 5' 9" (1.753 m)   Wt 63.5 kg (140 lb)   SpO2 98%   BMI 20.67 kg/m²     "

## 2024-11-11 LAB
FINAL PATHOLOGIC DIAGNOSIS: NORMAL
GROSS: NORMAL
Lab: NORMAL

## 2024-11-11 NOTE — ANESTHESIA POSTPROCEDURE EVALUATION
Anesthesia Post Evaluation    Patient: Sindey Lanza    Procedure(s) Performed: Procedure(s) (LRB):  COLONOSCOPY, SCREENING, HIGH RISK PATIENT (N/A)    Final Anesthesia Type: general      Patient location during evaluation: PACU  Patient participation: Yes- Able to Participate  Level of consciousness: awake and alert  Post-procedure vital signs: reviewed and stable  Pain management: adequate  Airway patency: patent    PONV status at discharge: No PONV  Anesthetic complications: no      Cardiovascular status: stable  Respiratory status: unassisted and spontaneous ventilation  Hydration status: euvolemic  Follow-up not needed.              Vitals Value Taken Time   BP 96/61 11/06/24 1102   Temp 36.3 °C (97.3 °F) 11/06/24 1100   Pulse 63 11/06/24 1102   Resp 15 11/06/24 1102   SpO2 98 % 11/06/24 1102   Vitals shown include unfiled device data.      No case tracking events are documented in the log.      Pain/Timothy Score: No data recorded

## 2024-11-12 ENCOUNTER — LAB VISIT (OUTPATIENT)
Dept: LAB | Facility: HOSPITAL | Age: 79
End: 2024-11-12
Attending: INTERNAL MEDICINE
Payer: MEDICARE

## 2024-11-12 DIAGNOSIS — R79.9 ABNORMAL FINDING OF BLOOD CHEMISTRY, UNSPECIFIED: ICD-10-CM

## 2024-11-12 DIAGNOSIS — Z86.0100 HX OF COLONIC POLYPS: ICD-10-CM

## 2024-11-12 DIAGNOSIS — I10 ESSENTIAL HYPERTENSION: ICD-10-CM

## 2024-11-12 DIAGNOSIS — Z12.11 COLON CANCER SCREENING: ICD-10-CM

## 2024-11-12 DIAGNOSIS — Z12.5 ENCOUNTER FOR SCREENING FOR MALIGNANT NEOPLASM OF PROSTATE: ICD-10-CM

## 2024-11-12 LAB
ALBUMIN SERPL BCP-MCNC: 3.9 G/DL (ref 3.5–5.2)
ALP SERPL-CCNC: 52 U/L (ref 40–150)
ALT SERPL W/O P-5'-P-CCNC: 11 U/L (ref 10–44)
ANION GAP SERPL CALC-SCNC: 10 MMOL/L (ref 8–16)
AST SERPL-CCNC: 19 U/L (ref 10–40)
BASOPHILS # BLD AUTO: 0.04 K/UL (ref 0–0.2)
BASOPHILS NFR BLD: 0.7 % (ref 0–1.9)
BILIRUB SERPL-MCNC: 0.5 MG/DL (ref 0.1–1)
BUN SERPL-MCNC: 25 MG/DL (ref 8–23)
CALCIUM SERPL-MCNC: 9.2 MG/DL (ref 8.7–10.5)
CHLORIDE SERPL-SCNC: 109 MMOL/L (ref 95–110)
CO2 SERPL-SCNC: 22 MMOL/L (ref 23–29)
COMPLEXED PSA SERPL-MCNC: <0.01 NG/ML (ref 0–4)
CREAT SERPL-MCNC: 1.4 MG/DL (ref 0.5–1.4)
DIFFERENTIAL METHOD BLD: ABNORMAL
EOSINOPHIL # BLD AUTO: 0.1 K/UL (ref 0–0.5)
EOSINOPHIL NFR BLD: 2.5 % (ref 0–8)
ERYTHROCYTE [DISTWIDTH] IN BLOOD BY AUTOMATED COUNT: 16.2 % (ref 11.5–14.5)
EST. GFR  (NO RACE VARIABLE): 51.4 ML/MIN/1.73 M^2
ESTIMATED AVG GLUCOSE: 120 MG/DL (ref 68–131)
GLUCOSE SERPL-MCNC: 103 MG/DL (ref 70–110)
HBA1C MFR BLD: 5.8 % (ref 4–5.6)
HCT VFR BLD AUTO: 33.8 % (ref 40–54)
HGB BLD-MCNC: 10.7 G/DL (ref 14–18)
IMM GRANULOCYTES # BLD AUTO: 0.01 K/UL (ref 0–0.04)
IMM GRANULOCYTES NFR BLD AUTO: 0.2 % (ref 0–0.5)
LYMPHOCYTES # BLD AUTO: 2.5 K/UL (ref 1–4.8)
LYMPHOCYTES NFR BLD: 44.6 % (ref 18–48)
MCH RBC QN AUTO: 24 PG (ref 27–31)
MCHC RBC AUTO-ENTMCNC: 31.7 G/DL (ref 32–36)
MCV RBC AUTO: 76 FL (ref 82–98)
MONOCYTES # BLD AUTO: 0.4 K/UL (ref 0.3–1)
MONOCYTES NFR BLD: 7.2 % (ref 4–15)
NEUTROPHILS # BLD AUTO: 2.5 K/UL (ref 1.8–7.7)
NEUTROPHILS NFR BLD: 44.8 % (ref 38–73)
NRBC BLD-RTO: 0 /100 WBC
PLATELET # BLD AUTO: 214 K/UL (ref 150–450)
PMV BLD AUTO: 11.8 FL (ref 9.2–12.9)
POTASSIUM SERPL-SCNC: 4.3 MMOL/L (ref 3.5–5.1)
PROT SERPL-MCNC: 6.8 G/DL (ref 6–8.4)
RBC # BLD AUTO: 4.46 M/UL (ref 4.6–6.2)
SODIUM SERPL-SCNC: 141 MMOL/L (ref 136–145)
WBC # BLD AUTO: 5.52 K/UL (ref 3.9–12.7)

## 2024-11-12 PROCEDURE — 83036 HEMOGLOBIN GLYCOSYLATED A1C: CPT | Performed by: INTERNAL MEDICINE

## 2024-11-12 PROCEDURE — 36415 COLL VENOUS BLD VENIPUNCTURE: CPT | Performed by: INTERNAL MEDICINE

## 2024-11-12 PROCEDURE — 85025 COMPLETE CBC W/AUTO DIFF WBC: CPT | Performed by: INTERNAL MEDICINE

## 2024-11-12 PROCEDURE — 84153 ASSAY OF PSA TOTAL: CPT | Performed by: INTERNAL MEDICINE

## 2024-11-12 PROCEDURE — 80053 COMPREHEN METABOLIC PANEL: CPT | Performed by: INTERNAL MEDICINE

## 2024-11-19 ENCOUNTER — OFFICE VISIT (OUTPATIENT)
Dept: INTERNAL MEDICINE | Facility: CLINIC | Age: 79
End: 2024-11-19
Payer: MEDICARE

## 2024-11-19 VITALS
HEART RATE: 60 BPM | RESPIRATION RATE: 16 BRPM | TEMPERATURE: 97 F | SYSTOLIC BLOOD PRESSURE: 104 MMHG | WEIGHT: 143 LBS | OXYGEN SATURATION: 97 % | HEIGHT: 69 IN | DIASTOLIC BLOOD PRESSURE: 60 MMHG | BODY MASS INDEX: 21.18 KG/M2

## 2024-11-19 DIAGNOSIS — R22.0 LEFT FACIAL SWELLING: ICD-10-CM

## 2024-11-19 DIAGNOSIS — R21 RASH: ICD-10-CM

## 2024-11-19 DIAGNOSIS — L91.8 MULTIPLE ACQUIRED SKIN TAGS: ICD-10-CM

## 2024-11-19 DIAGNOSIS — I10 ESSENTIAL HYPERTENSION: Primary | ICD-10-CM

## 2024-11-19 DIAGNOSIS — N18.31 CHRONIC KIDNEY DISEASE, STAGE 3A: ICD-10-CM

## 2024-11-19 DIAGNOSIS — E78.49 OTHER HYPERLIPIDEMIA: ICD-10-CM

## 2024-11-19 DIAGNOSIS — I42.8 NONISCHEMIC CARDIOMYOPATHY: ICD-10-CM

## 2024-11-19 PROCEDURE — 1159F MED LIST DOCD IN RCRD: CPT | Mod: CPTII,S$GLB,, | Performed by: INTERNAL MEDICINE

## 2024-11-19 PROCEDURE — 99214 OFFICE O/P EST MOD 30 MIN: CPT | Mod: S$GLB,,, | Performed by: INTERNAL MEDICINE

## 2024-11-19 PROCEDURE — 3078F DIAST BP <80 MM HG: CPT | Mod: CPTII,S$GLB,, | Performed by: INTERNAL MEDICINE

## 2024-11-19 PROCEDURE — 1101F PT FALLS ASSESS-DOCD LE1/YR: CPT | Mod: CPTII,S$GLB,, | Performed by: INTERNAL MEDICINE

## 2024-11-19 PROCEDURE — 3074F SYST BP LT 130 MM HG: CPT | Mod: CPTII,S$GLB,, | Performed by: INTERNAL MEDICINE

## 2024-11-19 PROCEDURE — 3288F FALL RISK ASSESSMENT DOCD: CPT | Mod: CPTII,S$GLB,, | Performed by: INTERNAL MEDICINE

## 2024-11-19 PROCEDURE — 99999 PR PBB SHADOW E&M-EST. PATIENT-LVL V: CPT | Mod: PBBFAC,,, | Performed by: INTERNAL MEDICINE

## 2024-11-19 PROCEDURE — 1160F RVW MEDS BY RX/DR IN RCRD: CPT | Mod: CPTII,S$GLB,, | Performed by: INTERNAL MEDICINE

## 2024-11-19 PROCEDURE — 1126F AMNT PAIN NOTED NONE PRSNT: CPT | Mod: CPTII,S$GLB,, | Performed by: INTERNAL MEDICINE

## 2024-11-19 RX ORDER — HYDROXYZINE HYDROCHLORIDE 25 MG/1
25 TABLET, FILM COATED ORAL EVERY 8 HOURS PRN
Qty: 40 TABLET | Refills: 1 | Status: SHIPPED | OUTPATIENT
Start: 2024-11-19

## 2024-11-19 NOTE — PROGRESS NOTES
Subjective:       Patient ID: Sidney Lanza is a 79 y.o. male.    Chief Complaint: Hypertension (4 mos fol up wlabs)    History of Present Illness    CHIEF COMPLAINT:  Sidney presents today for follow-up of hypertension, CHF, and left facial swelling..    WEIGHT MANAGEMENT:  He reports a weight loss of 2 lbs since his last visit in July, dropping from 145 to 143 lbs. He expresses difficulty gaining weight despite conscious efforts to eat more. He maintains a regular eating schedule with a good breakfast and dinner, and attempts to incorporate fruits and vegetables between meals. He has reduced his physical exercise due to concerns about further weight loss, but continues to walk in the morning. He notes a history of more significant weight loss, having previously maintained a weight of 165 lbs before dropping to around 140 lbs, resulting in loose-fitting clothing. He is considering using a yeast protein supplement to help build muscle mass.    GASTROINTESTINAL ISSUES:  He reports ongoing gastric issues, including occasional reflux. He recently underwent a colonoscopy on November 6th with benign findings. The physician recommended repeating the colonoscopy in five years.    FACIAL SWELLING:  He reports experiencing intermittent swelling on the left side of his face, typically occurring in the morning and subsiding by evening. The most recent episode lasted approximately two weeks. He recalls a similar episode about a year ago. He denies any pain associated with the swelling. He describes feeling a small line or aggravation under the affected area before the swelling begins. The swelling is localized to the side of the face and does not involve the eye or lips. He denies experiencing any dry mouth in conjunction with the swelling.    DERMATOLOGICAL CONCERNS:  He reports recurring itchy and irritating rashes, primarily on the neck and under the arm, which periodically appear and then resolve. He also mentions an  increasing number of skin tags, particularly on the neck, which are becoming more widespread and causing irritation. He expresses concern about the relationship between the rashes and his medications.    CARDIOVASCULAR HEALTH:  He denies experiencing chest pain or waking up short of breath during the night. He reports mild shortness of breath with overexertion but no problems with breathing at rest. He has a pacemaker, which was recently checked during his last cardiology appointment.    MEDICATIONS:  He takes Entresto (Sacubitril Valsartan) for his heart condition. He reports occasional delays in prescription refills due to communication issues with the pharmacy. He actively manages this by calling the office early when he notices his medication supply is low. He utilizes a mail-order pharmacy service called [facility] delivery for all his medications, which he finds convenient when working properly.      ROS:  Constitutional: +weight loss  ENT: -dry mouth  Respiratory: -shortness of breath  Cardiovascular: -chest pain  Gastrointestinal: +heartburn  Integumentary: +rash              Physical Exam  Vitals and nursing note reviewed.   Constitutional:       General: He is not in acute distress.     Appearance: Normal appearance. He is well-developed.   HENT:      Head: Normocephalic and atraumatic.      Comments: No facial swelling noted.     Right Ear: External ear normal.      Left Ear: External ear normal.      Mouth/Throat:      Pharynx: Oropharynx is clear. No oropharyngeal exudate.   Eyes:      General: No scleral icterus.     Extraocular Movements: Extraocular movements intact.      Conjunctiva/sclera: Conjunctivae normal.   Neck:      Thyroid: No thyromegaly.      Vascular: No JVD.   Cardiovascular:      Rate and Rhythm: Normal rate and regular rhythm.      Pulses: Normal pulses.      Heart sounds: Normal heart sounds. No murmur heard.     No friction rub. No gallop.   Pulmonary:      Effort: Pulmonary effort is  normal. No respiratory distress.      Breath sounds: Normal breath sounds. No wheezing or rales.   Abdominal:      General: Bowel sounds are normal. There is no distension.      Palpations: Abdomen is soft. There is no mass.      Tenderness: There is no abdominal tenderness. There is no guarding.   Musculoskeletal:         General: No tenderness. Normal range of motion.      Cervical back: Normal range of motion and neck supple.      Right lower leg: No edema.      Left lower leg: No edema.   Lymphadenopathy:      Cervical: No cervical adenopathy.   Skin:     General: Skin is warm and dry.      Findings: No rash.   Neurological:      General: No focal deficit present.      Mental Status: He is alert and oriented to person, place, and time.      Cranial Nerves: No cranial nerve deficit.      Motor: No abnormal muscle tone.      Deep Tendon Reflexes: Reflexes are normal and symmetric.   Psychiatric:         Behavior: Behavior normal.         Thought Content: Thought content normal.           Lab Visit on 11/12/2024   Component Date Value Ref Range Status    Sodium 11/12/2024 141  136 - 145 mmol/L Final    Potassium 11/12/2024 4.3  3.5 - 5.1 mmol/L Final    Chloride 11/12/2024 109  95 - 110 mmol/L Final    CO2 11/12/2024 22 (L)  23 - 29 mmol/L Final    Glucose 11/12/2024 103  70 - 110 mg/dL Final    BUN 11/12/2024 25 (H)  8 - 23 mg/dL Final    Creatinine 11/12/2024 1.4  0.5 - 1.4 mg/dL Final    Calcium 11/12/2024 9.2  8.7 - 10.5 mg/dL Final    Total Protein 11/12/2024 6.8  6.0 - 8.4 g/dL Final    Albumin 11/12/2024 3.9  3.5 - 5.2 g/dL Final    Total Bilirubin 11/12/2024 0.5  0.1 - 1.0 mg/dL Final    Comment: For infants and newborns, interpretation of results should be based  on gestational age, weight and in agreement with clinical  observations.    Premature Infant recommended reference ranges:  Up to 24 hours.............<8.0 mg/dL  Up to 48 hours............<12.0 mg/dL  3-5 days..................<15.0 mg/dL  6-29  days.................<15.0 mg/dL      Alkaline Phosphatase 11/12/2024 52  40 - 150 U/L Final    AST 11/12/2024 19  10 - 40 U/L Final    ALT 11/12/2024 11  10 - 44 U/L Final    eGFR 11/12/2024 51.4 (A)  >60 mL/min/1.73 m^2 Final    Anion Gap 11/12/2024 10  8 - 16 mmol/L Final    WBC 11/12/2024 5.52  3.90 - 12.70 K/uL Final    RBC 11/12/2024 4.46 (L)  4.60 - 6.20 M/uL Final    Hemoglobin 11/12/2024 10.7 (L)  14.0 - 18.0 g/dL Final    Hematocrit 11/12/2024 33.8 (L)  40.0 - 54.0 % Final    MCV 11/12/2024 76 (L)  82 - 98 fL Final    MCH 11/12/2024 24.0 (L)  27.0 - 31.0 pg Final    MCHC 11/12/2024 31.7 (L)  32.0 - 36.0 g/dL Final    RDW 11/12/2024 16.2 (H)  11.5 - 14.5 % Final    Platelets 11/12/2024 214  150 - 450 K/uL Final    MPV 11/12/2024 11.8  9.2 - 12.9 fL Final    Immature Granulocytes 11/12/2024 0.2  0.0 - 0.5 % Final    Gran # (ANC) 11/12/2024 2.5  1.8 - 7.7 K/uL Final    Immature Grans (Abs) 11/12/2024 0.01  0.00 - 0.04 K/uL Final    Comment: Mild elevation in immature granulocytes is non specific and   can be seen in a variety of conditions including stress response,   acute inflammation, trauma and pregnancy. Correlation with other   laboratory and clinical findings is essential.      Lymph # 11/12/2024 2.5  1.0 - 4.8 K/uL Final    Mono # 11/12/2024 0.4  0.3 - 1.0 K/uL Final    Eos # 11/12/2024 0.1  0.0 - 0.5 K/uL Final    Baso # 11/12/2024 0.04  0.00 - 0.20 K/uL Final    nRBC 11/12/2024 0  0 /100 WBC Final    Gran % 11/12/2024 44.8  38.0 - 73.0 % Final    Lymph % 11/12/2024 44.6  18.0 - 48.0 % Final    Mono % 11/12/2024 7.2  4.0 - 15.0 % Final    Eosinophil % 11/12/2024 2.5  0.0 - 8.0 % Final    Basophil % 11/12/2024 0.7  0.0 - 1.9 % Final    Differential Method 11/12/2024 Automated   Final    Hemoglobin A1C 11/12/2024 5.8 (H)  4.0 - 5.6 % Final    Comment: ADA Screening Guidelines:  5.7-6.4%  Consistent with prediabetes  >or=6.5%  Consistent with diabetes    High levels of fetal hemoglobin interfere with  the HbA1C  assay. Heterozygous hemoglobin variants (HbS, HgC, etc)do  not significantly interfere with this assay.   However, presence of multiple variants may affect accuracy.      Estimated Avg Glucose 11/12/2024 120  68 - 131 mg/dL Final    PSA, Screen 11/12/2024 <0.01  0.00 - 4.00 ng/mL Final    Comment: The testing method is a chemiluminescent microparticle immunoassay   manufactured by [employer] and performed on the    or   [employer] system. Values obtained with different assay manufacturers   for   methods may be different and cannot be used interchangeably.  PSA Expected levels:  Hormonal Therapy: <0.05 ng/ml  Prostatectomy: <0.01 ng/ml  Radiation Therapy: <1.00 ng/ml         Assessment & Plan:     Assessment & Plan     Assessed weight loss and appetite, noting patient's difficulty gaining weight despite efforts   Evaluated persistent facial swelling, considering possible angioedema related to Entresto (Sacubitril/Valsartan)   Reviewed lab results, noting improvement in kidney function and HbA1c   Considered dermatological evaluation for recurring rashes and skin tags   Assessed cardiac status, noting stable condition with no significant changes in hemoglobin levels    AGE-RELATED CHANGES:   Discussed normal age-related changes including muscle mass loss starting at age 40 and its impact on weight.   Explained skin tags as a common occurrence with aging, often hereditary.   Sidney to continue current level of physical activity, including morning walks.   Recommend using protein supplements to support muscle mass.   Recommend increasing fluid intake to address potential dehydration issues.    DERMATITIS AND SKIN CONDITIONS:   Started hydroxyzine as needed every 8 hours for itching and rash.   Referred to dermatology for evaluation of rashes and skin tags.    LOCALIZED SWELLING:   Provided information on angioedema as a possible side effect of certain medications, including Entresto.    Referred to throat specialist for evaluation of facial swelling.    MEDICATION MANAGEMENT:   Contact the office if skin specialist suggests medication-related rash, as medication changes may be needed.         Follow up in about 6 months (around 5/19/2025).     Sunny Soto MD

## 2024-12-02 ENCOUNTER — CLINICAL SUPPORT (OUTPATIENT)
Dept: CARDIOLOGY | Facility: HOSPITAL | Age: 79
End: 2024-12-02
Attending: INTERNAL MEDICINE
Payer: MEDICARE

## 2024-12-02 ENCOUNTER — CLINICAL SUPPORT (OUTPATIENT)
Dept: CARDIOLOGY | Facility: HOSPITAL | Age: 79
End: 2024-12-02

## 2024-12-02 DIAGNOSIS — R00.1 BRADYCARDIA, UNSPECIFIED: ICD-10-CM

## 2024-12-02 DIAGNOSIS — I42.8 OTHER CARDIOMYOPATHIES: ICD-10-CM

## 2024-12-02 DIAGNOSIS — Z95.810 PRESENCE OF AUTOMATIC (IMPLANTABLE) CARDIAC DEFIBRILLATOR: ICD-10-CM

## 2024-12-02 PROCEDURE — 93296 REM INTERROG EVL PM/IDS: CPT | Performed by: INTERNAL MEDICINE

## 2024-12-02 PROCEDURE — 93295 DEV INTERROG REMOTE 1/2/MLT: CPT | Mod: ,,, | Performed by: INTERNAL MEDICINE

## 2024-12-30 DIAGNOSIS — I50.20 HFREF (HEART FAILURE WITH REDUCED EJECTION FRACTION): ICD-10-CM

## 2024-12-30 RX ORDER — CARVEDILOL 25 MG/1
25 TABLET ORAL 2 TIMES DAILY WITH MEALS
Qty: 180 TABLET | Refills: 3 | Status: SHIPPED | OUTPATIENT
Start: 2024-12-30 | End: 2025-12-30

## 2024-12-30 RX ORDER — ASPIRIN 81 MG/1
81 TABLET ORAL DAILY
Qty: 90 TABLET | Refills: 3 | Status: SHIPPED | OUTPATIENT
Start: 2024-12-30 | End: 2025-12-30

## 2024-12-30 NOTE — TELEPHONE ENCOUNTER
Please see the attached refill request.   General: well appearing, appears stated age  Cardiovascular: RRR, no murmurs appreciated  Respiratory: CTA B/L

## 2025-01-14 LAB
OHS CV AF BURDEN PERCENT: < 1
OHS CV DC REMOTE DEVICE TYPE: NORMAL
OHS CV RV PACING PERCENT: 1 %

## 2025-02-03 ENCOUNTER — OFFICE VISIT (OUTPATIENT)
Dept: CARDIOLOGY | Facility: CLINIC | Age: 80
End: 2025-02-03
Payer: MEDICARE

## 2025-02-03 VITALS
WEIGHT: 146.19 LBS | OXYGEN SATURATION: 100 % | SYSTOLIC BLOOD PRESSURE: 108 MMHG | HEART RATE: 62 BPM | HEIGHT: 69 IN | BODY MASS INDEX: 21.65 KG/M2 | DIASTOLIC BLOOD PRESSURE: 72 MMHG

## 2025-02-03 DIAGNOSIS — Z95.0 CARDIAC PACEMAKER: ICD-10-CM

## 2025-02-03 DIAGNOSIS — E78.49 OTHER HYPERLIPIDEMIA: ICD-10-CM

## 2025-02-03 DIAGNOSIS — I50.42 CHRONIC COMBINED SYSTOLIC AND DIASTOLIC CHF (CONGESTIVE HEART FAILURE): Primary | ICD-10-CM

## 2025-02-03 DIAGNOSIS — I47.29 NON-SUSTAINED VENTRICULAR TACHYCARDIA: ICD-10-CM

## 2025-02-03 DIAGNOSIS — I10 ESSENTIAL HYPERTENSION: Chronic | ICD-10-CM

## 2025-02-03 DIAGNOSIS — I42.8 NONISCHEMIC CARDIOMYOPATHY: ICD-10-CM

## 2025-02-03 PROCEDURE — 3074F SYST BP LT 130 MM HG: CPT | Mod: CPTII,S$GLB,, | Performed by: PHYSICIAN ASSISTANT

## 2025-02-03 PROCEDURE — 3288F FALL RISK ASSESSMENT DOCD: CPT | Mod: CPTII,S$GLB,, | Performed by: PHYSICIAN ASSISTANT

## 2025-02-03 PROCEDURE — 1101F PT FALLS ASSESS-DOCD LE1/YR: CPT | Mod: CPTII,S$GLB,, | Performed by: PHYSICIAN ASSISTANT

## 2025-02-03 PROCEDURE — 3078F DIAST BP <80 MM HG: CPT | Mod: CPTII,S$GLB,, | Performed by: PHYSICIAN ASSISTANT

## 2025-02-03 PROCEDURE — 99999 PR PBB SHADOW E&M-EST. PATIENT-LVL IV: CPT | Mod: PBBFAC,,, | Performed by: PHYSICIAN ASSISTANT

## 2025-02-03 PROCEDURE — 1159F MED LIST DOCD IN RCRD: CPT | Mod: CPTII,S$GLB,, | Performed by: PHYSICIAN ASSISTANT

## 2025-02-03 PROCEDURE — 1126F AMNT PAIN NOTED NONE PRSNT: CPT | Mod: CPTII,S$GLB,, | Performed by: PHYSICIAN ASSISTANT

## 2025-02-03 PROCEDURE — 1160F RVW MEDS BY RX/DR IN RCRD: CPT | Mod: CPTII,S$GLB,, | Performed by: PHYSICIAN ASSISTANT

## 2025-02-03 PROCEDURE — 99214 OFFICE O/P EST MOD 30 MIN: CPT | Mod: S$GLB,,, | Performed by: PHYSICIAN ASSISTANT

## 2025-02-03 NOTE — PROGRESS NOTES
"    General Cardiology Clinic Note  Reason for Visit: Annual    Last Clinic Visit: 1/30/2024  General Cardiologist: Dr. Ayon    HPI:   Sidney Lanza is a 79 y.o. male who presents for follow up.    Problems:  Nonischemic cardiomyopathy  HFrEF  NSVT s/p ICD  Hypertension  Hyperlipidemia     Interval HPI   Patient presents for routine follow up. He is feeling well. He reports no change since last visit. He still reports mild BOYD with moderate-strenuous exertion, as well as mild lightheadedness with standing quickly. He states he can walk up to 2 miles at a time. He denies CP, orthopnea, PND, edema, weight gain, syncope, near syncope, palpitations. He does get occasional itchy rashes, which he attributes to the Jardiance. No rashes currently. He has an appt schedule with dermatology later this month.     1/30/2024 [facility]  Patient presents for follow up. He does report BOYD with moderate exertion. May be slightly better since last visit. He can walk at least 1/2 mile before needing to stop, but did have to give up weed eating because it was too difficult for him. He is still able to keep up with all the cleaning around the house. Denies orthopnea, PND, edema, weight gain. He has some lightheadedness only if he stands quickly, so he stopped standing quickly. He denies syncope and near syncope. Walks about 15 minutes carrying light weights twice a week.     3/30/2023 [facility]   Patient presents for follow up. Main complaint today is of "gurgling", discomfort, and excessive gas after eating. States is consistently happens after every meal. He has tried Pepto Bismal which did help, but is wondering what is safe to take on a regular basis. Denies nausea, vomiting, diarrhea, constipation. From a cardiac standpoint, he is doing better. He now only has SOB with moderate-strenuous exertion (mopping, cutting the grass, walking more than 3 blocks). He denies orthopnea, PND, pedal edema. He weighs himself at home and weight " fluctuates between 142-144 lbs. He denies CP, palpitations, syncope, near syncope. He has not checked his BP recently, but previously was ranging from 95-110s systolic. He is asymptomatic when it is in the 90s systolic. He is walking laps in his driveway for exercise, walks about 6-8 laps (could do > 20 before HF diagnosis).    1/30/2023 [facility] (Dr. Ayon)  Sidney Lanza is a 77 y.o. male who presents for follow-up of heart failure w reduced EF  .He developed abdominal discomfort and shortness of breath. He had abdominal bloating that he felt was gas, lost his appetite and also felt short of breath.  He had side effects with Jardiance but then ran out of it for about a week.  When he resumed it the side effects did not recur.    11/30/2022 [facility]  Patient presents for follow up. He states shortly after has last appt, he developed a rash, nipple soreness, and a yeast infection in his groin. These side effects lasted about a week before starting to subside. He ended up running out of the Jardiance due to insurance issues, and the yeast infection went away completely. He just started the Jardiance again a few days ago, and so far has not had issues. He still has BOYD with mild exertion. There has been no significant change in this. He has learned to modify his behavior and slow down to avoid dyspnea. He had one night of orthopnea and PND following his last appt, so he just started taking the Lasix daily after that. He denies edema, sudden weight gain, chest pain, syncope, palpitations. He has occasional lightheadedness with standing quickly. BP at home is 110s systolic on average and HR around 75.     10/20/2022 [facility]  Patient presents for follow up and up-titration of GDMT. He reports BOYD with walking one block. He reports PND and orthopnea, which seemed to be worsening three nights ago, so the past two nights he took Lasix which helped. He denies pedal edema, palpitations, chest pain, lightheadedness, syncope, weight  gain. He has not been checking his BP regularly at home or doing daily weights.     ROS:      Review of Systems   Constitutional: Negative for diaphoresis, malaise/fatigue, weight gain and weight loss.   HENT:  Negative for nosebleeds.    Eyes:  Negative for vision loss in left eye, vision loss in right eye and visual disturbance.   Cardiovascular:  Positive for dyspnea on exertion. Negative for chest pain, claudication, irregular heartbeat, leg swelling, near-syncope, orthopnea, palpitations, paroxysmal nocturnal dyspnea and syncope.   Respiratory:  Positive for shortness of breath. Negative for cough, sleep disturbances due to breathing, snoring and wheezing.    Hematologic/Lymphatic: Negative for bleeding problem. Does not bruise/bleed easily.   Skin:  Negative for poor wound healing and rash.   Musculoskeletal:  Negative for muscle cramps and myalgias.   Gastrointestinal:  Negative for bloating, abdominal pain, diarrhea, flatus, heartburn, melena, nausea and vomiting.   Genitourinary:  Negative for hematuria and nocturia.   Neurological:  Positive for light-headedness. Negative for brief paralysis, dizziness, headaches, numbness and weakness.   Psychiatric/Behavioral:  Negative for depression.    Allergic/Immunologic: Negative for hives.       PMH:     Past Medical History:   Diagnosis Date    Arthritis     Chronic rhinitis     Fever blister     Heart failure, unspecified     Hyperlipidemia     Hypertension     Impotence of organic origin     Joint pain     Nuclear sclerosis - Both Eyes 10/23/2013    Prostate cancer     Ulcer     NSAID related     Varicose vein of leg     bilateral legs     Past Surgical History:   Procedure Laterality Date    CATARACT EXTRACTION W/  INTRAOCULAR LENS IMPLANT Right 11/22/2022    Procedure: EXTRACTION, CATARACT, WITH IOL INSERTION;  Surgeon: Estella Silva MD;  Location: [facility];  Service: Ophthalmology;  Laterality: Right;    CATARACT EXTRACTION W/  INTRAOCULAR LENS IMPLANT  Left 1/11/2023    Procedure: EXTRACTION, CATARACT, WITH IOL INSERTION;  Surgeon: Estella Silva MD;  Location: [facility] OR;  Service: Ophthalmology;  Laterality: Left;    COLONOSCOPY N/A 6/13/2018    Procedure: COLONOSCOPY;  Surgeon: Adonis Stack MD;  Location: [facility] ENDO (4TH FLR);  Service: Endoscopy;  Laterality: N/A;    COLONOSCOPY N/A 4/28/2021    Procedure: COLONOSCOPY;  Surgeon: Steven Watson MD;  Location: [facility] ENDO (4TH FLR);  Service: Endoscopy;  Laterality: N/A;  4/25-[facility] uc-inst email-tb    COLONOSCOPY, SCREENING, HIGH RISK PATIENT N/A 11/6/2024    Procedure: COLONOSCOPY, SCREENING, HIGH RISK PATIENT;  Surgeon: Max Clayton MD;  Location: [facility] ENDO (2ND FLR);  Service: Endoscopy;  Laterality: N/A;  Ref by Charla Puga-for heart, Suprep, portal - PC  10/31-pre call complete-confirmed will stop jardiance 11/3/24-no glp1 meds-tb    EYE SURGERY      INSERTION, ICD, DUAL CHAMBER Left 12/8/2022    Procedure: INSERTION, ICD, DUAL CHAMBER;  Surgeon: Chalino Wahl MD;  Location: [facility] EP LAB;  Service: Cardiology;  Laterality: Left;  NICM, dcICD, SJM, anes, MB, 3 Prep    KNEE ARTHROSCOPY W/ DEBRIDEMENT      PROSTATE SURGERY  2006    for prostate cancer    SHOULDER ARTHROSCOPY Right 10/2017    RTC repair with patch    ULNAR NERVE TRANSPOSITION       Allergies:   Review of patient's allergies indicates:  No Known Allergies  Medications:     Current Outpatient Medications on File Prior to Visit   Medication Sig Dispense Refill    aspirin (ECOTRIN) 81 MG EC tablet Take 1 tablet (81 mg total) by mouth once daily. 90 tablet 3    atorvastatin (LIPITOR) 40 MG tablet Take 1 tablet (40 mg total) by mouth every evening. 90 tablet 3    carvediloL (COREG) 25 MG tablet Take 1 tablet (25 mg total) by mouth 2 (two) times daily with meals. 180 tablet 3    FIBER, DEXTRIN, ORAL Take 1 tablet by mouth every morning.      fluticasone propionate (FLONASE) 50 mcg/actuation nasal spray USE 2 SPRAYS IN  BOTH NOSTRILS  ONCE DAILY 48 g 3    furosemide (LASIX) 40 MG tablet Take 1 tablet (40 mg total) by mouth once daily. 90 tablet 3    glucosamine-chondroitin 500-400 mg tablet Take 2 tablets by mouth every morning.      hydrOXYzine HCL (ATARAX) 25 MG tablet Take 1 tablet (25 mg total) by mouth every 8 (eight) hours as needed for Itching (rash). 40 tablet 1    JARDIANCE 10 mg tablet TAKE 1 TABLET BY MOUTH ONCE  DAILY 90 tablet 1    multivitamin capsule Take 1 capsule by mouth every morning.       pantoprazole (PROTONIX) 40 MG tablet TAKE 1 TABLET BY MOUTH ONCE  DAILY FOR REFLUX 90 tablet 3    sacubitriL-valsartan (ENTRESTO)  mg per tablet Take 1 tablet by mouth 2 (two) times daily. 180 tablet 3    spironolactone (ALDACTONE) 25 MG tablet TAKE 1 TABLET BY MOUTH ONCE  DAILY 90 tablet 2     Current Facility-Administered Medications on File Prior to Visit   Medication Dose Route Frequency Provider Last Rate Last Admin    balanced salt irrigation intra-ocular solution 1 drop  1 drop Left Eye On Call Procedure Estella Silva MD        phenylephrine HCL 10% ophthalmic solution 1 drop  1 drop Left Eye PRN Estella Silva MD        sodium chloride 0.9% flush 2 mL  2 mL Intravenous PRN Estella Silva MD         Social History:     Social History     Tobacco Use    Smoking status: Former     Current packs/day: 0.00     Average packs/day: 1 pack/day for 30.0 years (30.0 ttl pk-yrs)     Types: Cigarettes     Start date: 1958     Quit date: 1988     Years since quittin.7    Smokeless tobacco: Never   Substance Use Topics    Alcohol use: Yes     Alcohol/week: 14.0 standard drinks of alcohol     Types: 14 Cans of beer per week     Comment: beer - 2 a night      Family History:     Family History   Problem Relation Name Age of Onset    Cancer Mother          breast    Cancer Father          prostate    Heart disease Father  74        CHF    Diabetes Sister      Heart disease Sister          stroke     "Diabetes Brother      Hypertension Brother      Diabetes Brother      Amblyopia Neg Hx      Blindness Neg Hx      Cataracts Neg Hx      Glaucoma Neg Hx      Macular degeneration Neg Hx      Retinal detachment Neg Hx      Strabismus Neg Hx      Stroke Neg Hx      Thyroid disease Neg Hx      Melanoma Neg Hx      Psoriasis Neg Hx      Lupus Neg Hx       Physical Exam:   /72 (Patient Position: Sitting)   Pulse 62   Ht 5' 9" (1.753 m)   Wt 66.3 kg (146 lb 2.6 oz)   SpO2 100%   BMI 21.58 kg/m²      Physical Exam  Vitals and nursing note reviewed.   Constitutional:       General: He is not in acute distress.     Appearance: Normal appearance.   HENT:      Head: Normocephalic and atraumatic.      Nose: Nose normal.   Eyes:      General: No scleral icterus.     Extraocular Movements: Extraocular movements intact.      Conjunctiva/sclera: Conjunctivae normal.   Neck:      Thyroid: No thyromegaly.      Vascular: No carotid bruit or JVD.   Cardiovascular:      Rate and Rhythm: Normal rate and regular rhythm.      Pulses: Normal pulses.      Heart sounds: No murmur heard.     No friction rub. Gallop present.   Pulmonary:      Effort: Pulmonary effort is normal.      Breath sounds: Normal breath sounds. No wheezing, rhonchi or rales.   Chest:      Chest wall: No tenderness.   Abdominal:      General: Bowel sounds are normal. There is no distension.      Palpations: Abdomen is soft.      Tenderness: There is no abdominal tenderness.   Musculoskeletal:      Cervical back: Neck supple.      Right lower leg: No edema.      Left lower leg: No edema.   Skin:     General: Skin is warm and dry.      Coloration: Skin is not pale.      Findings: No erythema or rash.      Nails: There is no clubbing.   Neurological:      Mental Status: He is alert and oriented to person, place, and time.   Psychiatric:         Mood and Affect: Mood and affect normal.         Behavior: Behavior normal.          Labs:     Lab Results   Component " Value Date     11/12/2024    K 4.3 11/12/2024     11/12/2024    CO2 22 (L) 11/12/2024    BUN 25 (H) 11/12/2024    CREATININE 1.4 11/12/2024    ANIONGAP 10 11/12/2024     Lab Results   Component Value Date    HGBA1C 5.8 (H) 11/12/2024     Lab Results   Component Value Date    BNP 1,537 (H) 09/11/2022    BNP 1,610 (H) 09/01/2022    Lab Results   Component Value Date    WBC 5.52 11/12/2024    HGB 10.7 (L) 11/12/2024    HCT 33.8 (L) 11/12/2024    HCT 47 09/11/2022     11/12/2024    GRAN 2.5 11/12/2024    GRAN 44.8 11/12/2024     Lab Results   Component Value Date    CHOL 113 (L) 07/11/2024    HDL 48 07/11/2024    LDLCALC 53.8 (L) 07/11/2024    TRIG 56 07/11/2024          Imaging:   Echocardiograms:   TTE 3/7/2024    Left Ventricle: The left ventricle is severely dilated. Ventricular mass is normal. Normal wall thickness. Global hypokinesis present. There is severely reduced systolic function with a visually estimated ejection fraction of less than 30%. Ejection fraction by visual approximation is 10%. Grade II diastolic dysfunction.    Right Ventricle: The right ventricular cavity is upper limit of normal in size. Right ventricle wall motion  is normal. Systolic function is normal. Pacemaker lead present in the ventricle. Compared to the echo on 2/12/22, RV systolic function has improved.    Left Atrium: Left atrium is severely dilated.    Mitral Valve: There is mild regurgitation.    Pulmonary Artery: The estimated pulmonary artery systolic pressure is 31 mmHg.    IVC/SVC: Intermediate venous pressure at 8 mmHg.    TTE 12/2/2022  The left ventricle is severely enlarged with eccentric hypertrophy and severely decreased systolic function.  The estimated ejection fraction is 10% or slightly higher.  There is severe left ventricular global hypokinesis.  There is abnormal septal wall motion.  Grade II left ventricular diastolic dysfunction.  Severe left atrial enlargement.  Moderate right ventricular  enlargement with moderately reduced right ventricular systolic function.  Mild mitral regurgitation.  Mild tricuspid regurgitation.  Mild to moderate pulmonic regurgitation.  Elevated central venous pressure (15 mmHg).  The estimated PA systolic pressure is 65 mmHg.  There is moderate-severe pulmonary hypertension.    TTE 9/12/2022  The left ventricle is severely enlarged with eccentric hypertrophy and severely decreased systolic function. The estimated ejection fraction is 10%.  Mild right ventricular enlargement with mildly to moderately reduced right ventricular systolic function.  Grade II left ventricular diastolic dysfunction.  Severe left atrial enlargement.  Mild mitral regurgitation.  There is pulmonary hypertension. The estimated PA systolic pressure is 50 mmHg.  Intermediate central venous pressure (8 mmHg).    TTE 9/2/2022  Severe left atrial enlargement.  The left ventricle is severely enlarged with severely decreased systolic function.  There is severe left ventricular global hypokinesis. The estimated ejection fraction is 20%.  The 3D quantitatively derived ejection fraction is 18%.  Grade II left ventricular diastolic dysfunction.  Mild right ventricular enlargement with normal right ventricular systolic function.  Mild mitral regurgitation.  Mild tricuspid regurgitation.  Intermediate central venous pressure (8 mmHg).  There is pulmonary hypertension. The estimated PA systolic pressure is 49 mmHg.  A short burst of tachycardia with a cycle length of 334 milliseconds was noted at the end of the study.    Stress Tests:   PET Stress Test 10/5/2022    There are no clinically significant perfusion abnormalities. There is a small to moderate (10-15%) amount of mild resting heterogeneity in the basal to apical inferior wall(s) that does not change with stress. This likely represents severe cardiomyopathy, good stress flows and cfr in this distribution suggestive of patent coronary arteries.    The whole  heart absolute myocardial perfusion values averaged 0.52 cc/min/g at rest, which is reduced; 1.04 cc/min/g at stress, which is moderately reduced; and CFR is 2.05 , which is mildly reduced.    CT attenuation images demonstrate mild diffuse coronary calcifications in the LAD territory.    The gated perfusion images showed an ejection fraction of 14% at rest and 14% during stress. A normal ejection fraction is greater than 53%.    There is severe global hypokinesis at rest and during stress.    The LV cavity size is severely enlarged at rest and stress.    The EKG portion of this study is uninterpretable.    During stress, rare PVCs are noted.    The patient reported no chest pain during the stress test.    Caths:   None    Other:  Event monitor 9/13/2022  Symptoms corresponding with normal sinus rhythm.  3 runs of non-sustained VT 9-23 beats    Brain MRI 9/11/2022  No evidence of acute infarct or hemorrhage.     Chronic senescent and microvascular ischemic disease.  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     CTA Stroke 9/11/2022  CTA head: Unremarkable CTA of the head specifically without evidence for proximal significant stenosis or occlusion.     CTA neck: Atherosclerotic plaquing of the carotid bifurcations and proximal ICAs with less than 50% proximal ICA stenosis by NASCET criteria.     Incidental 6-7 mm subsolid nodule right upper lobe with prominent prevascular and questionable precarinal lymph nodes.  Further evaluation with dedicated CT thorax recommended.     CT head: Patchy ill-defined decreased attenuation supratentorial white matter which is nonspecific and may be sequela of chronic ischemic change.  There is no evidence for acute intracranial hemorrhage or sulcal effacement to suggest large territory recent infarction.     Small hypodensity left basal ganglia which may be prior infarction.       Abdominal aorta ultrasound 3/30/2017  There is no evidence of an Abdominal Aortic Aneurysm.     Assessment:      1. Chronic combined systolic and diastolic CHF (congestive heart failure)    2. Nonischemic cardiomyopathy    3. Cardiac pacemaker    4. Non-sustained ventricular tachycardia    5. Essential hypertension    6. Other hyperlipidemia      Plan:     Heart failure with reduced ejection fraction   Nonischemic cardiomyopathy   S/p ICD  NYHA Class 1-2 symptoms. Compensated on exam.   Continue Entresto  mg bid    Continue Coreg 25 mg bid   Continue Spironolactone 25 mg daily.   Continue Jardiance 10 mg daily. He does get intermittent rashes, possibly from Jardiance. Recommend continuing medication for cardiac benefits, unless symptoms become intolerable. He has an appt with dermatology scheduled.   Continue Lasix 40 mg daily.   Limit sodium intake to less then 2 gram sodium and 1500cc fluid restriction.  Graded exercise program as tolerated.  Call if  more than 3 lbs in 1 day or 5 lbs in 1 week.    Nonsustained ventricular tachycardia s/p ICD  No recent sustained arrhythmias. Continue beta blockers. Following with EP    Hypertension  Well controlled on current medications    Hyperlipidemia   At goal  Continue statin    Follow up in one year.     Signed:  Ashley Lagos PA-C  Cardiology   [phone] - General  2/3/2025 12:13 PM

## 2025-02-24 ENCOUNTER — OFFICE VISIT (OUTPATIENT)
Dept: DERMATOLOGY | Facility: CLINIC | Age: 80
End: 2025-02-24
Payer: MEDICARE

## 2025-02-24 ENCOUNTER — OFFICE VISIT (OUTPATIENT)
Dept: OTOLARYNGOLOGY | Facility: CLINIC | Age: 80
End: 2025-02-24
Payer: MEDICARE

## 2025-02-24 VITALS
WEIGHT: 147.25 LBS | SYSTOLIC BLOOD PRESSURE: 107 MMHG | HEART RATE: 60 BPM | BODY MASS INDEX: 21.75 KG/M2 | DIASTOLIC BLOOD PRESSURE: 64 MMHG

## 2025-02-24 DIAGNOSIS — L30.9 DERMATITIS: ICD-10-CM

## 2025-02-24 DIAGNOSIS — K11.20 SUBMANDIBULAR SIALOADENITIS: Primary | ICD-10-CM

## 2025-02-24 DIAGNOSIS — R22.0 LEFT FACIAL SWELLING: ICD-10-CM

## 2025-02-24 PROCEDURE — 1101F PT FALLS ASSESS-DOCD LE1/YR: CPT | Mod: CPTII,S$GLB,, | Performed by: STUDENT IN AN ORGANIZED HEALTH CARE EDUCATION/TRAINING PROGRAM

## 2025-02-24 PROCEDURE — 3288F FALL RISK ASSESSMENT DOCD: CPT | Mod: CPTII,S$GLB,, | Performed by: STUDENT IN AN ORGANIZED HEALTH CARE EDUCATION/TRAINING PROGRAM

## 2025-02-24 PROCEDURE — 99999 PR PBB SHADOW E&M-EST. PATIENT-LVL III: CPT | Mod: PBBFAC,,, | Performed by: STUDENT IN AN ORGANIZED HEALTH CARE EDUCATION/TRAINING PROGRAM

## 2025-02-24 PROCEDURE — 1159F MED LIST DOCD IN RCRD: CPT | Mod: CPTII,S$GLB,, | Performed by: OTOLARYNGOLOGY

## 2025-02-24 PROCEDURE — 1126F AMNT PAIN NOTED NONE PRSNT: CPT | Mod: CPTII,S$GLB,, | Performed by: STUDENT IN AN ORGANIZED HEALTH CARE EDUCATION/TRAINING PROGRAM

## 2025-02-24 PROCEDURE — 1126F AMNT PAIN NOTED NONE PRSNT: CPT | Mod: CPTII,S$GLB,, | Performed by: OTOLARYNGOLOGY

## 2025-02-24 PROCEDURE — 1160F RVW MEDS BY RX/DR IN RCRD: CPT | Mod: CPTII,S$GLB,, | Performed by: STUDENT IN AN ORGANIZED HEALTH CARE EDUCATION/TRAINING PROGRAM

## 2025-02-24 PROCEDURE — 99214 OFFICE O/P EST MOD 30 MIN: CPT | Mod: S$GLB,,, | Performed by: STUDENT IN AN ORGANIZED HEALTH CARE EDUCATION/TRAINING PROGRAM

## 2025-02-24 PROCEDURE — 3078F DIAST BP <80 MM HG: CPT | Mod: CPTII,S$GLB,, | Performed by: OTOLARYNGOLOGY

## 2025-02-24 PROCEDURE — 1159F MED LIST DOCD IN RCRD: CPT | Mod: CPTII,S$GLB,, | Performed by: STUDENT IN AN ORGANIZED HEALTH CARE EDUCATION/TRAINING PROGRAM

## 2025-02-24 PROCEDURE — 99999 PR PBB SHADOW E&M-EST. PATIENT-LVL III: CPT | Mod: PBBFAC,,, | Performed by: OTOLARYNGOLOGY

## 2025-02-24 PROCEDURE — 99204 OFFICE O/P NEW MOD 45 MIN: CPT | Mod: S$GLB,,, | Performed by: OTOLARYNGOLOGY

## 2025-02-24 PROCEDURE — 3074F SYST BP LT 130 MM HG: CPT | Mod: CPTII,S$GLB,, | Performed by: OTOLARYNGOLOGY

## 2025-02-24 PROCEDURE — 1160F RVW MEDS BY RX/DR IN RCRD: CPT | Mod: CPTII,S$GLB,, | Performed by: OTOLARYNGOLOGY

## 2025-02-24 RX ORDER — TRIAMCINOLONE ACETONIDE 1 MG/G
CREAM TOPICAL 2 TIMES DAILY
Qty: 80 G | Refills: 2 | Status: SHIPPED | OUTPATIENT
Start: 2025-02-24

## 2025-02-24 NOTE — PATIENT INSTRUCTIONS
XEROSIS (DRY SKIN)        Definition    Xerosis is the term for dry skin.  We all have a natural oil coating over our skin produced by the skin oil glands.  If this oil is removed, the skin becomes dry which can lead to cracking, which can lead to inflammation.  Xerosis is usually a long-term problem that recurs often, especially in the winter.    Cause    Long hot baths or showers can remove our natural oil and lead to xerosis.  One should never take more than one bath or shower a day and for no longer than ten minutes.  Use of harsh soaps such as Zest, Dial, and Ivory can worsen and cause xerosis.  Cold winter weather worsens xerosis because the amount of moisture contained in cold air is much less than the amount of moisture in warm air.    Treatment    Treatment is intended to restore the natural oil to your skin.  Keep the skin lubricated.    Do not take more than one bath or shower a day.  Use lukewarm water, not hot.  Hot water dries out the skin.    Use a gentle moisturizing soap such as Cetaphil soap, Oil of Olay, Dove, Basis, Ivory moisture care, Restoraderm cleanser.    When toweling dry, dont rub.  Blot the skin so there is still some water left on the skin.  You should apply a moisturizing cream to all of the skin such as Cerave cream, Cetaphil cream, Lipikar Napoleon AP+ Intense Repair Moisturizing Cream or Restoraderm or Eucerin Original Formula cream.   Alpha hydroxyacid lotions, i.e., AmLactin, also work very well for preventing dry skin, but may burn when used on inflamed or reddened skin.    If you like to swim during the winter months, you should not use soap when getting out of the pool.  When you have finished swimming, rinse off the chlorine with cool to warm water.  If this will be the only shower of the day, then you may use Cetaphil or another mild soap to cleanse your skin.  After the shower, apply a moisturizing cream to all of the skin as above.        [address] [facility] [place] [address]/ [phone] FAX/ [email or web address]

## 2025-02-24 NOTE — PROGRESS NOTES
Subjective:      Patient ID:  Sidney Lanza is a 79 y.o. male who presents for   Chief Complaint   Patient presents with    Rash     Pt is here for rash on bilateral forearms       Had a rash in b/l AC fossa and armpits. A/w pruritus. Has had a few episodes. Applied triple antibiotic ointment and it improved    Review of Systems   Skin:  Positive for itching, rash and dry skin.       Objective:   Physical Exam   Constitutional: He appears well-developed and well-nourished. No distress.   Neurological: He is alert and oriented to person, place, and time. He is not disoriented.   Psychiatric: He has a normal mood and affect.   Skin:   Areas Examined (abnormalities noted in diagram):   RUE Inspected  LUE Inspection Performed            Diagram Legend     Erythematous scaling macule/papule c/w actinic keratosis       Vascular papule c/w angioma      Pigmented verrucoid papule/plaque c/w seborrheic keratosis      Yellow umbilicated papule c/w sebaceous hyperplasia      Irregularly shaped tan macule c/w lentigo     1-2 mm smooth white papules consistent with Milia      Movable subcutaneous cyst with punctum c/w epidermal inclusion cyst      Subcutaneous movable cyst c/w pilar cyst      Firm pink to brown papule c/w dermatofibroma      Pedunculated fleshy papule(s) c/w skin tag(s)      Evenly pigmented macule c/w junctional nevus     Mildly variegated pigmented, slightly irregular-bordered macule c/w mildly atypical nevus      Flesh colored to evenly pigmented papule c/w intradermal nevus       Pink pearly papule/plaque c/w basal cell carcinoma      Erythematous hyperkeratotic cursted plaque c/w SCC      Surgical scar with no sign of skin cancer recurrence      Open and closed comedones      Inflammatory papules and pustules      Verrucoid papule consistent consistent with wart     Erythematous eczematous patches and plaques     Dystrophic onycholytic nail with subungual debris c/w onychomycosis     Umbilicated  papule    Erythematous-base heme-crusted tan verrucoid plaque consistent with inflamed seborrheic keratosis     Erythematous Silvery Scaling Plaque c/w Psoriasis     See annotation      Assessment / Plan:        Dermatitis  -     Ambulatory referral/consult to Dermatology  -     triamcinolone acetonide 0.1% (KENALOG) 0.1 % cream; Apply topically 2 (two) times daily. Use to affected areas for up to 2 weeks then take a 1 week break or decrease to 3 times weekly. Do not apply to groin or face. Use to itchy rashes on arms when needed  Dispense: 80 g; Refill: 2    - counseled on dry skin care. Cerave cream bid. Handout provided         Follow up if symptoms worsen or fail to improve.

## 2025-02-24 NOTE — PROGRESS NOTES
History of Present Illness:   Sidney Lanza is a 79 y.o. year old male evaluated in the Otolaryngology-Head and Neck Surgery Clinic at [facility]. The patient was referred by Dr. Soto for evaluation of submandibular sialoadenitis versus angioedema.  Patient reports 2 episodes several months apart of left facial swelling.  He reports that this occurred last year with significant swelling along the jaw.  He was seen in urgent care and was told to do sialagogues and that this was secondary to dehydration and to drink plenty of fluids.  He reports that improved.  He had recurrent episode in November of 2024 and he saw primary care physician.  At that time it was thought it could be secondary to his valsartan.  This was always on the left side with spontaneous resolution in a few days.  He has not had episodes since then.  He denies foul taste in mouth or any dental problems.  He has not had recent dental work.  On review of the chart he was seen by Dr. Wood for this issue 3 years ago in our practice.         Past Medical/Surgical History  Past Medical History:   Diagnosis Date    Arthritis     Chronic rhinitis     Fever blister     Heart failure, unspecified     Hyperlipidemia     Hypertension     Impotence of organic origin     Joint pain     Nuclear sclerosis - Both Eyes 10/23/2013    Prostate cancer     Ulcer     NSAID related     Varicose vein of leg     bilateral legs     His  has a past surgical history that includes Prostate surgery (2006); Knee arthroscopy w/ debridement; Ulnar nerve transposition; Eye surgery; Shoulder arthroscopy (Right, 10/2017); Colonoscopy (N/A, 6/13/2018); Colonoscopy (N/A, 4/28/2021); Cataract extraction w/  intraocular lens implant (Right, 11/22/2022); insertion, icd, dual chamber (Left, 12/8/2022); Cataract extraction w/  intraocular lens implant (Left, 1/11/2023); and colonoscopy, screening, high risk patient (N/A, 11/6/2024).     Past Family/Social History  His  family history includes Cancer in his father and mother; Diabetes in his brother, brother, and sister; Heart disease in his sister; Heart disease (age of onset: 74) in his father; Hypertension in his brother.  He  reports that he quit smoking about 36 years ago. His smoking use included cigarettes. He started smoking about 66 years ago. He has a 30 pack-year smoking history. He has never used smokeless tobacco. He reports current alcohol use of about 14.0 standard drinks of alcohol per week. He reports that he does not use drugs.     Medications/Allergies/Immunizations  His current medication(s) include:   Current Medications[1]     Allergies: Patient has no known allergies.     Immunizations:   Immunization History   Administered Date(s) Administered    COVID-19, MRNA, LN-S, PF (Pfizer) (Gray Cap) 06/14/2022    COVID-19, MRNA, LN-S, PF (Pfizer) (Purple Cap) 01/09/2021, 01/30/2021, 09/28/2021    COVID-19, mRNA, LNP-S, PF (Moderna) Ages 12+ 11/29/2023    COVID-19, mRNA, LNP-S, PF, brant-sucrose, 30 mcg/0.3 mL (Pfizer Ages 12+) 09/30/2024    Influenza (FLUAD) - Quadrivalent - Adjuvanted - PF *Preferred* (65+) 10/05/2020, 10/09/2021, 09/20/2022, 11/07/2023    Influenza - Trivalent - Fluzone High Dose - PF (65 years and older) 10/18/2013, 10/08/2014, 10/06/2015, 11/10/2016, 10/30/2017, 09/28/2018, 10/15/2019, 09/16/2024    Influenza Split 10/17/2007, 10/14/2008, 10/16/2009, 09/13/2011    Pneumococcal Conjugate - 13 Valent 05/10/2016    Pneumococcal Polysaccharide - 23 Valent 04/21/2014    RSVpreF (Arexvy) 09/30/2024    Tdap 05/10/2016    Zoster 04/21/2014    Zoster Recombinant 06/03/2018, 08/30/2018         Review of Systems   Constitutional: Negative for fever, weight loss and weight gain.  Skin: Negative for rash, itchiness, dryness  HENT:  As per HPI  Cardiovascular: Negative for chest pain and dyspnea on exertion .   Respiratory: Is not experiencing shortness of breath.   Gastrointestinal: Negative for nausea and  vomiting.   Neurological: Negative for headaches.   Lymph/Heme: Negative for lymphadenopathy or easy bruising  Musculoskeletal: Negative for joint or muscle pain  Psychiatric: The patient is not nervous/anxious.        All other systems are negative except for that listed in the HPI.      PHYSICAL EXAM:   Vital Signs:  /64 (BP Location: Right arm, Patient Position: Sitting)   Pulse 60   Wt 66.8 kg (147 lb 4.3 oz)   BMI 21.75 kg/m²      General:  Well-developed, well-nourished  Communication and Voice:  Clear pitch and clarity  Hearing: Hearing adequate for verbal communication bilaterally   Inspection:  Normocephalic and atraumatic without mass or lesion  Palpation:  Facial skeleton intact without bony stepoffs  Parotid Glands:  No mass or tenderness  Facial Strength:  Facial motility symmetric and full bilaterally  Pinna:  External ear intact and fully developed  External canal:  Canal is patent with intact skin  Tympanic Membrane:  Clear and mobile  External nose:  No scar or anatomic deformity  Internal Nose:  Septum intact and midline.  No edema, polyp, or rhinorrhea.  TMJ:  No pain to palpation with full mobility  Oral cavity, Lips, Teeth, and Gums:  Mucosa and teeth intact and viable, No lesions, masses or ulcers normal flow from Sudhakar's duct papilla bilaterally  Oropharynx: No erythema or exudate, no masses or ulcerations, non-obstructive tonsils  Nasopharynx:  No mass or lesion with intact mucosa  Hypopharynx:  Not well visualized secondary to gagging  Larynx:  Not well visualized secondary to gagging  Neck, Trachea, Lymphatics:  Midline trachea without mass or lesion, no lymphadenopathy  Thyroid:  No mass or nodularity  Eyes: No nystagmus with equal extraocular motion bilaterally  Neuro/Psych/Balance: Patient oriented and appropriate in interaction;  Appropriate mood and affect;  Gait is intact with no imbalance; Cranial nerves I-XII are intact  Respiratory effort:  Equal inspiration and  expiration without stridor  Peripheral Vascular:  Warm extremities with equal pulses       ASSESSMENT:   1. Submandibular sialoadenitis    2. Left facial swelling            PLAN:   Patient likely has had submandibular sialoadenitis given localization to the left side of the face.  He has had 2 episodes with no preceding history.  On review of the chart this was more than 3 years apart.  This does not warrant CT scan or other intervention.  I recommended continued warm compresses sialagogues and NSAIDs with acute episodes.  Follow up p.r.n.      I believe that Mr. Lanza has a good understanding of the issues involved and I answered all of his questions.     DISCLAIMER: This note was prepared with [employer] voice recognition transcription software. Garbled syntax, mangled pronouns, and other bizarre constructions may be attributed to that software system. While efforts were made to correct any mistakes made by this voice recognition program, some errors and/or omissions may remain in the note that were missed when the note was originally created.           [1]   Current Outpatient Medications   Medication Sig Dispense Refill    aspirin (ECOTRIN) 81 MG EC tablet Take 1 tablet (81 mg total) by mouth once daily. 90 tablet 3    atorvastatin (LIPITOR) 40 MG tablet Take 1 tablet (40 mg total) by mouth every evening. 90 tablet 3    carvediloL (COREG) 25 MG tablet Take 1 tablet (25 mg total) by mouth 2 (two) times daily with meals. 180 tablet 3    empagliflozin (JARDIANCE) 10 mg tablet TAKE 1 TABLET BY MOUTH ONCE  DAILY 90 tablet 0    FIBER, DEXTRIN, ORAL Take 1 tablet by mouth every morning.      fluticasone propionate (FLONASE) 50 mcg/actuation nasal spray USE 2 SPRAYS IN BOTH NOSTRILS  ONCE DAILY 48 g 3    furosemide (LASIX) 40 MG tablet Take 1 tablet (40 mg total) by mouth once daily. 90 tablet 3    glucosamine-chondroitin 500-400 mg tablet Take 2 tablets by mouth every morning.      hydrOXYzine HCL (ATARAX) 25 MG  tablet Take 1 tablet (25 mg total) by mouth every 8 (eight) hours as needed for Itching (rash). 40 tablet 1    multivitamin capsule Take 1 capsule by mouth every morning.       pantoprazole (PROTONIX) 40 MG tablet TAKE 1 TABLET BY MOUTH ONCE  DAILY FOR REFLUX 90 tablet 3    sacubitriL-valsartan (ENTRESTO)  mg per tablet Take 1 tablet by mouth 2 (two) times daily. 180 tablet 3    spironolactone (ALDACTONE) 25 MG tablet TAKE 1 TABLET BY MOUTH ONCE  DAILY 90 tablet 2    triamcinolone acetonide 0.1% (KENALOG) 0.1 % cream Apply topically 2 (two) times daily. Use to affected areas for up to 2 weeks then take a 1 week break or decrease to 3 times weekly. Do not apply to groin or face. Use to itchy rashes on arms when needed 80 g 2     No current facility-administered medications for this visit.     Facility-Administered Medications Ordered in Other Visits   Medication Dose Route Frequency Provider Last Rate Last Admin    balanced salt irrigation intra-ocular solution 1 drop  1 drop Left Eye On Call Procedure Estella Silva MD        phenylephrine HCL 10% ophthalmic solution 1 drop  1 drop Left Eye PRN Estella Silva MD        sodium chloride 0.9% flush 2 mL  2 mL Intravenous PRN Estella Silva MD

## 2025-03-03 ENCOUNTER — CLINICAL SUPPORT (OUTPATIENT)
Dept: CARDIOLOGY | Facility: HOSPITAL | Age: 80
End: 2025-03-03
Payer: MEDICARE

## 2025-03-03 ENCOUNTER — CLINICAL SUPPORT (OUTPATIENT)
Dept: CARDIOLOGY | Facility: HOSPITAL | Age: 80
End: 2025-03-03
Attending: INTERNAL MEDICINE
Payer: MEDICARE

## 2025-03-03 DIAGNOSIS — I42.8 OTHER CARDIOMYOPATHIES: ICD-10-CM

## 2025-03-03 DIAGNOSIS — Z95.810 PRESENCE OF AUTOMATIC (IMPLANTABLE) CARDIAC DEFIBRILLATOR: ICD-10-CM

## 2025-03-03 DIAGNOSIS — R00.1 BRADYCARDIA, UNSPECIFIED: ICD-10-CM

## 2025-03-03 PROCEDURE — 93295 DEV INTERROG REMOTE 1/2/MLT: CPT | Mod: ,,, | Performed by: INTERNAL MEDICINE

## 2025-03-03 PROCEDURE — 93296 REM INTERROG EVL PM/IDS: CPT | Performed by: INTERNAL MEDICINE

## 2025-03-05 DIAGNOSIS — I50.20 HFREF (HEART FAILURE WITH REDUCED EJECTION FRACTION): ICD-10-CM

## 2025-03-06 RX ORDER — SPIRONOLACTONE 25 MG/1
25 TABLET ORAL
Qty: 90 TABLET | Refills: 2 | Status: SHIPPED | OUTPATIENT
Start: 2025-03-06

## 2025-03-06 NOTE — TELEPHONE ENCOUNTER
No care due was identified.  [facility] Embedded Care Due Messages. Reference number: [id number].   3/05/2025 9:40:19 PM CST

## 2025-03-06 NOTE — TELEPHONE ENCOUNTER
Refill Decision Note   Sidney Lanza  is requesting a refill authorization.  Brief Assessment and Rationale for Refill:  Approve     Medication Therapy Plan:       Medication Reconciliation Completed: No   Comments:     No Care Gaps recommended.     Note composed:8:51 AM 03/06/2025

## 2025-03-14 LAB
OHS CV AF BURDEN PERCENT: < 1
OHS CV DC REMOTE DEVICE TYPE: NORMAL
OHS CV RV PACING PERCENT: 1 %

## 2025-04-25 ENCOUNTER — TELEPHONE (OUTPATIENT)
Dept: OPTOMETRY | Facility: CLINIC | Age: 80
End: 2025-04-25
Payer: MEDICARE

## 2025-05-13 ENCOUNTER — LAB VISIT (OUTPATIENT)
Dept: LAB | Facility: HOSPITAL | Age: 80
End: 2025-05-13
Attending: INTERNAL MEDICINE
Payer: MEDICARE

## 2025-05-13 DIAGNOSIS — D51.3 OTHER DIETARY VITAMIN B12 DEFICIENCY ANEMIA: ICD-10-CM

## 2025-05-13 DIAGNOSIS — R79.9 ABNORMAL FINDING OF BLOOD CHEMISTRY, UNSPECIFIED: ICD-10-CM

## 2025-05-13 DIAGNOSIS — I10 ESSENTIAL HYPERTENSION: ICD-10-CM

## 2025-05-13 DIAGNOSIS — E78.49 OTHER HYPERLIPIDEMIA: ICD-10-CM

## 2025-05-13 DIAGNOSIS — N18.31 CHRONIC KIDNEY DISEASE, STAGE 3A: ICD-10-CM

## 2025-05-13 LAB
ABSOLUTE EOSINOPHIL (OHS): 0.12 K/UL
ABSOLUTE MONOCYTE (OHS): 0.54 K/UL (ref 0.3–1)
ABSOLUTE NEUTROPHIL COUNT (OHS): 3.22 K/UL (ref 1.8–7.7)
ALBUMIN SERPL BCP-MCNC: 3.9 G/DL (ref 3.5–5.2)
ALP SERPL-CCNC: 56 UNIT/L (ref 40–150)
ALT SERPL W/O P-5'-P-CCNC: 19 UNIT/L (ref 10–44)
ANION GAP (OHS): 9 MMOL/L (ref 8–16)
AST SERPL-CCNC: 23 UNIT/L (ref 11–45)
BASOPHILS # BLD AUTO: 0.04 K/UL
BASOPHILS NFR BLD AUTO: 0.6 %
BILIRUB SERPL-MCNC: 0.7 MG/DL (ref 0.1–1)
BUN SERPL-MCNC: 20 MG/DL (ref 8–23)
CALCIUM SERPL-MCNC: 9.1 MG/DL (ref 8.7–10.5)
CHLORIDE SERPL-SCNC: 107 MMOL/L (ref 95–110)
CHOLEST SERPL-MCNC: 120 MG/DL (ref 120–199)
CHOLEST/HDLC SERPL: 2.1 {RATIO} (ref 2–5)
CO2 SERPL-SCNC: 24 MMOL/L (ref 23–29)
CREAT SERPL-MCNC: 1.4 MG/DL (ref 0.5–1.4)
EAG (OHS): 120 MG/DL (ref 68–131)
ERYTHROCYTE [DISTWIDTH] IN BLOOD BY AUTOMATED COUNT: 17.3 % (ref 11.5–14.5)
GFR SERPLBLD CREATININE-BSD FMLA CKD-EPI: 51 ML/MIN/1.73/M2
GLUCOSE SERPL-MCNC: 96 MG/DL (ref 70–110)
HBA1C MFR BLD: 5.8 % (ref 4–5.6)
HCT VFR BLD AUTO: 34.3 % (ref 40–54)
HDLC SERPL-MCNC: 56 MG/DL (ref 40–75)
HDLC SERPL: 46.7 % (ref 20–50)
HGB BLD-MCNC: 11.1 GM/DL (ref 14–18)
IMM GRANULOCYTES # BLD AUTO: 0.01 K/UL (ref 0–0.04)
IMM GRANULOCYTES NFR BLD AUTO: 0.1 % (ref 0–0.5)
LDLC SERPL CALC-MCNC: 50.8 MG/DL (ref 63–159)
LYMPHOCYTES # BLD AUTO: 2.83 K/UL (ref 1–4.8)
MCH RBC QN AUTO: 23.8 PG (ref 27–31)
MCHC RBC AUTO-ENTMCNC: 32.4 G/DL (ref 32–36)
MCV RBC AUTO: 74 FL (ref 82–98)
NONHDLC SERPL-MCNC: 64 MG/DL
NUCLEATED RBC (/100WBC) (OHS): 0 /100 WBC
PLATELET # BLD AUTO: 169 K/UL (ref 150–450)
PMV BLD AUTO: 10.7 FL (ref 9.2–12.9)
POTASSIUM SERPL-SCNC: 4.4 MMOL/L (ref 3.5–5.1)
PROT SERPL-MCNC: 6.7 GM/DL (ref 6–8.4)
RBC # BLD AUTO: 4.66 M/UL (ref 4.6–6.2)
RELATIVE EOSINOPHIL (OHS): 1.8 %
RELATIVE LYMPHOCYTE (OHS): 41.9 % (ref 18–48)
RELATIVE MONOCYTE (OHS): 8 % (ref 4–15)
RELATIVE NEUTROPHIL (OHS): 47.6 % (ref 38–73)
SODIUM SERPL-SCNC: 140 MMOL/L (ref 136–145)
TRIGL SERPL-MCNC: 66 MG/DL (ref 30–150)
TSH SERPL-ACNC: 1.25 UIU/ML (ref 0.4–4)
VIT B12 SERPL-MCNC: 1174 PG/ML (ref 210–950)
WBC # BLD AUTO: 6.76 K/UL (ref 3.9–12.7)

## 2025-05-13 PROCEDURE — 82607 VITAMIN B-12: CPT

## 2025-05-13 PROCEDURE — 85025 COMPLETE CBC W/AUTO DIFF WBC: CPT

## 2025-05-13 PROCEDURE — 82040 ASSAY OF SERUM ALBUMIN: CPT

## 2025-05-13 PROCEDURE — 83036 HEMOGLOBIN GLYCOSYLATED A1C: CPT

## 2025-05-13 PROCEDURE — 84443 ASSAY THYROID STIM HORMONE: CPT

## 2025-05-13 PROCEDURE — 82465 ASSAY BLD/SERUM CHOLESTEROL: CPT

## 2025-05-13 PROCEDURE — 36415 COLL VENOUS BLD VENIPUNCTURE: CPT

## 2025-05-20 ENCOUNTER — OFFICE VISIT (OUTPATIENT)
Dept: INTERNAL MEDICINE | Facility: CLINIC | Age: 80
End: 2025-05-20
Payer: MEDICARE

## 2025-05-20 VITALS
TEMPERATURE: 97 F | RESPIRATION RATE: 16 BRPM | SYSTOLIC BLOOD PRESSURE: 100 MMHG | DIASTOLIC BLOOD PRESSURE: 56 MMHG | BODY MASS INDEX: 20.88 KG/M2 | HEIGHT: 69 IN | OXYGEN SATURATION: 97 % | WEIGHT: 141 LBS | HEART RATE: 64 BPM

## 2025-05-20 DIAGNOSIS — I50.20 HFREF (HEART FAILURE WITH REDUCED EJECTION FRACTION): ICD-10-CM

## 2025-05-20 DIAGNOSIS — N18.31 CHRONIC KIDNEY DISEASE, STAGE 3A: ICD-10-CM

## 2025-05-20 DIAGNOSIS — R79.9 ABNORMAL FINDING OF BLOOD CHEMISTRY, UNSPECIFIED: ICD-10-CM

## 2025-05-20 DIAGNOSIS — I10 ESSENTIAL HYPERTENSION: Primary | ICD-10-CM

## 2025-05-20 DIAGNOSIS — N42.9 DISORDER OF PROSTATE, UNSPECIFIED: ICD-10-CM

## 2025-05-20 DIAGNOSIS — I42.8 NONISCHEMIC CARDIOMYOPATHY: ICD-10-CM

## 2025-05-20 DIAGNOSIS — M15.0 PRIMARY OSTEOARTHRITIS INVOLVING MULTIPLE JOINTS: ICD-10-CM

## 2025-05-20 PROCEDURE — 1101F PT FALLS ASSESS-DOCD LE1/YR: CPT | Mod: CPTII,S$GLB,, | Performed by: INTERNAL MEDICINE

## 2025-05-20 PROCEDURE — 99999 PR PBB SHADOW E&M-EST. PATIENT-LVL IV: CPT | Mod: PBBFAC,,, | Performed by: INTERNAL MEDICINE

## 2025-05-20 PROCEDURE — 99214 OFFICE O/P EST MOD 30 MIN: CPT | Mod: S$GLB,,, | Performed by: INTERNAL MEDICINE

## 2025-05-20 PROCEDURE — 3288F FALL RISK ASSESSMENT DOCD: CPT | Mod: CPTII,S$GLB,, | Performed by: INTERNAL MEDICINE

## 2025-05-20 PROCEDURE — 1126F AMNT PAIN NOTED NONE PRSNT: CPT | Mod: CPTII,S$GLB,, | Performed by: INTERNAL MEDICINE

## 2025-05-20 PROCEDURE — 3074F SYST BP LT 130 MM HG: CPT | Mod: CPTII,S$GLB,, | Performed by: INTERNAL MEDICINE

## 2025-05-20 PROCEDURE — 3078F DIAST BP <80 MM HG: CPT | Mod: CPTII,S$GLB,, | Performed by: INTERNAL MEDICINE

## 2025-05-20 RX ORDER — ATORVASTATIN CALCIUM 40 MG/1
40 TABLET, FILM COATED ORAL NIGHTLY
Start: 2025-05-20

## 2025-05-20 NOTE — PROGRESS NOTES
Subjective:       Patient ID: Sidney Lanza is a 79 y.o. male.    Chief Complaint: Hypertension (6 mos wlabs) and Gastroesophageal Reflux (Is on pantoprazole but not sure it is helping enough. Discuss. )    History of Present Illness    CHIEF COMPLAINT:  Sidney presents today for follow-up of chronic medical conditions which include hypertension, CHF, hyperlipidemia, chronic kidney disease, prediabetes, osteoarthritis of the hands.    CARDIOVASCULAR:  He experiences shortness of breath with exertion and orthostatic symptoms including dizziness and lightheadedness when standing up too quickly. Left ventricular EF remains at 10% with no improvement despite medical management targeting 35%.  He exercises 3 days a week.  He walks but he also uses 10 lb hand weights for muscle strengthening.  No PND or orthopnea is described.  No lower extremity edema is noted.  No exertional chest pain is present.    ESSENTIAL HYPERTENSION:  The patient does not routinely monitor his blood pressures.  Current blood pressure medications include carvedilol and spironolactone.  Heart failure medications include Entresto, furosemide, and Jardiance.    MEDICATIONS:  He reports issues with medication refills, particularly his heart medications including Entresto. A recent week-long delay occurred when the pharmacy was unable to contact the prescribing physician's office. He discontinued automatic refill program due to excess medication accumulation and now manages refills manually. Currently, some medications have two refills remaining, while others have one.    EXERCISE:  He walks two miles around a track 3 times per week and performs arm exercises with 10-pound weights.    MUSCULOSKELETAL:  He reports arthritis pain in his fingers. Previously took Arthrotec but discontinued due to heart condition. Has Tylenol available for pain management but rarely uses it.    GASTROINTESTINAL:  He reports regular bowel movements but experiences  persistent gas and stomach rumbling, particularly increased in the evening after dinner, regardless of food choices.      ROS:  Constitutional: +dizziness  ENT: -sore throat  Respiratory: -cough, +exertional dyspnea  Gastrointestinal: -heartburn, +indigestion, +abdominal distention  Musculoskeletal: +joint pain, +joint stiffness, +pain with movement  Neurological: +near-syncope              Physical Exam  Vitals and nursing note reviewed.   Constitutional:       General: He is not in acute distress.     Appearance: Normal appearance. He is well-developed.   HENT:      Head: Normocephalic and atraumatic.   Eyes:      General: No scleral icterus.     Extraocular Movements: Extraocular movements intact.      Conjunctiva/sclera: Conjunctivae normal.   Neck:      Thyroid: No thyromegaly.      Vascular: No JVD.   Cardiovascular:      Rate and Rhythm: Normal rate and regular rhythm.      Heart sounds: Normal heart sounds. No murmur heard.     No friction rub. No gallop.   Pulmonary:      Effort: Pulmonary effort is normal. No respiratory distress.      Breath sounds: Normal breath sounds. No wheezing or rales.   Abdominal:      General: Bowel sounds are normal.      Palpations: Abdomen is soft. There is no mass.      Tenderness: There is no abdominal tenderness.   Musculoskeletal:         General: No tenderness. Normal range of motion.      Cervical back: Normal range of motion and neck supple.      Right lower leg: No edema.      Left lower leg: No edema.      Comments: Hands: Heberden's nodes are present bilaterally at the D IP joints.  Aline's nodes are also noted at the PIP joints.  Joint range of motion is intact.    Shoulders:  Range of motion is intact bilaterally.  No local tenderness is present.   Lymphadenopathy:      Cervical: No cervical adenopathy.   Skin:     General: Skin is warm and dry.      Findings: No rash.   Neurological:      Mental Status: He is alert and oriented to person, place, and time.       Comments: Gait is normal.   Psychiatric:         Mood and Affect: Mood normal.         Behavior: Behavior normal.           Lab Visit on 05/13/2025   Component Date Value Ref Range Status    Sodium 05/13/2025 140  136 - 145 mmol/L Final    Potassium 05/13/2025 4.4  3.5 - 5.1 mmol/L Final    Chloride 05/13/2025 107  95 - 110 mmol/L Final    CO2 05/13/2025 24  23 - 29 mmol/L Final    Glucose 05/13/2025 96  70 - 110 mg/dL Final    BUN 05/13/2025 20  8 - 23 mg/dL Final    Creatinine 05/13/2025 1.4  0.5 - 1.4 mg/dL Final    Calcium 05/13/2025 9.1  8.7 - 10.5 mg/dL Final    Protein Total 05/13/2025 6.7  6.0 - 8.4 gm/dL Final    Albumin 05/13/2025 3.9  3.5 - 5.2 g/dL Final    Bilirubin Total 05/13/2025 0.7  0.1 - 1.0 mg/dL Final    For infants and newborns, interpretation of results should be based   on gestational age, weight and in agreement with clinical   observations.    Premature Infant recommended reference ranges:   0-24 hours:  <8.0 mg/dL   24-48 hours: <12.0 mg/dL   3-5 days:    <15.0 mg/dL   6-29 days:   <15.0 mg/dL    ALP 05/13/2025 56  40 - 150 unit/L Final    AST 05/13/2025 23  11 - 45 unit/L Final    ALT 05/13/2025 19  10 - 44 unit/L Final    Anion Gap 05/13/2025 9  8 - 16 mmol/L Final    eGFR 05/13/2025 51 (L)  >60 mL/min/1.73/m2 Final    Estimated GFR calculated using the CKD-EPI creatinine (2021) equation.    Cholesterol Total 05/13/2025 120  120 - 199 mg/dL Final    The National Cholesterol Education Program (NCEP) has set the  following guidelines (reference ranges) for Cholesterol:  Optimal.....................<200 mg/dL  Borderline High.............200-239 mg/dL  High........................> or = 240 mg/dL    Triglyceride 05/13/2025 66  30 - 150 mg/dL Final    The National Cholesterol Education Program (NCEP) has set the  following guidelines (reference values) for triglycerides:  Normal......................<150 mg/dL  Borderline High.............150-199 mg/dL  High........................200-499  mg/dL    HDL Cholesterol 05/13/2025 56  40 - 75 mg/dL Final    The National Cholesterol Education Program (NCEP) has set the   following guidelines (reference values) for HDL Cholesterol:   Low...............<40 mg/dL   Optimal...........>60 mg/dL    LDL Cholesterol 05/13/2025 50.8 (L)  63.0 - 159.0 mg/dL Final    The National Cholesterol Education Program (NCEP) has set the  following guidelines (reference values) for LDL Cholesterol:  Optimal.......................<130 mg/dL  Borderline High...............130-159 mg/dL  High..........................160-189 mg/dL  Very High.....................>190 mg/dL  LDL calculated using the Friedewald equation.    HDL/Cholesterol Ratio 05/13/2025 46.7  20.0 - 50.0 % Final    Cholesterol/HDL Ratio 05/13/2025 2.1  2.0 - 5.0 Final    Non HDL Cholesterol 05/13/2025 64  mg/dL Final    Risk category and Non-HDL cholesterol goals:  Coronary heart disease (CHD)or equivalent (10-year risk of CHD >20%):  Non-HDL cholesterol goal     <130 mg/dL  Two or more CHD risk factors and 10-year risk of CHD <= 20%:  Non-HDL cholesterol goal     <160 mg/dL  0 to 1 CHD risk factor:  Non-HDL cholesterol goal     <190 mg/dL    TSH 05/13/2025 1.252  0.400 - 4.000 uIU/mL Final    Hemoglobin A1c 05/13/2025 5.8 (H)  4.0 - 5.6 % Final    ADA Screening Guidelines:  5.7-6.4%  Consistent with prediabetes  >=6.5%  Consistent with diabetes    High levels of fetal hemoglobin interfere with the HbA1C  assay. Heterozygous hemoglobin variants (HbS, HgC, etc)do  not significantly interfere with this assay.   However, presence of multiple variants may affect accuracy.    Estimated Average Glucose 05/13/2025 120  68 - 131 mg/dL Final    Vitamin B12 05/13/2025 1,174 (H)  210 - 950 pg/mL Final    WBC 05/13/2025 6.76  3.90 - 12.70 K/uL Final    RBC 05/13/2025 4.66  4.60 - 6.20 M/uL Final    HGB 05/13/2025 11.1 (L)  14.0 - 18.0 gm/dL Final    HCT 05/13/2025 34.3 (L)  40.0 - 54.0 % Final    MCV 05/13/2025 74 (L)  82 - 98  fL Final    MCH 05/13/2025 23.8 (L)  27.0 - 31.0 pg Final    MCHC 05/13/2025 32.4  32.0 - 36.0 g/dL Final    RDW 05/13/2025 17.3 (H)  11.5 - 14.5 % Final    Platelet Count 05/13/2025 169  150 - 450 K/uL Final    MPV 05/13/2025 10.7  9.2 - 12.9 fL Final    Nucleated RBC 05/13/2025 0  <=0 /100 WBC Final    Neut % 05/13/2025 47.6  38 - 73 % Final    Lymph % 05/13/2025 41.9  18 - 48 % Final    Mono % 05/13/2025 8.0  4 - 15 % Final    Eos % 05/13/2025 1.8  <=8 % Final    Basophil % 05/13/2025 0.6  <=1.9 % Final    Imm Grans % 05/13/2025 0.1  0.0 - 0.5 % Final    Neut # 05/13/2025 3.22  1.8 - 7.7 K/uL Final    Lymph # 05/13/2025 2.83  1 - 4.8 K/uL Final    Mono # 05/13/2025 0.54  0.3 - 1 K/uL Final    Eos # 05/13/2025 0.12  <=0.5 K/uL Final    Baso # 05/13/2025 0.04  <=0.2 K/uL Final    Imm Grans # 05/13/2025 0.01  0.00 - 0.04 K/uL Final    Mild elevation in immature granulocytes is non specific and can be seen in a variety of conditions including stress response, acute inflammation, trauma and pregnancy. Correlation with other laboratory and clinical findings is essential.   Clinical Support on 03/03/2025   Component Date Value Ref Range Status    Device Type 03/03/2025 ICD   Final    AF Groom % 03/03/2025 < 1   Final    RV Paccing % 03/03/2025 1.0  % Final       Assessment & Plan:     Assessment & Plan     /56, borderline low but likely due to heart failure medications.   Recent lab results show stable renal function (GFR 51), pre-diabetic A1C (5.8%), and good LDL cholesterol control (50 mg/dL).   Stable heart condition with no changes in EF (remains at 10%).   Joint pain and stiffness in hands, consistent with progression of previously diagnosed osteoarthritis.   Clarified that high vitamin B12 levels are not medically significant.    ## CHRONIC SYSTOLIC HEART FAILURE:   Monitored the patient's blood pressure, which is consistently low with readings of 100/56 and 104/60, likely due to heart failure  medications.   Heart condition remains stable with no improvement or worsening; left chamber function remains at 10%.   Will continue current heart medications at present doses, including Entresto and Carvedilol for heart failure management, with ongoing monitoring of medication effects on blood pressure.    ## DIZZINESS:   Sidney experiences dizziness when rising too quickly.   This occurs with rapid movement, but the patient feels good otherwise.   Will continue to monitor.    ## SHORTNESS OF BREATH:   Sidney experiences shortness of breath (dyspnea) upon exertion, but feels good otherwise when not exerting.   Will continue to monitor.    ## OSTEOARTHRITIS OF THE HAND:   Sidney has joint pain and stiffness in fingers, with nodules and swelling in one finger; no family history reported.   Recommend OTC Tylenol (acetaminophen) for joint pain, either extra strength or arthritis formulation, up to 2 tablets every 8 hours as needed.   Advised to use topical arthritis preparations such as Voltaren gel as needed.   Informed patient that Tylenol is safe for kidneys when managing arthritis pain.    ## ABDOMINAL DISTENSION AND GAS:   Sidney experiences gas and abdominal distension, with flatulence and borborygmi occurring regularly, especially after dinner.   No acid reflux or heartburn reported.   Will continue to monitor.    ## EXERCISE AND GENERAL HEALTH:   Educated on the importance of resistance exercises for maintaining muscle mass.   Sidney to continue current exercise regimen, including walking and light weightlifting.    ## FOLLOW-UP AND TESTS:   Ordered PSA test to be performed at next visit.   Follow up in 6 months for lab work including renal function, blood sugar, hemoglobin A1C, and PSA.         Follow up in about 6 months (around 11/20/2025).     Sunny Soto MD

## 2025-05-30 ENCOUNTER — OFFICE VISIT (OUTPATIENT)
Dept: OPTOMETRY | Facility: CLINIC | Age: 80
End: 2025-05-30
Payer: MEDICARE

## 2025-05-30 DIAGNOSIS — H26.493 POSTERIOR CAPSULAR OPACIFICATION OF BOTH EYES, OBSCURING VISION: Primary | ICD-10-CM

## 2025-05-30 PROCEDURE — 99999 PR PBB SHADOW E&M-EST. PATIENT-LVL III: CPT | Mod: PBBFAC,,, | Performed by: OPTOMETRIST

## 2025-05-30 NOTE — PROGRESS NOTES
HPI    Sidney Lanza is a/an 79 y.o. male in today for ocular health check. Pt   was last seen 02/09/2023 with Dr. Torres. Diagnosis was Status post   cataract extraction of both eyes with insertion of intraocular lens. Pt   states there are no concerning symptoms to report,but does have running   tears OD and sensitivity to light.   (+)Blurred vision  (--)Headaches  (--)Diplopia  (--)Flashes  (--)Floaters  (--)Pain  (--)Itching  (+)Tearing, OD  (--)Burning  (--)Dryness  (--)OTC Drops  (+)Photophobia  Last edited by Danya Torres, OD on 5/30/2025  3:25 PM.            Assessment /Plan     For exam results, see Encounter Report.    Posterior capsular opacification of both eyes, obscuring vision      Educated pt on today's findings and need for YAG CAP OU. Pt's BCVA at last visit with me was 20/100 OD (due to previous retinal hx) and 20/25 OS.    Referred pt to Dr. Abbasi for YAG CAP OU

## 2025-06-02 ENCOUNTER — CLINICAL SUPPORT (OUTPATIENT)
Dept: CARDIOLOGY | Facility: HOSPITAL | Age: 80
End: 2025-06-02
Attending: INTERNAL MEDICINE
Payer: MEDICARE

## 2025-06-02 ENCOUNTER — CLINICAL SUPPORT (OUTPATIENT)
Dept: CARDIOLOGY | Facility: HOSPITAL | Age: 80
End: 2025-06-02
Payer: MEDICARE

## 2025-06-02 DIAGNOSIS — Z95.810 PRESENCE OF AUTOMATIC (IMPLANTABLE) CARDIAC DEFIBRILLATOR: ICD-10-CM

## 2025-06-02 DIAGNOSIS — R00.1 BRADYCARDIA, UNSPECIFIED: ICD-10-CM

## 2025-06-02 DIAGNOSIS — I42.8 OTHER CARDIOMYOPATHIES: ICD-10-CM

## 2025-06-02 PROCEDURE — 93295 DEV INTERROG REMOTE 1/2/MLT: CPT | Mod: ,,, | Performed by: INTERNAL MEDICINE

## 2025-06-02 PROCEDURE — 93296 REM INTERROG EVL PM/IDS: CPT | Performed by: INTERNAL MEDICINE

## 2025-06-05 RX ORDER — SACUBITRIL AND VALSARTAN 97; 103 MG/1; MG/1
1 TABLET, FILM COATED ORAL 2 TIMES DAILY
Qty: 180 TABLET | Refills: 3 | Status: SHIPPED | OUTPATIENT
Start: 2025-06-05 | End: 2026-05-31

## 2025-06-09 DIAGNOSIS — I50.20 HFREF (HEART FAILURE WITH REDUCED EJECTION FRACTION): ICD-10-CM

## 2025-06-09 RX ORDER — FUROSEMIDE 40 MG/1
40 TABLET ORAL DAILY
Qty: 90 TABLET | Refills: 3 | Status: SHIPPED | OUTPATIENT
Start: 2025-06-09 | End: 2026-06-04

## 2025-08-11 DIAGNOSIS — Z95.0 CARDIAC PACEMAKER: Primary | ICD-10-CM

## 2025-08-13 ENCOUNTER — OFFICE VISIT (OUTPATIENT)
Dept: OPHTHALMOLOGY | Facility: CLINIC | Age: 80
End: 2025-08-13
Payer: MEDICARE

## 2025-08-13 DIAGNOSIS — H35.371 EPIRETINAL MEMBRANE, RIGHT EYE: ICD-10-CM

## 2025-08-13 DIAGNOSIS — H26.493 PCO (POSTERIOR CAPSULAR OPACIFICATION), BILATERAL: Primary | ICD-10-CM

## 2025-08-13 DIAGNOSIS — Z96.1 PSEUDOPHAKIA: ICD-10-CM

## 2025-08-13 PROCEDURE — 99999 PR PBB SHADOW E&M-EST. PATIENT-LVL II: CPT | Mod: PBBFAC,,, | Performed by: OPHTHALMOLOGY

## 2025-09-03 ENCOUNTER — OFFICE VISIT (OUTPATIENT)
Dept: OPHTHALMOLOGY | Facility: CLINIC | Age: 80
End: 2025-09-03
Payer: MEDICARE

## 2025-09-03 DIAGNOSIS — Z96.1 PSEUDOPHAKIA: ICD-10-CM

## 2025-09-03 DIAGNOSIS — H35.371 EPIRETINAL MEMBRANE, RIGHT EYE: ICD-10-CM

## 2025-09-03 DIAGNOSIS — H52.7 REFRACTIVE ERROR: ICD-10-CM

## 2025-09-03 DIAGNOSIS — Z98.890 POST-OPERATIVE STATE: Primary | ICD-10-CM

## 2025-09-03 PROCEDURE — 99999 PR PBB SHADOW E&M-EST. PATIENT-LVL I: CPT | Mod: PBBFAC,,, | Performed by: OPHTHALMOLOGY

## (undated) DEVICE — BANDAGE ACE NON LATEX 2IN

## (undated) DEVICE — DRAPE PLASTIC U 60X72

## (undated) DEVICE — PAD DEFIB CADENCE ADULT R2

## (undated) DEVICE — SYR 30CC LUER LOCK

## (undated) DEVICE — ELECTRODE REM PLYHSV RETURN 9

## (undated) DEVICE — DRESSING N ADH OIL EMUL 3X3

## (undated) DEVICE — SOL PVP-I SCRUB 7.5% 4OZ

## (undated) DEVICE — PAD SHOULDER CARE POLAR

## (undated) DEVICE — AQUACEL

## (undated) DEVICE — Device

## (undated) DEVICE — SLING SHOT II LARGE

## (undated) DEVICE — GAUZE SPONGE 4X4 12PLY

## (undated) DEVICE — DRAPE STERI U-SHAPED 47X51IN

## (undated) DEVICE — BLADE SHAVER 4.5 6/BX

## (undated) DEVICE — GLOVE BIOGEL ECLIPSE SZ 6.5

## (undated) DEVICE — BANDAGE CONFORM 3IN STRL

## (undated) DEVICE — APPLICATOR CHLORAPREP ORN 26ML

## (undated) DEVICE — DRESSING SPONGE 8PLY 4X4 STRL

## (undated) DEVICE — SOL IRR NACL .9% 3000ML

## (undated) DEVICE — BLADE SURG CARBON STEEL SZ11

## (undated) DEVICE — SOL BETADINE 5%

## (undated) DEVICE — BLADE ULTRACUT 3.5MM

## (undated) DEVICE — SLING SWATHE UNIVERSAL FOAM

## (undated) DEVICE — SEE MEDLINE ITEM 152529

## (undated) DEVICE — DRESSING TRANS 2X2 TEGADERM

## (undated) DEVICE — SHEATH SAFESHEATH II ULTRA 6FR

## (undated) DEVICE — SAFESHEATH II 8FR 13CM

## (undated) DEVICE — SEE MEDLINE ITEM 157131

## (undated) DEVICE — DRAPE OPHTHALMIC 48X62 FEN

## (undated) DEVICE — SEE MEDLINE ITEM 146268

## (undated) DEVICE — IMMOBILIZER HAND

## (undated) DEVICE — PAD ABD 8X10 STERILE

## (undated) DEVICE — BANDAGE KERLIX P/P 2.25IN STER

## (undated) DEVICE — NDL SPINAL 18GX3.5 SPINOCAN

## (undated) DEVICE — TAPE SURG MEDIPORE 6X72IN

## (undated) DEVICE — SYR SLIP TIP 1CC

## (undated) DEVICE — DRAPE STERI-DRAPE 1000 17X11IN

## (undated) DEVICE — SEE MEDLINE ITEM 157216

## (undated) DEVICE — TOURNIQUET SB QC DP 18X4IN

## (undated) DEVICE — TUBE SET INFLOW/OUTFLOW

## (undated) DEVICE — SEE MEDLINE ITEM 146313

## (undated) DEVICE — NDL SUREFIRE SCORPION RC

## (undated) DEVICE — ADHESIVE DERMABOND ADVANCED

## (undated) DEVICE — DRESSING AQUACEL AG ADV 3.5X6

## (undated) DEVICE — SEE MEDLINE ITEM 157160

## (undated) DEVICE — TRAY MINOR ORTHO

## (undated) DEVICE — PAD CAST SPECIALIST STRL 4

## (undated) DEVICE — DRAPE INCISE IOBAN 2 23X17IN

## (undated) DEVICE — PACK PACER PERMANENT